# Patient Record
Sex: FEMALE | Race: WHITE | NOT HISPANIC OR LATINO
[De-identification: names, ages, dates, MRNs, and addresses within clinical notes are randomized per-mention and may not be internally consistent; named-entity substitution may affect disease eponyms.]

---

## 2017-03-16 ENCOUNTER — MYAURORA ACCOUNT LINK (OUTPATIENT)
Dept: OTHER | Age: 62
End: 2017-03-16

## 2017-04-12 ENCOUNTER — PRIOR ORIGINAL RECORDS (OUTPATIENT)
Dept: OTHER | Age: 62
End: 2017-04-12

## 2017-04-14 ENCOUNTER — PRIOR ORIGINAL RECORDS (OUTPATIENT)
Dept: OTHER | Age: 62
End: 2017-04-14

## 2017-04-15 ENCOUNTER — MYAURORA ACCOUNT LINK (OUTPATIENT)
Dept: OTHER | Age: 62
End: 2017-04-15

## 2017-04-24 ENCOUNTER — EKG ENCOUNTER (OUTPATIENT)
Dept: LAB | Facility: HOSPITAL | Age: 62
End: 2017-04-24
Attending: NURSE PRACTITIONER
Payer: COMMERCIAL

## 2017-04-24 PROCEDURE — 93005 ELECTROCARDIOGRAM TRACING: CPT

## 2017-04-24 PROCEDURE — 93010 ELECTROCARDIOGRAM REPORT: CPT | Performed by: INTERNAL MEDICINE

## 2017-09-08 ENCOUNTER — APPOINTMENT (OUTPATIENT)
Dept: LAB | Facility: HOSPITAL | Age: 62
End: 2017-09-08
Attending: NURSE PRACTITIONER
Payer: COMMERCIAL

## 2017-09-08 DIAGNOSIS — F33.1 MODERATE EPISODE OF RECURRENT MAJOR DEPRESSIVE DISORDER (HCC): ICD-10-CM

## 2017-09-08 LAB
ATRIAL RATE: 81 BPM
P AXIS: 57 DEGREES
P-R INTERVAL: 142 MS
Q-T INTERVAL: 384 MS
QRS DURATION: 92 MS
QTC CALCULATION (BEZET): 446 MS
R AXIS: -22 DEGREES
T AXIS: 105 DEGREES
VENTRICULAR RATE: 81 BPM

## 2017-09-08 PROCEDURE — 93005 ELECTROCARDIOGRAM TRACING: CPT

## 2017-09-08 PROCEDURE — 93010 ELECTROCARDIOGRAM REPORT: CPT | Performed by: INTERNAL MEDICINE

## 2017-09-13 ENCOUNTER — HOSPITAL ENCOUNTER (INPATIENT)
Facility: HOSPITAL | Age: 62
LOS: 6 days | Discharge: HOME | DRG: 247 | End: 2017-09-20
Attending: EMERGENCY MEDICINE | Admitting: INTERNAL MEDICINE
Payer: COMMERCIAL

## 2017-09-13 ENCOUNTER — APPOINTMENT (OUTPATIENT)
Dept: NUCLEAR MEDICINE | Facility: HOSPITAL | Age: 62
DRG: 247 | End: 2017-09-13
Attending: EMERGENCY MEDICINE
Payer: COMMERCIAL

## 2017-09-13 ENCOUNTER — APPOINTMENT (OUTPATIENT)
Dept: GENERAL RADIOLOGY | Facility: HOSPITAL | Age: 62
DRG: 247 | End: 2017-09-13
Attending: EMERGENCY MEDICINE
Payer: COMMERCIAL

## 2017-09-13 DIAGNOSIS — R06.00 DYSPNEA, UNSPECIFIED TYPE: Primary | ICD-10-CM

## 2017-09-13 LAB
ALBUMIN SERPL-MCNC: 2.9 G/DL (ref 3.5–4.8)
ALP LIVER SERPL-CCNC: 145 U/L (ref 50–130)
ALT SERPL-CCNC: 36 U/L (ref 14–54)
AST SERPL-CCNC: 32 U/L (ref 15–41)
BILIRUB SERPL-MCNC: 0.6 MG/DL (ref 0.1–2)
BILIRUB UR QL STRIP.AUTO: NEGATIVE
BUN BLD-MCNC: 36 MG/DL (ref 8–20)
CALCIUM BLD-MCNC: 8.7 MG/DL (ref 8.3–10.3)
CHLORIDE: 105 MMOL/L (ref 101–111)
CO2: 26 MMOL/L (ref 22–32)
COLOR UR AUTO: YELLOW
CREAT BLD-MCNC: 2.47 MG/DL (ref 0.55–1.02)
D-DIMER: 0.98 UG/ML FEU (ref 0–0.49)
GLUCOSE BLD-MCNC: 202 MG/DL (ref 70–99)
GLUCOSE UR STRIP.AUTO-MCNC: 50 MG/DL
KETONES UR STRIP.AUTO-MCNC: NEGATIVE MG/DL
M PROTEIN MFR SERPL ELPH: 7.3 G/DL (ref 6.1–8.3)
NITRITE UR QL STRIP.AUTO: NEGATIVE
PH UR STRIP.AUTO: 7 [PH] (ref 4.5–8)
POTASSIUM SERPL-SCNC: 4.7 MMOL/L (ref 3.6–5.1)
PROT UR STRIP.AUTO-MCNC: 100 MG/DL
RBC UR QL AUTO: NEGATIVE
SODIUM SERPL-SCNC: 141 MMOL/L (ref 136–144)
SP GR UR STRIP.AUTO: 1.01 (ref 1–1.03)
TROPONIN: <0.046 NG/ML (ref ?–0.05)
UROBILINOGEN UR STRIP.AUTO-MCNC: <2 MG/DL

## 2017-09-13 PROCEDURE — 71010 XR CHEST AP PORTABLE  (CPT=71010): CPT | Performed by: EMERGENCY MEDICINE

## 2017-09-13 PROCEDURE — 78582 LUNG VENTILAT&PERFUS IMAGING: CPT | Performed by: EMERGENCY MEDICINE

## 2017-09-13 RX ORDER — HYDRALAZINE HYDROCHLORIDE 20 MG/ML
20 INJECTION INTRAMUSCULAR; INTRAVENOUS ONCE
Status: COMPLETED | OUTPATIENT
Start: 2017-09-13 | End: 2017-09-13

## 2017-09-13 RX ORDER — HYDRALAZINE HYDROCHLORIDE 20 MG/ML
20 INJECTION INTRAMUSCULAR; INTRAVENOUS ONCE
Status: COMPLETED | OUTPATIENT
Start: 2017-09-13 | End: 2017-09-14

## 2017-09-13 RX ORDER — METHYLPREDNISOLONE SODIUM SUCCINATE 125 MG/2ML
125 INJECTION, POWDER, LYOPHILIZED, FOR SOLUTION INTRAMUSCULAR; INTRAVENOUS ONCE
Status: COMPLETED | OUTPATIENT
Start: 2017-09-13 | End: 2017-09-13

## 2017-09-13 RX ORDER — LORAZEPAM 2 MG/ML
1 INJECTION INTRAMUSCULAR ONCE
Status: COMPLETED | OUTPATIENT
Start: 2017-09-13 | End: 2017-09-13

## 2017-09-14 ENCOUNTER — APPOINTMENT (OUTPATIENT)
Dept: CV DIAGNOSTICS | Facility: HOSPITAL | Age: 62
DRG: 247 | End: 2017-09-14
Attending: INTERNAL MEDICINE
Payer: COMMERCIAL

## 2017-09-14 PROBLEM — R06.00 DYSPNEA, UNSPECIFIED TYPE: Status: ACTIVE | Noted: 2017-09-14

## 2017-09-14 LAB
BUN BLD-MCNC: 39 MG/DL (ref 8–20)
CALCIUM BLD-MCNC: 9.4 MG/DL (ref 8.3–10.3)
CHLORIDE: 105 MMOL/L (ref 101–111)
CO2: 23 MMOL/L (ref 22–32)
CREAT BLD-MCNC: 2.47 MG/DL (ref 0.55–1.02)
ERYTHROCYTE [DISTWIDTH] IN BLOOD BY AUTOMATED COUNT: 13.8 % (ref 11.5–16)
EST. AVERAGE GLUCOSE BLD GHB EST-MCNC: 163 MG/DL (ref 68–126)
GLUCOSE BLD-MCNC: 197 MG/DL (ref 65–99)
GLUCOSE BLD-MCNC: 246 MG/DL (ref 65–99)
GLUCOSE BLD-MCNC: 247 MG/DL (ref 65–99)
GLUCOSE BLD-MCNC: 306 MG/DL (ref 65–99)
GLUCOSE BLD-MCNC: 365 MG/DL (ref 70–99)
HBA1C MFR BLD HPLC: 7.3 % (ref ?–5.7)
HCT VFR BLD AUTO: 47.5 % (ref 34–50)
HGB BLD-MCNC: 15.6 G/DL (ref 12–16)
MCH RBC QN AUTO: 30.6 PG (ref 27–33.2)
MCHC RBC AUTO-ENTMCNC: 32.8 G/DL (ref 31–37)
MCV RBC AUTO: 93.1 FL (ref 81–100)
PLATELET # BLD AUTO: 214 10(3)UL (ref 150–450)
POTASSIUM SERPL-SCNC: 5.3 MMOL/L (ref 3.6–5.1)
RBC # BLD AUTO: 5.1 X10(6)UL (ref 3.8–5.1)
RED CELL DISTRIBUTION WIDTH-SD: 47 FL (ref 35.1–46.3)
SODIUM SERPL-SCNC: 139 MMOL/L (ref 136–144)
WBC # BLD AUTO: 6.2 X10(3) UL (ref 4–13)

## 2017-09-14 PROCEDURE — 99254 IP/OBS CNSLTJ NEW/EST MOD 60: CPT | Performed by: INTERNAL MEDICINE

## 2017-09-14 PROCEDURE — 93306 TTE W/DOPPLER COMPLETE: CPT | Performed by: INTERNAL MEDICINE

## 2017-09-14 RX ORDER — ATORVASTATIN CALCIUM 20 MG/1
20 TABLET, FILM COATED ORAL DAILY
COMMUNITY
End: 2017-09-27

## 2017-09-14 RX ORDER — ALLOPURINOL 300 MG/1
150 TABLET ORAL DAILY
Status: DISCONTINUED | OUTPATIENT
Start: 2017-09-14 | End: 2017-09-20

## 2017-09-14 RX ORDER — SODIUM PHOSPHATE, DIBASIC AND SODIUM PHOSPHATE, MONOBASIC 7; 19 G/133ML; G/133ML
1 ENEMA RECTAL ONCE AS NEEDED
Status: DISCONTINUED | OUTPATIENT
Start: 2017-09-14 | End: 2017-09-20

## 2017-09-14 RX ORDER — PANTOPRAZOLE SODIUM 40 MG/1
40 TABLET, DELAYED RELEASE ORAL
Status: DISCONTINUED | OUTPATIENT
Start: 2017-09-14 | End: 2017-09-20

## 2017-09-14 RX ORDER — ACETAMINOPHEN 325 MG/1
650 TABLET ORAL EVERY 4 HOURS PRN
Status: DISCONTINUED | OUTPATIENT
Start: 2017-09-14 | End: 2017-09-20

## 2017-09-14 RX ORDER — HYDROCODONE BITARTRATE AND ACETAMINOPHEN 5; 325 MG/1; MG/1
2 TABLET ORAL EVERY 4 HOURS PRN
Status: DISCONTINUED | OUTPATIENT
Start: 2017-09-14 | End: 2017-09-20

## 2017-09-14 RX ORDER — OMEGA-3-ACID ETHYL ESTERS 1 G/1
2 CAPSULE, LIQUID FILLED ORAL 2 TIMES DAILY
COMMUNITY
End: 2018-02-14

## 2017-09-14 RX ORDER — IPRATROPIUM BROMIDE AND ALBUTEROL SULFATE 2.5; .5 MG/3ML; MG/3ML
3 SOLUTION RESPIRATORY (INHALATION) EVERY 4 HOURS PRN
Status: DISCONTINUED | OUTPATIENT
Start: 2017-09-14 | End: 2017-09-20

## 2017-09-14 RX ORDER — HYDRALAZINE HYDROCHLORIDE 20 MG/ML
10 INJECTION INTRAMUSCULAR; INTRAVENOUS EVERY 6 HOURS PRN
Status: DISCONTINUED | OUTPATIENT
Start: 2017-09-14 | End: 2017-09-20

## 2017-09-14 RX ORDER — DEXTROSE MONOHYDRATE 25 G/50ML
50 INJECTION, SOLUTION INTRAVENOUS
Status: DISCONTINUED | OUTPATIENT
Start: 2017-09-14 | End: 2017-09-20

## 2017-09-14 RX ORDER — ASPIRIN 81 MG/1
81 TABLET ORAL NIGHTLY
Status: DISCONTINUED | OUTPATIENT
Start: 2017-09-14 | End: 2017-09-20

## 2017-09-14 RX ORDER — LABETALOL HYDROCHLORIDE 5 MG/ML
10 INJECTION, SOLUTION INTRAVENOUS EVERY 6 HOURS PRN
Status: DISCONTINUED | OUTPATIENT
Start: 2017-09-14 | End: 2017-09-20

## 2017-09-14 RX ORDER — ACETAMINOPHEN 325 MG/1
650 TABLET ORAL EVERY 6 HOURS PRN
Status: DISCONTINUED | OUTPATIENT
Start: 2017-09-14 | End: 2017-09-20

## 2017-09-14 RX ORDER — POLYETHYLENE GLYCOL 3350 17 G/17G
17 POWDER, FOR SOLUTION ORAL DAILY PRN
Status: DISCONTINUED | OUTPATIENT
Start: 2017-09-14 | End: 2017-09-20

## 2017-09-14 RX ORDER — DOCUSATE SODIUM 100 MG/1
100 CAPSULE, LIQUID FILLED ORAL 2 TIMES DAILY
Status: DISCONTINUED | OUTPATIENT
Start: 2017-09-14 | End: 2017-09-20

## 2017-09-14 RX ORDER — ONDANSETRON 2 MG/ML
4 INJECTION INTRAMUSCULAR; INTRAVENOUS EVERY 6 HOURS PRN
Status: DISCONTINUED | OUTPATIENT
Start: 2017-09-14 | End: 2017-09-20

## 2017-09-14 RX ORDER — PRASUGREL HCL 10 MG
10 TABLET ORAL DAILY
COMMUNITY
Start: 2017-08-17 | End: 2021-04-12

## 2017-09-14 RX ORDER — BISACODYL 10 MG
10 SUPPOSITORY, RECTAL RECTAL
Status: DISCONTINUED | OUTPATIENT
Start: 2017-09-14 | End: 2017-09-20

## 2017-09-14 RX ORDER — HYDRALAZINE HYDROCHLORIDE 20 MG/ML
INJECTION INTRAMUSCULAR; INTRAVENOUS
Status: COMPLETED
Start: 2017-09-14 | End: 2017-09-14

## 2017-09-14 RX ORDER — DIAZEPAM 5 MG/1
5 TABLET ORAL NIGHTLY
COMMUNITY
End: 2017-11-29

## 2017-09-14 RX ORDER — DIAZEPAM 5 MG/1
2.5 TABLET ORAL EVERY MORNING
COMMUNITY
End: 2017-11-29

## 2017-09-14 RX ORDER — HYDRALAZINE HYDROCHLORIDE 25 MG/1
25 TABLET, FILM COATED ORAL EVERY 8 HOURS SCHEDULED
Status: DISCONTINUED | OUTPATIENT
Start: 2017-09-14 | End: 2017-09-15

## 2017-09-14 RX ORDER — DIAZEPAM 5 MG/1
2.5 TABLET ORAL EVERY 12 HOURS PRN
Status: DISCONTINUED | OUTPATIENT
Start: 2017-09-14 | End: 2017-09-20

## 2017-09-14 RX ORDER — ALLOPURINOL 300 MG/1
150 TABLET ORAL DAILY
COMMUNITY
End: 2017-09-27

## 2017-09-14 RX ORDER — LEVOTHYROXINE SODIUM 0.07 MG/1
75 TABLET ORAL
Status: DISCONTINUED | OUTPATIENT
Start: 2017-09-14 | End: 2017-09-20

## 2017-09-14 RX ORDER — LABETALOL HYDROCHLORIDE 5 MG/ML
20 INJECTION, SOLUTION INTRAVENOUS ONCE
Status: COMPLETED | OUTPATIENT
Start: 2017-09-14 | End: 2017-09-14

## 2017-09-14 RX ORDER — HYDROCODONE BITARTRATE AND ACETAMINOPHEN 5; 325 MG/1; MG/1
1 TABLET ORAL EVERY 4 HOURS PRN
Status: DISCONTINUED | OUTPATIENT
Start: 2017-09-14 | End: 2017-09-20

## 2017-09-14 RX ORDER — METOPROLOL SUCCINATE 50 MG/1
50 TABLET, EXTENDED RELEASE ORAL
Status: DISCONTINUED | OUTPATIENT
Start: 2017-09-14 | End: 2017-09-17

## 2017-09-14 RX ORDER — METHYLPHENIDATE HYDROCHLORIDE 5 MG/1
15 TABLET ORAL 2 TIMES DAILY
Status: DISCONTINUED | OUTPATIENT
Start: 2017-09-14 | End: 2017-09-14

## 2017-09-14 RX ORDER — HEPARIN SODIUM 5000 [USP'U]/ML
5000 INJECTION, SOLUTION INTRAVENOUS; SUBCUTANEOUS EVERY 8 HOURS SCHEDULED
Status: DISCONTINUED | OUTPATIENT
Start: 2017-09-14 | End: 2017-09-20

## 2017-09-14 RX ORDER — IMIPRAMINE HCL 50 MG
50 TABLET ORAL NIGHTLY
Status: DISCONTINUED | OUTPATIENT
Start: 2017-09-14 | End: 2017-09-14

## 2017-09-14 RX ORDER — LEVOTHYROXINE SODIUM 0.07 MG/1
75 TABLET ORAL
COMMUNITY
End: 2017-11-08 | Stop reason: DRUGHIGH

## 2017-09-14 RX ORDER — PRASUGREL 10 MG/1
10 TABLET, FILM COATED ORAL DAILY
Status: DISCONTINUED | OUTPATIENT
Start: 2017-09-14 | End: 2017-09-20

## 2017-09-14 RX ORDER — ZOLPIDEM TARTRATE 5 MG/1
5 TABLET ORAL NIGHTLY PRN
Status: DISCONTINUED | OUTPATIENT
Start: 2017-09-14 | End: 2017-09-20

## 2017-09-14 RX ORDER — LOSARTAN POTASSIUM 50 MG/1
50 TABLET ORAL DAILY
Status: DISCONTINUED | OUTPATIENT
Start: 2017-09-14 | End: 2017-09-15

## 2017-09-14 RX ORDER — ALBUTEROL SULFATE 90 UG/1
1 AEROSOL, METERED RESPIRATORY (INHALATION) EVERY 6 HOURS PRN
Status: DISCONTINUED | OUTPATIENT
Start: 2017-09-14 | End: 2017-09-20

## 2017-09-14 RX ORDER — ATORVASTATIN CALCIUM 20 MG/1
20 TABLET, FILM COATED ORAL DAILY
Status: DISCONTINUED | OUTPATIENT
Start: 2017-09-14 | End: 2017-09-19

## 2017-09-14 NOTE — CONSULTS
[facility]  Report of Consultation    Elza Parma Patient Status:  Inpatient    1955 MRN [id number]   [place] 7NE-A Attending Biju Gomez MD   Hosp Day # 0 PCP Zaid Min MD     Reason for Consultation:  HTN    Hi ELUTING STENT EA ADDL  1973:  IMPACT TOOTH REM BONY W/COMP Bilateral      Comment: wisdom teeth  3/24/16: KNEE REPLACEMENT SURGERY      Comment: right TKA   5/31/2011: LEFT HEART CATH,PERCUTANEOUS      Comment: CAD  3/2/2015: LEFT HEART CATH,PERCUTANEOUS  N Before breakfast  •  Methylphenidate HCl (RITALIN) tab 15 mg, 15 mg, Oral, BID  •  Metoprolol Succinate ER (Toprol XL) 24 hr tab 50 mg, 50 mg, Oral, 2x Daily(Beta Blocker)  •  Pantoprazole Sodium (PROTONIX) EC tab 40 mg, 40 mg, Oral, QAM AC  •  glucose (DE except as described above in HPI. Physical Exam:  Blood pressure (!) 168/101, pulse 99, temperature 98.7 °F (37.1 °C), temperature source Oral, resp.  rate 19, height 5' 4\" (1.626 m), weight 220 lb 7.4 oz (100 kg), SpO2 98 %, not currently breastfeeding

## 2017-09-14 NOTE — ED PROVIDER NOTES
Patient Seen in: [facility] Emergency Department    History   Patient presents with:  Dyspnea NEFTALY SOB (respiratory)    Stated Complaint: NEFTALY    HPI    This is a 75-year-old female who arrives here with complaints of shortness of breath the patient h METAL STENT (BMS)  3/3/2015: CATH PERCUTANEOUS  TRANSLUMINAL CORONARY ANGIO*  No date: COLONOSCOPY  3/5/2015:  PLMT CORONARY DRUG ELUTING STENT EA ADDL  1973:  IMPACT TOOTH REM BONY W/COMP Bilateral      Comment: wisdom teeth  3/24/16: KNEE REPLACEMENT GARCIA Resp 26   Ht 162.6 cm (5' 4\")   Wt 95.3 kg   SpO2 98%   BMI 36.05 kg/m²         Physical Exam  General: . Slightly obese female in no pain put in some mild respiratory distress  .  The patient is not septic or toxic    HEENT: Atraumatic, conjunctiva are n radiograph was obtained. COMPARISON:  [facility] , XR CHEST AP PORTABLE  (CPT=71010), 7/16/2014, 14:17. INDICATIONS:  NEFTALY  PATIENT STATED HISTORY: (As transcribed by Technologist)  Patient complains of difficulty breathing.     FINDINGS:  Monitoring device pr specified.     Medications Prescribed:  Current Discharge Medication List

## 2017-09-14 NOTE — CONSULTS
[facility]  Report of Consultation    Sheron Borjas Patient Status:  Inpatient    1955 MRN [id number]   [facility] 7NE-A Attending Millicent Pino MD   Hosp Day # 0 PCP Juan Manuel Kilgore MD     Reason for Consultation:  CKD 4/HTN Comment: right TKA   2011: LEFT HEART CATH,PERCUTANEOUS      Comment: CAD  3/2/2015: LEFT HEART CATH,PERCUTANEOUS  No date:   13: PATH REPORT      Comment: cecal polyps - 1 tubular adenoma  2011: RENAL ANGIO, CARDIAC CATH  8: Brain Lama mg, 50 mg, Oral, 2x Daily(Beta Blocker)  •  Pantoprazole Sodium (PROTONIX) EC tab 40 mg, 40 mg, Oral, QAM AC  •  glucose (DEX4) oral liquid 15 g, 15 g, Oral, Q15 Min PRN **OR** Glucose-Vitamin C (DEX-4) 4-0.006 g chewable tab 4 tablet, 4 tablet, Oral, Q15 changes  Denies CP or palpitations  + SOB  Denies abd or flank pain  Denies N/V/D  Denies change in urinary habits or gross hematuria  Denies LE edema  Denies skin rashes/myalgias/arthralgias      Physical Exam:   BP (!) 177/95 (BP Location: Left arm)   Pu Value   09/14/2017 39 (H)   09/13/2017 36 (H)   10/01/2016 72 (H)   07/18/2014 51 (H)   07/17/2014 45 (H)   07/16/2014 40 (H)   ----------  Blood Urea Nitrogen (mg/dL)   Date Value   02/13/2017 49 (H)   08/27/2015 34 (H)   06/07/2014 48 (H)   01/12/2013 31 questions or concerns.        Ish Elias  9/14/2017  1:19 PM

## 2017-09-14 NOTE — PAYOR COMM NOTE
--------------  ADMISSION REVIEW     Payor: [employer]  Subscriber #:  [id number]  Authorization Number: N/A    Admit date: 9/14/17  Admit time: [facility]       Admitting Physician: Damion Fernandez MD  Attending Physician:  Theresa Chacko MD  Primary Care 9/2011   • MENOPAUSE 2001   • Osteoarthritis    • Other and unspecified hyperlipidemia    • Pneumonia, organism unspecified(486)    • Polyp of colon    • Renal artery stenosis ([facility] [address] 5/2011    right kidney   • SLEEP APNEA    • Type II or unspecified type di °C)  Temp src: Temporal  SpO2: 95 %  O2 Device: None (Room air)[SM.2]    Current:[SM.1]BP (!) 188/120   Pulse 102   Temp 98.4 °F (36.9 °C) (Temporal)   Resp 26   Ht 162.6 cm (5' 4\")   Wt 95.3 kg   SpO2 98%   BMI 36.05 kg/m²[SM.2]     Physical Exam  Iva Yazmin heart. Median sternotomy changes noted. Normal heart size and pulmonary vascularity. No pleural effusion or pneumothorax. No lobar consolidation. CONCLUSION:  No active cardiopulmonary process identified.     Dictated by: Meliza Oakley MD on 9/13/2017 CP - ?angina; likely due to her HTN; will check echo; stress when SBP ok  3. CRI - suspect that her baseline creat is higher; recommend renal eval  4.  STEFFEN  Vitaliy Uriostegui MD  9/14/2017  11:16 AM     Reason for Consultation:  Nephrology  CKD 4/HTN  Imp User    9/14/2017 0929 Given 5 Units Subcutaneous (Left Upper Arm) Ledy Reyes RN      Insulin Aspart Pen (NOVOLOG) 100 UNIT/ML flexpen 2-10 Units     Date Action Dose Route User    9/14/2017 1212 Given 6 Units Subcutaneous (Left Upper Arm) Jillian Merchant

## 2017-09-14 NOTE — ED NOTES
PCT 7CTU CHARGE WITH UPDATE ON B/P RESPONSE.   WILL OBSERVE X 30 MIN AND NOTIFY OF ANY REBOUND IN PRESSURE

## 2017-09-14 NOTE — HISTORICAL OFFICE NOTE
Corrinne Sale      *merged to 2017 note*  : 1955  ACCOUNT: [id number]  PCP: Dr. Silva Levels: 2017    CHIEF COMPLAINT: [Coronary artery disease, syncope.]    HPI: Glennis Cabot has a history of coronary artery disease, bypass surgery, status p

## 2017-09-14 NOTE — CONSULTS
Pulmonary Consult     Assessment / Plan:  1. Acute dyspnea - due to her \"stressful\" interaction  - monitor  2. Chest pain - no e/o PE  - ischemic eval per cards  3. STEFFEN  - CPAP  4.  History of tobacco use in long term remission  - qualifies for lung cance Bilateral      Comment: wisdom teeth  3/24/16: KNEE REPLACEMENT SURGERY      Comment: right TKA   2011: LEFT HEART CATH,PERCUTANEOUS      Comment: CAD  3/2/2015: LEFT HEART CATH,PERCUTANEOUS  No date:   13: PATH REPORT      Comment: cecal po Rfl: 3 9/13/2017 at 2000   aspirin 81 MG Oral Tab EC Take 81 mg by mouth nightly. Disp: 30 tablet Rfl: 11 9/13/2017 at 2000   Cholecalciferol (VITAMIN D) 2000 UNITS Oral Cap Take 2,000 Units by mouth daily.  Disp:  Rfl:  9/13/2017 at 0800   Multiple Vitam Succinate ER 50 MG Oral Tablet 24 Hr Take 1 tablet (50 mg total) by mouth 2 (two) times daily. Disp: 180 tablet Rfl: 3   aspirin 81 MG Oral Tab EC Take 81 mg by mouth nightly.    Disp: 30 tablet Rfl: 11   Cholecalciferol (VITAMIN D) 2000 UNITS Oral Cap Take Brother    • Alcohol and Other Disorders Associated Son    • Substance Abuse Son    • Obesity Sister    • Diabetes Sister          Exam:   09/14/17  0251 09/14/17  0921 09/14/17  1152 09/14/17  1630   BP:   (!) 177/95 (!) 172/91   BP Location:   Left arm L

## 2017-09-14 NOTE — PLAN OF CARE
A/OX4, SBP 170s, medicated with hydralazine, down to 166/77 at 0416. Denies pain or sob. Lungs clear. Sr/st on tele.   CARDIOVASCULAR - ADULT    • Maintains optimal cardiac output and hemodynamic stability Progressing        RESPIRATORY - ADULT    • Achieve

## 2017-09-14 NOTE — PLAN OF CARE
Assumed care at 0730. Blood pressure kept within parameters. Cards, renal, and pulm consulted. Echo completed. Needs attended to.     CARDIOVASCULAR - ADULT    • Maintains optimal cardiac output and hemodynamic stability Progressing        RESPIRATORY -

## 2017-09-14 NOTE — H&P
[facility]  History & Physical    Jie Bi Patient Status:  Inpatient    1955 MRN [id number]   [facility] 7NE-A Attending Jessika Higuera MD   Hosp Day # 0 PCP Eva Lai MD     Cc: shortness of breath    History of Pres TRANSLUMINAL CORONARY ANGIO*  No date: COLONOSCOPY  3/5/2015: HC PLMT CORONARY DRUG ELUTING STENT EA ADDL  1973:  IMPACT TOOTH REM BONY W/COMP Bilateral      Comment: wisdom teeth  3/24/16: KNEE REPLACEMENT SURGERY      Comment: right TKA   5/31/2011: LEFT Rfl: 2 9/13/2017 at 2000time   diazepam 5 MG Oral Tab Take a half tablet by mouth in the morning and one tablet by mouth at night.  Disp: 45 tablet Rfl: 2 9/13/2017 at 2000   ATORVASTATIN 20 MG Oral Tab TAKE 1 TABLET(20 MG) BY MOUTH DAILY Disp: 30 tablet Rf PEN NEEDLE ULTRAFINE 29G X 12.7MM Does not apply Misc USE DAILY AS DIRECTED Disp: 200 each Rfl: 3 Taking   NOVOLOG FLEXPEN 100 UNIT/ML Subcutaneous Solution Pen-injector Inject 3 Units into the skin 3 (three) times daily before meals.  Disp: 15 mL Rfl: 5 No Josias Smith is a 57year old female with PMH significant for HTN, T2DM, CAD s/p CABG/ PCI, CKD stage 4, GERD, HL, gout  and depression presenting to [facility] for shortness of breath.        # acute onset shortness of breath  - no hx of lung disease, sats 8280    Agree with PA note above. 54yo F with h/o HTN, DMII, CKD presenting with SOB and chest discomfort after discussion with family member. Some improvement with nebs in ED. Pt independently seen and examined. CTA, RRR, abd NT/ND +bs.  Labs reviewed

## 2017-09-15 ENCOUNTER — APPOINTMENT (OUTPATIENT)
Dept: CV DIAGNOSTICS | Facility: HOSPITAL | Age: 62
DRG: 247 | End: 2017-09-15
Attending: INTERNAL MEDICINE
Payer: COMMERCIAL

## 2017-09-15 LAB
ATRIAL RATE: 103 BPM
ATRIAL RATE: 87 BPM
BUN BLD-MCNC: 48 MG/DL (ref 8–20)
CALCIUM BLD-MCNC: 9.3 MG/DL (ref 8.3–10.3)
CHLORIDE: 108 MMOL/L (ref 101–111)
CO2: 26 MMOL/L (ref 22–32)
CREAT BLD-MCNC: 2.38 MG/DL (ref 0.55–1.02)
GLUCOSE BLD-MCNC: 105 MG/DL (ref 65–99)
GLUCOSE BLD-MCNC: 119 MG/DL (ref 70–99)
GLUCOSE BLD-MCNC: 121 MG/DL (ref 65–99)
GLUCOSE BLD-MCNC: 125 MG/DL (ref 65–99)
GLUCOSE BLD-MCNC: 175 MG/DL (ref 65–99)
P AXIS: 54 DEGREES
P AXIS: 66 DEGREES
P-R INTERVAL: 136 MS
P-R INTERVAL: 136 MS
POTASSIUM SERPL-SCNC: 4.4 MMOL/L (ref 3.6–5.1)
Q-T INTERVAL: 364 MS
Q-T INTERVAL: 390 MS
QRS DURATION: 92 MS
QRS DURATION: 94 MS
QTC CALCULATION (BEZET): 469 MS
QTC CALCULATION (BEZET): 476 MS
R AXIS: -17 DEGREES
R AXIS: -6 DEGREES
SODIUM SERPL-SCNC: 143 MMOL/L (ref 136–144)
T AXIS: 106 DEGREES
T AXIS: 125 DEGREES
VENTRICULAR RATE: 103 BPM
VENTRICULAR RATE: 87 BPM

## 2017-09-15 PROCEDURE — 99232 SBSQ HOSP IP/OBS MODERATE 35: CPT | Performed by: INTERNAL MEDICINE

## 2017-09-15 RX ORDER — HYDRALAZINE HYDROCHLORIDE 20 MG/ML
5 INJECTION INTRAMUSCULAR; INTRAVENOUS EVERY 6 HOURS PRN
Status: DISCONTINUED | OUTPATIENT
Start: 2017-09-15 | End: 2017-09-20

## 2017-09-15 RX ORDER — HYDRALAZINE HYDROCHLORIDE 25 MG/1
25 TABLET, FILM COATED ORAL EVERY 8 HOURS SCHEDULED
Status: DISCONTINUED | OUTPATIENT
Start: 2017-09-15 | End: 2017-09-15

## 2017-09-15 RX ORDER — DOBUTAMINE HYDROCHLORIDE 100 MG/100ML
INJECTION INTRAVENOUS
Status: DISPENSED
Start: 2017-09-15 | End: 2017-09-16

## 2017-09-15 RX ORDER — LOSARTAN POTASSIUM 100 MG/1
100 TABLET ORAL DAILY
Status: DISCONTINUED | OUTPATIENT
Start: 2017-09-15 | End: 2017-09-20

## 2017-09-15 RX ORDER — HYDRALAZINE HYDROCHLORIDE 50 MG/1
50 TABLET, FILM COATED ORAL EVERY 8 HOURS SCHEDULED
Status: DISCONTINUED | OUTPATIENT
Start: 2017-09-15 | End: 2017-09-15

## 2017-09-15 RX ORDER — HYDRALAZINE HYDROCHLORIDE 50 MG/1
50 TABLET, FILM COATED ORAL EVERY 8 HOURS SCHEDULED
Status: DISCONTINUED | OUTPATIENT
Start: 2017-09-15 | End: 2017-09-16

## 2017-09-15 RX ORDER — LOSARTAN POTASSIUM 100 MG/1
100 TABLET ORAL DAILY
Qty: 30 TABLET | Refills: 0 | Status: SHIPPED | OUTPATIENT
Start: 2017-09-16 | End: 2017-09-20

## 2017-09-15 RX ORDER — HYDRALAZINE HYDROCHLORIDE 50 MG/1
50 TABLET, FILM COATED ORAL EVERY 8 HOURS SCHEDULED
Qty: 90 TABLET | Refills: 0 | Status: SHIPPED | OUTPATIENT
Start: 2017-09-15 | End: 2017-09-18

## 2017-09-15 NOTE — PROGRESS NOTES
[facility]  Nephrology Progress Note    Kay Race Patient Status:  Inpatient    1955 MRN [id number]   [facility] 7NE-A Attending Roseann Reid MD   Hosp Day # 1 PCP Josefian Cordero MD       SUBJECTIVE:  No complaints this AM 246*  197*  247*  105*       Meds:     Current Facility-Administered Medications:  allopurinol (ZYLOPRIM) tab 150 mg 150 mg Oral Daily   aspirin EC tab 81 mg 81 mg Oral Nightly   atorvastatin (LIPITOR) tab 20 mg 20 mg Oral Daily   diazepam (VALIUM) tab 2.5 HCl (APRESOLINE) injection 10 mg 10 mg Intravenous Q6H PRN   Labetalol HCl (TRANDATE) injection 10 mg 10 mg Intravenous Q6H PRN   hydrALAzine HCl (APRESOLINE) tab 25 mg 25 mg Oral Q8H JERRY   Insulin Aspart Pen (NOVOLOG) 100 UNIT/ML flexpen 2-10 Units 2-10 U

## 2017-09-15 NOTE — PLAN OF CARE
Assumed care @0700. Pt AOx4. No complaints of pain. Elevated BP controlled with scheduled and PRN medications. Unable to complete stress echo due to elevated BP. Plan for stress test during this admission. Oriented to room and call light.    Will

## 2017-09-15 NOTE — DISCHARGE SUMMARY
General Medicine Discharge Summary     Patient ID:  Renaldo City  57year old  7/13/1955    Admit date: 9/13/2017    Discharge date and time: 9/15/2017    Attending Physician: Kole Magdaleno MD     [facility] following as above     # T2DM with associated CKD  - fair controlled with Ha1c of 7.3% on 9/14/17- better controlled May 2017  - hyperglycemia noted -  expected due to one-time  IV steroid administration   - resume po sitagliptin on discharge  - continue l known as:  SYNTHROID, LEVOTHROID     methylphenidate 10 MG Tabs  Take 1.5 tablets (15 mg total) by mouth 2 (two) times daily. Metoprolol Succinate ER 50 MG Tb24  Commonly known as: Toprol XL  Take 1 tablet (50 mg total) by mouth 2 (two) times daily.

## 2017-09-15 NOTE — PROGRESS NOTES
[facility]  Cardiology Progress Note    Raz Bass Patient Status:  Inpatient    1955 MRN [id number]   [facility] 7NE-A Attending Yeimy De Paz MD   [facility] Day # 1 PCP Addy Reyes MD     Subjective:  No further chest pain. Units Subcutaneous Q8H [address]   • docusate sodium  100 mg Oral BID   • Insulin Aspart Pen  2-10 Units Subcutaneous TID CC and HS   • Prasugrel HCl  10 mg Oral Daily   • Imipramine HCl  75 mg Oral Nightly          Assessment:  · SOB, resolved.  Echo with normal

## 2017-09-15 NOTE — PROGRESS NOTES
[facility] Patient Status:  Inpatient    1955 MRN [id number]   [facility] 7NE-A Attending Brandy Morgan MD   Hosp Day # 1 PCP Ioana Hernandez MD     Pulm / Critical Care Progress Note     S: pt feels fine. [P.O.:300]  Out: 2450 [Urine:2450]     Physical Exam:   General: alert, cooperative, oriented. No respiratory distress. Head: Normocephalic, without obvious abnormality, atraumatic. Lungs: ctab   Chest wall: No tenderness or deformity.    Heart: Regula

## 2017-09-15 NOTE — PLAN OF CARE
Assumed care at [address]. AOx4. Denies pain or discomfort. Pt is on room air; no SOB noted. Scheduled Hydralazine given to maintain 's-170's. Plan of care discussed with pt. Call light within reach.     CARDIOVASCULAR - ADULT    • Maintains optimal c

## 2017-09-15 NOTE — PROGRESS NOTES
Cardiodiagnostics    Pt for dobutamine stress echo, /122. Dr. Burks Clubs here and notified that test could not be performed with BP elevated. Pt returned to her room for BP management and stress test will be planned for Monday.   Mary Givens, 09/15/

## 2017-09-16 LAB
GLUCOSE BLD-MCNC: 100 MG/DL (ref 65–99)
GLUCOSE BLD-MCNC: 112 MG/DL (ref 65–99)
GLUCOSE BLD-MCNC: 122 MG/DL (ref 65–99)
GLUCOSE BLD-MCNC: 168 MG/DL (ref 65–99)

## 2017-09-16 PROCEDURE — 99232 SBSQ HOSP IP/OBS MODERATE 35: CPT | Performed by: INTERNAL MEDICINE

## 2017-09-16 RX ORDER — HYDRALAZINE HYDROCHLORIDE 50 MG/1
100 TABLET, FILM COATED ORAL EVERY 8 HOURS SCHEDULED
Status: DISCONTINUED | OUTPATIENT
Start: 2017-09-16 | End: 2017-09-20

## 2017-09-16 NOTE — PLAN OF CARE
SBP 150s-160s at 9pm & 1am, however 5am bp 183/92. Scheduled 6am bp meds given. /70. Denies pain. Slept in intervals during night.     CARDIOVASCULAR - ADULT    • Maintains optimal cardiac output and hemodynamic stability Progressing        RESPIR

## 2017-09-16 NOTE — PROGRESS NOTES
[facility] Hospitalist Progress Note     Joe Loving Patient Status:  Inpatient    1955 MRN [id number]   [facility] 7NE-A Attending Va Jules MD   Hosp Day # 2 PCP Courtney Cabello MD     CC: follow up  Delayed entry.  Pt seen and exa breakfast   • Metoprolol Succinate ER  50 mg Oral 2x Daily(Beta Blocker)   • Pantoprazole Sodium  40 mg Oral QAM AC   • Heparin Sodium (Porcine)  5,000 Units Subcutaneous Q8H [facility]   • docusate sodium  100 mg Oral BID   • Insulin Aspart Pen  2-10 Units Subcu home     # GERD  - patient with recurrent heart burn symptoms when she has tried to stop PPI  - continue po PPI, FU with PCP     # Hypothyroidism  - replace with Synthroid     # STEFFEN  - STEFFEN protocol     # depression / ADHD  - continue tofranil  - Ritalin  C

## 2017-09-16 NOTE — PROGRESS NOTES
[facility] Hospitalist Progress Note     Yuki Payne Patient Status:  Inpatient    1955 MRN [id number]   [facility] 7NE-A Attending Bette Schulte MD   Hosp Day # 2 PCP Melanie Irwin MD     CC: follow up    SUBJECTIVE:  No CP, SOB, or Daily(Beta Blocker)   • Pantoprazole Sodium  40 mg Oral QAM AC   • Heparin Sodium (Porcine)  5,000 Units Subcutaneous Q8H [address]   • docusate sodium  100 mg Oral BID   • Insulin Aspart Pen  2-10 Units Subcutaneous TID CC and HS   • Prasugrel HCl  10 mg Oral D with PCP     # Hypothyroidism  - replace with Synthroid     # STEFFEN  - STEFFEN protocol     # depression / ADHD  - continue tofranil  - Ritalin  Can be restarted as BP better controlled     # history of prolonged QTc  - per outpatient cards notes, attributed  to

## 2017-09-16 NOTE — PROGRESS NOTES
CARDIOLOGY PROGRESS NOTE - [facility]      NAME: Júnior Nobles - ROOM: 8411/1506-E - MRN: [id number] - Age: 57year old - :  1955    Date of Admission: 2017  8:14 PM  Admission Diagnosis: Dyspnea, unspecified type Glucose-Vitamin C **OR** dextrose 50% **OR** glucose **OR** Glucose-Vitamin C, acetaminophen **OR** HYDROcodone-acetaminophen **OR** HYDROcodone-acetaminophen, acetaminophen, Zolpidem Tartrate, PEG 3350, bisacodyl, FLEET ENEMA, ondansetron HCl, Albuterol S

## 2017-09-16 NOTE — PROGRESS NOTES
[facility]  Nephrology Progress Note    Angel Christiansen Patient Status:  Inpatient    1955 MRN [id number]   [facility] 7NE-A Attending Elena Ramires MD   Hosp Day # 2 PCP Sabra Wallace MD       SUBJECTIVE:  No overnight events, 175*  100*       Meds:     Current Facility-Administered Medications:  losartan (COZAAR) tab 100 mg 100 mg Oral Daily   hydrALAzine HCl (APRESOLINE) tab 50 mg 50 mg Oral Q8H [address]   hydrALAzine HCl (APRESOLINE) injection 5 mg 5 mg Intravenous Q6H PRN   allop Sulfate  (90 Base) MCG/ACT inhaler 1 puff 1 puff Inhalation Q6H PRN   ipratropium-albuterol (DUONEB) nebulizer solution 3 mL 3 mL Nebulization Q4H PRN   hydrALAzine HCl (APRESOLINE) injection 10 mg 10 mg Intravenous Q6H PRN   Labetalol HCl (TRANDATE

## 2017-09-16 NOTE — PLAN OF CARE
CARDIOVASCULAR - ADULT    • Maintains optimal cardiac output and hemodynamic stability Progressing    • Absence of cardiac arrhythmias or at baseline Progressing            Received report at 0730. Patient alert and oriented x4. No complaints of pain.  Juana

## 2017-09-17 LAB
GLUCOSE BLD-MCNC: 113 MG/DL (ref 65–99)
GLUCOSE BLD-MCNC: 115 MG/DL (ref 65–99)
GLUCOSE BLD-MCNC: 175 MG/DL (ref 65–99)
GLUCOSE BLD-MCNC: 99 MG/DL (ref 65–99)

## 2017-09-17 PROCEDURE — 99232 SBSQ HOSP IP/OBS MODERATE 35: CPT | Performed by: INTERNAL MEDICINE

## 2017-09-17 NOTE — PROGRESS NOTES
[facility] Hospitalist Progress Note     Vaishali Ricardo Patient Status:  Inpatient    1955 MRN [id number]   [facility] 7NE-A Attending Codie Pagan MD   Hosp Day # 3 PCP Jean Meléndez MD     CC: follow up    SUBJECTIVE:  No CP, SOB, or Pantoprazole Sodium  40 mg Oral QAM AC   • Heparin Sodium (Porcine)  5,000 Units Subcutaneous Q8H [address]   • docusate sodium  100 mg Oral BID   • Insulin Aspart Pen  2-10 Units Subcutaneous TID CC and HS   • Prasugrel HCl  10 mg Oral Daily   • Imipramine HCl Hypothyroidism  - replace with Synthroid     # STEFFEN  - STEFFEN protocol     # depression / ADHD  - continue tofranil  - Ritalin  Can be restarted as BP better controlled     # history of prolonged QTc  - per outpatient cards notes, attributed  to her psych meds

## 2017-09-17 NOTE — PROGRESS NOTES
[facility]  Nephrology Progress Note    Sheron Borjas Patient Status:  Inpatient    1955 MRN [id number]   [facility] 7NE-A Attending Millicent Pino MD   Hosp Day # 3 PCP Juan Manuel Kilgore MD       SUBJECTIVE:  No overnight events, Meds:     Current Facility-Administered Medications:  hydrALAzine HCl (APRESOLINE) tab 100 mg 100 mg Oral Q8H [facility]   losartan (COZAAR) tab 100 mg 100 mg Oral Daily   hydrALAzine HCl (APRESOLINE) injection 5 mg 5 mg Intravenous Q6H PRN   allopurinol (Z 108 (90 Base) MCG/ACT inhaler 1 puff 1 puff Inhalation Q6H PRN   ipratropium-albuterol (DUONEB) nebulizer solution 3 mL 3 mL Nebulization Q4H PRN   hydrALAzine HCl (APRESOLINE) injection 10 mg 10 mg Intravenous Q6H PRN   Labetalol HCl (TRANDATE) injection

## 2017-09-17 NOTE — PROGRESS NOTES
[facility]  Progress Note    Chrissy Briceño Patient Status:  Inpatient    1955 MRN [id number]   [facility] 7NE-A Attending Misti Bosch MD   Hosp Day # 3 PCP Kenneth Valdovinos MD       Assessment and Plan:  Patient Active Problem hours) at 09/17/17 1307  Last data filed at 09/17/17 1226   Gross per 24 hour   Intake              240 ml   Output             2610 ml   Net            -2370 ml       Wt Readings from Last 3 Encounters:  09/14/17 : 220 lb 7.4 oz (100 kg)  05/11/17 : 214 l (DEX-4) 4-0.006 g chewable tab 4 tablet 4 tablet Oral Q15 Min PRN   Or      dextrose 50% injection 50 mL 50 mL Intravenous Q15 Min PRN   Or      glucose (DEX4) oral liquid 30 g 30 g Oral Q15 Min PRN   Or      Glucose-Vitamin C (DEX-4) 4-0.006 g chewable ta

## 2017-09-17 NOTE — PLAN OF CARE
CARDIOVASCULAR - ADULT    • Maintains optimal cardiac output and hemodynamic stability Progressing    • Absence of cardiac arrhythmias or at baseline Progressing            Received report at 0730. Patient alert and oriented x4. No complaints of pain.  Up a

## 2017-09-18 ENCOUNTER — APPOINTMENT (OUTPATIENT)
Dept: CV DIAGNOSTICS | Facility: HOSPITAL | Age: 62
DRG: 247 | End: 2017-09-18
Attending: INTERNAL MEDICINE
Payer: COMMERCIAL

## 2017-09-18 PROBLEM — R06.00 DYSPNEA, UNSPECIFIED TYPE: Status: RESOLVED | Noted: 2017-09-14 | Resolved: 2017-09-18

## 2017-09-18 PROBLEM — R06.00 DYSPNEA: Status: RESOLVED | Noted: 2017-09-13 | Resolved: 2017-09-18

## 2017-09-18 LAB
BUN BLD-MCNC: 57 MG/DL (ref 8–20)
CALCIUM BLD-MCNC: 8.9 MG/DL (ref 8.3–10.3)
CHLORIDE: 106 MMOL/L (ref 101–111)
CO2: 24 MMOL/L (ref 22–32)
CREAT BLD-MCNC: 2.52 MG/DL (ref 0.55–1.02)
GLUCOSE BLD-MCNC: 107 MG/DL (ref 65–99)
GLUCOSE BLD-MCNC: 118 MG/DL (ref 70–99)
GLUCOSE BLD-MCNC: 153 MG/DL (ref 65–99)
GLUCOSE BLD-MCNC: 155 MG/DL (ref 65–99)
GLUCOSE BLD-MCNC: 89 MG/DL (ref 65–99)
POTASSIUM SERPL-SCNC: 4.4 MMOL/L (ref 3.6–5.1)
SODIUM SERPL-SCNC: 141 MMOL/L (ref 136–144)

## 2017-09-18 PROCEDURE — 93018 CV STRESS TEST I&R ONLY: CPT | Performed by: INTERNAL MEDICINE

## 2017-09-18 PROCEDURE — 93017 CV STRESS TEST TRACING ONLY: CPT | Performed by: INTERNAL MEDICINE

## 2017-09-18 PROCEDURE — 78452 HT MUSCLE IMAGE SPECT MULT: CPT | Performed by: INTERNAL MEDICINE

## 2017-09-18 RX ORDER — ASPIRIN 81 MG/1
324 TABLET, CHEWABLE ORAL ONCE
Status: COMPLETED | OUTPATIENT
Start: 2017-09-19 | End: 2017-09-19

## 2017-09-18 RX ORDER — SODIUM CHLORIDE 9 MG/ML
INJECTION, SOLUTION INTRAVENOUS CONTINUOUS
Status: DISCONTINUED | OUTPATIENT
Start: 2017-09-18 | End: 2017-09-20

## 2017-09-18 NOTE — PROGRESS NOTES
[facility]  Cardiology Progress Note    Subjective:  No complaints at present. Just leaving floor for stress testing.     Objective:  /89 (BP Location: Right arm)   Pulse 87   Temp 98 °F (36.7 °C) (Oral)   Resp 16   Ht 162.6 cm (5' 4\")   Wt 220 films from 2016.   Will tentatively plan on cath in AM pending review of films  Dean Beckham

## 2017-09-18 NOTE — PLAN OF CARE
AOx4.  NSR on tele. Stress test abnormal.  Cardiac cath tomorrow; NPO @ MN. Vitals stable. Will continue to monitor.     CARDIOVASCULAR - ADULT    • Maintains optimal cardiac output and hemodynamic stability Progressing    • Absence of cardiac arrhythmia

## 2017-09-18 NOTE — PROGRESS NOTES
[facility] Hospitalist Progress Note     Raz Bass Patient Status:  Inpatient    1955 MRN [id number]   [facility] 7NE-A Attending Yeimy De Paz MD   Hosp Day # 4 PCP Addy Reyes MD     CC: follow up    SUBJECTIVE:  No CP, SOB, o insulin detemir  30 Units Subcutaneous Nightly   • Levothyroxine Sodium  75 mcg Oral Before breakfast   • Pantoprazole Sodium  40 mg Oral QAM AC   • Heparin Sodium (Porcine)  5,000 Units Subcutaneous Q8H [address]   • docusate sodium  100 mg Oral BID   • Insulin insulin: 36 units nightly at home     # GERD  - patient with recurrent heart burn symptoms when she has tried to stop PPI  - continue po PPI, FU with PCP     # Hypothyroidism-stable  - replace with Synthroid     # STEFFEN-stable  - STEFFEN protocol     # justyn

## 2017-09-18 NOTE — PLAN OF CARE
Assumed car at [address], patient alert & oriented X 4, ambulating the halls independently, patient denies pain  Sat >92% on RA, NSR monitored on tele, SBP maintained within 160-180  Plan for stress test in morning, discussed treadmill breakfast with patient, s

## 2017-09-18 NOTE — PLAN OF CARE
0715:  Assumed care of patient, sitting up in chair, denies any needs. 0815:  Remains up in chair, denies any discomfort or needs at this time. SR per telemetry, peripheral pulses palpable, minimal non-pitting edema present to bilat lower ext.   Lung so

## 2017-09-19 ENCOUNTER — APPOINTMENT (OUTPATIENT)
Dept: INTERVENTIONAL RADIOLOGY/VASCULAR | Facility: HOSPITAL | Age: 62
DRG: 247 | End: 2017-09-19
Attending: INTERNAL MEDICINE
Payer: COMMERCIAL

## 2017-09-19 LAB
APTT PPP: 33.6 SECONDS (ref 25–34)
ATRIAL RATE: 80 BPM
ATRIAL RATE: 84 BPM
GLUCOSE BLD-MCNC: 130 MG/DL (ref 65–99)
GLUCOSE BLD-MCNC: 140 MG/DL (ref 65–99)
GLUCOSE BLD-MCNC: 143 MG/DL (ref 65–99)
GLUCOSE BLD-MCNC: 204 MG/DL (ref 65–99)
INR BLD: 0.99 (ref 0.89–1.11)
ISTAT ACTIVATED CLOTTING TIME: 257 SECONDS (ref 74–137)
ISTAT ACTIVATED CLOTTING TIME: 279 SECONDS (ref 74–137)
P AXIS: 65 DEGREES
P AXIS: 70 DEGREES
P-R INTERVAL: 134 MS
P-R INTERVAL: 138 MS
PSA SERPL DL<=0.01 NG/ML-MCNC: 13.1 SECONDS (ref 12–14.3)
Q-T INTERVAL: 394 MS
Q-T INTERVAL: 412 MS
QRS DURATION: 106 MS
QRS DURATION: 98 MS
QTC CALCULATION (BEZET): 454 MS
QTC CALCULATION (BEZET): 486 MS
R AXIS: 11 DEGREES
R AXIS: 26 DEGREES
T AXIS: 177 DEGREES
T AXIS: 183 DEGREES
VENTRICULAR RATE: 80 BPM
VENTRICULAR RATE: 84 BPM

## 2017-09-19 PROCEDURE — 027034Z DILATION OF CORONARY ARTERY, ONE ARTERY WITH DRUG-ELUTING INTRALUMINAL DEVICE, PERCUTANEOUS APPROACH: ICD-10-PCS | Performed by: INTERNAL MEDICINE

## 2017-09-19 PROCEDURE — 4A023N7 MEASUREMENT OF CARDIAC SAMPLING AND PRESSURE, LEFT HEART, PERCUTANEOUS APPROACH: ICD-10-PCS | Performed by: INTERNAL MEDICINE

## 2017-09-19 PROCEDURE — 99232 SBSQ HOSP IP/OBS MODERATE 35: CPT | Performed by: INTERNAL MEDICINE

## 2017-09-19 PROCEDURE — B212YZZ FLUOROSCOPY OF SINGLE CORONARY ARTERY BYPASS GRAFT USING OTHER CONTRAST: ICD-10-PCS | Performed by: INTERNAL MEDICINE

## 2017-09-19 PROCEDURE — B240ZZ3 ULTRASONOGRAPHY OF SINGLE CORONARY ARTERY, INTRAVASCULAR: ICD-10-PCS | Performed by: INTERNAL MEDICINE

## 2017-09-19 PROCEDURE — B211YZZ FLUOROSCOPY OF MULTIPLE CORONARY ARTERIES USING OTHER CONTRAST: ICD-10-PCS | Performed by: INTERNAL MEDICINE

## 2017-09-19 RX ORDER — HEPARIN SODIUM 5000 [USP'U]/ML
INJECTION, SOLUTION INTRAVENOUS; SUBCUTANEOUS
Status: COMPLETED
Start: 2017-09-19 | End: 2017-09-19

## 2017-09-19 RX ORDER — LIDOCAINE HYDROCHLORIDE 10 MG/ML
INJECTION, SOLUTION INFILTRATION; PERINEURAL
Status: DISCONTINUED
Start: 2017-09-19 | End: 2017-09-19 | Stop reason: WASHOUT

## 2017-09-19 RX ORDER — PRASUGREL 10 MG/1
TABLET, FILM COATED ORAL
Status: COMPLETED
Start: 2017-09-19 | End: 2017-09-19

## 2017-09-19 RX ORDER — SODIUM CHLORIDE 9 MG/ML
INJECTION, SOLUTION INTRAVENOUS CONTINUOUS
Status: DISCONTINUED | OUTPATIENT
Start: 2017-09-19 | End: 2017-09-19

## 2017-09-19 RX ORDER — ATORVASTATIN CALCIUM 20 MG/1
20 TABLET, FILM COATED ORAL DAILY
Status: DISCONTINUED | OUTPATIENT
Start: 2017-09-19 | End: 2017-09-20

## 2017-09-19 RX ORDER — MIDAZOLAM HYDROCHLORIDE 1 MG/ML
INJECTION INTRAMUSCULAR; INTRAVENOUS
Status: COMPLETED
Start: 2017-09-19 | End: 2017-09-19

## 2017-09-19 RX ORDER — LIDOCAINE HYDROCHLORIDE 10 MG/ML
INJECTION, SOLUTION INFILTRATION; PERINEURAL
Status: COMPLETED
Start: 2017-09-19 | End: 2017-09-19

## 2017-09-19 RX ORDER — HEPARIN SODIUM 5000 [USP'U]/ML
INJECTION, SOLUTION INTRAVENOUS; SUBCUTANEOUS
Status: DISCONTINUED
Start: 2017-09-19 | End: 2017-09-19 | Stop reason: WASHOUT

## 2017-09-19 RX ORDER — CLOPIDOGREL BISULFATE 75 MG/1
75 TABLET ORAL DAILY
Status: DISCONTINUED | OUTPATIENT
Start: 2017-09-20 | End: 2017-09-19

## 2017-09-19 RX ORDER — SODIUM CHLORIDE 9 MG/ML
INJECTION, SOLUTION INTRAVENOUS CONTINUOUS
Status: ACTIVE | OUTPATIENT
Start: 2017-09-19 | End: 2017-09-19

## 2017-09-19 NOTE — PROGRESS NOTES
[facility] Hospitalist Progress Note     Kay Race Patient Status:  Inpatient    1955 MRN [id number]   [facility] 7NE-A Attending Roseann Reid MD   Hosp Day # 5 PCP Josefina Cordero MD     CC: follow up    SUBJECTIVE:  Ms. Daria Price s 2-10 Units Subcutaneous TID CC and HS   • Prasugrel HCl  10 mg Oral Daily   • Imipramine HCl  75 mg Oral Nightly     Continuous Infusing Medication:  • sodium chloride 20 mL/hr at 09/19/17 0537     PRN Medication:hydrALAzine HCl, diazepam, glucose **OR** G protocol     # depression / ADHD-stable  - continue tofranil  - Ritalin on hold during admission due to uncontrolled BP, can be restarted on discharge     # history of prolonged QTc  - per outpatient cards notes, attributed  to her psych meds  - EKG with Q

## 2017-09-19 NOTE — PROCEDURES
[facility]  PCI Procedure Note    [place] Location: Cath Lab    CSN [id number] MRN [id number]   Admission Date 9/13/2017 Procedure Date 9/19/2017   Attending Physician David Cool, * Procedure Physician Gisella Sheldon MD     Pre-Proc hornet wire into the mid segment of the right coronary artery. An exchange was made for a run through. The vessel was dilated with a 1.5 and then 3.0 mm balloon. Intracoronary ultrasound was performed.   A 3.5 x 26 mm resolute drug-eluting stent was depl

## 2017-09-19 NOTE — PLAN OF CARE
Assumed care at 0700  Stent placed in right CA in cardiac cath. 100% occlusion  Angio-seal intact, dry, no drainage.  Old hematomas in lower abdomen from subq heparin  Pedal pulses +1  Bed rest until [address] started  Hydralazine prn needed this evenin

## 2017-09-19 NOTE — PLAN OF CARE
Assumed care at 1900. No acute issues overnight. RA, denies sob. NSR on tele. Denies pain. Up ad maverick. Cath lab in am, consent on chart, npo after MN. Vitals stable.      CARDIOVASCULAR - ADULT    • Maintains optimal cardiac output and hemodyna

## 2017-09-19 NOTE — PROGRESS NOTES
[facility]  Nephrology Progress Note    Rosalia Miller Patient Status:  Inpatient    1955 MRN [id number]   [facility] 7NE-A Attending Carol Payton MD   Hosp Day # 5 PCP Coco Vo MD       SUBJECTIVE:  No overnight events, Medications:  0.9%  NaCl infusion  Intravenous Continuous   0.9%  NaCl infusion  Intravenous Continuous   0.9%  NaCl infusion  Intravenous Continuous   Metoprolol Succinate ER (Toprol XL) 24 hr tab 75 mg 75 mg Oral 2x Daily(Beta Blocker)   hydrALAzine HCl ondansetron HCl (ZOFRAN) injection 4 mg 4 mg Intravenous Q6H PRN   Albuterol Sulfate  (90 Base) MCG/ACT inhaler 1 puff 1 puff Inhalation Q6H PRN   ipratropium-albuterol (DUONEB) nebulizer solution 3 mL 3 mL Nebulization Q4H PRN   hydrALAzine HCl (

## 2017-09-20 ENCOUNTER — PRIOR ORIGINAL RECORDS (OUTPATIENT)
Dept: OTHER | Age: 62
End: 2017-09-20

## 2017-09-20 VITALS
SYSTOLIC BLOOD PRESSURE: 165 MMHG | RESPIRATION RATE: 18 BRPM | BODY MASS INDEX: 36.55 KG/M2 | WEIGHT: 214.06 LBS | HEART RATE: 88 BPM | HEIGHT: 64 IN | DIASTOLIC BLOOD PRESSURE: 89 MMHG | OXYGEN SATURATION: 100 % | TEMPERATURE: 98 F

## 2017-09-20 LAB
BASOPHILS # BLD AUTO: 0.05 X10(3) UL (ref 0–0.1)
BASOPHILS NFR BLD AUTO: 0.7 %
BUN BLD-MCNC: 47 MG/DL (ref 8–20)
CALCIUM BLD-MCNC: 9 MG/DL (ref 8.3–10.3)
CHLORIDE: 110 MMOL/L (ref 101–111)
CO2: 23 MMOL/L (ref 22–32)
CREAT BLD-MCNC: 2.69 MG/DL (ref 0.55–1.02)
EOSINOPHIL # BLD AUTO: 0.2 X10(3) UL (ref 0–0.3)
EOSINOPHIL NFR BLD AUTO: 2.9 %
ERYTHROCYTE [DISTWIDTH] IN BLOOD BY AUTOMATED COUNT: 14.3 % (ref 11.5–16)
GLUCOSE BLD-MCNC: 121 MG/DL (ref 70–99)
GLUCOSE BLD-MCNC: 132 MG/DL (ref 65–99)
GLUCOSE BLD-MCNC: 153 MG/DL (ref 65–99)
HAV IGM SER QL: 2.4 MG/DL (ref 1.7–3)
HCT VFR BLD AUTO: 45.1 % (ref 34–50)
HGB BLD-MCNC: 14.8 G/DL (ref 12–16)
IMMATURE GRANULOCYTE COUNT: 0.05 X10(3) UL (ref 0–1)
IMMATURE GRANULOCYTE RATIO %: 0.7 %
LYMPHOCYTES # BLD AUTO: 2.16 X10(3) UL (ref 0.9–4)
LYMPHOCYTES NFR BLD AUTO: 31.1 %
MCH RBC QN AUTO: 31 PG (ref 27–33.2)
MCHC RBC AUTO-ENTMCNC: 32.8 G/DL (ref 31–37)
MCV RBC AUTO: 94.4 FL (ref 81–100)
MONOCYTES # BLD AUTO: 0.59 X10(3) UL (ref 0.1–0.6)
MONOCYTES NFR BLD AUTO: 8.5 %
NEUTROPHIL ABS PRELIM: 3.89 X10 (3) UL (ref 1.3–6.7)
NEUTROPHILS # BLD AUTO: 3.89 X10(3) UL (ref 1.3–6.7)
NEUTROPHILS NFR BLD AUTO: 56.1 %
PLATELET # BLD AUTO: 207 10(3)UL (ref 150–450)
POTASSIUM SERPL-SCNC: 4.4 MMOL/L (ref 3.6–5.1)
RBC # BLD AUTO: 4.78 X10(6)UL (ref 3.8–5.1)
RED CELL DISTRIBUTION WIDTH-SD: 49.3 FL (ref 35.1–46.3)
SODIUM SERPL-SCNC: 142 MMOL/L (ref 136–144)
WBC # BLD AUTO: 6.9 X10(3) UL (ref 4–13)

## 2017-09-20 PROCEDURE — 99232 SBSQ HOSP IP/OBS MODERATE 35: CPT | Performed by: INTERNAL MEDICINE

## 2017-09-20 RX ORDER — HYDRALAZINE HYDROCHLORIDE 100 MG/1
100 TABLET, FILM COATED ORAL EVERY 8 HOURS SCHEDULED
Qty: 90 TABLET | Refills: 3 | Status: SHIPPED | OUTPATIENT
Start: 2017-09-20 | End: 2019-07-22 | Stop reason: DRUGHIGH

## 2017-09-20 RX ORDER — METOPROLOL SUCCINATE 25 MG/1
75 TABLET, EXTENDED RELEASE ORAL
Qty: 180 TABLET | Refills: 3 | Status: SHIPPED | OUTPATIENT
Start: 2017-09-20 | End: 2019-07-24

## 2017-09-20 RX ORDER — ATORVASTATIN CALCIUM 20 MG/1
20 TABLET, FILM COATED ORAL DAILY
Refills: 0 | Status: SHIPPED | COMMUNITY
Start: 2017-09-20 | End: 2018-02-14

## 2017-09-20 RX ORDER — LOSARTAN POTASSIUM 100 MG/1
100 TABLET ORAL DAILY
Qty: 30 TABLET | Refills: 0 | Status: SHIPPED | OUTPATIENT
Start: 2017-09-20 | End: 2021-11-08

## 2017-09-20 NOTE — PROGRESS NOTES
[facility]  Nephrology Progress Note    Vaishali Ricardo Patient Status:  Inpatient    1955 MRN [id number]   [facility] Attending Codie Pagan MD   Hosp Day # 6 PCP Jean Mleéndez MD       SUBJECTIVE:  S/p PTCA/stenting yes 09/19/17   1224  09/19/17   1633  09/19/17   2123  09/20/17   0651   PGLU  140*  143*  130*  204*  132*       Meds:     Current Facility-Administered Medications:  atorvastatin (LIPITOR) tab 20 mg 20 mg Oral Daily   0.9%  NaCl infusion  Intravenous Continu (FLEET) 7-19 GM/118ML enema 133 mL 1 enema Rectal Once PRN   ondansetron HCl (ZOFRAN) injection 4 mg 4 mg Intravenous Q6H PRN   Albuterol Sulfate  (90 Base) MCG/ACT inhaler 1 puff 1 puff Inhalation Q6H PRN   ipratropium-albuterol (DUONEB) nebulizer

## 2017-09-20 NOTE — PROGRESS NOTES
[facility]  Cardiology Progress Note    Subjective:  No chest pain or shortness of breath. Feels good. Ambulating without difficulties.     Objective:  /85 (BP Location: Left arm)   Pulse 91   Temp 98 °F (36.7 °C) (Oral)   Resp 19   Ht 162.6 cm to drug eluting stent.  - Phase I Cardiac rehab  - No cardiac objection to discharge when ok w/ nephrology/hospitalist    JUAN CARLOS Strong  9/20/2017  9:33 AM  Patient seen and examined.   Okay from my standpoint for discharge as long as it is okay with n

## 2017-09-20 NOTE — DISCHARGE SUMMARY
General Medicine Discharge Summary     Patient ID:  Jie Hassan  57year old  7/13/1955    Admit date: 9/13/2017    Discharge date and time: 9/20/17    Attending Physician: Akiko Lawrence, * cardiology service following as above  - abnormal stress test 9/18/17  - s/p PTCA / stent of  RCA 9/19/17     # T2DM with associated CKD  - fair controlled with Ha1c of 7.3% on 9/14/17  - hyperglycemia due to one-time IV steroid administration - resolve prescribed for you. Read the directions carefully, and ask your doctor or other care provider to review them with you.             CONTINUE taking these medications    allopurinol 300 MG Tabs  Commonly known as:  ZYLOPRIM     aspirin 81 MG Tbec     Calcium BP: 131/85   Pulse: 91   Resp: 19   Temp:        General: no acute distress, alert, flattened affect  Heart: RRR, NSR on tele   Lungs: clear bilaterally, no active wheezing  Abdomen: nontender, nondistended, intact BS  Extremities: no pedal edema       T

## 2017-09-20 NOTE — DIETARY NOTE
Nutrition Short Note   Dietitian consult received per cardiac rehab standing order. Pt to be educated by cardiac rehab staff and encouraged to attend outpatient classes taught by RD. RD available PRN.      Kimberlee Abdi RD, LDN  Pager [phone]

## 2017-09-20 NOTE — PLAN OF CARE
To Whom it May Concern:    Mrs. Latrell Rose was hospitalized 9/13/17 through 9/20/17 and underwent a  cardiac procedure. She will need to remain off work through 9/22. She may return without restriction Monday 9/25.       Professionally,    JUAN CARLOS Dinh

## 2017-09-21 NOTE — PAYOR COMM NOTE
--------------  DISCHARGE REVIEW    Payor: ELIZABETH LUDWIG  Subscriber #:  [id number]  Authorization Number: [id number]    Admit date: 9/14/17  Admit time:  [facility]  Discharge Date: 9/20/2017  5:44 PM     Admitting Physician: Janee Mckeon MD  Attending Ph cardiologist in the clinic in 1-2 weeks    Hospital Course:      57year old female with PMH significant for HTN, T2DM, CAD s/p CABG/ PCI, CKD stage 4, GERD, HL, gout   presenting to [facility] for shortness of breath.  Please see details of hospitaliz take these medications    * atorvastatin 20 MG Tabs  Commonly known as:  LIPITOR  What changed:  Another medication with the same name was changed. Make sure you understand how and when to take each.      * atorvastatin 20 MG Tabs  Commonly known as:  LIPIT each of these medications  · HydrALAZINE HCl 100 MG Tabs  · losartan 100 MG Tabs  · Metoprolol Succinate ER 25 MG Tb24       Activity: activity as tolerated  Diet: diabetic diet  Wound Care: as directed  Code Status: Full Code    Exam on day of discharge:

## 2017-10-02 ENCOUNTER — MYAURORA ACCOUNT LINK (OUTPATIENT)
Dept: OTHER | Age: 62
End: 2017-10-02

## 2017-10-02 ENCOUNTER — PRIOR ORIGINAL RECORDS (OUTPATIENT)
Dept: OTHER | Age: 62
End: 2017-10-02

## 2017-10-09 LAB
BUN: 47 MG/DL
CALCIUM: 9 MG/DL
CHLORIDE: 110 MEQ/L
CREATININE, SERUM: 2.69 MG/DL
GLUCOSE: 121 MG/DL
HEMATOCRIT: 45.1 %
HEMOGLOBIN: 14.8 G/DL
MAGNESIUM: 2.4 MG/DL
PLATELETS: 207 K/UL
POTASSIUM, SERUM: 4.4 MEQ/L
RED BLOOD COUNT: 4.78 X 10-6/U
SODIUM: 142 MEQ/L
WHITE BLOOD COUNT: 6.9 X 10-3/U

## 2017-10-18 ENCOUNTER — PRIOR ORIGINAL RECORDS (OUTPATIENT)
Dept: OTHER | Age: 62
End: 2017-10-18

## 2017-10-19 ENCOUNTER — CARDPULM VISIT (OUTPATIENT)
Dept: CARDIAC REHAB | Facility: HOSPITAL | Age: 62
End: 2017-10-19
Attending: INTERNAL MEDICINE
Payer: COMMERCIAL

## 2017-10-19 VITALS — WEIGHT: 223.81 LBS | BODY MASS INDEX: 38.21 KG/M2 | HEIGHT: 64 IN

## 2017-10-25 ENCOUNTER — OFFICE VISIT (OUTPATIENT)
Dept: NEPHROLOGY | Facility: CLINIC | Age: 62
End: 2017-10-25

## 2017-10-25 VITALS — DIASTOLIC BLOOD PRESSURE: 88 MMHG | WEIGHT: 224 LBS | SYSTOLIC BLOOD PRESSURE: 162 MMHG | BODY MASS INDEX: 38 KG/M2

## 2017-10-25 DIAGNOSIS — N18.4 CKD (CHRONIC KIDNEY DISEASE) STAGE 4, GFR 15-29 ML/MIN (HCC): Primary | ICD-10-CM

## 2017-10-25 DIAGNOSIS — I10 ESSENTIAL HYPERTENSION: ICD-10-CM

## 2017-10-25 PROCEDURE — 99214 OFFICE O/P EST MOD 30 MIN: CPT | Performed by: INTERNAL MEDICINE

## 2017-10-25 RX ORDER — AMLODIPINE BESYLATE 10 MG/1
10 TABLET ORAL DAILY
Qty: 30 TABLET | Refills: 11 | Status: SHIPPED | OUTPATIENT
Start: 2017-10-25 | End: 2018-11-20

## 2017-10-25 NOTE — PROGRESS NOTES
Nephrology Progress Note      Keely Gallo is a 57year old female.     HPI:   Patient presents with:  Chronic Kidney Disease  Hypertension      Darielarti Calrie was seen in the nephrology clinic today in follow-up for management of chronic kidney disease s (BMS)  3/3/2015: CATH PERCUTANEOUS  TRANSLUMINAL CORONARY ANGIO*  09/19/2017: CATH PERCUTANEOUS  TRANSLUMINAL CORONARY ANGIO* Right      Comment: right Coronary artery, OSMIN  No date: COLONOSCOPY  3/5/2015: CAILIN ANDRE CORONARY DRUG ELUTING STENT REGLA CRUZ  1973: ALLOPURINOL 300 MG Oral Tab TAKE 1/2 TABLET BY MOUTH DAILY Disp: 45 tablet Rfl: 5   atorvastatin 20 MG Oral Tab Take 1 tablet (20 mg total) by mouth daily.  Disp:  Rfl: 0   hydrALAzine HCl 100 MG Oral Tab Take 1 tablet (100 mg total) by mouth every 8 (eig HA or visual changes  Denies CP or palpitations  Denies SOB/cough/hemoptysis  Denies abd or flank pain  Denies N/V/D  Denies change in urinary habits or gross hematuria  Denies LE edema  Denies skin rashes/myalgias/arthralgias      PHYSICAL EXAM:   BP (!) 0.20 09/20/2017   BAABSO 0.05 09/20/2017       Lab Results  Component Value Date   CREUR 88.60 07/02/2016       Lab Results  Component Value Date   CLARITY Hazy (A) 09/13/2017   SPECGRAVITY 1.013 09/13/2017   GLUUR 50  (A) 09/13/2017   BILUR Negative 09/13

## 2017-10-27 ENCOUNTER — CARDPULM VISIT (OUTPATIENT)
Dept: CARDIAC REHAB | Facility: HOSPITAL | Age: 62
End: 2017-10-27
Attending: INTERNAL MEDICINE
Payer: COMMERCIAL

## 2017-10-27 PROCEDURE — 93798 PHYS/QHP OP CAR RHAB W/ECG: CPT

## 2017-10-30 ENCOUNTER — CARDPULM VISIT (OUTPATIENT)
Dept: CARDIAC REHAB | Facility: HOSPITAL | Age: 62
End: 2017-10-30
Attending: INTERNAL MEDICINE
Payer: COMMERCIAL

## 2017-10-30 PROCEDURE — 93798 PHYS/QHP OP CAR RHAB W/ECG: CPT

## 2017-11-01 ENCOUNTER — APPOINTMENT (OUTPATIENT)
Dept: CARDIAC REHAB | Facility: HOSPITAL | Age: 62
End: 2017-11-01
Attending: INTERNAL MEDICINE
Payer: COMMERCIAL

## 2017-11-03 ENCOUNTER — CARDPULM VISIT (OUTPATIENT)
Dept: CARDIAC REHAB | Facility: HOSPITAL | Age: 62
End: 2017-11-03
Attending: INTERNAL MEDICINE
Payer: COMMERCIAL

## 2017-11-03 PROCEDURE — 93798 PHYS/QHP OP CAR RHAB W/ECG: CPT

## 2017-11-06 ENCOUNTER — CARDPULM VISIT (OUTPATIENT)
Dept: CARDIAC REHAB | Facility: HOSPITAL | Age: 62
End: 2017-11-06
Attending: INTERNAL MEDICINE
Payer: COMMERCIAL

## 2017-11-06 PROCEDURE — 93798 PHYS/QHP OP CAR RHAB W/ECG: CPT

## 2017-11-07 ENCOUNTER — LAB ENCOUNTER (OUTPATIENT)
Dept: LAB | Facility: HOSPITAL | Age: 62
End: 2017-11-07
Attending: INTERNAL MEDICINE
Payer: COMMERCIAL

## 2017-11-07 DIAGNOSIS — E78.00 PURE HYPERCHOLESTEROLEMIA: ICD-10-CM

## 2017-11-07 DIAGNOSIS — Z79.4 DIABETES MELLITUS, TYPE II, INSULIN DEPENDENT (HCC): ICD-10-CM

## 2017-11-07 DIAGNOSIS — E11.9 DIABETES MELLITUS, TYPE II, INSULIN DEPENDENT (HCC): ICD-10-CM

## 2017-11-07 DIAGNOSIS — E03.9 ACQUIRED HYPOTHYROIDISM: ICD-10-CM

## 2017-11-07 DIAGNOSIS — N18.4 CKD (CHRONIC KIDNEY DISEASE) STAGE 4, GFR 15-29 ML/MIN (HCC): ICD-10-CM

## 2017-11-07 DIAGNOSIS — N39.0 RECURRENT UTI (URINARY TRACT INFECTION): ICD-10-CM

## 2017-11-07 DIAGNOSIS — I10 ESSENTIAL HYPERTENSION: ICD-10-CM

## 2017-11-07 PROCEDURE — 87086 URINE CULTURE/COLONY COUNT: CPT

## 2017-11-07 PROCEDURE — 84443 ASSAY THYROID STIM HORMONE: CPT

## 2017-11-07 PROCEDURE — 80053 COMPREHEN METABOLIC PANEL: CPT

## 2017-11-07 PROCEDURE — 87077 CULTURE AEROBIC IDENTIFY: CPT

## 2017-11-07 PROCEDURE — 36415 COLL VENOUS BLD VENIPUNCTURE: CPT

## 2017-11-07 PROCEDURE — 85025 COMPLETE CBC W/AUTO DIFF WBC: CPT

## 2017-11-07 PROCEDURE — 83036 HEMOGLOBIN GLYCOSYLATED A1C: CPT

## 2017-11-07 PROCEDURE — 81001 URINALYSIS AUTO W/SCOPE: CPT

## 2017-11-07 PROCEDURE — 80061 LIPID PANEL: CPT

## 2017-11-07 PROCEDURE — 87186 SC STD MICRODIL/AGAR DIL: CPT

## 2017-11-08 ENCOUNTER — CARDPULM VISIT (OUTPATIENT)
Dept: CARDIAC REHAB | Facility: HOSPITAL | Age: 62
End: 2017-11-08
Attending: INTERNAL MEDICINE
Payer: COMMERCIAL

## 2017-11-08 PROCEDURE — 93798 PHYS/QHP OP CAR RHAB W/ECG: CPT

## 2017-11-09 ENCOUNTER — APPOINTMENT (OUTPATIENT)
Dept: LAB | Facility: HOSPITAL | Age: 62
End: 2017-11-09
Attending: FAMILY MEDICINE
Payer: COMMERCIAL

## 2017-11-09 PROCEDURE — 82570 ASSAY OF URINE CREATININE: CPT

## 2017-11-09 PROCEDURE — 82043 UR ALBUMIN QUANTITATIVE: CPT

## 2017-11-10 ENCOUNTER — CARDPULM VISIT (OUTPATIENT)
Dept: CARDIAC REHAB | Facility: HOSPITAL | Age: 62
End: 2017-11-10
Attending: INTERNAL MEDICINE
Payer: COMMERCIAL

## 2017-11-11 NOTE — PROGRESS NOTES
Bud Taveras you have any urinary tract infection from E. coli. I have ordered some cefuroxime to take 1 pill twice a day for 5 days. If have any questions, please call or e-mail the office. Have a great week.   Dr. Brian Thomas

## 2017-11-13 ENCOUNTER — CARDPULM VISIT (OUTPATIENT)
Dept: CARDIAC REHAB | Facility: HOSPITAL | Age: 62
End: 2017-11-13
Attending: INTERNAL MEDICINE
Payer: COMMERCIAL

## 2017-11-13 PROCEDURE — 93798 PHYS/QHP OP CAR RHAB W/ECG: CPT

## 2017-11-15 ENCOUNTER — OFFICE VISIT (OUTPATIENT)
Dept: NEPHROLOGY | Facility: CLINIC | Age: 62
End: 2017-11-15

## 2017-11-15 ENCOUNTER — APPOINTMENT (OUTPATIENT)
Dept: CARDIAC REHAB | Facility: HOSPITAL | Age: 62
End: 2017-11-15
Attending: INTERNAL MEDICINE
Payer: COMMERCIAL

## 2017-11-15 VITALS — SYSTOLIC BLOOD PRESSURE: 156 MMHG | DIASTOLIC BLOOD PRESSURE: 82 MMHG | BODY MASS INDEX: 39 KG/M2 | WEIGHT: 226 LBS

## 2017-11-15 DIAGNOSIS — I10 ESSENTIAL HYPERTENSION: ICD-10-CM

## 2017-11-15 DIAGNOSIS — N18.4 CKD (CHRONIC KIDNEY DISEASE) STAGE 4, GFR 15-29 ML/MIN (HCC): Primary | ICD-10-CM

## 2017-11-15 PROCEDURE — 99214 OFFICE O/P EST MOD 30 MIN: CPT | Performed by: INTERNAL MEDICINE

## 2017-11-15 PROCEDURE — 93798 PHYS/QHP OP CAR RHAB W/ECG: CPT

## 2017-11-15 RX ORDER — CLONIDINE HYDROCHLORIDE 0.2 MG/1
0.2 TABLET ORAL 2 TIMES DAILY
Qty: 60 TABLET | Refills: 11 | Status: SHIPPED | OUTPATIENT
Start: 2017-11-15 | End: 2017-12-15

## 2017-11-15 NOTE — PROGRESS NOTES
Nephrology Progress Note      Keely Gallo is a 57year old female.     HPI:   Patient presents with:  Chronic Kidney Disease  Hypertension    Merilee Seip was seen in the nephrology clinic today in follow-up for management of hypertension in the setting CORONARY ANGIO*  09/19/2017: CATH PERCUTANEOUS  TRANSLUMINAL CORONARY ANGIO* Right      Comment: right Coronary artery, OSMIN  No date: COLONOSCOPY  3/5/2015:  PLMT CORONARY DRUG ELUTING STENT EA ADDL  1973:  IMPACT TOOTH REM BONY W/COMP Bilateral      Comm ULTRAFINE) 29G X 12.7MM Does not apply Misc USE DAILY AS DIRECTED Disp: 200 each Rfl: 3   AmLODIPine Besylate (NORVASC) 10 MG Oral Tab Take 1 tablet (10 mg total) by mouth daily.  Disp: 30 tablet Rfl: 11   NOVOLOG FLEXPEN 100 UNIT/ML Subcutaneous Solution P Vitamin (TAB-A-RENAN) Oral Tab Take 1 tablet by mouth nightly.    Disp:  Rfl:        Allergies:    Ultram [Tramadol]       Fever, Anxiety    Comment:Serotonin syndrome to scheduled tramadol in             setting of use of SSRI  Plavix [Clopidogrel]    Rash 11/07/2017   MCH 30.8 11/07/2017   MCHC 33.0 11/07/2017   RDW 13.3 11/07/2017   NEPRELIM 3.82 11/07/2017   NEPERCENT 52.1 11/07/2017   LYPERCENT 35.7 11/07/2017   MOPERCENT 8.1 11/07/2017   EOPERCENT 2.7 11/07/2017   BAPERCENT 1.1 11/07/2017   NE 3.82 11/0 participate in the care of your patient. Please do not hesitate to call with any questions or concerns.     Guillermina Leiva MD  11/15/2017  3:58 PM

## 2017-11-16 ENCOUNTER — PRIOR ORIGINAL RECORDS (OUTPATIENT)
Dept: OTHER | Age: 62
End: 2017-11-16

## 2017-11-17 ENCOUNTER — PRIOR ORIGINAL RECORDS (OUTPATIENT)
Dept: OTHER | Age: 62
End: 2017-11-17

## 2017-11-20 ENCOUNTER — CARDPULM VISIT (OUTPATIENT)
Dept: CARDIAC REHAB | Facility: HOSPITAL | Age: 62
End: 2017-11-20
Attending: INTERNAL MEDICINE
Payer: COMMERCIAL

## 2017-11-20 PROCEDURE — 93798 PHYS/QHP OP CAR RHAB W/ECG: CPT

## 2017-11-24 ENCOUNTER — APPOINTMENT (OUTPATIENT)
Dept: CARDIAC REHAB | Facility: HOSPITAL | Age: 62
End: 2017-11-24
Attending: INTERNAL MEDICINE
Payer: COMMERCIAL

## 2017-11-27 ENCOUNTER — CARDPULM VISIT (OUTPATIENT)
Dept: CARDIAC REHAB | Facility: HOSPITAL | Age: 62
End: 2017-11-27
Attending: INTERNAL MEDICINE
Payer: COMMERCIAL

## 2017-11-27 PROCEDURE — 93798 PHYS/QHP OP CAR RHAB W/ECG: CPT

## 2017-11-29 ENCOUNTER — APPOINTMENT (OUTPATIENT)
Dept: CARDIAC REHAB | Facility: HOSPITAL | Age: 62
End: 2017-11-29
Attending: INTERNAL MEDICINE
Payer: COMMERCIAL

## 2017-12-04 ENCOUNTER — CARDPULM VISIT (OUTPATIENT)
Dept: CARDIAC REHAB | Facility: HOSPITAL | Age: 62
End: 2017-12-04
Attending: INTERNAL MEDICINE
Payer: COMMERCIAL

## 2017-12-04 PROCEDURE — 93798 PHYS/QHP OP CAR RHAB W/ECG: CPT

## 2017-12-08 ENCOUNTER — CARDPULM VISIT (OUTPATIENT)
Dept: CARDIAC REHAB | Facility: HOSPITAL | Age: 62
End: 2017-12-08
Attending: INTERNAL MEDICINE
Payer: COMMERCIAL

## 2017-12-08 PROCEDURE — 93798 PHYS/QHP OP CAR RHAB W/ECG: CPT

## 2017-12-11 ENCOUNTER — CARDPULM VISIT (OUTPATIENT)
Dept: CARDIAC REHAB | Facility: HOSPITAL | Age: 62
End: 2017-12-11
Attending: INTERNAL MEDICINE
Payer: COMMERCIAL

## 2017-12-11 PROCEDURE — 93798 PHYS/QHP OP CAR RHAB W/ECG: CPT

## 2017-12-13 ENCOUNTER — CARDPULM VISIT (OUTPATIENT)
Dept: CARDIAC REHAB | Facility: HOSPITAL | Age: 62
End: 2017-12-13
Attending: INTERNAL MEDICINE
Payer: COMMERCIAL

## 2017-12-13 PROCEDURE — 93798 PHYS/QHP OP CAR RHAB W/ECG: CPT

## 2017-12-15 ENCOUNTER — CARDPULM VISIT (OUTPATIENT)
Dept: CARDIAC REHAB | Facility: HOSPITAL | Age: 62
End: 2017-12-15
Attending: INTERNAL MEDICINE
Payer: COMMERCIAL

## 2017-12-15 PROCEDURE — 93798 PHYS/QHP OP CAR RHAB W/ECG: CPT

## 2017-12-18 ENCOUNTER — CARDPULM VISIT (OUTPATIENT)
Dept: CARDIAC REHAB | Facility: HOSPITAL | Age: 62
End: 2017-12-18
Attending: INTERNAL MEDICINE
Payer: COMMERCIAL

## 2017-12-18 PROCEDURE — 93798 PHYS/QHP OP CAR RHAB W/ECG: CPT

## 2017-12-20 ENCOUNTER — CARDPULM VISIT (OUTPATIENT)
Dept: CARDIAC REHAB | Facility: HOSPITAL | Age: 62
End: 2017-12-20
Attending: INTERNAL MEDICINE
Payer: COMMERCIAL

## 2017-12-20 PROCEDURE — 93798 PHYS/QHP OP CAR RHAB W/ECG: CPT

## 2017-12-22 ENCOUNTER — CARDPULM VISIT (OUTPATIENT)
Dept: CARDIAC REHAB | Facility: HOSPITAL | Age: 62
End: 2017-12-22
Attending: INTERNAL MEDICINE
Payer: COMMERCIAL

## 2017-12-22 PROCEDURE — 93798 PHYS/QHP OP CAR RHAB W/ECG: CPT

## 2017-12-25 ENCOUNTER — APPOINTMENT (OUTPATIENT)
Dept: CARDIAC REHAB | Facility: HOSPITAL | Age: 62
End: 2017-12-25
Attending: INTERNAL MEDICINE
Payer: COMMERCIAL

## 2018-01-01 ENCOUNTER — APPOINTMENT (OUTPATIENT)
Dept: CARDIAC REHAB | Facility: HOSPITAL | Age: 63
End: 2018-01-01
Attending: INTERNAL MEDICINE
Payer: COMMERCIAL

## 2018-01-02 ENCOUNTER — HOSPITAL ENCOUNTER (OUTPATIENT)
Age: 63
Discharge: HOME | End: 2018-01-02
Attending: FAMILY MEDICINE
Payer: COMMERCIAL

## 2018-01-02 VITALS
HEIGHT: 64 IN | DIASTOLIC BLOOD PRESSURE: 88 MMHG | HEART RATE: 72 BPM | RESPIRATION RATE: 18 BRPM | BODY MASS INDEX: 35.85 KG/M2 | SYSTOLIC BLOOD PRESSURE: 171 MMHG | OXYGEN SATURATION: 96 % | WEIGHT: 210 LBS | TEMPERATURE: 97 F

## 2018-01-02 DIAGNOSIS — S05.12XA PERIORBITAL CONTUSION OF LEFT EYE, INITIAL ENCOUNTER: Primary | ICD-10-CM

## 2018-01-02 PROCEDURE — 99212 OFFICE O/P EST SF 10 MIN: CPT

## 2018-01-03 ENCOUNTER — APPOINTMENT (OUTPATIENT)
Dept: CARDIAC REHAB | Facility: HOSPITAL | Age: 63
End: 2018-01-03
Attending: INTERNAL MEDICINE
Payer: COMMERCIAL

## 2018-01-03 NOTE — ED PROVIDER NOTES
Patient presents with: Eye Visual Problem (opthalmic)      HPI:     Sagrario Myers is a 57year old female who presents with for chief complaint of bruising around L eye   Noted earlier today when she woke up   Denies any injury. Denied any abuse.    Pt Bilateral    Home medications reviewed.       Family History   Problem Relation Age of Onset   • Cancer Father      prostate, liver   • Hypertension Father    • Obesity Father    • Heart Surgery Mother      Mitral valve replaced   • Heart Disorder Mother with:  Andres Suarez, [address]      As needed

## 2018-01-08 ENCOUNTER — CARDPULM VISIT (OUTPATIENT)
Dept: CARDIAC REHAB | Facility: HOSPITAL | Age: 63
End: 2018-01-08
Attending: INTERNAL MEDICINE
Payer: COMMERCIAL

## 2018-01-08 PROCEDURE — 93798 PHYS/QHP OP CAR RHAB W/ECG: CPT

## 2018-01-15 ENCOUNTER — CARDPULM VISIT (OUTPATIENT)
Dept: CARDIAC REHAB | Facility: HOSPITAL | Age: 63
End: 2018-01-15
Attending: INTERNAL MEDICINE
Payer: COMMERCIAL

## 2018-01-15 PROCEDURE — 93798 PHYS/QHP OP CAR RHAB W/ECG: CPT

## 2018-01-17 ENCOUNTER — CARDPULM VISIT (OUTPATIENT)
Dept: CARDIAC REHAB | Facility: HOSPITAL | Age: 63
End: 2018-01-17
Attending: INTERNAL MEDICINE
Payer: COMMERCIAL

## 2018-01-17 PROCEDURE — 93798 PHYS/QHP OP CAR RHAB W/ECG: CPT

## 2018-01-19 ENCOUNTER — CARDPULM VISIT (OUTPATIENT)
Dept: CARDIAC REHAB | Facility: HOSPITAL | Age: 63
End: 2018-01-19
Attending: INTERNAL MEDICINE
Payer: COMMERCIAL

## 2018-01-19 PROCEDURE — 93798 PHYS/QHP OP CAR RHAB W/ECG: CPT

## 2018-01-22 ENCOUNTER — APPOINTMENT (OUTPATIENT)
Dept: CARDIAC REHAB | Facility: HOSPITAL | Age: 63
End: 2018-01-22
Attending: INTERNAL MEDICINE
Payer: COMMERCIAL

## 2018-01-24 ENCOUNTER — APPOINTMENT (OUTPATIENT)
Dept: CARDIAC REHAB | Facility: HOSPITAL | Age: 63
End: 2018-01-24
Attending: INTERNAL MEDICINE
Payer: COMMERCIAL

## 2018-01-29 ENCOUNTER — CARDPULM VISIT (OUTPATIENT)
Dept: CARDIAC REHAB | Facility: HOSPITAL | Age: 63
End: 2018-01-29
Attending: INTERNAL MEDICINE
Payer: COMMERCIAL

## 2018-01-29 PROCEDURE — 93798 PHYS/QHP OP CAR RHAB W/ECG: CPT

## 2018-01-31 ENCOUNTER — APPOINTMENT (OUTPATIENT)
Dept: CARDIAC REHAB | Facility: HOSPITAL | Age: 63
End: 2018-01-31
Attending: INTERNAL MEDICINE
Payer: COMMERCIAL

## 2018-02-02 ENCOUNTER — CARDPULM VISIT (OUTPATIENT)
Dept: CARDIAC REHAB | Facility: HOSPITAL | Age: 63
End: 2018-02-02
Attending: INTERNAL MEDICINE
Payer: COMMERCIAL

## 2018-02-02 PROCEDURE — 93798 PHYS/QHP OP CAR RHAB W/ECG: CPT

## 2018-02-12 DIAGNOSIS — N18.30 CHRONIC KIDNEY DISEASE, STAGE III (MODERATE) (HCC): Primary | ICD-10-CM

## 2018-02-13 ENCOUNTER — LAB ENCOUNTER (OUTPATIENT)
Dept: LAB | Facility: HOSPITAL | Age: 63
End: 2018-02-13
Attending: INTERNAL MEDICINE
Payer: COMMERCIAL

## 2018-02-13 ENCOUNTER — PRIOR ORIGINAL RECORDS (OUTPATIENT)
Dept: OTHER | Age: 63
End: 2018-02-13

## 2018-02-13 DIAGNOSIS — E11.9 DIABETES MELLITUS, TYPE II, INSULIN DEPENDENT (HCC): ICD-10-CM

## 2018-02-13 DIAGNOSIS — N18.30 CHRONIC KIDNEY DISEASE, STAGE III (MODERATE) (HCC): ICD-10-CM

## 2018-02-13 DIAGNOSIS — E03.9 ACQUIRED HYPOTHYROIDISM: ICD-10-CM

## 2018-02-13 DIAGNOSIS — N18.4 CKD (CHRONIC KIDNEY DISEASE) STAGE 4, GFR 15-29 ML/MIN (HCC): ICD-10-CM

## 2018-02-13 DIAGNOSIS — N18.4 DIABETES MELLITUS DUE TO UNDERLYING CONDITION, CONTROLLED, WITH STAGE 4 CHRONIC KIDNEY DISEASE, WITH LONG-TERM CURRENT USE OF INSULIN (HCC): ICD-10-CM

## 2018-02-13 DIAGNOSIS — M10.9 GOUT, UNSPECIFIED CAUSE, UNSPECIFIED CHRONICITY, UNSPECIFIED SITE: ICD-10-CM

## 2018-02-13 DIAGNOSIS — Z79.4 DIABETES MELLITUS, TYPE II, INSULIN DEPENDENT (HCC): ICD-10-CM

## 2018-02-13 DIAGNOSIS — N39.0 RECURRENT UTI: ICD-10-CM

## 2018-02-13 DIAGNOSIS — E08.22 DIABETES MELLITUS DUE TO UNDERLYING CONDITION, CONTROLLED, WITH STAGE 4 CHRONIC KIDNEY DISEASE, WITH LONG-TERM CURRENT USE OF INSULIN (HCC): ICD-10-CM

## 2018-02-13 DIAGNOSIS — Z79.4 DIABETES MELLITUS DUE TO UNDERLYING CONDITION, CONTROLLED, WITH STAGE 4 CHRONIC KIDNEY DISEASE, WITH LONG-TERM CURRENT USE OF INSULIN (HCC): ICD-10-CM

## 2018-02-13 LAB
ALBUMIN SERPL-MCNC: 3.3 G/DL (ref 3.5–4.8)
ALP LIVER SERPL-CCNC: 106 U/L (ref 50–130)
ALT SERPL-CCNC: 39 U/L (ref 14–54)
AST SERPL-CCNC: 29 U/L (ref 15–41)
BASOPHILS # BLD AUTO: 0.08 X10(3) UL (ref 0–0.1)
BASOPHILS NFR BLD AUTO: 1.1 %
BILIRUB SERPL-MCNC: 0.6 MG/DL (ref 0.1–2)
BILIRUB UR QL STRIP.AUTO: NEGATIVE
BUN BLD-MCNC: 59 MG/DL (ref 8–20)
CALCIUM BLD-MCNC: 9.5 MG/DL (ref 8.3–10.3)
CHLORIDE: 104 MMOL/L (ref 101–111)
CO2: 27 MMOL/L (ref 22–32)
COLOR UR AUTO: YELLOW
CREAT BLD-MCNC: 2.98 MG/DL (ref 0.55–1.02)
EOSINOPHIL # BLD AUTO: 0.19 X10(3) UL (ref 0–0.3)
EOSINOPHIL NFR BLD AUTO: 2.6 %
ERYTHROCYTE [DISTWIDTH] IN BLOOD BY AUTOMATED COUNT: 13.3 % (ref 11.5–16)
EST. AVERAGE GLUCOSE BLD GHB EST-MCNC: 157 MG/DL (ref 68–126)
FREE T4: 1.1 NG/DL (ref 0.9–1.8)
GLUCOSE BLD-MCNC: 105 MG/DL (ref 70–99)
GLUCOSE UR STRIP.AUTO-MCNC: NEGATIVE MG/DL
HBA1C MFR BLD HPLC: 7.1 % (ref ?–5.7)
HCT VFR BLD AUTO: 40.4 % (ref 34–50)
HGB BLD-MCNC: 13.7 G/DL (ref 12–16)
IMMATURE GRANULOCYTE COUNT: 0.02 X10(3) UL (ref 0–1)
IMMATURE GRANULOCYTE RATIO %: 0.3 %
KETONES UR STRIP.AUTO-MCNC: NEGATIVE MG/DL
LYMPHOCYTES # BLD AUTO: 2.67 X10(3) UL (ref 0.9–4)
LYMPHOCYTES NFR BLD AUTO: 36 %
M PROTEIN MFR SERPL ELPH: 7.2 G/DL (ref 6.1–8.3)
MCH RBC QN AUTO: 32.2 PG (ref 27–33.2)
MCHC RBC AUTO-ENTMCNC: 33.9 G/DL (ref 31–37)
MCV RBC AUTO: 94.8 FL (ref 81–100)
MONOCYTES # BLD AUTO: 0.63 X10(3) UL (ref 0.1–1)
MONOCYTES NFR BLD AUTO: 8.5 %
NEUTROPHIL ABS PRELIM: 3.82 X10 (3) UL (ref 1.3–6.7)
NEUTROPHILS # BLD AUTO: 3.82 X10(3) UL (ref 1.3–6.7)
NEUTROPHILS NFR BLD AUTO: 51.5 %
NITRITE UR QL STRIP.AUTO: NEGATIVE
PH UR STRIP.AUTO: 5 [PH] (ref 4.5–8)
PLATELET # BLD AUTO: 267 10(3)UL (ref 150–450)
POTASSIUM SERPL-SCNC: 4.3 MMOL/L (ref 3.6–5.1)
PROT UR STRIP.AUTO-MCNC: 30 MG/DL
RBC # BLD AUTO: 4.26 X10(6)UL (ref 3.8–5.1)
RBC UR QL AUTO: NEGATIVE
RED CELL DISTRIBUTION WIDTH-SD: 45.8 FL (ref 35.1–46.3)
SODIUM SERPL-SCNC: 139 MMOL/L (ref 136–144)
SP GR UR STRIP.AUTO: 1.02 (ref 1–1.03)
TSI SER-ACNC: 5.39 MIU/ML (ref 0.35–5.5)
URIC ACID: 6.4 MG/DL (ref 2.4–8)
UROBILINOGEN UR STRIP.AUTO-MCNC: <2 MG/DL
WBC # BLD AUTO: 7.4 X10(3) UL (ref 4–13)

## 2018-02-13 PROCEDURE — 84443 ASSAY THYROID STIM HORMONE: CPT

## 2018-02-13 PROCEDURE — 87086 URINE CULTURE/COLONY COUNT: CPT

## 2018-02-13 PROCEDURE — 80053 COMPREHEN METABOLIC PANEL: CPT

## 2018-02-13 PROCEDURE — 85025 COMPLETE CBC W/AUTO DIFF WBC: CPT

## 2018-02-13 PROCEDURE — 84439 ASSAY OF FREE THYROXINE: CPT

## 2018-02-13 PROCEDURE — 81001 URINALYSIS AUTO W/SCOPE: CPT

## 2018-02-13 PROCEDURE — 36415 COLL VENOUS BLD VENIPUNCTURE: CPT

## 2018-02-13 PROCEDURE — 84550 ASSAY OF BLOOD/URIC ACID: CPT

## 2018-02-13 PROCEDURE — 83036 HEMOGLOBIN GLYCOSYLATED A1C: CPT

## 2018-02-13 NOTE — PROGRESS NOTES
Cassy you have white blood cells in your urine. Left me know if you have any bladder infection symptoms. If have any questions, please call or e-mail the office. Have a great week.   Dr. Rowena Morse

## 2018-02-14 ENCOUNTER — CARDPULM VISIT (OUTPATIENT)
Dept: CARDIAC REHAB | Facility: HOSPITAL | Age: 63
End: 2018-02-14
Attending: INTERNAL MEDICINE
Payer: COMMERCIAL

## 2018-02-14 ENCOUNTER — OFFICE VISIT (OUTPATIENT)
Dept: NEPHROLOGY | Facility: CLINIC | Age: 63
End: 2018-02-14

## 2018-02-14 VITALS — BODY MASS INDEX: 39 KG/M2 | SYSTOLIC BLOOD PRESSURE: 146 MMHG | DIASTOLIC BLOOD PRESSURE: 78 MMHG | WEIGHT: 225 LBS

## 2018-02-14 DIAGNOSIS — I10 ESSENTIAL HYPERTENSION: ICD-10-CM

## 2018-02-14 DIAGNOSIS — N18.4 CKD (CHRONIC KIDNEY DISEASE) STAGE 4, GFR 15-29 ML/MIN (HCC): Primary | ICD-10-CM

## 2018-02-14 PROCEDURE — 93798 PHYS/QHP OP CAR RHAB W/ECG: CPT

## 2018-02-14 PROCEDURE — 99214 OFFICE O/P EST MOD 30 MIN: CPT | Performed by: INTERNAL MEDICINE

## 2018-02-14 NOTE — PROGRESS NOTES
Nephrology Progress Note      Isabel Sargent is a 57year old female.     HPI:   Patient presents with:  Chronic Kidney Disease  Hypertension      Shannon Muller was seen in the nephrology clinic today in follow-up for management of chronic kidney disease s Comment: wisdom teeth  3/24/16: KNEE REPLACEMENT SURGERY      Comment: right TKA   2011: LEFT HEART CATH,PERCUTANEOUS      Comment: CAD  3/2/2015: LEFT HEART CATH,PERCUTANEOUS  No date:   13: PATH REPORT      Comment: cecal polyps - 1 tubul Levothyroxine Sodium 88 MCG Oral Tab Take 1 tablet (88 mcg total) by mouth before breakfast. Disp: 30 tablet Rfl: 11   Insulin Pen Needle (BD PEN NEEDLE ULTRAFINE) 29G X 12.7MM Does not apply Misc USE DAILY AS DIRECTED Disp: 200 each Rfl: 3   AmLODIPine changes  Denies CP or palpitations  Denies SOB/cough/hemoptysis  Denies abd or flank pain  Denies N/V/D  Denies change in urinary habits or gross hematuria  Denies LE edema  Denies skin rashes/myalgias/arthralgias      PHYSICAL EXAM:   /78   Wt 225 l Results  Component Value Date   CREUR 122.00 11/09/2017       Lab Results  Component Value Date   CLARITY Cloudy (A) 02/13/2018   SPECGRAVITY 1.017 02/13/2018   GLUUR Negative 02/13/2018   BILUR Negative 02/13/2018   KETUR Negative 02/13/2018   BLOODURINE again for allowing me to participate in the care of your patient. Please do not hesitate to call with any questions or concerns.     Emma Dutton MD  2/14/2018  4:07 PM

## 2018-02-16 ENCOUNTER — CARDPULM VISIT (OUTPATIENT)
Dept: CARDIAC REHAB | Facility: HOSPITAL | Age: 63
End: 2018-02-16
Attending: INTERNAL MEDICINE
Payer: COMMERCIAL

## 2018-02-16 PROCEDURE — 93798 PHYS/QHP OP CAR RHAB W/ECG: CPT

## 2018-02-19 ENCOUNTER — CARDPULM VISIT (OUTPATIENT)
Dept: CARDIAC REHAB | Facility: HOSPITAL | Age: 63
End: 2018-02-19
Attending: INTERNAL MEDICINE
Payer: COMMERCIAL

## 2018-02-19 PROCEDURE — 93798 PHYS/QHP OP CAR RHAB W/ECG: CPT

## 2018-02-21 ENCOUNTER — APPOINTMENT (OUTPATIENT)
Dept: CARDIAC REHAB | Facility: HOSPITAL | Age: 63
End: 2018-02-21
Attending: INTERNAL MEDICINE
Payer: COMMERCIAL

## 2018-02-23 ENCOUNTER — APPOINTMENT (OUTPATIENT)
Dept: CARDIAC REHAB | Facility: HOSPITAL | Age: 63
End: 2018-02-23
Attending: INTERNAL MEDICINE
Payer: COMMERCIAL

## 2018-02-23 PROCEDURE — 93798 PHYS/QHP OP CAR RHAB W/ECG: CPT

## 2018-03-02 ENCOUNTER — CARDPULM VISIT (OUTPATIENT)
Dept: CARDIAC REHAB | Facility: HOSPITAL | Age: 63
End: 2018-03-02
Attending: INTERNAL MEDICINE
Payer: COMMERCIAL

## 2018-03-02 PROCEDURE — 93798 PHYS/QHP OP CAR RHAB W/ECG: CPT

## 2018-03-05 ENCOUNTER — CARDPULM VISIT (OUTPATIENT)
Dept: CARDIAC REHAB | Facility: HOSPITAL | Age: 63
End: 2018-03-05
Attending: INTERNAL MEDICINE
Payer: COMMERCIAL

## 2018-03-05 PROCEDURE — 93798 PHYS/QHP OP CAR RHAB W/ECG: CPT

## 2018-03-07 ENCOUNTER — CARDPULM VISIT (OUTPATIENT)
Dept: CARDIAC REHAB | Facility: HOSPITAL | Age: 63
End: 2018-03-07
Attending: INTERNAL MEDICINE
Payer: COMMERCIAL

## 2018-03-07 PROCEDURE — 93798 PHYS/QHP OP CAR RHAB W/ECG: CPT

## 2018-03-09 ENCOUNTER — CARDPULM VISIT (OUTPATIENT)
Dept: CARDIAC REHAB | Facility: HOSPITAL | Age: 63
End: 2018-03-09
Attending: INTERNAL MEDICINE
Payer: COMMERCIAL

## 2018-03-09 PROCEDURE — 93798 PHYS/QHP OP CAR RHAB W/ECG: CPT

## 2018-03-12 ENCOUNTER — CARDPULM VISIT (OUTPATIENT)
Dept: CARDIAC REHAB | Facility: HOSPITAL | Age: 63
End: 2018-03-12
Attending: INTERNAL MEDICINE
Payer: COMMERCIAL

## 2018-03-12 PROCEDURE — 93798 PHYS/QHP OP CAR RHAB W/ECG: CPT

## 2018-05-19 ENCOUNTER — LAB ENCOUNTER (OUTPATIENT)
Dept: LAB | Facility: HOSPITAL | Age: 63
End: 2018-05-19
Attending: INTERNAL MEDICINE
Payer: COMMERCIAL

## 2018-05-19 ENCOUNTER — PRIOR ORIGINAL RECORDS (OUTPATIENT)
Dept: OTHER | Age: 63
End: 2018-05-19

## 2018-05-19 DIAGNOSIS — I10 ESSENTIAL HYPERTENSION: ICD-10-CM

## 2018-05-19 DIAGNOSIS — N18.4 CKD (CHRONIC KIDNEY DISEASE) STAGE 4, GFR 15-29 ML/MIN (HCC): ICD-10-CM

## 2018-05-19 PROCEDURE — 36415 COLL VENOUS BLD VENIPUNCTURE: CPT

## 2018-05-19 PROCEDURE — 80053 COMPREHEN METABOLIC PANEL: CPT

## 2018-05-19 PROCEDURE — 84100 ASSAY OF PHOSPHORUS: CPT

## 2018-05-19 PROCEDURE — 85025 COMPLETE CBC W/AUTO DIFF WBC: CPT

## 2018-05-24 ENCOUNTER — PRIOR ORIGINAL RECORDS (OUTPATIENT)
Dept: OTHER | Age: 63
End: 2018-05-24

## 2018-05-24 LAB
CHOLESTEROL, TOTAL: 181 MG/DL
HDL CHOLESTEROL: 42 MG/DL
LDL CHOLESTEROL: 59 MG/DL
TRIGLYCERIDES: 398 MG/DL

## 2018-05-25 LAB
ALBUMIN: 3 G/DL
ALKALINE PHOSPHATATE(ALK PHOS): 118 IU/L
BILIRUBIN TOTAL: 0.7 MG/DL
BUN: 39 MG/DL
CALCIUM: 9.9 MG/DL
CHLORIDE: 101 MEQ/L
CREATININE, SERUM: 2.64 MG/DL
FREE T4: 1.1 MG/DL
GLUCOSE: 318 MG/DL
HEMATOCRIT: 38.1 %
HEMOGLOBIN: 12.9 G/DL
PLATELETS: 243 K/UL
POTASSIUM, SERUM: 4.3 MEQ/L
PROTEIN, TOTAL: 7 G/DL
RED BLOOD COUNT: 4.09 X 10-6/U
SGOT (AST): 22 IU/L
SGPT (ALT): 34 IU/L
SODIUM: 135 MEQ/L
THYROID STIMULATING HORMONE: 5.39 MLU/L
WHITE BLOOD COUNT: 6.4 X 10-3/U

## 2018-05-30 ENCOUNTER — OFFICE VISIT (OUTPATIENT)
Dept: NEPHROLOGY | Facility: CLINIC | Age: 63
End: 2018-05-30

## 2018-05-30 VITALS — BODY MASS INDEX: 38 KG/M2 | DIASTOLIC BLOOD PRESSURE: 78 MMHG | SYSTOLIC BLOOD PRESSURE: 136 MMHG | WEIGHT: 223 LBS

## 2018-05-30 DIAGNOSIS — N18.4 CKD (CHRONIC KIDNEY DISEASE) STAGE 4, GFR 15-29 ML/MIN (HCC): Primary | ICD-10-CM

## 2018-05-30 DIAGNOSIS — I10 ESSENTIAL HYPERTENSION: ICD-10-CM

## 2018-05-30 PROCEDURE — 99214 OFFICE O/P EST MOD 30 MIN: CPT | Performed by: INTERNAL MEDICINE

## 2018-05-30 NOTE — PROGRESS NOTES
Nephrology Progress Note      Julianna Dalton is a 57year old female.     HPI:   Patient presents with:  Chronic Kidney Disease  Hypertension      Katy Cunningham was seen in the nephrology clinic today in follow-up for management of chronic kidney disease st IMPACT TOOTH REM BONY W/COMP Bilateral      Comment: wisdom teeth  3/24/16: KNEE REPLACEMENT SURGERY      Comment: right TKA   2011: LEFT HEART CATH,PERCUTANEOUS      Comment: CAD  3/2/2015: LEFT HEART CATH,PERCUTANEOUS  No date:   13: Manisha Aparicio TABLET(25 MG) BY MOUTH DAILY Disp: 90 tablet Rfl: 1   ATORVASTATIN 20 MG Oral Tab TAKE 1 TABLET(20 MG) BY MOUTH DAILY Disp: 90 tablet Rfl: 3   OMEGA-3-ACID ETHYL ESTERS 1 g Oral Cap TAKE 2 CAPSULES BY MOUTH TWICE DAILY Disp: 120 capsule Rfl: 3   Levothyrox loss/gain  Denies HA or visual changes  Denies CP or palpitations  Denies SOB/cough/hemoptysis  Denies abd or flank pain  Denies N/V/D  Denies change in urinary habits or gross hematuria  Denies LE edema  Denies skin rashes/myalgias/arthralgias      PHYSIC 11/09/2017       Lab Results  Component Value Date   CLARITY Cloudy (A) 02/13/2018   SPECGRAVITY 1.017 02/13/2018   GLUUR Negative 02/13/2018   BILUR Negative 02/13/2018   KETUR Negative 02/13/2018   BLOODURINE Negative 02/13/2018   PHURINE 5.0 02/13/2018

## 2018-07-09 ENCOUNTER — PRIOR ORIGINAL RECORDS (OUTPATIENT)
Dept: OTHER | Age: 63
End: 2018-07-09

## 2018-07-09 ENCOUNTER — MYAURORA ACCOUNT LINK (OUTPATIENT)
Dept: OTHER | Age: 63
End: 2018-07-09

## 2018-09-02 ENCOUNTER — HOSPITAL ENCOUNTER (EMERGENCY)
Facility: HOSPITAL | Age: 63
Discharge: HOME | End: 2018-09-02
Attending: EMERGENCY MEDICINE
Payer: COMMERCIAL

## 2018-09-02 ENCOUNTER — APPOINTMENT (OUTPATIENT)
Dept: ULTRASOUND IMAGING | Facility: HOSPITAL | Age: 63
End: 2018-09-02
Attending: EMERGENCY MEDICINE
Payer: COMMERCIAL

## 2018-09-02 ENCOUNTER — APPOINTMENT (OUTPATIENT)
Dept: GENERAL RADIOLOGY | Facility: HOSPITAL | Age: 63
End: 2018-09-02
Attending: EMERGENCY MEDICINE
Payer: COMMERCIAL

## 2018-09-02 VITALS
DIASTOLIC BLOOD PRESSURE: 61 MMHG | SYSTOLIC BLOOD PRESSURE: 128 MMHG | RESPIRATION RATE: 20 BRPM | WEIGHT: 225.06 LBS | BODY MASS INDEX: 39 KG/M2 | OXYGEN SATURATION: 97 % | HEART RATE: 88 BPM | TEMPERATURE: 98 F

## 2018-09-02 DIAGNOSIS — M25.562 BILATERAL ANTERIOR KNEE PAIN: Primary | ICD-10-CM

## 2018-09-02 DIAGNOSIS — R50.9 FEVER OF UNKNOWN ORIGIN: ICD-10-CM

## 2018-09-02 DIAGNOSIS — M25.561 BILATERAL ANTERIOR KNEE PAIN: Primary | ICD-10-CM

## 2018-09-02 LAB
ANION GAP SERPL CALC-SCNC: 10 MMOL/L (ref 0–18)
BASOPHILS # BLD: 0.1 K/UL (ref 0–0.2)
BASOPHILS NFR BLD: 1 %
BILIRUB UR QL: NEGATIVE
BUN SERPL-MCNC: 33 MG/DL (ref 8–20)
BUN/CREAT SERPL: 12.6 (ref 10–20)
CALCIUM SERPL-MCNC: 8.8 MG/DL (ref 8.5–10.5)
CHLORIDE SERPL-SCNC: 101 MMOL/L (ref 95–110)
CLARITY UR: CLEAR
CO2 SERPL-SCNC: 24 MMOL/L (ref 22–32)
COLOR UR: YELLOW
CREAT SERPL-MCNC: 2.62 MG/DL (ref 0.5–1.5)
EOSINOPHIL # BLD: 0.1 K/UL (ref 0–0.7)
EOSINOPHIL NFR BLD: 1 %
ERYTHROCYTE [DISTWIDTH] IN BLOOD BY AUTOMATED COUNT: 14.7 % (ref 11–15)
GLUCOSE SERPL-MCNC: 174 MG/DL (ref 70–99)
GLUCOSE UR-MCNC: NEGATIVE MG/DL
HCT VFR BLD AUTO: 38 % (ref 35–48)
HGB BLD-MCNC: 12.6 G/DL (ref 12–16)
HGB UR QL STRIP.AUTO: NEGATIVE
KETONES UR-MCNC: NEGATIVE MG/DL
LEUKOCYTE ESTERASE UR QL STRIP.AUTO: NEGATIVE
LYMPHOCYTES # BLD: 1.6 K/UL (ref 1–4)
LYMPHOCYTES NFR BLD: 14 %
MAGNESIUM SERPL-MCNC: 2 MG/DL (ref 1.8–2.5)
MCH RBC QN AUTO: 31.7 PG (ref 27–32)
MCHC RBC AUTO-ENTMCNC: 33.2 G/DL (ref 32–37)
MCV RBC AUTO: 95.3 FL (ref 80–100)
MONOCYTES # BLD: 0.7 K/UL (ref 0–1)
MONOCYTES NFR BLD: 6 %
NEUTROPHILS # BLD AUTO: 8.5 K/UL (ref 1.8–7.7)
NEUTROPHILS NFR BLD: 78 %
NITRITE UR QL STRIP.AUTO: NEGATIVE
OSMOLALITY UR CALC.SUM OF ELEC: 291 MOSM/KG (ref 275–295)
PH UR: 5 [PH] (ref 5–8)
PLATELET # BLD AUTO: 205 K/UL (ref 140–400)
PMV BLD AUTO: 8.9 FL (ref 7.4–10.3)
POTASSIUM SERPL-SCNC: 4 MMOL/L (ref 3.3–5.1)
PROT UR-MCNC: 30 MG/DL
RBC # BLD AUTO: 3.98 M/UL (ref 3.7–5.4)
RBC #/AREA URNS AUTO: 2 /HPF
SODIUM SERPL-SCNC: 135 MMOL/L (ref 136–144)
SP GR UR STRIP: 1.01 (ref 1–1.03)
UROBILINOGEN UR STRIP-ACNC: <2
VIT C UR-MCNC: NEGATIVE MG/DL
WBC # BLD AUTO: 10.9 K/UL (ref 4–11)
WBC #/AREA URNS AUTO: 1 /HPF

## 2018-09-02 PROCEDURE — 80048 BASIC METABOLIC PNL TOTAL CA: CPT | Performed by: EMERGENCY MEDICINE

## 2018-09-02 PROCEDURE — 73560 X-RAY EXAM OF KNEE 1 OR 2: CPT | Performed by: EMERGENCY MEDICINE

## 2018-09-02 PROCEDURE — 96361 HYDRATE IV INFUSION ADD-ON: CPT

## 2018-09-02 PROCEDURE — 99285 EMERGENCY DEPT VISIT HI MDM: CPT

## 2018-09-02 PROCEDURE — 83735 ASSAY OF MAGNESIUM: CPT | Performed by: EMERGENCY MEDICINE

## 2018-09-02 PROCEDURE — 93970 EXTREMITY STUDY: CPT | Performed by: EMERGENCY MEDICINE

## 2018-09-02 PROCEDURE — 96360 HYDRATION IV INFUSION INIT: CPT

## 2018-09-02 PROCEDURE — 81001 URINALYSIS AUTO W/SCOPE: CPT | Performed by: EMERGENCY MEDICINE

## 2018-09-02 PROCEDURE — 85025 COMPLETE CBC W/AUTO DIFF WBC: CPT | Performed by: EMERGENCY MEDICINE

## 2018-09-02 RX ORDER — HYDROCODONE BITARTRATE AND ACETAMINOPHEN 5; 325 MG/1; MG/1
1-2 TABLET ORAL EVERY 6 HOURS PRN
Qty: 20 TABLET | Refills: 0 | Status: SHIPPED | OUTPATIENT
Start: 2018-09-02 | End: 2018-11-28

## 2018-09-02 NOTE — ED NOTES
Discharge instructions given to patient. Verbalized understanding. Patient in no distress. Vss. Patient's son or mom is coming to pick her up.

## 2018-09-02 NOTE — ED INITIAL ASSESSMENT (HPI)
Patient here with c/o bilateral knee pain since yesterday and fever. Hx bilateral knee replacements. Unable to bear weight due to pain.

## 2018-09-02 NOTE — ED PROVIDER NOTES
Patient Seen in: [facility] AND [facility] Emergency Department    History   Patient presents with:  Knee Pain    Stated Complaint: Knee Pain/ Fever    HPI    70year old female with multiple medical problems including CKD, CAD s/p CABG and stents, DM, HTN, HCL, 3/5/2015:  PLMT CORONARY DRUG ELUTING STENT EA ADDL  1973:  IMPACT TOOTH REM BONY W/COMP Bilateral      Comment: wisdom teeth  3/24/16: KNEE REPLACEMENT SURGERY      Comment: right TKA   5/31/2011: LEFT HEART CATH,PERCUTANEOUS      Comment: CAD  3/2/2015: Bilateral pedal pulses, bilateral extremities appropriately warm to touch, pink with good capillary refill   Pulmonary/Chest: Effort normal and breath sounds normal. No respiratory distress. She has no wheezes. Abdominal: Soft.  She exhibits no distension Procedure                               Abnormality         Status                     ---------                               -----------         ------                     CBC W/ DIFFERENTIAL[[id number]]          Abnormal            Final result Radiology exams  Viewed and reviewed by myself and findings discussed with patient including need for follow up      Medications   sodium chloride 0.9% IV bolus 500 mL (0 mL Intravenous Stopped 9/2/18 1312)       Labs reassuring, improved with small amt fl

## 2018-09-26 ENCOUNTER — OFFICE VISIT (OUTPATIENT)
Dept: NEPHROLOGY | Facility: CLINIC | Age: 63
End: 2018-09-26
Payer: COMMERCIAL

## 2018-09-26 VITALS — WEIGHT: 227 LBS | DIASTOLIC BLOOD PRESSURE: 96 MMHG | SYSTOLIC BLOOD PRESSURE: 154 MMHG | BODY MASS INDEX: 39 KG/M2

## 2018-09-26 DIAGNOSIS — I10 ESSENTIAL HYPERTENSION: ICD-10-CM

## 2018-09-26 DIAGNOSIS — N18.4 CKD (CHRONIC KIDNEY DISEASE) STAGE 4, GFR 15-29 ML/MIN (HCC): Primary | ICD-10-CM

## 2018-09-26 PROCEDURE — 99214 OFFICE O/P EST MOD 30 MIN: CPT | Performed by: INTERNAL MEDICINE

## 2018-09-26 NOTE — PROGRESS NOTES
Nephrology Progress Note      Michelle Puga is a 70year old female.     HPI:   Patient presents with:  Chronic Kidney Disease  Hypertension      Phyllis Barrow was seen in the nephrology clinic today in follow-up for management of chronic kidney disease st COLONOSCOPY  3/5/2015: HC PLMT CORONARY DRUG ELUTING STENT EA ADDL  1973: IMPACT TOOTH REM BONY W/COMP; Bilateral      Comment:  wisdom teeth  3/24/16: KNEE REPLACEMENT SURGERY      Comment:  right TKA   5/5/2016: KNEE TOTAL REPLACEMENT;  Left      Comment: tablet Rfl: 0   HYDROcodone-acetaminophen (NORCO) 5-325 MG Oral Tab Take 1-2 tablets by mouth every 6 (six) hours as needed for Pain (severe pain - do not take while driving).  Disp: 20 tablet Rfl: 0   Imipramine HCl 50 MG Oral Tab Take 2 tablets (100 mg to by mouth daily. Disp: 30 tablet Rfl: 0   EFFIENT 10 MG Oral Tab Take 10 mg by mouth daily. Disp:  Rfl:    aspirin 81 MG Oral Tab EC Take 81 mg by mouth nightly.    Disp: 30 tablet Rfl: 11   Calcium Carbonate-Vitamin D (OSCAL-500) 500-400 MG-UNIT Oral Tab Ta 05/19/2018    TP 7.0 05/19/2018    ALB 3.0 (L) 05/19/2018    AGRATIO 2.0 06/07/2014     (L) 09/02/2018    K 4.0 09/02/2018     09/02/2018    CO2 24 09/02/2018     Lab Results   Component Value Date    RBC 3.98 09/02/2018    HGB 12.6 09/02/2018 changes in her medications at this time. We will continue to monitor the renal function closely. As mentioned in her previous correspondence if her estimated GFR is consistently less than 20 mL's per minute we will refer her for transplant evaluation.

## 2018-11-20 RX ORDER — CLONIDINE HYDROCHLORIDE 0.2 MG/1
TABLET ORAL
Qty: 60 TABLET | Refills: 0 | OUTPATIENT
Start: 2018-11-20

## 2018-11-21 RX ORDER — AMLODIPINE BESYLATE 10 MG/1
TABLET ORAL
Qty: 30 TABLET | Refills: 5 | Status: SHIPPED | OUTPATIENT
Start: 2018-11-21 | End: 2019-02-19

## 2018-12-12 ENCOUNTER — APPOINTMENT (OUTPATIENT)
Dept: LAB | Facility: HOSPITAL | Age: 63
End: 2018-12-12
Attending: NURSE PRACTITIONER
Payer: COMMERCIAL

## 2018-12-12 DIAGNOSIS — F33.1 MODERATE EPISODE OF RECURRENT MAJOR DEPRESSIVE DISORDER (HCC): ICD-10-CM

## 2018-12-12 DIAGNOSIS — F42.9 OBSESSIVE-COMPULSIVE DISORDER, UNSPECIFIED TYPE: ICD-10-CM

## 2018-12-12 PROCEDURE — 93010 ELECTROCARDIOGRAM REPORT: CPT | Performed by: INTERNAL MEDICINE

## 2018-12-12 PROCEDURE — 93005 ELECTROCARDIOGRAM TRACING: CPT

## 2018-12-14 ENCOUNTER — PRIOR ORIGINAL RECORDS (OUTPATIENT)
Dept: OTHER | Age: 63
End: 2018-12-14

## 2018-12-14 ENCOUNTER — MYAURORA ACCOUNT LINK (OUTPATIENT)
Dept: OTHER | Age: 63
End: 2018-12-14

## 2019-02-05 ENCOUNTER — APPOINTMENT (OUTPATIENT)
Dept: LAB | Age: 64
End: 2019-02-05
Attending: INTERNAL MEDICINE
Payer: COMMERCIAL

## 2019-02-05 DIAGNOSIS — I10 ESSENTIAL HYPERTENSION: ICD-10-CM

## 2019-02-05 DIAGNOSIS — F33.1 MODERATE EPISODE OF RECURRENT MAJOR DEPRESSIVE DISORDER (HCC): ICD-10-CM

## 2019-02-05 DIAGNOSIS — N18.4 CKD (CHRONIC KIDNEY DISEASE) STAGE 4, GFR 15-29 ML/MIN (HCC): ICD-10-CM

## 2019-02-05 LAB
ANION GAP SERPL CALC-SCNC: 14 MMOL/L (ref 0–18)
BUN SERPL-MCNC: 36 MG/DL (ref 8–20)
BUN/CREAT SERPL: 13.3 (ref 10–20)
CALCIUM SERPL-MCNC: 9.2 MG/DL (ref 8.5–10.5)
CHLORIDE SERPL-SCNC: 102 MMOL/L (ref 95–110)
CO2 SERPL-SCNC: 22 MMOL/L (ref 22–32)
CREAT SERPL-MCNC: 2.71 MG/DL (ref 0.5–1.5)
GLUCOSE SERPL-MCNC: 125 MG/DL (ref 70–99)
OSMOLALITY UR CALC.SUM OF ELEC: 296 MOSM/KG (ref 275–295)
POTASSIUM SERPL-SCNC: 3.9 MMOL/L (ref 3.3–5.1)
SODIUM SERPL-SCNC: 138 MMOL/L (ref 136–144)

## 2019-02-05 PROCEDURE — 36415 COLL VENOUS BLD VENIPUNCTURE: CPT

## 2019-02-05 PROCEDURE — 82306 VITAMIN D 25 HYDROXY: CPT

## 2019-02-05 PROCEDURE — 80048 BASIC METABOLIC PNL TOTAL CA: CPT

## 2019-02-06 LAB — 25(OH)D3 SERPL-MCNC: 37.7 NG/ML (ref 30–100)

## 2019-02-12 ENCOUNTER — OFFICE VISIT (OUTPATIENT)
Dept: NEPHROLOGY | Facility: CLINIC | Age: 64
End: 2019-02-12
Payer: COMMERCIAL

## 2019-02-12 VITALS — SYSTOLIC BLOOD PRESSURE: 156 MMHG | DIASTOLIC BLOOD PRESSURE: 84 MMHG | BODY MASS INDEX: 38 KG/M2 | WEIGHT: 224 LBS

## 2019-02-12 DIAGNOSIS — N18.4 CKD (CHRONIC KIDNEY DISEASE) STAGE 4, GFR 15-29 ML/MIN (HCC): Primary | ICD-10-CM

## 2019-02-12 DIAGNOSIS — I10 ESSENTIAL HYPERTENSION: ICD-10-CM

## 2019-02-12 PROCEDURE — 99214 OFFICE O/P EST MOD 30 MIN: CPT | Performed by: INTERNAL MEDICINE

## 2019-02-12 NOTE — PROGRESS NOTES
Nephrology Progress Note      Chrissy Briceño is a 70year old female.     HPI:   Patient presents with:  Chronic Kidney Disease  Hypertension      Jesse Elena was seen in the nephrology clinic today in follow-up for management of chronic kidney disease st OSMIN   • COLONOSCOPY     • HC PLMT CORONARY DRUG ELUTING STENT EA ADDL  3/5/2015   • IMPACT TOOTH REM BONY W/COMP Bilateral 1973    wisdom teeth   • KNEE REPLACEMENT SURGERY  3/24/16    right TKA    • KNEE TOTAL REPLACEMENT Left 5/5/2016    Performed by David Chaudhry (severe pain - do not take while driving).  Disp: 20 tablet Rfl: 0   LEVOTHYROXINE SODIUM 88 MCG Oral Tab TAKE 1 TABLET(88 MCG) BY MOUTH BEFORE BREAKFAST Disp: 30 tablet Rfl: 11   Insulin Pen Needle (BD PEN NEEDLE ULTRAFINE) 29G X 12.7MM Does not apply Misc Multiple Vitamin (TAB-A-RENAN) Oral Tab Take 1 tablet by mouth nightly.    Disp:  Rfl:        Allergies:    Plavix [Clopidogrel]    RASH  Ultram [Tramadol]       FEVER, ANXIETY    Comment:Serotonin syndrome to scheduled tramadol in             setting of u 09/02/2018     09/02/2018    MPV 8.9 09/02/2018    MCV 95.3 09/02/2018    MCH 31.7 09/02/2018    MCHC 33.2 09/02/2018    RDW 14.7 09/02/2018    NEPRELIM 2.80 05/19/2018    NEPERCENT 78 09/02/2018    LYPERCENT 14 09/02/2018    MOPERCENT 6 09/02/2018 that at some point she would require renal replacement therapy in the form of dialysis. We will continue to discuss these moving forward. #2.   Hypertension-as mentioned above the blood pressures are relatively stable at home and for now she will contin

## 2019-02-19 RX ORDER — AMLODIPINE BESYLATE 10 MG/1
TABLET ORAL
Qty: 90 TABLET | Refills: 1 | Status: SHIPPED | OUTPATIENT
Start: 2019-02-19 | End: 2019-04-24

## 2019-02-26 RX ORDER — CLONIDINE HYDROCHLORIDE 0.2 MG/1
0.2 TABLET ORAL 2 TIMES DAILY
Qty: 60 TABLET | Refills: 5 | Status: SHIPPED | OUTPATIENT
Start: 2019-02-26 | End: 2019-03-11

## 2019-02-28 VITALS
WEIGHT: 228 LBS | BODY MASS INDEX: 38.93 KG/M2 | HEART RATE: 74 BPM | DIASTOLIC BLOOD PRESSURE: 70 MMHG | SYSTOLIC BLOOD PRESSURE: 110 MMHG | HEIGHT: 64 IN

## 2019-02-28 VITALS
SYSTOLIC BLOOD PRESSURE: 142 MMHG | BODY MASS INDEX: 37.39 KG/M2 | WEIGHT: 219 LBS | HEART RATE: 83 BPM | HEIGHT: 64 IN | DIASTOLIC BLOOD PRESSURE: 80 MMHG

## 2019-02-28 VITALS
HEART RATE: 84 BPM | WEIGHT: 225 LBS | BODY MASS INDEX: 38.41 KG/M2 | HEIGHT: 64 IN | DIASTOLIC BLOOD PRESSURE: 80 MMHG | SYSTOLIC BLOOD PRESSURE: 142 MMHG

## 2019-02-28 VITALS
HEART RATE: 72 BPM | WEIGHT: 221 LBS | HEIGHT: 64 IN | DIASTOLIC BLOOD PRESSURE: 90 MMHG | BODY MASS INDEX: 37.73 KG/M2 | SYSTOLIC BLOOD PRESSURE: 154 MMHG

## 2019-02-28 VITALS
WEIGHT: 224 LBS | HEART RATE: 63 BPM | HEIGHT: 64 IN | SYSTOLIC BLOOD PRESSURE: 114 MMHG | DIASTOLIC BLOOD PRESSURE: 62 MMHG | BODY MASS INDEX: 38.24 KG/M2

## 2019-02-28 VITALS
HEART RATE: 74 BPM | HEIGHT: 64 IN | DIASTOLIC BLOOD PRESSURE: 70 MMHG | WEIGHT: 228 LBS | SYSTOLIC BLOOD PRESSURE: 138 MMHG | BODY MASS INDEX: 38.93 KG/M2

## 2019-03-01 VITALS
HEART RATE: 81 BPM | SYSTOLIC BLOOD PRESSURE: 178 MMHG | DIASTOLIC BLOOD PRESSURE: 96 MMHG | HEIGHT: 64 IN | WEIGHT: 210 LBS | BODY MASS INDEX: 35.85 KG/M2

## 2019-03-08 ENCOUNTER — TELEPHONE (OUTPATIENT)
Dept: CARDIOLOGY | Age: 64
End: 2019-03-08

## 2019-03-08 RX ORDER — LOSARTAN POTASSIUM 100 MG/1
100 TABLET ORAL DAILY
Qty: 90 TABLET | Refills: 0 | Status: SHIPPED | OUTPATIENT
Start: 2019-03-08 | End: 2019-05-22 | Stop reason: SDUPTHER

## 2019-03-08 RX ORDER — LOSARTAN POTASSIUM 100 MG/1
100 TABLET ORAL DAILY
COMMUNITY
Start: 2018-11-21 | End: 2019-03-08 | Stop reason: SDUPTHER

## 2019-03-08 RX ORDER — PRASUGREL 10 MG/1
TABLET, FILM COATED ORAL
Qty: 90 TABLET | Refills: 2 | Status: SHIPPED | OUTPATIENT
Start: 2019-03-08 | End: 2019-11-18 | Stop reason: SDUPTHER

## 2019-03-08 RX ORDER — HYDRALAZINE HYDROCHLORIDE 100 MG/1
100 TABLET, FILM COATED ORAL 3 TIMES DAILY
Qty: 90 TABLET | Refills: 0 | Status: SHIPPED | OUTPATIENT
Start: 2019-03-08 | End: 2019-04-25 | Stop reason: SDUPTHER

## 2019-03-08 RX ORDER — HYDRALAZINE HYDROCHLORIDE 100 MG/1
100 TABLET, FILM COATED ORAL 3 TIMES DAILY
COMMUNITY
Start: 2018-11-21 | End: 2019-03-08 | Stop reason: SDUPTHER

## 2019-03-11 RX ORDER — CLONIDINE HYDROCHLORIDE 0.2 MG/1
0.2 TABLET ORAL 2 TIMES DAILY
Qty: 180 TABLET | Refills: 1 | Status: SHIPPED | OUTPATIENT
Start: 2019-03-11 | End: 2019-08-15

## 2019-03-12 RX ORDER — ATORVASTATIN CALCIUM 20 MG/1
20 TABLET, FILM COATED ORAL DAILY
Qty: 90 TABLET | Refills: 1 | Status: SHIPPED | OUTPATIENT
Start: 2019-03-12 | End: 2019-08-16 | Stop reason: SDUPTHER

## 2019-03-12 RX ORDER — ATORVASTATIN CALCIUM 20 MG/1
20 TABLET, FILM COATED ORAL DAILY
COMMUNITY
Start: 2018-06-05 | End: 2019-03-12 | Stop reason: SDUPTHER

## 2019-04-03 RX ORDER — CHOLECALCIFEROL (VITAMIN D3) 125 MCG
CAPSULE ORAL
COMMUNITY

## 2019-04-03 RX ORDER — FENOFIBRATE 54 MG/1
TABLET ORAL
COMMUNITY
Start: 2014-09-30

## 2019-04-03 RX ORDER — IBUPROFEN 200 MG
CAPSULE ORAL
COMMUNITY
Start: 2010-12-20

## 2019-04-03 RX ORDER — METOPROLOL SUCCINATE 25 MG/1
TABLET, EXTENDED RELEASE ORAL
COMMUNITY
Start: 2018-07-02 | End: 2019-07-09 | Stop reason: SDUPTHER

## 2019-04-03 RX ORDER — INSULIN GLARGINE 100 [IU]/ML
INJECTION, SOLUTION SUBCUTANEOUS
COMMUNITY
End: 2019-12-03 | Stop reason: ALTCHOICE

## 2019-04-03 RX ORDER — METHYLPHENIDATE HYDROCHLORIDE 10 MG/1
TABLET ORAL
COMMUNITY
Start: 2017-04-12 | End: 2019-12-04 | Stop reason: DRUGHIGH

## 2019-04-03 RX ORDER — MULTIVITAMIN
1 CAPSULE ORAL DAILY
COMMUNITY
Start: 2010-12-20 | End: 2020-06-12 | Stop reason: SDUPTHER

## 2019-04-03 RX ORDER — OMEGA-3-ACID ETHYL ESTERS 1 G/1
CAPSULE, LIQUID FILLED ORAL
COMMUNITY

## 2019-04-03 RX ORDER — CLONIDINE HYDROCHLORIDE 0.2 MG/1
TABLET ORAL
COMMUNITY
End: 2020-06-12 | Stop reason: SDUPTHER

## 2019-04-03 RX ORDER — LEVOTHYROXINE SODIUM 0.07 MG/1
1 TABLET ORAL DAILY
COMMUNITY
Start: 2017-10-02 | End: 2020-06-12 | Stop reason: DRUGHIGH

## 2019-04-03 RX ORDER — PANTOPRAZOLE SODIUM 40 MG/1
1 TABLET, DELAYED RELEASE ORAL DAILY
COMMUNITY
Start: 2010-12-20 | End: 2020-06-12 | Stop reason: SDUPTHER

## 2019-04-03 RX ORDER — DIAZEPAM 5 MG/1
TABLET ORAL
COMMUNITY
Start: 2017-10-18

## 2019-04-03 RX ORDER — ALLOPURINOL 300 MG/1
300 TABLET ORAL
COMMUNITY
Start: 2017-10-02

## 2019-04-03 RX ORDER — IMIPRAMINE HCL 50 MG
2 TABLET ORAL DAILY
COMMUNITY
Start: 2018-07-09 | End: 2019-12-10 | Stop reason: ALTCHOICE

## 2019-04-08 ENCOUNTER — HOSPITAL (OUTPATIENT)
Dept: OTHER | Age: 64
End: 2019-04-08
Attending: EMERGENCY MEDICINE

## 2019-04-08 ENCOUNTER — DIAGNOSTIC TRANS (OUTPATIENT)
Dept: OTHER | Age: 64
End: 2019-04-08

## 2019-04-08 LAB
ANALYZER ANC (IANC): ABNORMAL
ANION GAP SERPL CALC-SCNC: 18 MMOL/L (ref 10–20)
BASOPHILS # BLD: 0.1 THOUSAND/MCL (ref 0–0.3)
BASOPHILS NFR BLD: 1 %
BUN SERPL-MCNC: 33 MG/DL (ref 6–20)
BUN/CREAT SERPL: 15 (ref 7–25)
CALCIUM SERPL-MCNC: 9.5 MG/DL (ref 8.4–10.2)
CHLORIDE: 104 MMOL/L (ref 98–107)
CO2 SERPL-SCNC: 26 MMOL/L (ref 21–32)
CREAT SERPL-MCNC: 2.17 MG/DL (ref 0.51–0.95)
DIFFERENTIAL METHOD BLD: ABNORMAL
EOSINOPHIL # BLD: 0.2 THOUSAND/MCL (ref 0.1–0.5)
EOSINOPHIL NFR BLD: 3 %
ERYTHROCYTE [DISTWIDTH] IN BLOOD: 13.3 % (ref 11–15)
GLUCOSE SERPL-MCNC: 224 MG/DL (ref 65–99)
HEMATOCRIT: 37.2 % (ref 36–46.5)
HGB BLD-MCNC: 12.3 GM/DL (ref 12–15.5)
IMM GRANULOCYTES # BLD AUTO: 0 THOUSAND/MCL (ref 0–0.2)
IMM GRANULOCYTES NFR BLD: 0 %
LYMPHOCYTES # BLD: 2.6 THOUSAND/MCL (ref 1–4)
LYMPHOCYTES NFR BLD: 38 %
MCH RBC QN AUTO: 31.5 PG (ref 26–34)
MCHC RBC AUTO-ENTMCNC: 33.1 GM/DL (ref 32–36.5)
MCV RBC AUTO: 95.4 FL (ref 78–100)
MONOCYTES # BLD: 0.6 THOUSAND/MCL (ref 0.3–0.9)
MONOCYTES NFR BLD: 8 %
NEUTROPHILS # BLD: 3.4 THOUSAND/MCL (ref 1.8–7.7)
NEUTROPHILS NFR BLD: 50 %
NEUTS SEG NFR BLD: ABNORMAL %
NRBC (NRBCRE): 0 /100 WBC
PLATELET # BLD: 266 THOUSAND/MCL (ref 140–450)
POTASSIUM SERPL-SCNC: 4.8 MMOL/L (ref 3.4–5.1)
RBC # BLD: 3.9 MILLION/MCL (ref 4–5.2)
SODIUM SERPL-SCNC: 143 MMOL/L (ref 135–145)
TROPONIN I SERPL HS-MCNC: <0.02 NG/ML
WBC # BLD: 6.9 THOUSAND/MCL (ref 4.2–11)

## 2019-04-23 PROCEDURE — 81001 URINALYSIS AUTO W/SCOPE: CPT | Performed by: FAMILY MEDICINE

## 2019-04-26 RX ORDER — HYDRALAZINE HYDROCHLORIDE 100 MG/1
TABLET, FILM COATED ORAL
Qty: 90 TABLET | Refills: 0 | Status: SHIPPED | OUTPATIENT
Start: 2019-04-26 | End: 2019-05-14 | Stop reason: SDUPTHER

## 2019-05-14 RX ORDER — HYDRALAZINE HYDROCHLORIDE 100 MG/1
TABLET, FILM COATED ORAL
Qty: 90 TABLET | Refills: 1 | Status: SHIPPED | OUTPATIENT
Start: 2019-05-14 | End: 2019-07-08 | Stop reason: SDUPTHER

## 2019-05-22 RX ORDER — LOSARTAN POTASSIUM 100 MG/1
TABLET ORAL
Qty: 90 TABLET | Refills: 1 | Status: SHIPPED | OUTPATIENT
Start: 2019-05-22 | End: 2019-11-18 | Stop reason: SDUPTHER

## 2019-06-13 RX ORDER — AMLODIPINE BESYLATE 10 MG/1
TABLET ORAL
COMMUNITY
Start: 2019-04-24

## 2019-06-14 ENCOUNTER — OFFICE VISIT (OUTPATIENT)
Dept: CARDIOLOGY | Age: 64
End: 2019-06-14

## 2019-06-14 VITALS
BODY MASS INDEX: 40.29 KG/M2 | DIASTOLIC BLOOD PRESSURE: 68 MMHG | WEIGHT: 236 LBS | HEIGHT: 64 IN | SYSTOLIC BLOOD PRESSURE: 132 MMHG | HEART RATE: 72 BPM

## 2019-06-14 DIAGNOSIS — Z95.5 S/P PRIMARY ANGIOPLASTY WITH CORONARY STENT: ICD-10-CM

## 2019-06-14 DIAGNOSIS — E10.8 TYPE 1 DIABETES MELLITUS WITH COMPLICATION (CMD): ICD-10-CM

## 2019-06-14 DIAGNOSIS — I70.1 RENAL ARTERY ATHEROSCLEROSIS (CMD): ICD-10-CM

## 2019-06-14 DIAGNOSIS — Z95.1 HX OF CABG: ICD-10-CM

## 2019-06-14 DIAGNOSIS — I25.10 CORONARY ARTERY DISEASE INVOLVING NATIVE HEART WITHOUT ANGINA PECTORIS, UNSPECIFIED VESSEL OR LESION TYPE: Primary | ICD-10-CM

## 2019-06-14 PROCEDURE — 3078F DIAST BP <80 MM HG: CPT | Performed by: INTERNAL MEDICINE

## 2019-06-14 PROCEDURE — 3075F SYST BP GE 130 - 139MM HG: CPT | Performed by: INTERNAL MEDICINE

## 2019-06-14 PROCEDURE — 99214 OFFICE O/P EST MOD 30 MIN: CPT | Performed by: INTERNAL MEDICINE

## 2019-06-14 SDOH — HEALTH STABILITY: PHYSICAL HEALTH: ON AVERAGE, HOW MANY DAYS PER WEEK DO YOU ENGAGE IN MODERATE TO STRENUOUS EXERCISE (LIKE A BRISK WALK)?: 0 DAYS

## 2019-06-14 SDOH — HEALTH STABILITY: PHYSICAL HEALTH: ON AVERAGE, HOW MANY MINUTES DO YOU ENGAGE IN EXERCISE AT THIS LEVEL?: 0 MIN

## 2019-06-14 ASSESSMENT — ENCOUNTER SYMPTOMS
COUGH: 0
HEMOPTYSIS: 0
BRUISES/BLEEDS EASILY: 0
HEMATOCHEZIA: 0
CHILLS: 0
FEVER: 0
ALLERGIC/IMMUNOLOGIC COMMENTS: NO NEW FOOD ALLERGIES
SUSPICIOUS LESIONS: 0
WEIGHT LOSS: 0
WEIGHT GAIN: 0

## 2019-06-21 ENCOUNTER — ANCILLARY PROCEDURE (OUTPATIENT)
Dept: CARDIOLOGY | Age: 64
End: 2019-06-21
Attending: INTERNAL MEDICINE

## 2019-06-21 DIAGNOSIS — Z95.1 HX OF CABG: ICD-10-CM

## 2019-06-21 DIAGNOSIS — E10.8 TYPE 1 DIABETES MELLITUS WITH COMPLICATION (CMD): ICD-10-CM

## 2019-06-21 DIAGNOSIS — I70.1 RENAL ARTERY ATHEROSCLEROSIS (CMD): ICD-10-CM

## 2019-06-21 DIAGNOSIS — I25.10 CORONARY ARTERY DISEASE INVOLVING NATIVE HEART WITHOUT ANGINA PECTORIS, UNSPECIFIED VESSEL OR LESION TYPE: ICD-10-CM

## 2019-06-21 PROCEDURE — 93306 TTE W/DOPPLER COMPLETE: CPT | Performed by: INTERNAL MEDICINE

## 2019-06-26 ENCOUNTER — TELEPHONE (OUTPATIENT)
Dept: CARDIOLOGY | Age: 64
End: 2019-06-26

## 2019-07-08 RX ORDER — HYDRALAZINE HYDROCHLORIDE 100 MG/1
TABLET, FILM COATED ORAL
Qty: 90 TABLET | Refills: 0 | Status: SHIPPED | OUTPATIENT
Start: 2019-07-08 | End: 2019-11-04 | Stop reason: SDUPTHER

## 2019-07-09 RX ORDER — METOPROLOL SUCCINATE 25 MG/1
TABLET, EXTENDED RELEASE ORAL
Qty: 180 TABLET | Refills: 2 | Status: SHIPPED | OUTPATIENT
Start: 2019-07-09 | End: 2019-11-23 | Stop reason: SDUPTHER

## 2019-07-19 ENCOUNTER — LAB ENCOUNTER (OUTPATIENT)
Dept: LAB | Age: 64
End: 2019-07-19
Attending: FAMILY MEDICINE
Payer: COMMERCIAL

## 2019-07-19 DIAGNOSIS — E08.22 DIABETES MELLITUS DUE TO UNDERLYING CONDITION, CONTROLLED, WITH STAGE 4 CHRONIC KIDNEY DISEASE, WITH LONG-TERM CURRENT USE OF INSULIN (HCC): ICD-10-CM

## 2019-07-19 DIAGNOSIS — N18.4 DIABETES MELLITUS DUE TO UNDERLYING CONDITION, CONTROLLED, WITH STAGE 4 CHRONIC KIDNEY DISEASE, WITH LONG-TERM CURRENT USE OF INSULIN (HCC): ICD-10-CM

## 2019-07-19 DIAGNOSIS — Z79.4 DIABETES MELLITUS DUE TO UNDERLYING CONDITION, CONTROLLED, WITH STAGE 4 CHRONIC KIDNEY DISEASE, WITH LONG-TERM CURRENT USE OF INSULIN (HCC): ICD-10-CM

## 2019-07-19 DIAGNOSIS — N18.4 CKD (CHRONIC KIDNEY DISEASE) STAGE 4, GFR 15-29 ML/MIN (HCC): ICD-10-CM

## 2019-07-19 DIAGNOSIS — E78.1 HYPERTRIGLYCERIDEMIA: ICD-10-CM

## 2019-07-19 DIAGNOSIS — E03.9 HYPOTHYROIDISM, UNSPECIFIED TYPE: ICD-10-CM

## 2019-07-19 LAB
ANION GAP SERPL CALC-SCNC: 9 MMOL/L (ref 0–18)
BUN BLD-MCNC: 40 MG/DL (ref 7–18)
BUN/CREAT SERPL: 14.4 (ref 10–20)
CALCIUM BLD-MCNC: 8.9 MG/DL (ref 8.5–10.1)
CHLORIDE SERPL-SCNC: 106 MMOL/L (ref 98–112)
CHOLEST SMN-MCNC: 220 MG/DL (ref ?–200)
CO2 SERPL-SCNC: 25 MMOL/L (ref 21–32)
CREAT BLD-MCNC: 2.77 MG/DL (ref 0.55–1.02)
EST. AVERAGE GLUCOSE BLD GHB EST-MCNC: 229 MG/DL (ref 68–126)
GLUCOSE BLD-MCNC: 282 MG/DL (ref 70–99)
HBA1C MFR BLD HPLC: 9.6 % (ref ?–5.7)
HDLC SERPL-MCNC: 36 MG/DL (ref 40–59)
LDLC SERPL DIRECT ASSAY-MCNC: 90 MG/DL (ref ?–100)
NONHDLC SERPL-MCNC: 184 MG/DL (ref ?–130)
OSMOLALITY SERPL CALC.SUM OF ELEC: 310 MOSM/KG (ref 275–295)
PATIENT FASTING: YES
PATIENT FASTING: YES
POTASSIUM SERPL-SCNC: 4.4 MMOL/L (ref 3.5–5.1)
SODIUM SERPL-SCNC: 140 MMOL/L (ref 136–145)
TRIGL SERPL-MCNC: 578 MG/DL (ref 30–149)
TSI SER-ACNC: 2.83 MIU/ML (ref 0.36–3.74)

## 2019-07-19 PROCEDURE — 83721 ASSAY OF BLOOD LIPOPROTEIN: CPT

## 2019-07-19 PROCEDURE — 80061 LIPID PANEL: CPT

## 2019-07-19 PROCEDURE — 36415 COLL VENOUS BLD VENIPUNCTURE: CPT

## 2019-07-19 PROCEDURE — 80048 BASIC METABOLIC PNL TOTAL CA: CPT

## 2019-07-19 PROCEDURE — 84443 ASSAY THYROID STIM HORMONE: CPT

## 2019-07-19 PROCEDURE — 83036 HEMOGLOBIN GLYCOSYLATED A1C: CPT

## 2019-07-21 ENCOUNTER — APPOINTMENT (OUTPATIENT)
Dept: CT IMAGING | Facility: HOSPITAL | Age: 64
End: 2019-07-21
Attending: EMERGENCY MEDICINE
Payer: COMMERCIAL

## 2019-07-21 ENCOUNTER — APPOINTMENT (OUTPATIENT)
Dept: MRI IMAGING | Facility: HOSPITAL | Age: 64
End: 2019-07-21
Attending: EMERGENCY MEDICINE
Payer: COMMERCIAL

## 2019-07-21 ENCOUNTER — HOSPITAL ENCOUNTER (EMERGENCY)
Facility: HOSPITAL | Age: 64
Discharge: HOME | End: 2019-07-21
Attending: EMERGENCY MEDICINE
Payer: COMMERCIAL

## 2019-07-21 VITALS
RESPIRATION RATE: 20 BRPM | HEART RATE: 77 BPM | TEMPERATURE: 98 F | DIASTOLIC BLOOD PRESSURE: 86 MMHG | OXYGEN SATURATION: 95 % | SYSTOLIC BLOOD PRESSURE: 156 MMHG

## 2019-07-21 DIAGNOSIS — R42 DIZZINESS: Primary | ICD-10-CM

## 2019-07-21 LAB
ANION GAP SERPL CALC-SCNC: 7 MMOL/L (ref 0–18)
BACTERIA UR QL AUTO: NEGATIVE /HPF
BASOPHILS # BLD AUTO: 0.06 X10(3) UL (ref 0–0.2)
BASOPHILS NFR BLD AUTO: 1 %
BILIRUB UR QL: NEGATIVE
BUN BLD-MCNC: 37 MG/DL (ref 7–18)
BUN/CREAT SERPL: 14.4 (ref 10–20)
CALCIUM BLD-MCNC: 9.2 MG/DL (ref 8.5–10.1)
CHLORIDE SERPL-SCNC: 107 MMOL/L (ref 98–112)
CLARITY UR: CLEAR
CO2 SERPL-SCNC: 28 MMOL/L (ref 21–32)
COLOR UR: YELLOW
CREAT BLD-MCNC: 2.57 MG/DL (ref 0.55–1.02)
DEPRECATED RDW RBC AUTO: 47.1 FL (ref 35.1–46.3)
EOSINOPHIL # BLD AUTO: 0.15 X10(3) UL (ref 0–0.7)
EOSINOPHIL NFR BLD AUTO: 2.4 %
ERYTHROCYTE [DISTWIDTH] IN BLOOD BY AUTOMATED COUNT: 13.5 % (ref 11–15)
GLUCOSE BLD-MCNC: 244 MG/DL (ref 70–99)
GLUCOSE UR-MCNC: 50 MG/DL
HCT VFR BLD AUTO: 34.3 % (ref 35–48)
HGB BLD-MCNC: 11.3 G/DL (ref 12–16)
HGB UR QL STRIP.AUTO: NEGATIVE
IMM GRANULOCYTES # BLD AUTO: 0.03 X10(3) UL (ref 0–1)
IMM GRANULOCYTES NFR BLD: 0.5 %
KETONES UR-MCNC: NEGATIVE MG/DL
LEUKOCYTE ESTERASE UR QL STRIP.AUTO: NEGATIVE
LYMPHOCYTES # BLD AUTO: 1.76 X10(3) UL (ref 1–4)
LYMPHOCYTES NFR BLD AUTO: 28.5 %
MCH RBC QN AUTO: 31.6 PG (ref 26–34)
MCHC RBC AUTO-ENTMCNC: 32.9 G/DL (ref 31–37)
MCV RBC AUTO: 95.8 FL (ref 80–100)
MONOCYTES # BLD AUTO: 0.61 X10(3) UL (ref 0.1–1)
MONOCYTES NFR BLD AUTO: 9.9 %
NEUTROPHILS # BLD AUTO: 3.57 X10 (3) UL (ref 1.5–7.7)
NEUTROPHILS # BLD AUTO: 3.57 X10(3) UL (ref 1.5–7.7)
NEUTROPHILS NFR BLD AUTO: 57.7 %
NITRITE UR QL STRIP.AUTO: NEGATIVE
OSMOLALITY SERPL CALC.SUM OF ELEC: 311 MOSM/KG (ref 275–295)
PH UR: 6 [PH] (ref 5–8)
PLATELET # BLD AUTO: 211 10(3)UL (ref 150–450)
POTASSIUM SERPL-SCNC: 4.6 MMOL/L (ref 3.5–5.1)
PROT UR-MCNC: 30 MG/DL
RBC # BLD AUTO: 3.58 X10(6)UL (ref 3.8–5.3)
RBC #/AREA URNS AUTO: 1 /HPF
SODIUM SERPL-SCNC: 142 MMOL/L (ref 136–145)
SP GR UR STRIP: 1.01 (ref 1–1.03)
TROPONIN I SERPL-MCNC: <0.045 NG/ML (ref ?–0.04)
UROBILINOGEN UR STRIP-ACNC: <2
VIT C UR-MCNC: NEGATIVE MG/DL
WBC # BLD AUTO: 6.2 X10(3) UL (ref 4–11)
WBC #/AREA URNS AUTO: 1 /HPF

## 2019-07-21 PROCEDURE — 81001 URINALYSIS AUTO W/SCOPE: CPT | Performed by: EMERGENCY MEDICINE

## 2019-07-21 PROCEDURE — 80048 BASIC METABOLIC PNL TOTAL CA: CPT | Performed by: EMERGENCY MEDICINE

## 2019-07-21 PROCEDURE — 99285 EMERGENCY DEPT VISIT HI MDM: CPT

## 2019-07-21 PROCEDURE — 93005 ELECTROCARDIOGRAM TRACING: CPT

## 2019-07-21 PROCEDURE — 70551 MRI BRAIN STEM W/O DYE: CPT | Performed by: EMERGENCY MEDICINE

## 2019-07-21 PROCEDURE — 96360 HYDRATION IV INFUSION INIT: CPT

## 2019-07-21 PROCEDURE — 96361 HYDRATE IV INFUSION ADD-ON: CPT

## 2019-07-21 PROCEDURE — 70450 CT HEAD/BRAIN W/O DYE: CPT | Performed by: EMERGENCY MEDICINE

## 2019-07-21 PROCEDURE — 85025 COMPLETE CBC W/AUTO DIFF WBC: CPT | Performed by: EMERGENCY MEDICINE

## 2019-07-21 PROCEDURE — 93010 ELECTROCARDIOGRAM REPORT: CPT | Performed by: EMERGENCY MEDICINE

## 2019-07-21 PROCEDURE — 84484 ASSAY OF TROPONIN QUANT: CPT | Performed by: EMERGENCY MEDICINE

## 2019-07-21 RX ORDER — MECLIZINE HYDROCHLORIDE CHEWABLE TABLETS 25 MG/1
1 TABLET, CHEWABLE ORAL 3 TIMES DAILY PRN
Qty: 30 TABLET | Refills: 0 | Status: SHIPPED | OUTPATIENT
Start: 2019-07-21 | End: 2019-07-31

## 2019-07-21 RX ORDER — MECLIZINE HYDROCHLORIDE 25 MG/1
25 TABLET ORAL ONCE
Status: COMPLETED | OUTPATIENT
Start: 2019-07-21 | End: 2019-07-21

## 2019-07-21 NOTE — ED INITIAL ASSESSMENT (HPI)
Patient states she has been losing her balance over the last few months. Nothing seems to bring these episodes on, and they never last more than 5 minutes.  Patient lost her balance this morning, causing her to hit her head on the side of the bathtub, and h

## 2019-07-21 NOTE — ED PROVIDER NOTES
Patient Seen in: [facility] Emergency Department    History   Patient presents with:  Unsteady    Stated Complaint: Loss of balance started \"months ago\" + fall yesterday with head injury     HPI    76 yo F with PMH CAD on ASA/brilinta, DM, HTN, HL CABGx4 vessel   • CATH BARE METAL STENT (BMS)     • CATH PERCUTANEOUS  TRANSLUMINAL CORONARY ANGIOPLASTY  3/3/2015   • CATH PERCUTANEOUS  TRANSLUMINAL CORONARY ANGIOPLASTY Right 09/19/2017    right Coronary artery, OSMIN   • COLONOSCOPY     • HC PLMT CORONAR Needle (BD PEN NEEDLE ORIGINAL U/F) 29G X 12.7MM Does not apply Misc,  USE AS DIRECTED DAILY   Insulin Lispro (HUMALOG MOISES KWIKPEN) 100 UNIT/ML Subcutaneous Solution Pen-injector,  Inject 3 Units into the skin 3 (three) times daily before meals.    allop Packs/day: 1.00        Years: 35.00        Pack years: 28        Types: Cigarettes        Quit date: 2014        Years since quittin.8      Smokeless tobacco: Never Used    Alcohol use:  Yes      Alcohol/week: 0.0 standard drinks      Comment: rare Notable for the following components:       Result Value    RBC 3.58 (*)     HGB 11.3 (*)     HCT 34.3 (*)     RDW-SD 47.1 (*)     All other components within normal limits   CBC WITH DIFFERENTIAL WITH PLATELET    Narrative:      The following orders were c hypodensities are present, consistent with chronic microvascular ischemic disease. CEREBELLUM: No edema, hemorrhage, mass, or acute infarction is seen. There is cerebellar folial expansion consistent with atrophy.   BRAINSTEM: Patchy areas of low attenuatio diffusion weighted imaging, T2-weighted FLAIR, and gradient recalled echo images performed in the axial plane. Images were performed without contrast.   FINDINGS:  COMMENT: Overall examination quality is mildly compromised by patient motion artifact.  CSF Approved by (CST): Belkys Larsen MD on 7/21/2019 at 18:45          MDM     DIFFERENTIAL DIAGNOSIS: After history and physical exam differential diagnosis includes but is not limited to intracerebral mass/hemorrhage, CVA/TIA, electrolyte derangement, a

## 2019-07-21 NOTE — ED NOTES
Pt reports she has episodes where she \"loses her balance\" and feels \"unsteady\". This has been going on for several months. Pt reports 2 falls within past 2 days. Pt denies dizziness, pain, LOC, SOB. No exacerbating factors.

## 2019-07-22 ENCOUNTER — OFFICE VISIT (OUTPATIENT)
Dept: NEPHROLOGY | Facility: CLINIC | Age: 64
End: 2019-07-22
Payer: COMMERCIAL

## 2019-07-22 VITALS — BODY MASS INDEX: 42 KG/M2 | DIASTOLIC BLOOD PRESSURE: 84 MMHG | WEIGHT: 238 LBS | SYSTOLIC BLOOD PRESSURE: 150 MMHG

## 2019-07-22 DIAGNOSIS — N18.4 CKD (CHRONIC KIDNEY DISEASE) STAGE 4, GFR 15-29 ML/MIN (HCC): Primary | ICD-10-CM

## 2019-07-22 DIAGNOSIS — I10 ESSENTIAL HYPERTENSION: ICD-10-CM

## 2019-07-22 PROCEDURE — 99214 OFFICE O/P EST MOD 30 MIN: CPT | Performed by: INTERNAL MEDICINE

## 2019-07-22 RX ORDER — HYDRALAZINE HYDROCHLORIDE 100 MG/1
100 TABLET, FILM COATED ORAL 3 TIMES DAILY
COMMUNITY

## 2019-07-22 NOTE — PROGRESS NOTES
Nephrology Progress Note      Kendall Guido is a 75year old female.     HPI:   Patient presents with:  Chronic Kidney Disease  Hypertension      Nory Clark was seen in the nephrology clinic today in follow-up for management of chronic kidney disease st 1973    wisdom teeth   • KNEE REPLACEMENT SURGERY  3/24/16    right TKA    • KNEE TOTAL REPLACEMENT Left 5/5/2016    Performed by Radha Bales MD at [address] [place] [address]   • [address] [place] [address] Right 3/24/2016    Performed by Radha Bales MD at [facility] MAIN total) by mouth 2 (two) times daily. Disp: 60 tablet Rfl: 0   diazepam 5 MG Oral Tab Take a half tablet by mouth in the morning and afternoon and one tablet by mouth at night.  Disp: 60 tablet Rfl: 2   Pantoprazole Sodium 40 MG Oral Tab EC TAKE 1 TABLET(40 Take 1 tablet (100 mg total) by mouth every 8 (eight) hours. Disp: 90 tablet Rfl: 3   Metoprolol Succinate ER 25 MG Oral Tablet 24 Hr Take 3 tablets (75 mg total) by mouth 2x Daily(Beta Blocker).  Disp: 180 tablet Rfl: 3   losartan 100 MG Oral Tab Take 1 ta (H) 07/21/2019    ANIONGAP 7 07/21/2019    GFR 21 (L) 11/07/2017    GFRNAA 19 (L) 07/21/2019    GFRAA 22 (L) 07/21/2019    CA 9.2 07/21/2019    OSMOCALC 311 (H) 07/21/2019    ALKPHO 127 04/23/2019    AST 23 04/23/2019    ALT 16 04/23/2019    BILT 0.19 04/2 We have discussed referring her for transplantation once her estimated GFR is consistently less than 20 mL's per minute although with a slight improvement in creatinine and a relatively stable clinical course for now we will hold off on this.   In the past

## 2019-08-16 RX ORDER — CLONIDINE HYDROCHLORIDE 0.2 MG/1
TABLET ORAL
Qty: 180 TABLET | Refills: 4 | Status: SHIPPED | OUTPATIENT
Start: 2019-08-16 | End: 2020-11-24

## 2019-08-19 RX ORDER — ATORVASTATIN CALCIUM 20 MG/1
20 TABLET, FILM COATED ORAL DAILY
Qty: 90 TABLET | Refills: 2 | Status: SHIPPED | OUTPATIENT
Start: 2019-11-18 | End: 2020-07-30

## 2019-09-18 ENCOUNTER — LAB ENCOUNTER (OUTPATIENT)
Dept: LAB | Age: 64
End: 2019-09-18
Attending: NURSE PRACTITIONER
Payer: COMMERCIAL

## 2019-09-18 DIAGNOSIS — Z79.899 MEDICATION MANAGEMENT: ICD-10-CM

## 2019-09-18 PROCEDURE — 36415 COLL VENOUS BLD VENIPUNCTURE: CPT

## 2019-09-18 PROCEDURE — 80335 ANTIDEPRESSANT TRICYCLIC 1/2: CPT

## 2019-09-21 LAB
DESIPRAMINE: 151 NG/ML
IMIPRAMINE: 427 NG/ML
Lab: 578 NG/ML

## 2019-09-22 NOTE — PROGRESS NOTES
I made patient aware of high imipramine level on 9/21/19 and will decrease to 100 mg at night. I will discuss alternative medications worthy patient on Monday. She denies any chest pain, nausea, dizziness, or being light headed. She reports fatigue.   She

## 2019-09-24 ENCOUNTER — APPOINTMENT (OUTPATIENT)
Dept: LAB | Age: 64
End: 2019-09-24
Attending: NURSE PRACTITIONER
Payer: COMMERCIAL

## 2019-09-24 DIAGNOSIS — F33.2 SEVERE EPISODE OF RECURRENT MAJOR DEPRESSIVE DISORDER, WITHOUT PSYCHOTIC FEATURES (HCC): ICD-10-CM

## 2019-09-24 PROCEDURE — 80335 ANTIDEPRESSANT TRICYCLIC 1/2: CPT

## 2019-09-24 PROCEDURE — 36415 COLL VENOUS BLD VENIPUNCTURE: CPT

## 2019-09-28 ENCOUNTER — LAB ENCOUNTER (OUTPATIENT)
Dept: LAB | Age: 64
End: 2019-09-28
Attending: NURSE PRACTITIONER
Payer: COMMERCIAL

## 2019-09-28 DIAGNOSIS — N18.4 CKD (CHRONIC KIDNEY DISEASE) STAGE 4, GFR 15-29 ML/MIN (HCC): ICD-10-CM

## 2019-09-28 DIAGNOSIS — F33.2 SEVERE EPISODE OF RECURRENT MAJOR DEPRESSIVE DISORDER, WITHOUT PSYCHOTIC FEATURES (HCC): ICD-10-CM

## 2019-09-28 DIAGNOSIS — I10 ESSENTIAL HYPERTENSION: ICD-10-CM

## 2019-09-28 LAB
DESIPRAMINE: 145 NG/ML
IMIPRAMINE: 319 NG/ML
Lab: 464 NG/ML

## 2019-09-28 PROCEDURE — 93010 ELECTROCARDIOGRAM REPORT: CPT | Performed by: NURSE PRACTITIONER

## 2019-09-28 PROCEDURE — 93005 ELECTROCARDIOGRAM TRACING: CPT

## 2019-10-03 ENCOUNTER — APPOINTMENT (OUTPATIENT)
Dept: LAB | Age: 64
End: 2019-10-03
Attending: NURSE PRACTITIONER
Payer: COMMERCIAL

## 2019-10-03 DIAGNOSIS — F33.2 SEVERE EPISODE OF RECURRENT MAJOR DEPRESSIVE DISORDER, WITHOUT PSYCHOTIC FEATURES (HCC): ICD-10-CM

## 2019-10-03 DIAGNOSIS — F41.1 GAD (GENERALIZED ANXIETY DISORDER): ICD-10-CM

## 2019-10-03 DIAGNOSIS — I10 ESSENTIAL HYPERTENSION: ICD-10-CM

## 2019-10-03 DIAGNOSIS — N18.4 CKD (CHRONIC KIDNEY DISEASE) STAGE 4, GFR 15-29 ML/MIN (HCC): ICD-10-CM

## 2019-10-03 PROCEDURE — 80335 ANTIDEPRESSANT TRICYCLIC 1/2: CPT

## 2019-10-03 PROCEDURE — 80048 BASIC METABOLIC PNL TOTAL CA: CPT

## 2019-10-03 PROCEDURE — 36415 COLL VENOUS BLD VENIPUNCTURE: CPT

## 2019-10-08 ENCOUNTER — TELEPHONE (OUTPATIENT)
Dept: NEPHROLOGY | Facility: CLINIC | Age: 64
End: 2019-10-08

## 2019-10-08 DIAGNOSIS — N18.4 CKD (CHRONIC KIDNEY DISEASE) STAGE 4, GFR 15-29 ML/MIN (HCC): Primary | ICD-10-CM

## 2019-10-08 NOTE — TELEPHONE ENCOUNTER
[facility] lab contacted office. Pt had gone for lab draw for orders from another doctor. Lab wenceslao the BMP that we ordered, but it was not time. Lab is crediting pt account for the lab.  We need to put an order back in for the lab and put a future date on i

## 2019-10-30 PROBLEM — Z95.5 S/P PRIMARY ANGIOPLASTY WITH CORONARY STENT: Status: ACTIVE | Noted: 2017-10-02

## 2019-11-05 RX ORDER — HYDRALAZINE HYDROCHLORIDE 100 MG/1
TABLET, FILM COATED ORAL
Qty: 90 TABLET | Refills: 0 | Status: SHIPPED | OUTPATIENT
Start: 2019-11-05 | End: 2019-11-14 | Stop reason: SDUPTHER

## 2019-11-14 ENCOUNTER — TELEPHONE (OUTPATIENT)
Dept: CARDIOLOGY | Age: 64
End: 2019-11-14

## 2019-11-14 RX ORDER — HYDRALAZINE HYDROCHLORIDE 100 MG/1
100 TABLET, FILM COATED ORAL 3 TIMES DAILY
Qty: 90 TABLET | Refills: 0 | Status: SHIPPED | OUTPATIENT
Start: 2019-11-14 | End: 2020-01-01

## 2019-11-18 RX ORDER — LOSARTAN POTASSIUM 100 MG/1
100 TABLET ORAL DAILY
Qty: 90 TABLET | Refills: 3 | Status: SHIPPED | OUTPATIENT
Start: 2019-11-19 | End: 2020-11-02 | Stop reason: SDUPTHER

## 2019-11-19 RX ORDER — PRASUGREL 10 MG/1
TABLET, FILM COATED ORAL
Qty: 90 TABLET | Refills: 0 | Status: SHIPPED | OUTPATIENT
Start: 2019-11-19

## 2019-11-23 ENCOUNTER — APPOINTMENT (OUTPATIENT)
Dept: LAB | Age: 64
End: 2019-11-23
Attending: INTERNAL MEDICINE
Payer: COMMERCIAL

## 2019-11-23 DIAGNOSIS — N18.4 CKD (CHRONIC KIDNEY DISEASE) STAGE 4, GFR 15-29 ML/MIN (HCC): ICD-10-CM

## 2019-11-23 PROCEDURE — 36415 COLL VENOUS BLD VENIPUNCTURE: CPT

## 2019-11-23 PROCEDURE — 80048 BASIC METABOLIC PNL TOTAL CA: CPT

## 2019-11-25 ENCOUNTER — OFFICE VISIT (OUTPATIENT)
Dept: NEPHROLOGY | Facility: CLINIC | Age: 64
End: 2019-11-25
Payer: COMMERCIAL

## 2019-11-25 VITALS — SYSTOLIC BLOOD PRESSURE: 124 MMHG | DIASTOLIC BLOOD PRESSURE: 78 MMHG | WEIGHT: 227 LBS | BODY MASS INDEX: 39 KG/M2

## 2019-11-25 DIAGNOSIS — N18.4 CKD (CHRONIC KIDNEY DISEASE) STAGE 4, GFR 15-29 ML/MIN (HCC): Primary | ICD-10-CM

## 2019-11-25 DIAGNOSIS — I10 ESSENTIAL HYPERTENSION: ICD-10-CM

## 2019-11-25 PROCEDURE — 99214 OFFICE O/P EST MOD 30 MIN: CPT | Performed by: INTERNAL MEDICINE

## 2019-11-25 NOTE — PROGRESS NOTES
Nephrology Progress Note      Chrissy Briceño is a 75year old female.     HPI:   Patient presents with:  Chronic Kidney Disease  Hypertension      Christian Holt was seen in the nephrology clinic today in follow-up for management of chronic kidney disease s ADDL  3/5/2015   • IMPACT TOOTH REM BONY W/COMP Bilateral 1973    wisdom teeth   • KNEE REPLACEMENT SURGERY  3/24/16    right TKA    • KNEE TOTAL REPLACEMENT Left 5/5/2016    Performed by Toño Campbell MD at [facility] MAIN OR   • KNEE TOTAL REPLACEMENT Right capsule (75 mg total) by mouth nightly.  30 capsule 2   • Pantoprazole Sodium 40 MG Oral Tab EC TAKE 1 TABLET EVERY MORNING BEFORE BREAKFAST 90 tablet 2   • Dulaglutide (TRULICITY) 0.59 CQ/9.7BH Subcutaneous Solution Pen-injector Inject 0.75 mg into the ski Esters 1 g Oral Cap Take 2 capsules (2 g total) by mouth 2 (two) times daily. 120 capsule 11   • ERICKSON CONTOUR NEXT TEST In Vitro Strip TEST BLOOD GLUCOSE THREE TIMES DAILY 300 strip 4   • EFFIENT 10 MG Oral Tab Take 10 mg by mouth daily.      • Calcium Car 11/23/2019    ALKPHO 127 04/23/2019    AST 23 04/23/2019    ALT 16 04/23/2019    BILT 0.19 04/23/2019    TP 6.4 04/23/2019    ALB 3.8 04/23/2019    AGRATIO 2.0 06/07/2014     11/23/2019    K 4.5 11/23/2019     11/23/2019    CO2 24.0 11/23/2019 that her blood sugar levels are significantly better and her blood pressure is stable at goal and I would make no changes in her medications at this time. We will see her somewhat more frequently, however, to monitor very closely.   If her creatinine remai

## 2019-11-26 RX ORDER — METOPROLOL SUCCINATE 25 MG/1
TABLET, EXTENDED RELEASE ORAL
Qty: 180 TABLET | Refills: 0 | Status: SHIPPED | OUTPATIENT
Start: 2019-11-26 | End: 2019-12-27 | Stop reason: SDUPTHER

## 2019-12-17 ENCOUNTER — OFFICE VISIT (OUTPATIENT)
Dept: CARDIOLOGY | Age: 64
End: 2019-12-17

## 2019-12-17 ENCOUNTER — APPOINTMENT (OUTPATIENT)
Dept: LAB | Facility: HOSPITAL | Age: 64
End: 2019-12-17
Attending: NURSE PRACTITIONER
Payer: COMMERCIAL

## 2019-12-17 VITALS
HEART RATE: 80 BPM | BODY MASS INDEX: 38.58 KG/M2 | DIASTOLIC BLOOD PRESSURE: 78 MMHG | SYSTOLIC BLOOD PRESSURE: 132 MMHG | HEIGHT: 64 IN | WEIGHT: 226 LBS

## 2019-12-17 DIAGNOSIS — N18.4 TYPE 2 DIABETES MELLITUS WITH STAGE 4 CHRONIC KIDNEY DISEASE, WITH LONG-TERM CURRENT USE OF INSULIN (CMD): ICD-10-CM

## 2019-12-17 DIAGNOSIS — Z95.1 HX OF CABG: ICD-10-CM

## 2019-12-17 DIAGNOSIS — Z79.899 MEDICATION COURSE CHANGED: ICD-10-CM

## 2019-12-17 DIAGNOSIS — Z79.4 TYPE 2 DIABETES MELLITUS WITH STAGE 4 CHRONIC KIDNEY DISEASE, WITH LONG-TERM CURRENT USE OF INSULIN (CMD): ICD-10-CM

## 2019-12-17 DIAGNOSIS — E11.22 TYPE 2 DIABETES MELLITUS WITH STAGE 4 CHRONIC KIDNEY DISEASE, WITH LONG-TERM CURRENT USE OF INSULIN (CMD): ICD-10-CM

## 2019-12-17 DIAGNOSIS — Z95.5 S/P PRIMARY ANGIOPLASTY WITH CORONARY STENT: ICD-10-CM

## 2019-12-17 DIAGNOSIS — I25.10 CORONARY ARTERY DISEASE INVOLVING NATIVE CORONARY ARTERY OF NATIVE HEART WITHOUT ANGINA PECTORIS: Primary | ICD-10-CM

## 2019-12-17 DIAGNOSIS — E78.01 FAMILIAL HYPERCHOLESTEROLEMIA: ICD-10-CM

## 2019-12-17 PROCEDURE — 99214 OFFICE O/P EST MOD 30 MIN: CPT | Performed by: CLINICAL NURSE SPECIALIST

## 2019-12-17 PROCEDURE — 80299 QUANTITATIVE ASSAY DRUG: CPT

## 2019-12-17 PROCEDURE — 3075F SYST BP GE 130 - 139MM HG: CPT | Performed by: CLINICAL NURSE SPECIALIST

## 2019-12-17 PROCEDURE — 3078F DIAST BP <80 MM HG: CPT | Performed by: CLINICAL NURSE SPECIALIST

## 2019-12-17 RX ORDER — CLOMIPRAMINE HYDROCHLORIDE 75 MG/1
75 CAPSULE ORAL
COMMUNITY
Start: 2019-11-20 | End: 2020-02-18

## 2019-12-17 SDOH — HEALTH STABILITY: PHYSICAL HEALTH: ON AVERAGE, HOW MANY DAYS PER WEEK DO YOU ENGAGE IN MODERATE TO STRENUOUS EXERCISE (LIKE A BRISK WALK)?: 0 DAYS

## 2019-12-17 SDOH — HEALTH STABILITY: PHYSICAL HEALTH: ON AVERAGE, HOW MANY MINUTES DO YOU ENGAGE IN EXERCISE AT THIS LEVEL?: 0 MIN

## 2019-12-17 ASSESSMENT — ENCOUNTER SYMPTOMS
ALLERGIC/IMMUNOLOGIC COMMENTS: NO NEW FOOD ALLERGIES
WEIGHT GAIN: 0
WEIGHT LOSS: 0
CHILLS: 0
HEMATOCHEZIA: 0
HEMOPTYSIS: 0
BRUISES/BLEEDS EASILY: 0
FEVER: 0
COUGH: 0
SUSPICIOUS LESIONS: 0

## 2019-12-17 ASSESSMENT — PATIENT HEALTH QUESTIONNAIRE - PHQ9
1. LITTLE INTEREST OR PLEASURE IN DOING THINGS: NOT AT ALL
SUM OF ALL RESPONSES TO PHQ9 QUESTIONS 1 AND 2: 0
SUM OF ALL RESPONSES TO PHQ9 QUESTIONS 1 AND 2: 0
2. FEELING DOWN, DEPRESSED OR HOPELESS: NOT AT ALL

## 2019-12-27 RX ORDER — METOPROLOL SUCCINATE 25 MG/1
TABLET, EXTENDED RELEASE ORAL
Qty: 270 TABLET | Refills: 3 | Status: SHIPPED | OUTPATIENT
Start: 2019-12-27 | End: 2020-01-23

## 2020-01-02 RX ORDER — HYDRALAZINE HYDROCHLORIDE 100 MG/1
100 TABLET, FILM COATED ORAL 3 TIMES DAILY
Qty: 270 TABLET | Refills: 0 | Status: SHIPPED | OUTPATIENT
Start: 2020-01-02 | End: 2020-03-23

## 2020-01-24 RX ORDER — METOPROLOL SUCCINATE 25 MG/1
75 TABLET, EXTENDED RELEASE ORAL 2 TIMES DAILY
Qty: 540 TABLET | Refills: 3 | Status: SHIPPED | OUTPATIENT
Start: 2020-01-24 | End: 2020-06-12 | Stop reason: DRUGHIGH

## 2020-02-10 PROBLEM — R80.9 PROTEINURIA: Status: ACTIVE | Noted: 2020-02-10

## 2020-02-19 ENCOUNTER — LAB ENCOUNTER (OUTPATIENT)
Dept: LAB | Age: 65
End: 2020-02-19
Attending: INTERNAL MEDICINE
Payer: COMMERCIAL

## 2020-02-19 DIAGNOSIS — E08.22 DIABETES MELLITUS DUE TO UNDERLYING CONDITION, CONTROLLED, WITH STAGE 4 CHRONIC KIDNEY DISEASE, WITH LONG-TERM CURRENT USE OF INSULIN (HCC): ICD-10-CM

## 2020-02-19 DIAGNOSIS — Z79.4 DIABETES MELLITUS DUE TO UNDERLYING CONDITION, CONTROLLED, WITH STAGE 4 CHRONIC KIDNEY DISEASE, WITH LONG-TERM CURRENT USE OF INSULIN (HCC): ICD-10-CM

## 2020-02-19 DIAGNOSIS — I70.1 RENAL ARTERY ATHEROSCLEROSIS (HCC): ICD-10-CM

## 2020-02-19 DIAGNOSIS — N18.4 CKD (CHRONIC KIDNEY DISEASE) STAGE 4, GFR 15-29 ML/MIN (HCC): ICD-10-CM

## 2020-02-19 DIAGNOSIS — I10 ESSENTIAL HYPERTENSION, BENIGN: ICD-10-CM

## 2020-02-19 DIAGNOSIS — E88.81 METABOLIC SYNDROME: ICD-10-CM

## 2020-02-19 DIAGNOSIS — I10 ESSENTIAL HYPERTENSION: ICD-10-CM

## 2020-02-19 DIAGNOSIS — E78.00 PURE HYPERCHOLESTEROLEMIA: ICD-10-CM

## 2020-02-19 DIAGNOSIS — F33.1 MAJOR DEPRESSIVE DISORDER, RECURRENT EPISODE, MODERATE (HCC): ICD-10-CM

## 2020-02-19 DIAGNOSIS — N18.4 DIABETES MELLITUS DUE TO UNDERLYING CONDITION, CONTROLLED, WITH STAGE 4 CHRONIC KIDNEY DISEASE, WITH LONG-TERM CURRENT USE OF INSULIN (HCC): ICD-10-CM

## 2020-02-19 LAB
ANION GAP SERPL CALC-SCNC: 3 MMOL/L (ref 0–18)
BASOPHILS # BLD AUTO: 0.09 X10(3) UL (ref 0–0.2)
BASOPHILS NFR BLD AUTO: 1.1 %
BUN BLD-MCNC: 56 MG/DL (ref 7–18)
BUN/CREAT SERPL: 17.7 (ref 10–20)
CALCIUM BLD-MCNC: 9.8 MG/DL (ref 8.5–10.1)
CHLORIDE SERPL-SCNC: 106 MMOL/L (ref 98–112)
CO2 SERPL-SCNC: 31 MMOL/L (ref 21–32)
CREAT BLD-MCNC: 3.16 MG/DL (ref 0.55–1.02)
DEPRECATED RDW RBC AUTO: 46.7 FL (ref 35.1–46.3)
EOSINOPHIL # BLD AUTO: 0.19 X10(3) UL (ref 0–0.7)
EOSINOPHIL NFR BLD AUTO: 2.3 %
ERYTHROCYTE [DISTWIDTH] IN BLOOD BY AUTOMATED COUNT: 13.3 % (ref 11–15)
GLUCOSE BLD-MCNC: 97 MG/DL (ref 70–99)
HCT VFR BLD AUTO: 39.5 % (ref 35–48)
HGB BLD-MCNC: 13 G/DL (ref 12–16)
IMM GRANULOCYTES # BLD AUTO: 0.02 X10(3) UL (ref 0–1)
IMM GRANULOCYTES NFR BLD: 0.2 %
LYMPHOCYTES # BLD AUTO: 3.41 X10(3) UL (ref 1–4)
LYMPHOCYTES NFR BLD AUTO: 41.1 %
MCH RBC QN AUTO: 31.9 PG (ref 26–34)
MCHC RBC AUTO-ENTMCNC: 32.9 G/DL (ref 31–37)
MCV RBC AUTO: 96.8 FL (ref 80–100)
MONOCYTES # BLD AUTO: 0.71 X10(3) UL (ref 0.1–1)
MONOCYTES NFR BLD AUTO: 8.6 %
NEUTROPHILS # BLD AUTO: 3.88 X10 (3) UL (ref 1.5–7.7)
NEUTROPHILS # BLD AUTO: 3.88 X10(3) UL (ref 1.5–7.7)
NEUTROPHILS NFR BLD AUTO: 46.7 %
OSMOLALITY SERPL CALC.SUM OF ELEC: 305 MOSM/KG (ref 275–295)
PATIENT FASTING Y/N/NP: NO
PHOSPHATE SERPL-MCNC: 3 MG/DL (ref 2.5–4.9)
PLATELET # BLD AUTO: 311 10(3)UL (ref 150–450)
POTASSIUM SERPL-SCNC: 4.6 MMOL/L (ref 3.5–5.1)
RBC # BLD AUTO: 4.08 X10(6)UL (ref 3.8–5.3)
SODIUM SERPL-SCNC: 140 MMOL/L (ref 136–145)
T4 FREE SERPL-MCNC: 1 NG/DL (ref 0.8–1.7)
TSI SER-ACNC: 3.77 MIU/ML (ref 0.36–3.74)
WBC # BLD AUTO: 8.3 X10(3) UL (ref 4–11)

## 2020-02-19 PROCEDURE — 36415 COLL VENOUS BLD VENIPUNCTURE: CPT

## 2020-02-19 PROCEDURE — 84439 ASSAY OF FREE THYROXINE: CPT

## 2020-02-19 PROCEDURE — 84443 ASSAY THYROID STIM HORMONE: CPT

## 2020-02-19 PROCEDURE — 85025 COMPLETE CBC W/AUTO DIFF WBC: CPT

## 2020-02-19 PROCEDURE — 84100 ASSAY OF PHOSPHORUS: CPT

## 2020-02-19 PROCEDURE — 80048 BASIC METABOLIC PNL TOTAL CA: CPT

## 2020-02-26 ENCOUNTER — OFFICE VISIT (OUTPATIENT)
Dept: NEPHROLOGY | Facility: CLINIC | Age: 65
End: 2020-02-26
Payer: COMMERCIAL

## 2020-02-26 VITALS — BODY MASS INDEX: 38 KG/M2 | WEIGHT: 222.19 LBS | DIASTOLIC BLOOD PRESSURE: 64 MMHG | SYSTOLIC BLOOD PRESSURE: 116 MMHG

## 2020-02-26 DIAGNOSIS — N18.4 CKD (CHRONIC KIDNEY DISEASE) STAGE 4, GFR 15-29 ML/MIN (HCC): Primary | ICD-10-CM

## 2020-02-26 DIAGNOSIS — I10 ESSENTIAL HYPERTENSION: ICD-10-CM

## 2020-02-26 PROCEDURE — 99214 OFFICE O/P EST MOD 30 MIN: CPT | Performed by: INTERNAL MEDICINE

## 2020-02-26 NOTE — PROGRESS NOTES
Nephrology Progress Note      Alix Quiroz is a 75year old female. HPI:   No chief complaint on file.       Evelyn Berry was seen in the nephrology clinic today in follow-up for management of chronic kidney disease stage IV secondary to diabetic neph COLONOSCOPY     • HC PLMT CORONARY DRUG ELUTING STENT EA ADDL  3/5/2015   • IMPACT TOOTH REM BONY W/COMP Bilateral 1973    wisdom teeth   • KNEE REPLACEMENT SURGERY  3/24/16    right TKA    • KNEE TOTAL REPLACEMENT Left 5/5/2016    Performed by Hardy Winter Refill   • Levothyroxine Sodium 112 MCG Oral Tab Take 1 tablet (112 mcg total) by mouth before breakfast. 30 tablet 11   • Sertraline HCl 100 MG Oral Tab Take 1.5 tablets (150 mg total) by mouth every morning.  45 tablet 0   • Dulaglutide (TRULICITY) 2.34 M Calcium Carbonate-Vitamin D (OSCAL-500) 500-400 MG-UNIT Oral Tab Take 1 tablet by mouth 2 (two) times daily. • Multiple Vitamin (TAB-A-RENAN) Oral Tab Take 1 tablet by mouth nightly.            Allergies:    Plavix [Clopidogrel]    RASH  Ultram Lugene Quill 2.0 06/07/2014     02/19/2020    K 4.6 02/19/2020     02/19/2020    CO2 31.0 02/19/2020     Lab Results   Component Value Date    RBC 4.08 02/19/2020    HGB 13.0 02/19/2020    HCT 39.5 02/19/2020    .0 02/19/2020    MPV 8.9 09/02/2018 regard. Again, I am optimistic that her creatinine is somewhat better. #2. Hypertension-blood pressure remained stable and she will continue amlodipine, hydralazine losartan and metoprolol.     Thank you again for allowing me to participate the care of

## 2020-03-23 RX ORDER — HYDRALAZINE HYDROCHLORIDE 100 MG/1
100 TABLET, FILM COATED ORAL 3 TIMES DAILY
Qty: 270 TABLET | Refills: 1 | Status: SHIPPED | OUTPATIENT
Start: 2020-03-23 | End: 2020-09-16 | Stop reason: SDUPTHER

## 2020-04-09 RX ORDER — AMLODIPINE BESYLATE 10 MG/1
10 TABLET ORAL DAILY
Qty: 90 TABLET | Refills: 1 | Status: SHIPPED | OUTPATIENT
Start: 2020-04-09 | End: 2021-04-12

## 2020-06-03 ENCOUNTER — NURSE ONLY (OUTPATIENT)
Dept: NEPHROLOGY | Facility: CLINIC | Age: 65
End: 2020-06-03

## 2020-06-12 ENCOUNTER — V-VISIT (OUTPATIENT)
Dept: CARDIOLOGY | Age: 65
End: 2020-06-12

## 2020-06-12 VITALS — WEIGHT: 204 LBS | HEIGHT: 64 IN | BODY MASS INDEX: 34.83 KG/M2

## 2020-06-12 DIAGNOSIS — N18.4 TYPE 2 DIABETES MELLITUS WITH STAGE 4 CHRONIC KIDNEY DISEASE, WITH LONG-TERM CURRENT USE OF INSULIN (CMD): ICD-10-CM

## 2020-06-12 DIAGNOSIS — I65.23 INTERNAL CAROTID ARTERY STENOSIS, BILATERAL: ICD-10-CM

## 2020-06-12 DIAGNOSIS — E11.22 TYPE 2 DIABETES MELLITUS WITH STAGE 4 CHRONIC KIDNEY DISEASE, WITH LONG-TERM CURRENT USE OF INSULIN (CMD): ICD-10-CM

## 2020-06-12 DIAGNOSIS — I70.1 RENAL ARTERY ATHEROSCLEROSIS (CMD): ICD-10-CM

## 2020-06-12 DIAGNOSIS — I25.10 CORONARY ARTERY DISEASE INVOLVING NATIVE CORONARY ARTERY OF NATIVE HEART WITHOUT ANGINA PECTORIS: Primary | ICD-10-CM

## 2020-06-12 DIAGNOSIS — Z95.5 S/P PRIMARY ANGIOPLASTY WITH CORONARY STENT: ICD-10-CM

## 2020-06-12 DIAGNOSIS — Z79.4 TYPE 2 DIABETES MELLITUS WITH STAGE 4 CHRONIC KIDNEY DISEASE, WITH LONG-TERM CURRENT USE OF INSULIN (CMD): ICD-10-CM

## 2020-06-12 DIAGNOSIS — Z95.1 HX OF CABG: ICD-10-CM

## 2020-06-12 PROCEDURE — 99443 TELEPHONE E&M BY PHYSICIAN EST PT NOT ORIG PREV 7 DAYS 21-30 MIN: CPT | Performed by: INTERNAL MEDICINE

## 2020-06-12 RX ORDER — SERTRALINE HYDROCHLORIDE 100 MG/1
100 TABLET, FILM COATED ORAL DAILY
COMMUNITY

## 2020-06-12 RX ORDER — METOPROLOL SUCCINATE 25 MG/1
25 TABLET, EXTENDED RELEASE ORAL 2 TIMES DAILY
COMMUNITY
End: 2020-07-16 | Stop reason: SDUPTHER

## 2020-06-12 RX ORDER — DEXTROAMPHETAMINE SACCHARATE, AMPHETAMINE ASPARTATE MONOHYDRATE, DEXTROAMPHETAMINE SULFATE AND AMPHETAMINE SULFATE 2.5; 2.5; 2.5; 2.5 MG/1; MG/1; MG/1; MG/1
10 CAPSULE, EXTENDED RELEASE ORAL 2 TIMES DAILY
COMMUNITY

## 2020-06-12 ASSESSMENT — ENCOUNTER SYMPTOMS
HEMOPTYSIS: 0
HEMATOCHEZIA: 0
CHILLS: 0
BRUISES/BLEEDS EASILY: 0
DEPRESSION: 1
WEIGHT LOSS: 0
COUGH: 0
WEIGHT GAIN: 0
SUSPICIOUS LESIONS: 0
ALLERGIC/IMMUNOLOGIC COMMENTS: NO NEW FOOD ALLERGIES
FEVER: 0

## 2020-06-16 ENCOUNTER — TELEPHONE (OUTPATIENT)
Dept: CARDIOLOGY | Age: 65
End: 2020-06-16

## 2020-06-19 ENCOUNTER — TELEPHONE (OUTPATIENT)
Dept: CARDIOLOGY | Age: 65
End: 2020-06-19

## 2020-06-19 ENCOUNTER — APPOINTMENT (OUTPATIENT)
Dept: CARDIOLOGY | Age: 65
End: 2020-06-19

## 2020-07-15 ENCOUNTER — NURSE ONLY (OUTPATIENT)
Dept: NEPHROLOGY | Facility: CLINIC | Age: 65
End: 2020-07-15

## 2020-07-15 NOTE — PROGRESS NOTES
Left voice message for patient to get blood work done prior to appointment with Paula Stratton on 7/22.

## 2020-07-16 RX ORDER — METOPROLOL SUCCINATE 25 MG/1
75 TABLET, EXTENDED RELEASE ORAL 2 TIMES DAILY
Qty: 540 TABLET | Refills: 1 | Status: SHIPPED | OUTPATIENT
Start: 2020-07-16 | End: 2021-01-04 | Stop reason: SDUPTHER

## 2020-07-22 ENCOUNTER — OFFICE VISIT (OUTPATIENT)
Dept: NEPHROLOGY | Facility: CLINIC | Age: 65
End: 2020-07-22
Payer: COMMERCIAL

## 2020-07-22 VITALS — BODY MASS INDEX: 36 KG/M2 | WEIGHT: 207.38 LBS | SYSTOLIC BLOOD PRESSURE: 124 MMHG | DIASTOLIC BLOOD PRESSURE: 64 MMHG

## 2020-07-22 DIAGNOSIS — I10 ESSENTIAL HYPERTENSION: ICD-10-CM

## 2020-07-22 DIAGNOSIS — N18.4 CKD (CHRONIC KIDNEY DISEASE) STAGE 4, GFR 15-29 ML/MIN (HCC): Primary | ICD-10-CM

## 2020-07-22 PROCEDURE — 3078F DIAST BP <80 MM HG: CPT | Performed by: INTERNAL MEDICINE

## 2020-07-22 PROCEDURE — 3074F SYST BP LT 130 MM HG: CPT | Performed by: INTERNAL MEDICINE

## 2020-07-22 PROCEDURE — 99214 OFFICE O/P EST MOD 30 MIN: CPT | Performed by: INTERNAL MEDICINE

## 2020-07-22 NOTE — PROGRESS NOTES
Nephrology Progress Note      Júnior Nobles is a 77year old female.     HPI:   Patient presents with:  Chronic Kidney Disease  Hypertension      Cyrus Daniel was seen in the nephrology clinic today in follow-up for management of chronic kidney disease s PERCUTANEOUS  TRANSLUMINAL CORONARY ANGIOPLASTY Right 09/19/2017    right Coronary artery, OSMIN   • COLONOSCOPY     • HC PLMT CORONARY DRUG ELUTING STENT EA ADDL  3/5/2015   • IMPACT TOOTH REM BONY W/COMP Bilateral 1973    wisdom teeth   • KNEE REPLACEMENT Medications (Active prior to today's visit):  Current Outpatient Medications   Medication Sig Dispense Refill   • amphetamine-dextroamphetamine (ADDERALL) 10 MG Oral Tab Take 1 tablet (10 mg total) by mouth 2 (two) times daily.  60 tablet 0   • diazepam tablet 0   • EFFIENT 10 MG Oral Tab Take 10 mg by mouth daily. • aspirin 81 MG Oral Tab EC Take 81 mg by mouth nightly. 30 tablet 11   • Calcium Carbonate-Vitamin D (OSCAL-500) 500-400 MG-UNIT Oral Tab Take 1 tablet by mouth 2 (two) times daily. ALKPHO 86 07/20/2020    AST 23 07/20/2020    ALT 24 07/20/2020    BILT 0.35 07/20/2020    TP 6.9 07/20/2020    ALB 4.3 07/20/2020    AGRATIO 2.0 06/07/2014     07/20/2020    K 3.85 07/20/2020     07/20/2020    CO2 26.2 07/20/2020     Lab Result pressure control including the use of medications which block the renin-angiotensin system. She is on losartan. #2. Hypertension-as mentioned above she does have exertional dizziness which suggest that her blood pressures are dropping somewhat.   We wi

## 2020-07-30 RX ORDER — ATORVASTATIN CALCIUM 20 MG/1
20 TABLET, FILM COATED ORAL DAILY
Qty: 90 TABLET | Refills: 0 | Status: SHIPPED | OUTPATIENT
Start: 2020-07-30 | End: 2020-11-07 | Stop reason: SDUPTHER

## 2020-09-16 ENCOUNTER — TELEPHONE (OUTPATIENT)
Dept: CARDIOLOGY | Age: 65
End: 2020-09-16

## 2020-09-18 RX ORDER — HYDRALAZINE HYDROCHLORIDE 100 MG/1
100 TABLET, FILM COATED ORAL 3 TIMES DAILY
Qty: 90 TABLET | Refills: 0 | Status: SHIPPED | OUTPATIENT
Start: 2020-09-18 | End: 2020-10-07 | Stop reason: SDUPTHER

## 2020-10-07 ENCOUNTER — TELEPHONE (OUTPATIENT)
Dept: CARDIOLOGY | Age: 65
End: 2020-10-07

## 2020-10-08 RX ORDER — HYDRALAZINE HYDROCHLORIDE 100 MG/1
100 TABLET, FILM COATED ORAL 3 TIMES DAILY
Qty: 270 TABLET | Refills: 0 | Status: SHIPPED | OUTPATIENT
Start: 2020-10-08 | End: 2021-01-09 | Stop reason: SDUPTHER

## 2020-11-03 RX ORDER — LOSARTAN POTASSIUM 100 MG/1
100 TABLET ORAL DAILY
Qty: 90 TABLET | Refills: 3 | Status: SHIPPED | OUTPATIENT
Start: 2020-11-03

## 2020-11-09 RX ORDER — ATORVASTATIN CALCIUM 20 MG/1
20 TABLET, FILM COATED ORAL DAILY
Qty: 90 TABLET | Refills: 3 | Status: SHIPPED | OUTPATIENT
Start: 2020-11-09

## 2020-11-24 RX ORDER — CLONIDINE HYDROCHLORIDE 0.2 MG/1
0.2 TABLET ORAL 2 TIMES DAILY
Qty: 180 TABLET | Refills: 1 | Status: SHIPPED | OUTPATIENT
Start: 2020-11-24 | End: 2021-11-04

## 2020-11-30 ENCOUNTER — LAB ENCOUNTER (OUTPATIENT)
Dept: LAB | Age: 65
End: 2020-11-30
Attending: INTERNAL MEDICINE
Payer: COMMERCIAL

## 2020-11-30 DIAGNOSIS — I10 ESSENTIAL HYPERTENSION: ICD-10-CM

## 2020-11-30 DIAGNOSIS — N18.4 CKD (CHRONIC KIDNEY DISEASE) STAGE 4, GFR 15-29 ML/MIN (HCC): ICD-10-CM

## 2020-11-30 PROCEDURE — 36415 COLL VENOUS BLD VENIPUNCTURE: CPT

## 2020-11-30 PROCEDURE — 80048 BASIC METABOLIC PNL TOTAL CA: CPT

## 2020-12-02 ENCOUNTER — OFFICE VISIT (OUTPATIENT)
Dept: NEPHROLOGY | Facility: CLINIC | Age: 65
End: 2020-12-02
Payer: COMMERCIAL

## 2020-12-02 VITALS — SYSTOLIC BLOOD PRESSURE: 134 MMHG | WEIGHT: 207.38 LBS | DIASTOLIC BLOOD PRESSURE: 76 MMHG | BODY MASS INDEX: 36 KG/M2

## 2020-12-02 DIAGNOSIS — N18.4 CKD (CHRONIC KIDNEY DISEASE) STAGE 4, GFR 15-29 ML/MIN (HCC): Primary | ICD-10-CM

## 2020-12-02 DIAGNOSIS — I10 ESSENTIAL HYPERTENSION: ICD-10-CM

## 2020-12-02 PROCEDURE — 99214 OFFICE O/P EST MOD 30 MIN: CPT | Performed by: INTERNAL MEDICINE

## 2020-12-02 PROCEDURE — 3075F SYST BP GE 130 - 139MM HG: CPT | Performed by: INTERNAL MEDICINE

## 2020-12-02 PROCEDURE — 3078F DIAST BP <80 MM HG: CPT | Performed by: INTERNAL MEDICINE

## 2020-12-02 NOTE — PROGRESS NOTES
Nephrology Progress Note      Kendall Guido is a 77year old female.     HPI:   Patient presents with:  Chronic Kidney Disease  Hypertension      Nory Clark was seen in the nephrology clinic today in follow-up for management of chronic kidney disease st teeth   • KNEE REPLACEMENT SURGERY  3/24/16    right TKA    • KNEE TOTAL REPLACEMENT Left 5/5/2016    Performed by John Worley MD at [facility]   • [address] [place] [address] Right 3/24/2016    Performed by John Worley MD at [facility] MAIN OR   • LEFT HEA amphetamine-dextroamphetamine (ADDERALL) 10 MG Oral Tab Take 1 tablet (10 mg total) by mouth 2 (two) times daily. 60 tablet 0   • buPROPion HCl ER, XL, 150 MG Oral Tablet 24 Hr Take 1 tablet (150 mg total) by mouth daily.  30 tablet 0   • diazepam 5 MG Oral mg by mouth nightly. 30 tablet 11   • Calcium Carbonate-Vitamin D (OSCAL-500) 500-400 MG-UNIT Oral Tab Take 1 tablet by mouth 2 (two) times daily. • Multiple Vitamin (TAB-A-RENAN) Oral Tab Take 1 tablet by mouth nightly.            Allergies:    Plav 11/30/2020    K 4.4 11/30/2020     11/30/2020    CO2 27.0 11/30/2020     Lab Results   Component Value Date    RBC 4.37 07/20/2020    HGB 13.5 07/20/2020    HCT 41.3 07/20/2020     07/20/2020    MPV 8.9 09/02/2018    MCV 94.5 07/20/2020    [facility] continue clonidine, hydralazine, losartan and metoprolol. Thank you again for allowing me to participate in the care of your patient. Please do not hesitate to call with any questions or concerns.       sIh Elias MD  12/2/2020  4:33 PM

## 2021-01-05 RX ORDER — METOPROLOL SUCCINATE 25 MG/1
75 TABLET, EXTENDED RELEASE ORAL 2 TIMES DAILY
Qty: 540 TABLET | Refills: 1 | Status: SHIPPED | OUTPATIENT
Start: 2021-01-05

## 2021-01-11 RX ORDER — HYDRALAZINE HYDROCHLORIDE 100 MG/1
100 TABLET, FILM COATED ORAL 3 TIMES DAILY
Qty: 270 TABLET | Refills: 1 | Status: SHIPPED | OUTPATIENT
Start: 2021-01-11

## 2021-01-27 DIAGNOSIS — Z23 NEED FOR VACCINATION: ICD-10-CM

## 2021-04-01 ENCOUNTER — IMMUNIZATION (OUTPATIENT)
Dept: LAB | Facility: HOSPITAL | Age: 66
End: 2021-04-01
Attending: HOSPITALIST
Payer: MEDICARE

## 2021-04-01 DIAGNOSIS — Z23 NEED FOR VACCINATION: Primary | ICD-10-CM

## 2021-04-01 PROCEDURE — 0011A SARSCOV2 VAC 100MCG/0.5ML IM: CPT

## 2021-04-10 ENCOUNTER — LAB ENCOUNTER (OUTPATIENT)
Dept: LAB | Age: 66
End: 2021-04-10
Attending: FAMILY MEDICINE
Payer: MEDICARE

## 2021-04-10 DIAGNOSIS — N18.4 CKD (CHRONIC KIDNEY DISEASE) STAGE 4, GFR 15-29 ML/MIN (HCC): ICD-10-CM

## 2021-04-10 DIAGNOSIS — I10 ESSENTIAL HYPERTENSION: ICD-10-CM

## 2021-04-10 PROCEDURE — 80048 BASIC METABOLIC PNL TOTAL CA: CPT

## 2021-04-10 PROCEDURE — 36415 COLL VENOUS BLD VENIPUNCTURE: CPT

## 2021-04-12 ENCOUNTER — OFFICE VISIT (OUTPATIENT)
Dept: NEPHROLOGY | Facility: CLINIC | Age: 66
End: 2021-04-12
Payer: MEDICARE

## 2021-04-12 VITALS — SYSTOLIC BLOOD PRESSURE: 130 MMHG | BODY MASS INDEX: 34 KG/M2 | WEIGHT: 195.38 LBS | DIASTOLIC BLOOD PRESSURE: 82 MMHG

## 2021-04-12 DIAGNOSIS — I10 ESSENTIAL HYPERTENSION: ICD-10-CM

## 2021-04-12 DIAGNOSIS — N18.4 CKD (CHRONIC KIDNEY DISEASE) STAGE 4, GFR 15-29 ML/MIN (HCC): Primary | ICD-10-CM

## 2021-04-12 PROCEDURE — 99214 OFFICE O/P EST MOD 30 MIN: CPT | Performed by: INTERNAL MEDICINE

## 2021-04-12 NOTE — PROGRESS NOTES
Nephrology Progress Note      Rosalia Miller is a 77year old female.     HPI:   Patient presents with:  Chronic Kidney Disease  Hypertension      David Castro was seen in the nephrology clinic today in follow-up for management of chronic kidney disease s PERCUTANEOUS  TRANSLUMINAL CORONARY ANGIOPLASTY  3/3/2015   • CATH PERCUTANEOUS  TRANSLUMINAL CORONARY ANGIOPLASTY Right 09/19/2017    right Coronary artery, OSMIN   • COLONOSCOPY     • HC PLMT CORONARY DRUG ELUTING STENT EA ADDL  3/5/2015   • IMPACT TOOTH R use: No       Medications (Active prior to today's visit):  Current Outpatient Medications   Medication Sig Dispense Refill   • buPROPion HCl ER, XL, 150 MG Oral Tablet 24 Hr Take 1 tablet (150 mg total) by mouth daily.  30 tablet 2   • diazepam 5 MG Oral T Tab EC Take 81 mg by mouth nightly. 30 tablet 11   • Calcium Carbonate-Vitamin D (OSCAL-500) 500-400 MG-UNIT Oral Tab Take 1 tablet by mouth 2 (two) times daily. • Multiple Vitamin (TAB-A-RENAN) Oral Tab Take 1 tablet by mouth nightly.            All 06/07/2014     04/10/2021    K 3.8 04/10/2021     04/10/2021    CO2 28.0 04/10/2021     Lab Results   Component Value Date    RBC 4.37 07/20/2020    HGB 13.5 07/20/2020    HCT 41.3 07/20/2020     07/20/2020    MPV 8.9 09/02/2018    MCV 9 function closely and I did encourage her to monitor her diet and increase her exercise frequency. We also discussed continuing her current antihypertensive regimen. #2.   Hypertension-her blood pressure is stable in the office today and she will continu

## 2021-04-29 ENCOUNTER — IMMUNIZATION (OUTPATIENT)
Dept: LAB | Facility: HOSPITAL | Age: 66
End: 2021-04-29
Attending: EMERGENCY MEDICINE
Payer: MEDICARE

## 2021-04-29 DIAGNOSIS — Z23 NEED FOR VACCINATION: Primary | ICD-10-CM

## 2021-04-29 PROCEDURE — 0012A SARSCOV2 VAC 100MCG/0.5ML IM: CPT

## 2021-06-05 ENCOUNTER — HOSPITAL ENCOUNTER (EMERGENCY)
Facility: HOSPITAL | Age: 66
Discharge: HOME | End: 2021-06-05
Attending: EMERGENCY MEDICINE
Payer: MEDICARE

## 2021-06-05 VITALS
HEART RATE: 80 BPM | RESPIRATION RATE: 18 BRPM | DIASTOLIC BLOOD PRESSURE: 96 MMHG | TEMPERATURE: 98 F | SYSTOLIC BLOOD PRESSURE: 171 MMHG | OXYGEN SATURATION: 98 %

## 2021-06-05 DIAGNOSIS — M10.071 ACUTE IDIOPATHIC GOUT INVOLVING TOE OF RIGHT FOOT: Primary | ICD-10-CM

## 2021-06-05 PROCEDURE — 99283 EMERGENCY DEPT VISIT LOW MDM: CPT

## 2021-06-05 RX ORDER — HYDROCODONE BITARTRATE AND ACETAMINOPHEN 5; 325 MG/1; MG/1
1 TABLET ORAL EVERY 8 HOURS PRN
Qty: 12 TABLET | Refills: 0 | Status: SHIPPED | OUTPATIENT
Start: 2021-06-05

## 2021-06-05 RX ORDER — METHYLPREDNISOLONE 4 MG/1
TABLET ORAL
Qty: 1 EACH | Refills: 0 | Status: SHIPPED | OUTPATIENT
Start: 2021-06-05 | End: 2021-06-23 | Stop reason: ALTCHOICE

## 2021-06-05 NOTE — ED INITIAL ASSESSMENT (HPI)
Right ankle and foot pain with some swelling and heat about the great toe x 1 week. Unable to bear weight and reports no known injury.

## 2021-06-05 NOTE — ED PROVIDER NOTES
Patient Seen in: [facility] AND [facility] Emergency Department      History   Patient presents with:   Foot Pain    Stated Complaint: Right foot problem    HPI/Subjective:   HPI    The patient is a 68-year-old female with a history of diabetes, gout, chronic kid right TKA    • LEFT HEART CATH,PERCUTANEOUS  2011    CAD   • LEFT HEART CATH,PERCUTANEOUS  3/2/2015   •      • PATH REPORT  13    cecal polyps - 1 tubular adenoma   • RENAL ANGIO, CARDIAC CATH  2011   • SLEEP STUDY, ATTENDED  12/10/2 Cervical back: Normal range of motion. Right foot: Normal capillary refill. Swelling, tenderness (First MTP right foot) and bony tenderness present. Normal pulse. Feet:    Feet:      Right foot:      Skin integrity: Erythema and warmth present. HYDROcodone-acetaminophen 5-325 MG Oral Tab  Take 1 tablet by mouth every 8 (eight) hours as needed for Pain.   Qty: 12 tablet Refills: 0

## 2021-08-12 ENCOUNTER — VIRTUAL PHONE E/M (OUTPATIENT)
Dept: NEPHROLOGY | Facility: CLINIC | Age: 66
End: 2021-08-12
Payer: MEDICARE

## 2021-08-12 DIAGNOSIS — I10 ESSENTIAL HYPERTENSION: ICD-10-CM

## 2021-08-12 DIAGNOSIS — N18.4 CKD (CHRONIC KIDNEY DISEASE) STAGE 4, GFR 15-29 ML/MIN (HCC): Primary | ICD-10-CM

## 2021-08-12 PROCEDURE — G2252 BRIEF CHKIN BY MD/QHP, 11-20: HCPCS | Performed by: INTERNAL MEDICINE

## 2021-08-12 NOTE — PROGRESS NOTES
I spoke with patient this morning and performed a telephone visit with her per her request as she has a flat tire and cannot make it to the office. She has been undergoing intensive therapy for depression but reports otherwise feeling quite well.   We did

## 2021-09-24 ENCOUNTER — LAB ENCOUNTER (OUTPATIENT)
Dept: LAB | Age: 66
End: 2021-09-24
Attending: FAMILY MEDICINE
Payer: MEDICARE

## 2021-09-24 DIAGNOSIS — E03.9 ACQUIRED HYPOTHYROIDISM: ICD-10-CM

## 2021-09-24 LAB — TSI SER-ACNC: 2.36 MIU/ML (ref 0.36–3.74)

## 2021-09-24 PROCEDURE — 84443 ASSAY THYROID STIM HORMONE: CPT

## 2021-09-24 PROCEDURE — 36415 COLL VENOUS BLD VENIPUNCTURE: CPT

## 2021-11-05 RX ORDER — CLONIDINE HYDROCHLORIDE 0.2 MG/1
0.2 TABLET ORAL 2 TIMES DAILY
Qty: 180 TABLET | Refills: 1 | Status: SHIPPED | OUTPATIENT
Start: 2021-11-05

## 2022-07-12 NOTE — PLAN OF CARE
CARDIOVASCULAR - ADULT    • Maintains optimal cardiac output and hemodynamic stability Adequate for Discharge    • Absence of cardiac arrhythmias or at baseline Adequate for Discharge        Patient/Family Goals    • Patient/Family Long Term Goal Adequate Multiple views of the left main, left coronary artery, LAD and circumflex artery obtained using hand injection.

## 2023-03-24 ENCOUNTER — APPOINTMENT (OUTPATIENT)
Dept: GENERAL RADIOLOGY | Facility: HOSPITAL | Age: 68
End: 2023-03-24
Attending: EMERGENCY MEDICINE
Payer: MEDICARE

## 2023-03-24 ENCOUNTER — APPOINTMENT (OUTPATIENT)
Dept: CT IMAGING | Facility: HOSPITAL | Age: 68
End: 2023-03-24
Attending: EMERGENCY MEDICINE
Payer: MEDICARE

## 2023-03-24 ENCOUNTER — HOSPITAL ENCOUNTER (OUTPATIENT)
Facility: HOSPITAL | Age: 68
Setting detail: OBSERVATION
Discharge: HOME HEALTH | End: 2023-03-26
Attending: EMERGENCY MEDICINE | Admitting: STUDENT IN AN ORGANIZED HEALTH CARE EDUCATION/TRAINING PROGRAM
Payer: MEDICARE

## 2023-03-24 DIAGNOSIS — S09.90XA CLOSED HEAD INJURY, INITIAL ENCOUNTER: ICD-10-CM

## 2023-03-24 DIAGNOSIS — S01.01XA LACERATION OF SCALP, INITIAL ENCOUNTER: ICD-10-CM

## 2023-03-24 DIAGNOSIS — R55 SYNCOPE, UNSPECIFIED SYNCOPE TYPE: Primary | ICD-10-CM

## 2023-03-24 LAB
ADENOVIRUS PCR:: NOT DETECTED
ANION GAP SERPL CALC-SCNC: 6 MMOL/L (ref 0–18)
ATRIAL RATE: 69 BPM
B PARAPERT DNA SPEC QL NAA+PROBE: NOT DETECTED
B PERT DNA SPEC QL NAA+PROBE: NOT DETECTED
BASOPHILS # BLD AUTO: 0.06 X10(3) UL (ref 0–0.2)
BASOPHILS NFR BLD AUTO: 0.6 %
BUN BLD-MCNC: 60 MG/DL (ref 7–18)
BUN/CREAT SERPL: 19 (ref 10–20)
C PNEUM DNA SPEC QL NAA+PROBE: NOT DETECTED
CALCIUM BLD-MCNC: 9 MG/DL (ref 8.5–10.1)
CHLORIDE SERPL-SCNC: 105 MMOL/L (ref 98–112)
CO2 SERPL-SCNC: 29 MMOL/L (ref 21–32)
CORONAVIRUS 229E PCR:: NOT DETECTED
CORONAVIRUS HKU1 PCR:: NOT DETECTED
CORONAVIRUS NL63 PCR:: NOT DETECTED
CORONAVIRUS OC43 PCR:: NOT DETECTED
CREAT BLD-MCNC: 3.16 MG/DL
DEPRECATED RDW RBC AUTO: 44.3 FL (ref 35.1–46.3)
EOSINOPHIL # BLD AUTO: 0.27 X10(3) UL (ref 0–0.7)
EOSINOPHIL NFR BLD AUTO: 2.7 %
ERYTHROCYTE [DISTWIDTH] IN BLOOD BY AUTOMATED COUNT: 12.9 % (ref 11–15)
EST. AVERAGE GLUCOSE BLD GHB EST-MCNC: 137 MG/DL (ref 68–126)
FLUAV RNA SPEC QL NAA+PROBE: NOT DETECTED
FLUBV RNA SPEC QL NAA+PROBE: NOT DETECTED
GFR SERPLBLD BASED ON 1.73 SQ M-ARVRAT: 15 ML/MIN/1.73M2 (ref 60–?)
GLUCOSE BLD-MCNC: 128 MG/DL (ref 70–99)
GLUCOSE BLDC GLUCOMTR-MCNC: 102 MG/DL (ref 70–99)
GLUCOSE BLDC GLUCOMTR-MCNC: 85 MG/DL (ref 70–99)
HBA1C MFR BLD: 6.4 % (ref ?–5.7)
HCT VFR BLD AUTO: 38.7 %
HGB BLD-MCNC: 12.8 G/DL
IMM GRANULOCYTES # BLD AUTO: 0.02 X10(3) UL (ref 0–1)
IMM GRANULOCYTES NFR BLD: 0.2 %
LYMPHOCYTES # BLD AUTO: 2.26 X10(3) UL (ref 1–4)
LYMPHOCYTES NFR BLD AUTO: 22.5 %
MCH RBC QN AUTO: 30.9 PG (ref 26–34)
MCHC RBC AUTO-ENTMCNC: 33.1 G/DL (ref 31–37)
MCV RBC AUTO: 93.5 FL
METAPNEUMOVIRUS PCR:: NOT DETECTED
MONOCYTES # BLD AUTO: 0.63 X10(3) UL (ref 0.1–1)
MONOCYTES NFR BLD AUTO: 6.3 %
MYCOPLASMA PNEUMONIA PCR:: NOT DETECTED
NEUTROPHILS # BLD AUTO: 6.81 X10 (3) UL (ref 1.5–7.7)
NEUTROPHILS # BLD AUTO: 6.81 X10(3) UL (ref 1.5–7.7)
NEUTROPHILS NFR BLD AUTO: 67.7 %
OSMOLALITY SERPL CALC.SUM OF ELEC: 309 MOSM/KG (ref 275–295)
P AXIS: 76 DEGREES
P-R INTERVAL: 126 MS
PARAINFLUENZA 1 PCR:: NOT DETECTED
PARAINFLUENZA 2 PCR:: NOT DETECTED
PARAINFLUENZA 3 PCR:: NOT DETECTED
PARAINFLUENZA 4 PCR:: NOT DETECTED
PLATELET # BLD AUTO: 243 10(3)UL (ref 150–450)
POTASSIUM SERPL-SCNC: 4.5 MMOL/L (ref 3.5–5.1)
Q-T INTERVAL: 428 MS
QRS DURATION: 90 MS
QTC CALCULATION (BEZET): 458 MS
R AXIS: 12 DEGREES
RBC # BLD AUTO: 4.14 X10(6)UL
RHINOVIRUS/ENTERO PCR:: DETECTED
RSV RNA SPEC QL NAA+PROBE: NOT DETECTED
SARS-COV-2 RNA NPH QL NAA+NON-PROBE: NOT DETECTED
SARS-COV-2 RNA RESP QL NAA+PROBE: NOT DETECTED
SODIUM SERPL-SCNC: 140 MMOL/L (ref 136–145)
T AXIS: 120 DEGREES
TROPONIN I HIGH SENSITIVITY: 25 NG/L
VENTRICULAR RATE: 69 BPM
WBC # BLD AUTO: 10.1 X10(3) UL (ref 4–11)

## 2023-03-24 PROCEDURE — 82962 GLUCOSE BLOOD TEST: CPT

## 2023-03-24 PROCEDURE — 71045 X-RAY EXAM CHEST 1 VIEW: CPT | Performed by: EMERGENCY MEDICINE

## 2023-03-24 PROCEDURE — 99285 EMERGENCY DEPT VISIT HI MDM: CPT

## 2023-03-24 PROCEDURE — 93010 ELECTROCARDIOGRAM REPORT: CPT

## 2023-03-24 PROCEDURE — 93005 ELECTROCARDIOGRAM TRACING: CPT

## 2023-03-24 PROCEDURE — 80048 BASIC METABOLIC PNL TOTAL CA: CPT | Performed by: EMERGENCY MEDICINE

## 2023-03-24 PROCEDURE — 70450 CT HEAD/BRAIN W/O DYE: CPT | Performed by: EMERGENCY MEDICINE

## 2023-03-24 PROCEDURE — 83036 HEMOGLOBIN GLYCOSYLATED A1C: CPT | Performed by: HOSPITALIST

## 2023-03-24 PROCEDURE — 85025 COMPLETE CBC W/AUTO DIFF WBC: CPT | Performed by: EMERGENCY MEDICINE

## 2023-03-24 PROCEDURE — 84484 ASSAY OF TROPONIN QUANT: CPT | Performed by: EMERGENCY MEDICINE

## 2023-03-24 PROCEDURE — 12001 RPR S/N/AX/GEN/TRNK 2.5CM/<: CPT

## 2023-03-24 PROCEDURE — 0202U NFCT DS 22 TRGT SARS-COV-2: CPT | Performed by: HOSPITALIST

## 2023-03-24 PROCEDURE — 36415 COLL VENOUS BLD VENIPUNCTURE: CPT

## 2023-03-24 RX ORDER — HYDRALAZINE HYDROCHLORIDE 100 MG/1
100 TABLET, FILM COATED ORAL 3 TIMES DAILY
Status: DISCONTINUED | OUTPATIENT
Start: 2023-03-24 | End: 2023-03-26

## 2023-03-24 RX ORDER — BUPROPION HYDROCHLORIDE 150 MG/1
300 TABLET ORAL NIGHTLY
Status: DISCONTINUED | OUTPATIENT
Start: 2023-03-24 | End: 2023-03-26

## 2023-03-24 RX ORDER — ATORVASTATIN CALCIUM 20 MG/1
20 TABLET, FILM COATED ORAL DAILY
Status: DISCONTINUED | OUTPATIENT
Start: 2023-03-25 | End: 2023-03-26

## 2023-03-24 RX ORDER — SENNOSIDES 8.6 MG
17.2 TABLET ORAL NIGHTLY PRN
Status: DISCONTINUED | OUTPATIENT
Start: 2023-03-24 | End: 2023-03-26

## 2023-03-24 RX ORDER — BISACODYL 10 MG
10 SUPPOSITORY, RECTAL RECTAL
Status: DISCONTINUED | OUTPATIENT
Start: 2023-03-24 | End: 2023-03-26

## 2023-03-24 RX ORDER — SERTRALINE HYDROCHLORIDE 100 MG/1
200 TABLET, FILM COATED ORAL NIGHTLY
Status: DISCONTINUED | OUTPATIENT
Start: 2023-03-24 | End: 2023-03-26

## 2023-03-24 RX ORDER — NICOTINE POLACRILEX 4 MG
30 LOZENGE BUCCAL
Status: DISCONTINUED | OUTPATIENT
Start: 2023-03-24 | End: 2023-03-26

## 2023-03-24 RX ORDER — ACETAMINOPHEN 500 MG
500 TABLET ORAL EVERY 4 HOURS PRN
Status: DISCONTINUED | OUTPATIENT
Start: 2023-03-24 | End: 2023-03-26

## 2023-03-24 RX ORDER — POLYETHYLENE GLYCOL 3350 17 G/17G
17 POWDER, FOR SOLUTION ORAL DAILY PRN
Status: DISCONTINUED | OUTPATIENT
Start: 2023-03-24 | End: 2023-03-26

## 2023-03-24 RX ORDER — PANTOPRAZOLE SODIUM 40 MG/1
40 TABLET, DELAYED RELEASE ORAL
Status: DISCONTINUED | OUTPATIENT
Start: 2023-03-25 | End: 2023-03-26

## 2023-03-24 RX ORDER — NICOTINE POLACRILEX 4 MG
15 LOZENGE BUCCAL
Status: DISCONTINUED | OUTPATIENT
Start: 2023-03-24 | End: 2023-03-26

## 2023-03-24 RX ORDER — MELATONIN
3 NIGHTLY PRN
Status: DISCONTINUED | OUTPATIENT
Start: 2023-03-24 | End: 2023-03-26

## 2023-03-24 RX ORDER — ASPIRIN 81 MG/1
81 TABLET ORAL NIGHTLY
Status: DISCONTINUED | OUTPATIENT
Start: 2023-03-24 | End: 2023-03-26

## 2023-03-24 RX ORDER — DIAZEPAM 5 MG/1
5 TABLET ORAL 2 TIMES DAILY
Status: DISCONTINUED | OUTPATIENT
Start: 2023-03-24 | End: 2023-03-26

## 2023-03-24 RX ORDER — ONDANSETRON 2 MG/ML
4 INJECTION INTRAMUSCULAR; INTRAVENOUS EVERY 6 HOURS PRN
Status: DISCONTINUED | OUTPATIENT
Start: 2023-03-24 | End: 2023-03-26

## 2023-03-24 RX ORDER — AMLODIPINE BESYLATE 5 MG/1
5 TABLET ORAL DAILY
COMMUNITY

## 2023-03-24 RX ORDER — CLONIDINE HYDROCHLORIDE 0.1 MG/1
0.2 TABLET ORAL 2 TIMES DAILY
Status: DISCONTINUED | OUTPATIENT
Start: 2023-03-24 | End: 2023-03-26

## 2023-03-24 RX ORDER — LEVOTHYROXINE SODIUM 0.12 MG/1
125 TABLET ORAL
Status: DISCONTINUED | OUTPATIENT
Start: 2023-03-25 | End: 2023-03-26

## 2023-03-24 RX ORDER — DEXTROSE MONOHYDRATE 25 G/50ML
50 INJECTION, SOLUTION INTRAVENOUS
Status: DISCONTINUED | OUTPATIENT
Start: 2023-03-24 | End: 2023-03-26

## 2023-03-24 RX ORDER — SODIUM CHLORIDE 9 MG/ML
INJECTION, SOLUTION INTRAVENOUS CONTINUOUS
Status: ACTIVE | OUTPATIENT
Start: 2023-03-24 | End: 2023-03-25

## 2023-03-24 RX ORDER — ALLOPURINOL 100 MG/1
300 TABLET ORAL DAILY
Status: DISCONTINUED | OUTPATIENT
Start: 2023-03-25 | End: 2023-03-26

## 2023-03-24 NOTE — ED INITIAL ASSESSMENT (HPI)
Patient presents to the ED from [facility] after syncopal episode. Pt has laceration to back of her head. Patient is on 81mg of aspirin daily. Alert and oriented x4.

## 2023-03-24 NOTE — ED QUICK NOTES
Orders for admission, patient is aware of plan and ready to go upstairs. Any questions, please call ED CRISTINA Sorenson at extension [phone].      Patient Covid vaccination status: Fully vaccinated     COVID Test Ordered in ED: Rapid SARS-CoV-2 by PCR    COVID Suspicion at Admission: N/A    Running Infusions:  None    Mental Status/LOC at time of transport: x4    Other pertinent information:   CIWA score: N/A   NIH score:  N/A

## 2023-03-25 ENCOUNTER — APPOINTMENT (OUTPATIENT)
Dept: CV DIAGNOSTICS | Facility: HOSPITAL | Age: 68
End: 2023-03-25
Attending: HOSPITALIST
Payer: MEDICARE

## 2023-03-25 LAB
ANION GAP SERPL CALC-SCNC: 7 MMOL/L (ref 0–18)
BASOPHILS # BLD AUTO: 0.05 X10(3) UL (ref 0–0.2)
BASOPHILS NFR BLD AUTO: 0.6 %
BUN BLD-MCNC: 52 MG/DL (ref 7–18)
BUN/CREAT SERPL: 18.9 (ref 10–20)
CALCIUM BLD-MCNC: 8.6 MG/DL (ref 8.5–10.1)
CHLORIDE SERPL-SCNC: 108 MMOL/L (ref 98–112)
CO2 SERPL-SCNC: 25 MMOL/L (ref 21–32)
CREAT BLD-MCNC: 2.75 MG/DL
DEPRECATED RDW RBC AUTO: 43.1 FL (ref 35.1–46.3)
EOSINOPHIL # BLD AUTO: 0.28 X10(3) UL (ref 0–0.7)
EOSINOPHIL NFR BLD AUTO: 3.3 %
ERYTHROCYTE [DISTWIDTH] IN BLOOD BY AUTOMATED COUNT: 12.9 % (ref 11–15)
GFR SERPLBLD BASED ON 1.73 SQ M-ARVRAT: 18 ML/MIN/1.73M2 (ref 60–?)
GLUCOSE BLD-MCNC: 68 MG/DL (ref 70–99)
GLUCOSE BLDC GLUCOMTR-MCNC: 106 MG/DL (ref 70–99)
GLUCOSE BLDC GLUCOMTR-MCNC: 160 MG/DL (ref 70–99)
GLUCOSE BLDC GLUCOMTR-MCNC: 198 MG/DL (ref 70–99)
GLUCOSE BLDC GLUCOMTR-MCNC: 209 MG/DL (ref 70–99)
GLUCOSE BLDC GLUCOMTR-MCNC: 283 MG/DL (ref 70–99)
GLUCOSE BLDC GLUCOMTR-MCNC: 57 MG/DL (ref 70–99)
GLUCOSE BLDC GLUCOMTR-MCNC: 70 MG/DL (ref 70–99)
HCT VFR BLD AUTO: 36 %
HGB BLD-MCNC: 11.9 G/DL
IMM GRANULOCYTES # BLD AUTO: 0.02 X10(3) UL (ref 0–1)
IMM GRANULOCYTES NFR BLD: 0.2 %
LYMPHOCYTES # BLD AUTO: 3.11 X10(3) UL (ref 1–4)
LYMPHOCYTES NFR BLD AUTO: 36.6 %
MAGNESIUM SERPL-MCNC: 2.4 MG/DL (ref 1.6–2.6)
MCH RBC QN AUTO: 30.4 PG (ref 26–34)
MCHC RBC AUTO-ENTMCNC: 33.1 G/DL (ref 31–37)
MCV RBC AUTO: 91.8 FL
MONOCYTES # BLD AUTO: 0.63 X10(3) UL (ref 0.1–1)
MONOCYTES NFR BLD AUTO: 7.4 %
NEUTROPHILS # BLD AUTO: 4.4 X10 (3) UL (ref 1.5–7.7)
NEUTROPHILS # BLD AUTO: 4.4 X10(3) UL (ref 1.5–7.7)
NEUTROPHILS NFR BLD AUTO: 51.9 %
OSMOLALITY SERPL CALC.SUM OF ELEC: 302 MOSM/KG (ref 275–295)
PLATELET # BLD AUTO: 225 10(3)UL (ref 150–450)
POTASSIUM SERPL-SCNC: 3.8 MMOL/L (ref 3.5–5.1)
RBC # BLD AUTO: 3.92 X10(6)UL
SODIUM SERPL-SCNC: 140 MMOL/L (ref 136–145)
WBC # BLD AUTO: 8.5 X10(3) UL (ref 4–11)

## 2023-03-25 PROCEDURE — 82962 GLUCOSE BLOOD TEST: CPT

## 2023-03-25 PROCEDURE — 80048 BASIC METABOLIC PNL TOTAL CA: CPT | Performed by: HOSPITALIST

## 2023-03-25 PROCEDURE — 93306 TTE W/DOPPLER COMPLETE: CPT | Performed by: HOSPITALIST

## 2023-03-25 PROCEDURE — 83735 ASSAY OF MAGNESIUM: CPT | Performed by: HOSPITALIST

## 2023-03-25 PROCEDURE — 97162 PT EVAL MOD COMPLEX 30 MIN: CPT

## 2023-03-25 PROCEDURE — 97530 THERAPEUTIC ACTIVITIES: CPT

## 2023-03-25 PROCEDURE — 85025 COMPLETE CBC W/AUTO DIFF WBC: CPT | Performed by: HOSPITALIST

## 2023-03-25 RX ORDER — SODIUM CHLORIDE 9 MG/ML
INJECTION, SOLUTION INTRAVENOUS CONTINUOUS
Status: ACTIVE | OUTPATIENT
Start: 2023-03-25 | End: 2023-03-26

## 2023-03-25 RX ORDER — HYDRALAZINE HYDROCHLORIDE 20 MG/ML
10 INJECTION INTRAMUSCULAR; INTRAVENOUS EVERY 6 HOURS PRN
Status: DISCONTINUED | OUTPATIENT
Start: 2023-03-25 | End: 2023-03-26

## 2023-03-25 RX ORDER — SODIUM CHLORIDE 9 MG/ML
INJECTION, SOLUTION INTRAVENOUS CONTINUOUS
Status: ACTIVE | OUTPATIENT
Start: 2023-03-25 | End: 2023-03-25

## 2023-03-25 NOTE — PLAN OF CARE
Alexis Sparrow is resting comfortably in bed, alert and oriented x 4, on room air, NSR on tele, purewick in place, ambulating with 1 assist x walker, no complaints of pain at this time. 0.9NS infusing at 75 mL/hr. Hypoglycemic episode resolved this morning. 2D Echo completed today. Plan to discharge tomorrow pending medical clearance with . Safety precautions in place, call light within easy reach. Problem: Diabetes/Glucose Control  Goal: Glucose maintained within prescribed range  Description: INTERVENTIONS:  - Monitor Blood Glucose as ordered  - Assess for signs and symptoms of hyperglycemia and hypoglycemia  - Administer ordered medications to maintain glucose within target range  - Assess barriers to adequate nutritional intake and initiate nutrition consult as needed  - Instruct patient on self management of diabetes  Outcome: Progressing     Problem: Patient Centered Care  Goal: Patient preferences are identified and integrated in the patient's plan of care  Description: Interventions:  - What would you like us to know as we care for you?  From [facility]  - Provide timely, complete, and accurate information to patient/family  - Incorporate patient and family knowledge, values, beliefs, and cultural backgrounds into the planning and delivery of care  - Encourage patient/family to participate in care and decision-making at the level they choose  - Honor patient and family perspectives and choices  Outcome: Progressing     Problem: CARDIOVASCULAR - ADULT  Goal: Maintains optimal cardiac output and hemodynamic stability  Description: INTERVENTIONS:  - Monitor vital signs, rhythm, and trends  - Monitor for bleeding, hypotension and signs of decreased cardiac output  - Evaluate effectiveness of vasoactive medications to optimize hemodynamic stability  - Monitor arterial and/or venous puncture sites for bleeding and/or hematoma  - Assess quality of pulses, skin color and temperature  - Assess for signs of decreased coronary artery perfusion - ex.  Angina  - Evaluate fluid balance, assess for edema, trend weights  Outcome: Progressing  Goal: Absence of cardiac arrhythmias or at baseline  Description: INTERVENTIONS:  - Continuous cardiac monitoring, monitor vital signs, obtain 12 lead EKG if indicated  - Evaluate effectiveness of antiarrhythmic and heart rate control medications as ordered  - Initiate emergency measures for life threatening arrhythmias  - Monitor electrolytes and administer replacement therapy as ordered  Outcome: Progressing     Problem: SKIN/TISSUE INTEGRITY - ADULT  Goal: Incision(s), wounds(s) or drain site(s) healing without S/S of infection  Description: INTERVENTIONS:  - Assess and document risk factors for pressure ulcer development  - Assess and document skin integrity  - Assess and document dressing/incision, wound bed, drain sites and surrounding tissue  - Implement wound care per orders  - Initiate isolation precautions as appropriate  - Initiate Pressure Ulcer prevention bundle as indicated  Outcome: Progressing

## 2023-03-25 NOTE — PLAN OF CARE
Pt. No complaints overnight. A/Ox4, on room air, (+) orthostatics. 0.9 NS @ 42ml. Unsteady at times, purewick in place. Plan for Echo in AM. Safety precaution in placed, call light within reach. Problem: Diabetes/Glucose Control  Goal: Glucose maintained within prescribed range  Description: INTERVENTIONS:  - Monitor Blood Glucose as ordered  - Assess for signs and symptoms of hyperglycemia and hypoglycemia  - Administer ordered medications to maintain glucose within target range  - Assess barriers to adequate nutritional intake and initiate nutrition consult as needed  - Instruct patient on self management of diabetes  Outcome: Progressing     Problem: Patient Centered Care  Goal: Patient preferences are identified and integrated in the patient's plan of care  Description: Interventions:  - What would you like us to know as we care for you? From [facility]  - Provide timely, complete, and accurate information to patient/family  - Incorporate patient and family knowledge, values, beliefs, and cultural backgrounds into the planning and delivery of care  - Encourage patient/family to participate in care and decision-making at the level they choose  - Honor patient and family perspectives and choices  Outcome: Progressing     Problem: CARDIOVASCULAR - ADULT  Goal: Maintains optimal cardiac output and hemodynamic stability  Description: INTERVENTIONS:  - Monitor vital signs, rhythm, and trends  - Monitor for bleeding, hypotension and signs of decreased cardiac output  - Evaluate effectiveness of vasoactive medications to optimize hemodynamic stability  - Monitor arterial and/or venous puncture sites for bleeding and/or hematoma  - Assess quality of pulses, skin color and temperature  - Assess for signs of decreased coronary artery perfusion - ex.  Angina  - Evaluate fluid balance, assess for edema, trend weights  Outcome: Progressing  Goal: Absence of cardiac arrhythmias or at baseline  Description: INTERVENTIONS:  - Continuous cardiac monitoring, monitor vital signs, obtain 12 lead EKG if indicated  - Evaluate effectiveness of antiarrhythmic and heart rate control medications as ordered  - Initiate emergency measures for life threatening arrhythmias  - Monitor electrolytes and administer replacement therapy as ordered  Outcome: Progressing     Problem: SKIN/TISSUE INTEGRITY - ADULT  Goal: Incision(s), wounds(s) or drain site(s) healing without S/S of infection  Description: INTERVENTIONS:  - Assess and document risk factors for pressure ulcer development  - Assess and document skin integrity  - Assess and document dressing/incision, wound bed, drain sites and surrounding tissue  - Implement wound care per orders  - Initiate isolation precautions as appropriate  - Initiate Pressure Ulcer prevention bundle as indicated  Outcome: Progressing

## 2023-03-25 NOTE — CM/SW NOTE
It is documented that Papi Cagle lives at [address]. Therapy recs are for [facility]. [facility] referrals started. Choice will be provided to patient prior to dc. / to remain available for support and/or discharge planning.      Amado Ramirez MBA BSN RN [address]  RN Case Manager  [phone]

## 2023-03-26 VITALS
DIASTOLIC BLOOD PRESSURE: 85 MMHG | HEIGHT: 64 IN | HEART RATE: 66 BPM | OXYGEN SATURATION: 96 % | WEIGHT: 179.81 LBS | BODY MASS INDEX: 30.7 KG/M2 | RESPIRATION RATE: 16 BRPM | TEMPERATURE: 98 F | SYSTOLIC BLOOD PRESSURE: 169 MMHG

## 2023-03-26 LAB
GLUCOSE BLDC GLUCOMTR-MCNC: 127 MG/DL (ref 70–99)
GLUCOSE BLDC GLUCOMTR-MCNC: 201 MG/DL (ref 70–99)
GLUCOSE BLDC GLUCOMTR-MCNC: 208 MG/DL (ref 70–99)
GLUCOSE BLDC GLUCOMTR-MCNC: 275 MG/DL (ref 70–99)
GLUCOSE BLDC GLUCOMTR-MCNC: 39 MG/DL (ref 70–99)

## 2023-03-26 PROCEDURE — 97530 THERAPEUTIC ACTIVITIES: CPT

## 2023-03-26 PROCEDURE — 97166 OT EVAL MOD COMPLEX 45 MIN: CPT

## 2023-03-26 PROCEDURE — 82962 GLUCOSE BLOOD TEST: CPT

## 2023-03-26 RX ORDER — INSULIN GLARGINE 300 U/ML
20 INJECTION, SOLUTION SUBCUTANEOUS DAILY
Qty: 45 ML | Refills: 0 | Status: SHIPPED | COMMUNITY
Start: 2023-03-26

## 2023-03-26 NOTE — PLAN OF CARE
Pt A&O x4. Complained of headache but declined pain medication. Denies SOB. Fluids running @ 75ml. Morning BP was extremely high - MD Ricardo Perez - PRN hydralazine given and 9 oclock clonidine and PO hydralazine given per MD orders  Problem: Diabetes/Glucose Control  Goal: Glucose maintained within prescribed range  Description: INTERVENTIONS:  - Monitor Blood Glucose as ordered  - Assess for signs and symptoms of hyperglycemia and hypoglycemia  - Administer ordered medications to maintain glucose within target range  - Assess barriers to adequate nutritional intake and initiate nutrition consult as needed  - Instruct patient on self management of diabetes  Outcome: Progressing     Problem: Patient Centered Care  Goal: Patient preferences are identified and integrated in the patient's plan of care  Description: Interventions:  - What would you like us to know as we care for you? From [facility]  - Provide timely, complete, and accurate information to patient/family  - Incorporate patient and family knowledge, values, beliefs, and cultural backgrounds into the planning and delivery of care  - Encourage patient/family to participate in care and decision-making at the level they choose  - Honor patient and family perspectives and choices  Outcome: Progressing     Problem: CARDIOVASCULAR - ADULT  Goal: Maintains optimal cardiac output and hemodynamic stability  Description: INTERVENTIONS:  - Monitor vital signs, rhythm, and trends  - Monitor for bleeding, hypotension and signs of decreased cardiac output  - Evaluate effectiveness of vasoactive medications to optimize hemodynamic stability  - Monitor arterial and/or venous puncture sites for bleeding and/or hematoma  - Assess quality of pulses, skin color and temperature  - Assess for signs of decreased coronary artery perfusion - ex.  Angina  - Evaluate fluid balance, assess for edema, trend weights  Outcome: Progressing  Goal: Absence of cardiac arrhythmias or at baseline  Description: INTERVENTIONS:  - Continuous cardiac monitoring, monitor vital signs, obtain 12 lead EKG if indicated  - Evaluate effectiveness of antiarrhythmic and heart rate control medications as ordered  - Initiate emergency measures for life threatening arrhythmias  - Monitor electrolytes and administer replacement therapy as ordered  Outcome: Progressing     Problem: SKIN/TISSUE INTEGRITY - ADULT  Goal: Incision(s), wounds(s) or drain site(s) healing without S/S of infection  Description: INTERVENTIONS:  - Assess and document risk factors for pressure ulcer development  - Assess and document skin integrity  - Assess and document dressing/incision, wound bed, drain sites and surrounding tissue  - Implement wound care per orders  - Initiate isolation precautions as appropriate  - Initiate Pressure Ulcer prevention bundle as indicated  Outcome: Progressing

## 2023-03-26 NOTE — PLAN OF CARE
Doug Irby is resting comfortably in the chair, alert and oriented x 4, on room air, hypoglycemia resolved, IVF discontinued, medically stable for discharge per MD orders, no complaints of pain, hydralazine administered x 1 for elevated BP. Son updated on POC. Plan to discharge to [facility] with [place]. Discharge instructions completed with patient, all questions answered, tele and IV removed, verified patient had her belongings prior to discharge. Problem: Diabetes/Glucose Control  Goal: Glucose maintained within prescribed range  Description: INTERVENTIONS:  - Monitor Blood Glucose as ordered  - Assess for signs and symptoms of hyperglycemia and hypoglycemia  - Administer ordered medications to maintain glucose within target range  - Assess barriers to adequate nutritional intake and initiate nutrition consult as needed  - Instruct patient on self management of diabetes  Outcome: Progressing     Problem: Patient Centered Care  Goal: Patient preferences are identified and integrated in the patient's plan of care  Description: Interventions:  - What would you like us to know as we care for you?  From [facility]  - Provide timely, complete, and accurate information to patient/family  - Incorporate patient and family knowledge, values, beliefs, and cultural backgrounds into the planning and delivery of care  - Encourage patient/family to participate in care and decision-making at the level they choose  - Honor patient and family perspectives and choices  Outcome: Progressing     Problem: CARDIOVASCULAR - ADULT  Goal: Maintains optimal cardiac output and hemodynamic stability  Description: INTERVENTIONS:  - Monitor vital signs, rhythm, and trends  - Monitor for bleeding, hypotension and signs of decreased cardiac output  - Evaluate effectiveness of vasoactive medications to optimize hemodynamic stability  - Monitor arterial and/or venous puncture sites for bleeding and/or hematoma  - Assess quality of pulses, skin color and temperature  - Assess for signs of decreased coronary artery perfusion - ex.  Angina  - Evaluate fluid balance, assess for edema, trend weights  Outcome: Progressing  Goal: Absence of cardiac arrhythmias or at baseline  Description: INTERVENTIONS:  - Continuous cardiac monitoring, monitor vital signs, obtain 12 lead EKG if indicated  - Evaluate effectiveness of antiarrhythmic and heart rate control medications as ordered  - Initiate emergency measures for life threatening arrhythmias  - Monitor electrolytes and administer replacement therapy as ordered  Outcome: Progressing     Problem: SKIN/TISSUE INTEGRITY - ADULT  Goal: Incision(s), wounds(s) or drain site(s) healing without S/S of infection  Description: INTERVENTIONS:  - Assess and document risk factors for pressure ulcer development  - Assess and document skin integrity  - Assess and document dressing/incision, wound bed, drain sites and surrounding tissue  - Implement wound care per orders  - Initiate isolation precautions as appropriate  - Initiate Pressure Ulcer prevention bundle as indicated  Outcome: Progressing

## 2023-03-26 NOTE — DISCHARGE INSTRUCTIONS
Sometimes managing your health at home requires assistance. The [facility] team has recognized your preference to use [facility], Phone: [phone]. A representative from the home health agency will contact you or your family to schedule your first visit.       -----------------------------------------------------------------    Recommend lowering Levemir dose to 20 at night. Please check your blood glucose prior to taking Levemir.

## 2023-03-26 NOTE — CM/SW NOTE
03/26/23 1300   Discharge disposition   Expected discharge disposition Home-Health   Post Acute Care Provider   ([address] [facility])   Discharge transportation Private car     Pt discussed during nursing rounds. Pt is stable for [place] today. MD olson order entered. List of accepting [facility] agencies provided to patient's son via phone, [address] [facility] is chosen provider for RN and therapy services at [facility]. Agency reserved in [address], agency notified of dc today. Pt's son will provide transport at [place]. Plan: Shantanu WHARTON w/[facility] today. / to remain available for support and/or discharge planning.      NOMAN Denson    [phone]

## 2023-09-09 ENCOUNTER — HOSPITAL ENCOUNTER (INPATIENT)
Facility: HOSPITAL | Age: 68
LOS: 3 days | Discharge: NURSING, REHAB OR LONG-TERM CARE | End: 2023-09-12
Attending: EMERGENCY MEDICINE | Admitting: HOSPITALIST
Payer: MEDICARE

## 2023-09-09 ENCOUNTER — APPOINTMENT (OUTPATIENT)
Dept: GENERAL RADIOLOGY | Facility: HOSPITAL | Age: 68
End: 2023-09-09
Attending: EMERGENCY MEDICINE
Payer: MEDICARE

## 2023-09-09 ENCOUNTER — APPOINTMENT (OUTPATIENT)
Dept: ULTRASOUND IMAGING | Facility: HOSPITAL | Age: 68
End: 2023-09-09
Attending: INTERNAL MEDICINE
Payer: MEDICARE

## 2023-09-09 DIAGNOSIS — J81.1 CHRONIC PULMONARY EDEMA: Primary | ICD-10-CM

## 2023-09-09 DIAGNOSIS — I10 PRIMARY HYPERTENSION: ICD-10-CM

## 2023-09-09 DIAGNOSIS — N28.9 RENAL INSUFFICIENCY: ICD-10-CM

## 2023-09-09 DIAGNOSIS — R09.02 HYPOXIA: ICD-10-CM

## 2023-09-09 PROBLEM — N17.9 AKI (ACUTE KIDNEY INJURY) (HCC): Status: ACTIVE | Noted: 2023-09-09

## 2023-09-09 PROBLEM — N17.9 AKI (ACUTE KIDNEY INJURY): Status: ACTIVE | Noted: 2023-09-09

## 2023-09-09 LAB
ANION GAP SERPL CALC-SCNC: 10 MMOL/L (ref 0–18)
BASOPHILS # BLD AUTO: 0.04 X10(3) UL (ref 0–0.2)
BASOPHILS NFR BLD AUTO: 0.3 %
BILIRUB UR QL: NEGATIVE
BUN BLD-MCNC: 55 MG/DL (ref 7–18)
BUN/CREAT SERPL: 13.6 (ref 10–20)
CALCIUM BLD-MCNC: 9.7 MG/DL (ref 8.5–10.1)
CHLORIDE SERPL-SCNC: 104 MMOL/L (ref 98–112)
CLARITY UR: CLEAR
CO2 SERPL-SCNC: 26 MMOL/L (ref 21–32)
CREAT BLD-MCNC: 4.05 MG/DL
CREAT UR-SCNC: 28.9 MG/DL
DEPRECATED RDW RBC AUTO: 47.3 FL (ref 35.1–46.3)
EGFRCR SERPLBLD CKD-EPI 2021: 11 ML/MIN/1.73M2 (ref 60–?)
EOSINOPHIL # BLD AUTO: 0.14 X10(3) UL (ref 0–0.7)
EOSINOPHIL NFR BLD AUTO: 1 %
ERYTHROCYTE [DISTWIDTH] IN BLOOD BY AUTOMATED COUNT: 13.5 % (ref 11–15)
EST. AVERAGE GLUCOSE BLD GHB EST-MCNC: 148 MG/DL (ref 68–126)
GLUCOSE BLD-MCNC: 183 MG/DL (ref 70–99)
GLUCOSE BLDC GLUCOMTR-MCNC: 138 MG/DL (ref 70–99)
GLUCOSE BLDC GLUCOMTR-MCNC: 142 MG/DL (ref 70–99)
GLUCOSE BLDC GLUCOMTR-MCNC: 161 MG/DL (ref 70–99)
GLUCOSE BLDC GLUCOMTR-MCNC: 192 MG/DL (ref 70–99)
GLUCOSE BLDC GLUCOMTR-MCNC: 218 MG/DL (ref 70–99)
GLUCOSE BLDC GLUCOMTR-MCNC: 271 MG/DL (ref 70–99)
GLUCOSE BLDC GLUCOMTR-MCNC: 274 MG/DL (ref 70–99)
GLUCOSE UR-MCNC: NORMAL MG/DL
HBA1C MFR BLD: 6.8 % (ref ?–5.7)
HCT VFR BLD AUTO: 41.8 %
HGB BLD-MCNC: 14 G/DL
IMM GRANULOCYTES # BLD AUTO: 0.03 X10(3) UL (ref 0–1)
IMM GRANULOCYTES NFR BLD: 0.2 %
KETONES UR-MCNC: NEGATIVE MG/DL
LEUKOCYTE ESTERASE UR QL STRIP.AUTO: 250
LYMPHOCYTES # BLD AUTO: 1.23 X10(3) UL (ref 1–4)
LYMPHOCYTES NFR BLD AUTO: 8.5 %
MCH RBC QN AUTO: 31.6 PG (ref 26–34)
MCHC RBC AUTO-ENTMCNC: 33.5 G/DL (ref 31–37)
MCV RBC AUTO: 94.4 FL
MONOCYTES # BLD AUTO: 0.17 X10(3) UL (ref 0.1–1)
MONOCYTES NFR BLD AUTO: 1.2 %
NEUTROPHILS # BLD AUTO: 12.93 X10 (3) UL (ref 1.5–7.7)
NEUTROPHILS # BLD AUTO: 12.93 X10(3) UL (ref 1.5–7.7)
NEUTROPHILS NFR BLD AUTO: 88.8 %
NT-PROBNP SERPL-MCNC: 5206 PG/ML (ref ?–125)
OSMOLALITY SERPL CALC.SUM OF ELEC: 310 MOSM/KG (ref 275–295)
PH UR: 5.5 [PH] (ref 5–8)
PLATELET # BLD AUTO: 287 10(3)UL (ref 150–450)
POTASSIUM SERPL-SCNC: 3.9 MMOL/L (ref 3.5–5.1)
PROT UR-MCNC: 100 MG/DL
PROT UR-MCNC: 144.4 MG/DL
PROT/CREAT UR-RTO: 5
RBC # BLD AUTO: 4.43 X10(6)UL
SARS-COV-2 RNA RESP QL NAA+PROBE: NOT DETECTED
SODIUM SERPL-SCNC: 140 MMOL/L (ref 136–145)
SODIUM SERPL-SCNC: 97 MMOL/L
SP GR UR STRIP: 1.01 (ref 1–1.03)
TROPONIN I HIGH SENSITIVITY: 36 NG/L
UROBILINOGEN UR STRIP-ACNC: NORMAL
WBC # BLD AUTO: 14.5 X10(3) UL (ref 4–11)

## 2023-09-09 PROCEDURE — 71045 X-RAY EXAM CHEST 1 VIEW: CPT | Performed by: EMERGENCY MEDICINE

## 2023-09-09 PROCEDURE — 76770 US EXAM ABDO BACK WALL COMP: CPT | Performed by: INTERNAL MEDICINE

## 2023-09-09 PROCEDURE — 99223 1ST HOSP IP/OBS HIGH 75: CPT | Performed by: INTERNAL MEDICINE

## 2023-09-09 RX ORDER — LEVOTHYROXINE SODIUM 0.12 MG/1
125 TABLET ORAL
Status: DISCONTINUED | OUTPATIENT
Start: 2023-09-09 | End: 2023-09-12

## 2023-09-09 RX ORDER — CARVEDILOL 6.25 MG/1
6.25 TABLET ORAL 2 TIMES DAILY WITH MEALS
Status: DISCONTINUED | OUTPATIENT
Start: 2023-09-09 | End: 2023-09-09 | Stop reason: ALTCHOICE

## 2023-09-09 RX ORDER — ATORVASTATIN CALCIUM 20 MG/1
20 TABLET, FILM COATED ORAL DAILY
Status: DISCONTINUED | OUTPATIENT
Start: 2023-09-09 | End: 2023-09-09

## 2023-09-09 RX ORDER — BUPROPION HYDROCHLORIDE 150 MG/1
300 TABLET ORAL DAILY
Status: DISCONTINUED | OUTPATIENT
Start: 2023-09-09 | End: 2023-09-12

## 2023-09-09 RX ORDER — PANTOPRAZOLE SODIUM 40 MG/1
40 TABLET, DELAYED RELEASE ORAL
Status: DISCONTINUED | OUTPATIENT
Start: 2023-09-09 | End: 2023-09-12

## 2023-09-09 RX ORDER — DEXTROSE MONOHYDRATE 25 G/50ML
50 INJECTION, SOLUTION INTRAVENOUS
Status: DISCONTINUED | OUTPATIENT
Start: 2023-09-09 | End: 2023-09-12

## 2023-09-09 RX ORDER — ALLOPURINOL 300 MG/1
150 TABLET ORAL DAILY
Status: DISCONTINUED | OUTPATIENT
Start: 2023-09-09 | End: 2023-09-12

## 2023-09-09 RX ORDER — HEPARIN SODIUM 5000 [USP'U]/ML
5000 INJECTION, SOLUTION INTRAVENOUS; SUBCUTANEOUS EVERY 8 HOURS SCHEDULED
Status: DISCONTINUED | OUTPATIENT
Start: 2023-09-09 | End: 2023-09-12

## 2023-09-09 RX ORDER — CARVEDILOL 12.5 MG/1
12.5 TABLET ORAL 2 TIMES DAILY WITH MEALS
Status: DISCONTINUED | OUTPATIENT
Start: 2023-09-09 | End: 2023-09-12

## 2023-09-09 RX ORDER — ASPIRIN 81 MG/1
81 TABLET, CHEWABLE ORAL DAILY
Status: DISCONTINUED | OUTPATIENT
Start: 2023-09-09 | End: 2023-09-09

## 2023-09-09 RX ORDER — FUROSEMIDE 10 MG/ML
40 INJECTION INTRAMUSCULAR; INTRAVENOUS
Status: DISCONTINUED | OUTPATIENT
Start: 2023-09-09 | End: 2023-09-12

## 2023-09-09 RX ORDER — ASPIRIN 81 MG/1
81 TABLET ORAL NIGHTLY
Status: DISCONTINUED | OUTPATIENT
Start: 2023-09-09 | End: 2023-09-12

## 2023-09-09 RX ORDER — NICOTINE POLACRILEX 4 MG
15 LOZENGE BUCCAL
Status: DISCONTINUED | OUTPATIENT
Start: 2023-09-09 | End: 2023-09-12

## 2023-09-09 RX ORDER — ONDANSETRON 2 MG/ML
4 INJECTION INTRAMUSCULAR; INTRAVENOUS EVERY 6 HOURS PRN
Status: DISCONTINUED | OUTPATIENT
Start: 2023-09-09 | End: 2023-09-12

## 2023-09-09 RX ORDER — ACETAMINOPHEN 500 MG
500 TABLET ORAL EVERY 4 HOURS PRN
Status: DISCONTINUED | OUTPATIENT
Start: 2023-09-09 | End: 2023-09-12

## 2023-09-09 RX ORDER — LABETALOL HYDROCHLORIDE 5 MG/ML
20 INJECTION, SOLUTION INTRAVENOUS ONCE
Status: COMPLETED | OUTPATIENT
Start: 2023-09-09 | End: 2023-09-09

## 2023-09-09 RX ORDER — HYDRALAZINE HYDROCHLORIDE 50 MG/1
100 TABLET, FILM COATED ORAL EVERY 8 HOURS SCHEDULED
Status: DISCONTINUED | OUTPATIENT
Start: 2023-09-09 | End: 2023-09-09

## 2023-09-09 RX ORDER — AMLODIPINE BESYLATE 5 MG/1
5 TABLET ORAL DAILY
Status: DISCONTINUED | OUTPATIENT
Start: 2023-09-09 | End: 2023-09-12

## 2023-09-09 RX ORDER — ATORVASTATIN CALCIUM 40 MG/1
40 TABLET, FILM COATED ORAL NIGHTLY
Status: DISCONTINUED | OUTPATIENT
Start: 2023-09-09 | End: 2023-09-12

## 2023-09-09 RX ORDER — HYDRALAZINE HYDROCHLORIDE 100 MG/1
100 TABLET, FILM COATED ORAL EVERY 8 HOURS SCHEDULED
Status: DISCONTINUED | OUTPATIENT
Start: 2023-09-09 | End: 2023-09-12

## 2023-09-09 RX ORDER — CLONIDINE HYDROCHLORIDE 0.2 MG/1
0.2 TABLET ORAL 2 TIMES DAILY
Status: DISCONTINUED | OUTPATIENT
Start: 2023-09-09 | End: 2023-09-12

## 2023-09-09 RX ORDER — NICOTINE POLACRILEX 4 MG
30 LOZENGE BUCCAL
Status: DISCONTINUED | OUTPATIENT
Start: 2023-09-09 | End: 2023-09-12

## 2023-09-09 RX ORDER — DIAZEPAM 5 MG/1
5 TABLET ORAL 2 TIMES DAILY
Status: DISCONTINUED | OUTPATIENT
Start: 2023-09-09 | End: 2023-09-12

## 2023-09-09 RX ORDER — FUROSEMIDE 10 MG/ML
40 INJECTION INTRAMUSCULAR; INTRAVENOUS ONCE
Status: COMPLETED | OUTPATIENT
Start: 2023-09-09 | End: 2023-09-09

## 2023-09-09 RX ORDER — FUROSEMIDE 10 MG/ML
40 INJECTION INTRAMUSCULAR; INTRAVENOUS DAILY
Status: DISCONTINUED | OUTPATIENT
Start: 2023-09-10 | End: 2023-09-09

## 2023-09-09 RX ORDER — SERTRALINE HYDROCHLORIDE 100 MG/1
200 TABLET, FILM COATED ORAL DAILY
Status: DISCONTINUED | OUTPATIENT
Start: 2023-09-09 | End: 2023-09-12

## 2023-09-09 RX ORDER — METOCLOPRAMIDE HYDROCHLORIDE 5 MG/ML
5 INJECTION INTRAMUSCULAR; INTRAVENOUS EVERY 8 HOURS PRN
Status: DISCONTINUED | OUTPATIENT
Start: 2023-09-09 | End: 2023-09-12

## 2023-09-10 ENCOUNTER — APPOINTMENT (OUTPATIENT)
Dept: GENERAL RADIOLOGY | Facility: HOSPITAL | Age: 68
End: 2023-09-10
Attending: INTERNAL MEDICINE
Payer: MEDICARE

## 2023-09-10 LAB
ANION GAP SERPL CALC-SCNC: 7 MMOL/L (ref 0–18)
ATRIAL RATE: 121 BPM
BASOPHILS # BLD AUTO: 0.06 X10(3) UL (ref 0–0.2)
BASOPHILS NFR BLD AUTO: 0.4 %
BUN BLD-MCNC: 64 MG/DL (ref 7–18)
BUN/CREAT SERPL: 13.9 (ref 10–20)
CALCIUM BLD-MCNC: 8.3 MG/DL (ref 8.5–10.1)
CHLORIDE SERPL-SCNC: 107 MMOL/L (ref 98–112)
CO2 SERPL-SCNC: 27 MMOL/L (ref 21–32)
CREAT BLD-MCNC: 4.6 MG/DL
DEPRECATED RDW RBC AUTO: 47.6 FL (ref 35.1–46.3)
EGFRCR SERPLBLD CKD-EPI 2021: 10 ML/MIN/1.73M2 (ref 60–?)
EOSINOPHIL # BLD AUTO: 0.17 X10(3) UL (ref 0–0.7)
EOSINOPHIL NFR BLD AUTO: 1.3 %
ERYTHROCYTE [DISTWIDTH] IN BLOOD BY AUTOMATED COUNT: 13.8 % (ref 11–15)
GLUCOSE BLD-MCNC: 75 MG/DL (ref 70–99)
GLUCOSE BLDC GLUCOMTR-MCNC: 159 MG/DL (ref 70–99)
GLUCOSE BLDC GLUCOMTR-MCNC: 172 MG/DL (ref 70–99)
GLUCOSE BLDC GLUCOMTR-MCNC: 252 MG/DL (ref 70–99)
GLUCOSE BLDC GLUCOMTR-MCNC: 79 MG/DL (ref 70–99)
HCT VFR BLD AUTO: 33.6 %
HGB BLD-MCNC: 11.2 G/DL
IMM GRANULOCYTES # BLD AUTO: 0.06 X10(3) UL (ref 0–1)
IMM GRANULOCYTES NFR BLD: 0.4 %
LYMPHOCYTES # BLD AUTO: 3.01 X10(3) UL (ref 1–4)
LYMPHOCYTES NFR BLD AUTO: 22.2 %
MAGNESIUM SERPL-MCNC: 2.4 MG/DL (ref 1.6–2.6)
MCH RBC QN AUTO: 31.5 PG (ref 26–34)
MCHC RBC AUTO-ENTMCNC: 33.3 G/DL (ref 31–37)
MCV RBC AUTO: 94.4 FL
MONOCYTES # BLD AUTO: 0.66 X10(3) UL (ref 0.1–1)
MONOCYTES NFR BLD AUTO: 4.9 %
NEUTROPHILS # BLD AUTO: 9.58 X10 (3) UL (ref 1.5–7.7)
NEUTROPHILS # BLD AUTO: 9.58 X10(3) UL (ref 1.5–7.7)
NEUTROPHILS NFR BLD AUTO: 70.8 %
OSMOLALITY SERPL CALC.SUM OF ELEC: 309 MOSM/KG (ref 275–295)
P AXIS: 66 DEGREES
P-R INTERVAL: 134 MS
PHOSPHATE SERPL-MCNC: 4.4 MG/DL (ref 2.5–4.9)
PLATELET # BLD AUTO: 215 10(3)UL (ref 150–450)
POTASSIUM SERPL-SCNC: 3.6 MMOL/L (ref 3.5–5.1)
PTH-INTACT SERPL-MCNC: 80.6 PG/ML (ref 18.5–88)
Q-T INTERVAL: 324 MS
QRS DURATION: 94 MS
QTC CALCULATION (BEZET): 460 MS
R AXIS: 5 DEGREES
RBC # BLD AUTO: 3.56 X10(6)UL
SODIUM SERPL-SCNC: 141 MMOL/L (ref 136–145)
T AXIS: 120 DEGREES
VENTRICULAR RATE: 121 BPM
WBC # BLD AUTO: 13.5 X10(3) UL (ref 4–11)

## 2023-09-10 PROCEDURE — 71045 X-RAY EXAM CHEST 1 VIEW: CPT | Performed by: INTERNAL MEDICINE

## 2023-09-10 PROCEDURE — 99233 SBSQ HOSP IP/OBS HIGH 50: CPT | Performed by: INTERNAL MEDICINE

## 2023-09-11 ENCOUNTER — APPOINTMENT (OUTPATIENT)
Dept: NUCLEAR MEDICINE | Facility: HOSPITAL | Age: 68
End: 2023-09-11
Attending: INTERNAL MEDICINE
Payer: MEDICARE

## 2023-09-11 ENCOUNTER — APPOINTMENT (OUTPATIENT)
Dept: CV DIAGNOSTICS | Facility: HOSPITAL | Age: 68
End: 2023-09-11
Attending: INTERNAL MEDICINE
Payer: MEDICARE

## 2023-09-11 LAB
% OF MAX PREDICTED HR: 100 %
ALBUMIN SERPL-MCNC: 2.5 G/DL (ref 3.4–5)
ALBUMIN/GLOB SERPL: 0.7 {RATIO} (ref 1–2)
ALP LIVER SERPL-CCNC: 73 U/L
ALT SERPL-CCNC: 15 U/L
ANION GAP SERPL CALC-SCNC: 7 MMOL/L (ref 0–18)
AST SERPL-CCNC: 13 U/L (ref 15–37)
BASOPHILS # BLD AUTO: 0.07 X10(3) UL (ref 0–0.2)
BASOPHILS NFR BLD AUTO: 0.6 %
BILIRUB SERPL-MCNC: 0.4 MG/DL (ref 0.1–2)
BUN BLD-MCNC: 64 MG/DL (ref 7–18)
BUN/CREAT SERPL: 15 (ref 10–20)
CALCIUM BLD-MCNC: 8.7 MG/DL (ref 8.5–10.1)
CHLORIDE SERPL-SCNC: 108 MMOL/L (ref 98–112)
CO2 SERPL-SCNC: 27 MMOL/L (ref 21–32)
CREAT BLD-MCNC: 4.28 MG/DL
DEPRECATED RDW RBC AUTO: 46.4 FL (ref 35.1–46.3)
EGFRCR SERPLBLD CKD-EPI 2021: 11 ML/MIN/1.73M2 (ref 60–?)
EOSINOPHIL # BLD AUTO: 0.29 X10(3) UL (ref 0–0.7)
EOSINOPHIL NFR BLD AUTO: 2.4 %
ERYTHROCYTE [DISTWIDTH] IN BLOOD BY AUTOMATED COUNT: 13.5 % (ref 11–15)
GLOBULIN PLAS-MCNC: 3.6 G/DL (ref 2.8–4.4)
GLUCOSE BLD-MCNC: 99 MG/DL (ref 70–99)
GLUCOSE BLDC GLUCOMTR-MCNC: 102 MG/DL (ref 70–99)
GLUCOSE BLDC GLUCOMTR-MCNC: 113 MG/DL (ref 70–99)
GLUCOSE BLDC GLUCOMTR-MCNC: 170 MG/DL (ref 70–99)
GLUCOSE BLDC GLUCOMTR-MCNC: 224 MG/DL (ref 70–99)
HCT VFR BLD AUTO: 32.7 %
HGB BLD-MCNC: 10.9 G/DL
IMM GRANULOCYTES # BLD AUTO: 0.05 X10(3) UL (ref 0–1)
IMM GRANULOCYTES NFR BLD: 0.4 %
LYMPHOCYTES # BLD AUTO: 3.2 X10(3) UL (ref 1–4)
LYMPHOCYTES NFR BLD AUTO: 26.5 %
MAGNESIUM SERPL-MCNC: 2.2 MG/DL (ref 1.6–2.6)
MAX DIASTOLIC BP: 80 MMHG
MAX HEART RATE: 83 BPM
MAX PREDICTED HEART RATE: 150 BPM
MAX SYSTOLIC BP: 176 MMHG
MAX WORK LOAD: 10
MCH RBC QN AUTO: 31.1 PG (ref 26–34)
MCHC RBC AUTO-ENTMCNC: 33.3 G/DL (ref 31–37)
MCV RBC AUTO: 93.4 FL
MONOCYTES # BLD AUTO: 0.63 X10(3) UL (ref 0.1–1)
MONOCYTES NFR BLD AUTO: 5.2 %
NEUTROPHILS # BLD AUTO: 7.85 X10 (3) UL (ref 1.5–7.7)
NEUTROPHILS # BLD AUTO: 7.85 X10(3) UL (ref 1.5–7.7)
NEUTROPHILS NFR BLD AUTO: 64.9 %
OSMOLALITY SERPL CALC.SUM OF ELEC: 312 MOSM/KG (ref 275–295)
PHOSPHATE SERPL-MCNC: 4.5 MG/DL (ref 2.5–4.9)
PLATELET # BLD AUTO: 226 10(3)UL (ref 150–450)
POTASSIUM SERPL-SCNC: 3.4 MMOL/L (ref 3.5–5.1)
PROT SERPL-MCNC: 6.1 G/DL (ref 6.4–8.2)
RBC # BLD AUTO: 3.5 X10(6)UL
SODIUM SERPL-SCNC: 142 MMOL/L (ref 136–145)
TSI SER-ACNC: 2.67 MIU/ML (ref 0.36–3.74)
WBC # BLD AUTO: 12.1 X10(3) UL (ref 4–11)

## 2023-09-11 PROCEDURE — 78452 HT MUSCLE IMAGE SPECT MULT: CPT | Performed by: INTERNAL MEDICINE

## 2023-09-11 PROCEDURE — 99233 SBSQ HOSP IP/OBS HIGH 50: CPT | Performed by: INTERNAL MEDICINE

## 2023-09-11 PROCEDURE — 93017 CV STRESS TEST TRACING ONLY: CPT | Performed by: INTERNAL MEDICINE

## 2023-09-11 RX ORDER — POTASSIUM CHLORIDE 20 MEQ/1
20 TABLET, EXTENDED RELEASE ORAL ONCE
Status: COMPLETED | OUTPATIENT
Start: 2023-09-11 | End: 2023-09-11

## 2023-09-11 RX ORDER — REGADENOSON 0.08 MG/ML
INJECTION, SOLUTION INTRAVENOUS
Status: COMPLETED
Start: 2023-09-11 | End: 2023-09-11

## 2023-09-11 RX ORDER — CARVEDILOL 12.5 MG/1
12.5 TABLET ORAL 2 TIMES DAILY WITH MEALS
COMMUNITY

## 2023-09-12 ENCOUNTER — TELEPHONE (OUTPATIENT)
Dept: NEPHROLOGY | Facility: CLINIC | Age: 68
End: 2023-09-12

## 2023-09-12 VITALS
TEMPERATURE: 98 F | DIASTOLIC BLOOD PRESSURE: 77 MMHG | OXYGEN SATURATION: 95 % | WEIGHT: 178.5 LBS | BODY MASS INDEX: 30.48 KG/M2 | HEIGHT: 64 IN | HEART RATE: 63 BPM | SYSTOLIC BLOOD PRESSURE: 95 MMHG | RESPIRATION RATE: 18 BRPM

## 2023-09-12 DIAGNOSIS — N17.9 AKI (ACUTE KIDNEY INJURY) (HCC): Primary | ICD-10-CM

## 2023-09-12 LAB
ALBUMIN SERPL-MCNC: 2.4 G/DL (ref 3.4–5)
ANION GAP SERPL CALC-SCNC: 7 MMOL/L (ref 0–18)
BASOPHILS # BLD AUTO: 0.05 X10(3) UL (ref 0–0.2)
BASOPHILS NFR BLD AUTO: 0.5 %
BUN BLD-MCNC: 62 MG/DL (ref 7–18)
BUN/CREAT SERPL: 16.2 (ref 10–20)
CALCIUM BLD-MCNC: 8.6 MG/DL (ref 8.5–10.1)
CHLORIDE SERPL-SCNC: 109 MMOL/L (ref 98–112)
CO2 SERPL-SCNC: 26 MMOL/L (ref 21–32)
CREAT BLD-MCNC: 3.82 MG/DL
DEPRECATED RDW RBC AUTO: 45.8 FL (ref 35.1–46.3)
EGFRCR SERPLBLD CKD-EPI 2021: 12 ML/MIN/1.73M2 (ref 60–?)
EOSINOPHIL # BLD AUTO: 0.33 X10(3) UL (ref 0–0.7)
EOSINOPHIL NFR BLD AUTO: 3.6 %
ERYTHROCYTE [DISTWIDTH] IN BLOOD BY AUTOMATED COUNT: 13.5 % (ref 11–15)
GLUCOSE BLD-MCNC: 67 MG/DL (ref 70–99)
GLUCOSE BLDC GLUCOMTR-MCNC: 101 MG/DL (ref 70–99)
GLUCOSE BLDC GLUCOMTR-MCNC: 111 MG/DL (ref 70–99)
GLUCOSE BLDC GLUCOMTR-MCNC: 142 MG/DL (ref 70–99)
HCT VFR BLD AUTO: 32.4 %
HGB BLD-MCNC: 11.1 G/DL
IMM GRANULOCYTES # BLD AUTO: 0.03 X10(3) UL (ref 0–1)
IMM GRANULOCYTES NFR BLD: 0.3 %
LYMPHOCYTES # BLD AUTO: 3.05 X10(3) UL (ref 1–4)
LYMPHOCYTES NFR BLD AUTO: 32.9 %
MAGNESIUM SERPL-MCNC: 2.4 MG/DL (ref 1.6–2.6)
MCH RBC QN AUTO: 31.7 PG (ref 26–34)
MCHC RBC AUTO-ENTMCNC: 34.3 G/DL (ref 31–37)
MCV RBC AUTO: 92.6 FL
MONOCYTES # BLD AUTO: 0.6 X10(3) UL (ref 0.1–1)
MONOCYTES NFR BLD AUTO: 6.5 %
NEUTROPHILS # BLD AUTO: 5.2 X10 (3) UL (ref 1.5–7.7)
NEUTROPHILS # BLD AUTO: 5.2 X10(3) UL (ref 1.5–7.7)
NEUTROPHILS NFR BLD AUTO: 56.2 %
OSMOLALITY SERPL CALC.SUM OF ELEC: 310 MOSM/KG (ref 275–295)
PHOSPHATE SERPL-MCNC: 3.8 MG/DL (ref 2.5–4.9)
PLATELET # BLD AUTO: 227 10(3)UL (ref 150–450)
POTASSIUM SERPL-SCNC: 4 MMOL/L (ref 3.5–5.1)
RBC # BLD AUTO: 3.5 X10(6)UL
SODIUM SERPL-SCNC: 142 MMOL/L (ref 136–145)
WBC # BLD AUTO: 9.3 X10(3) UL (ref 4–11)

## 2023-09-12 PROCEDURE — 99233 SBSQ HOSP IP/OBS HIGH 50: CPT | Performed by: INTERNAL MEDICINE

## 2023-09-18 ENCOUNTER — APPOINTMENT (OUTPATIENT)
Dept: GENERAL RADIOLOGY | Facility: HOSPITAL | Age: 68
End: 2023-09-18
Attending: EMERGENCY MEDICINE
Payer: MEDICARE

## 2023-09-18 ENCOUNTER — APPOINTMENT (OUTPATIENT)
Dept: CT IMAGING | Facility: HOSPITAL | Age: 68
End: 2023-09-18
Attending: EMERGENCY MEDICINE
Payer: MEDICARE

## 2023-09-18 ENCOUNTER — HOSPITAL ENCOUNTER (INPATIENT)
Facility: HOSPITAL | Age: 68
LOS: 10 days | Discharge: NURSING, REHAB OR LONG-TERM CARE | End: 2023-09-29
Attending: EMERGENCY MEDICINE | Admitting: HOSPITALIST
Payer: MEDICARE

## 2023-09-18 DIAGNOSIS — I21.4 NSTEMI (NON-ST ELEVATED MYOCARDIAL INFARCTION) (HCC): ICD-10-CM

## 2023-09-18 DIAGNOSIS — J96.01 ACUTE RESPIRATORY FAILURE WITH HYPOXIA (HCC): Primary | ICD-10-CM

## 2023-09-18 LAB
ANION GAP SERPL CALC-SCNC: 9 MMOL/L (ref 0–18)
APTT PPP: 29.4 SECONDS (ref 23.3–35.6)
BASE EXCESS BLD CALC-SCNC: -2 MMOL/L (ref ?–2)
BASOPHILS # BLD AUTO: 0.08 X10(3) UL (ref 0–0.2)
BASOPHILS NFR BLD AUTO: 0.5 %
BILIRUB UR QL: NEGATIVE
BUN BLD-MCNC: 50 MG/DL (ref 7–18)
BUN/CREAT SERPL: 12.6 (ref 10–20)
CALCIUM BLD-MCNC: 8.6 MG/DL (ref 8.5–10.1)
CHLORIDE SERPL-SCNC: 106 MMOL/L (ref 98–112)
CHOLEST SERPL-MCNC: 184 MG/DL (ref ?–200)
CO2 SERPL-SCNC: 23 MMOL/L (ref 21–32)
COLOR UR: YELLOW
CREAT BLD-MCNC: 3.98 MG/DL
DEPRECATED RDW RBC AUTO: 47.7 FL (ref 35.1–46.3)
EGFRCR SERPLBLD CKD-EPI 2021: 12 ML/MIN/1.73M2 (ref 60–?)
EOSINOPHIL # BLD AUTO: 0.12 X10(3) UL (ref 0–0.7)
EOSINOPHIL NFR BLD AUTO: 0.7 %
ERYTHROCYTE [DISTWIDTH] IN BLOOD BY AUTOMATED COUNT: 13.6 % (ref 11–15)
GLUCOSE BLD-MCNC: 182 MG/DL (ref 70–99)
GLUCOSE BLDC GLUCOMTR-MCNC: 192 MG/DL (ref 70–99)
GLUCOSE UR-MCNC: NORMAL MG/DL
HCO3 BLDA-SCNC: 23.3 MEQ/L (ref 21–27)
HCT VFR BLD AUTO: 40.1 %
HDLC SERPL-MCNC: 57 MG/DL (ref 40–59)
HGB BLD-MCNC: 13.2 G/DL
HYALINE CASTS #/AREA URNS AUTO: PRESENT /LPF
IMM GRANULOCYTES # BLD AUTO: 0.08 X10(3) UL (ref 0–1)
IMM GRANULOCYTES NFR BLD: 0.5 %
KETONES UR-MCNC: NEGATIVE MG/DL
LACTATE SERPL-SCNC: 1.2 MMOL/L (ref 0.4–2)
LDLC SERPL CALC-MCNC: 104 MG/DL (ref ?–100)
LEUKOCYTE ESTERASE UR QL STRIP.AUTO: 250
LYMPHOCYTES # BLD AUTO: 0.63 X10(3) UL (ref 1–4)
LYMPHOCYTES NFR BLD AUTO: 3.7 %
MCH RBC QN AUTO: 31.4 PG (ref 26–34)
MCHC RBC AUTO-ENTMCNC: 32.9 G/DL (ref 31–37)
MCV RBC AUTO: 95.5 FL
MONOCYTES # BLD AUTO: 0.41 X10(3) UL (ref 0.1–1)
MONOCYTES NFR BLD AUTO: 2.4 %
NEUTROPHILS # BLD AUTO: 15.66 X10 (3) UL (ref 1.5–7.7)
NEUTROPHILS # BLD AUTO: 15.66 X10(3) UL (ref 1.5–7.7)
NEUTROPHILS NFR BLD AUTO: 92.2 %
NONHDLC SERPL-MCNC: 127 MG/DL (ref ?–130)
NT-PROBNP SERPL-MCNC: ABNORMAL PG/ML (ref ?–125)
O2 CT BLD-SCNC: 17.3 VOL% (ref 15–23)
O2/TOTAL GAS SETTING VFR VENT: 100 %
OSMOLALITY SERPL CALC.SUM OF ELEC: 304 MOSM/KG (ref 275–295)
PCO2 BLDA: 49 MM HG (ref 35–45)
PEEP SETTING VENT: 5 CM H2O
PH BLDA: 7.31 [PH] (ref 7.35–7.45)
PH UR: 6 [PH] (ref 5–8)
PLATELET # BLD AUTO: 294 10(3)UL (ref 150–450)
PO2 BLDA: 234 MM HG (ref 80–100)
POTASSIUM SERPL-SCNC: 5.3 MMOL/L (ref 3.5–5.1)
PROT UR-MCNC: 300 MG/DL
PUNCTURE CHARGE: YES
RBC # BLD AUTO: 4.2 X10(6)UL
RESP RATE: 14 BPM
SAO2 % BLDA: 98.9 % (ref 94–100)
SARS-COV-2 RNA RESP QL NAA+PROBE: NOT DETECTED
SODIUM SERPL-SCNC: 138 MMOL/L (ref 136–145)
SP GR UR STRIP: 1.02 (ref 1–1.03)
SPECIMEN VOL 24H UR: 400 ML
TRIGL SERPL-MCNC: 131 MG/DL (ref 30–149)
TROPONIN I HIGH SENSITIVITY: 852 NG/L
UROBILINOGEN UR STRIP-ACNC: NORMAL
VLDLC SERPL CALC-MCNC: 22 MG/DL (ref 0–30)
WBC # BLD AUTO: 17 X10(3) UL (ref 4–11)
WBC CLUMPS UR QL AUTO: PRESENT /HPF

## 2023-09-18 PROCEDURE — 0BH17EZ INSERTION OF ENDOTRACHEAL AIRWAY INTO TRACHEA, VIA NATURAL OR ARTIFICIAL OPENING: ICD-10-PCS | Performed by: INTERNAL MEDICINE

## 2023-09-18 PROCEDURE — 5A1945Z RESPIRATORY VENTILATION, 24-96 CONSECUTIVE HOURS: ICD-10-PCS | Performed by: INTERNAL MEDICINE

## 2023-09-18 PROCEDURE — 70450 CT HEAD/BRAIN W/O DYE: CPT | Performed by: EMERGENCY MEDICINE

## 2023-09-18 PROCEDURE — 71045 X-RAY EXAM CHEST 1 VIEW: CPT | Performed by: EMERGENCY MEDICINE

## 2023-09-18 RX ORDER — IPRATROPIUM BROMIDE AND ALBUTEROL SULFATE 2.5; .5 MG/3ML; MG/3ML
3 SOLUTION RESPIRATORY (INHALATION) ONCE
Status: COMPLETED | OUTPATIENT
Start: 2023-09-18 | End: 2023-09-18

## 2023-09-18 RX ORDER — FUROSEMIDE 10 MG/ML
40 INJECTION INTRAMUSCULAR; INTRAVENOUS ONCE
Status: COMPLETED | OUTPATIENT
Start: 2023-09-18 | End: 2023-09-18

## 2023-09-18 RX ORDER — HEPARIN SODIUM 1000 [USP'U]/ML
60 INJECTION, SOLUTION INTRAVENOUS; SUBCUTANEOUS ONCE
Status: COMPLETED | OUTPATIENT
Start: 2023-09-18 | End: 2023-09-19

## 2023-09-18 RX ORDER — HEPARIN SODIUM AND DEXTROSE 10000; 5 [USP'U]/100ML; G/100ML
12 INJECTION INTRAVENOUS ONCE
Status: COMPLETED | OUTPATIENT
Start: 2023-09-18 | End: 2023-09-19

## 2023-09-18 RX ORDER — ROCURONIUM BROMIDE 10 MG/ML
70 INJECTION, SOLUTION INTRAVENOUS ONCE
Status: COMPLETED | OUTPATIENT
Start: 2023-09-18 | End: 2023-09-18

## 2023-09-18 RX ORDER — NITROGLYCERIN 20 MG/100ML
INJECTION INTRAVENOUS CONTINUOUS
Status: DISCONTINUED | OUTPATIENT
Start: 2023-09-18 | End: 2023-09-25

## 2023-09-19 ENCOUNTER — APPOINTMENT (OUTPATIENT)
Dept: CV DIAGNOSTICS | Facility: HOSPITAL | Age: 68
End: 2023-09-19
Attending: NURSE PRACTITIONER
Payer: MEDICARE

## 2023-09-19 PROBLEM — I21.4 NSTEMI (NON-ST ELEVATED MYOCARDIAL INFARCTION) (HCC): Status: ACTIVE | Noted: 2023-09-19

## 2023-09-19 LAB
ANION GAP SERPL CALC-SCNC: 10 MMOL/L (ref 0–18)
APTT PPP: 136.2 SECONDS (ref 23.3–35.6)
APTT PPP: 67.6 SECONDS (ref 23.3–35.6)
ATRIAL RATE: 124 BPM
BASE EXCESS BLD CALC-SCNC: -0.7 MMOL/L (ref ?–2)
BUN BLD-MCNC: 57 MG/DL (ref 7–18)
BUN/CREAT SERPL: 13.2 (ref 10–20)
CALCIUM BLD-MCNC: 8.5 MG/DL (ref 8.5–10.1)
CHLORIDE SERPL-SCNC: 106 MMOL/L (ref 98–112)
CO2 SERPL-SCNC: 24 MMOL/L (ref 21–32)
CREAT BLD-MCNC: 4.33 MG/DL
D DIMER PPP FEU-MCNC: 2.53 UG/ML FEU (ref ?–0.7)
DEPRECATED RDW RBC AUTO: 48.3 FL (ref 35.1–46.3)
EGFRCR SERPLBLD CKD-EPI 2021: 10 ML/MIN/1.73M2 (ref 60–?)
ERYTHROCYTE [DISTWIDTH] IN BLOOD BY AUTOMATED COUNT: 13.6 % (ref 11–15)
GLUCOSE BLD-MCNC: 132 MG/DL (ref 70–99)
GLUCOSE BLDC GLUCOMTR-MCNC: 114 MG/DL (ref 70–99)
GLUCOSE BLDC GLUCOMTR-MCNC: 139 MG/DL (ref 70–99)
GLUCOSE BLDC GLUCOMTR-MCNC: 148 MG/DL (ref 70–99)
GLUCOSE BLDC GLUCOMTR-MCNC: 76 MG/DL (ref 70–99)
GLUCOSE BLDC GLUCOMTR-MCNC: 86 MG/DL (ref 70–99)
HCO3 BLDA-SCNC: 24.4 MEQ/L (ref 21–27)
HCT VFR BLD AUTO: 36.8 %
HGB BLD-MCNC: 12.2 G/DL
MCH RBC QN AUTO: 32 PG (ref 26–34)
MCHC RBC AUTO-ENTMCNC: 33.2 G/DL (ref 31–37)
MCV RBC AUTO: 96.6 FL
MODIFIED ALLEN TEST: POSITIVE
MRSA DNA SPEC QL NAA+PROBE: NEGATIVE
O2 CT BLD-SCNC: 14.8 VOL% (ref 15–23)
O2/TOTAL GAS SETTING VFR VENT: 50 %
OSMOLALITY SERPL CALC.SUM OF ELEC: 308 MOSM/KG (ref 275–295)
P AXIS: 74 DEGREES
P-R INTERVAL: 132 MS
PCO2 BLDA: 34 MM HG (ref 35–45)
PEEP SETTING VENT: 5 CM H2O
PH BLDA: 7.44 [PH] (ref 7.35–7.45)
PLATELET # BLD AUTO: 242 10(3)UL (ref 150–450)
PO2 BLDA: 105 MM HG (ref 80–100)
POTASSIUM SERPL-SCNC: 4 MMOL/L (ref 3.5–5.1)
PUNCTURE CHARGE: YES
Q-T INTERVAL: 340 MS
QRS DURATION: 118 MS
QTC CALCULATION (BEZET): 488 MS
R AXIS: 4 DEGREES
RBC # BLD AUTO: 3.81 X10(6)UL
RESP RATE: 14 BPM
SAO2 % BLDA: 98.4 % (ref 94–100)
SODIUM SERPL-SCNC: 140 MMOL/L (ref 136–145)
SPECIMEN VOL 24H UR: 400 ML
T AXIS: 121 DEGREES
TROPONIN I HIGH SENSITIVITY: ABNORMAL NG/L
VENTRICULAR RATE: 124 BPM
WBC # BLD AUTO: 14.8 X10(3) UL (ref 4–11)

## 2023-09-19 PROCEDURE — 93306 TTE W/DOPPLER COMPLETE: CPT | Performed by: NURSE PRACTITIONER

## 2023-09-19 PROCEDURE — 99223 1ST HOSP IP/OBS HIGH 75: CPT | Performed by: INTERNAL MEDICINE

## 2023-09-19 RX ORDER — AMLODIPINE BESYLATE 5 MG/1
5 TABLET ORAL DAILY
Status: DISCONTINUED | OUTPATIENT
Start: 2023-09-19 | End: 2023-09-20

## 2023-09-19 RX ORDER — ACETAMINOPHEN 650 MG/1
650 SUPPOSITORY RECTAL EVERY 4 HOURS PRN
Status: DISCONTINUED | OUTPATIENT
Start: 2023-09-19 | End: 2023-09-22

## 2023-09-19 RX ORDER — BISACODYL 10 MG
10 SUPPOSITORY, RECTAL RECTAL
Status: DISCONTINUED | OUTPATIENT
Start: 2023-09-19 | End: 2023-09-29

## 2023-09-19 RX ORDER — POLYETHYLENE GLYCOL 3350 17 G/17G
17 POWDER, FOR SOLUTION ORAL DAILY PRN
Status: DISCONTINUED | OUTPATIENT
Start: 2023-09-19 | End: 2023-09-29

## 2023-09-19 RX ORDER — DEXTROSE MONOHYDRATE 25 G/50ML
50 INJECTION, SOLUTION INTRAVENOUS
Status: DISCONTINUED | OUTPATIENT
Start: 2023-09-19 | End: 2023-09-29

## 2023-09-19 RX ORDER — ACETAMINOPHEN 10 MG/ML
1000 INJECTION, SOLUTION INTRAVENOUS EVERY 6 HOURS PRN
Status: DISCONTINUED | OUTPATIENT
Start: 2023-09-19 | End: 2023-09-22

## 2023-09-19 RX ORDER — ACETAMINOPHEN 160 MG/5ML
650 SOLUTION ORAL EVERY 4 HOURS PRN
Status: DISCONTINUED | OUTPATIENT
Start: 2023-09-19 | End: 2023-09-22

## 2023-09-19 RX ORDER — SENNOSIDES 8.8 MG/5ML
10 LIQUID ORAL NIGHTLY PRN
Status: DISCONTINUED | OUTPATIENT
Start: 2023-09-19 | End: 2023-09-20

## 2023-09-19 RX ORDER — SERTRALINE HYDROCHLORIDE 100 MG/1
200 TABLET, FILM COATED ORAL DAILY
Status: DISCONTINUED | OUTPATIENT
Start: 2023-09-19 | End: 2023-09-29

## 2023-09-19 RX ORDER — NICOTINE POLACRILEX 4 MG
15 LOZENGE BUCCAL
Status: DISCONTINUED | OUTPATIENT
Start: 2023-09-19 | End: 2023-09-29

## 2023-09-19 RX ORDER — DEXTROAMPHETAMINE SACCHARATE, AMPHETAMINE ASPARTATE, DEXTROAMPHETAMINE SULFATE AND AMPHETAMINE SULFATE 2.5; 2.5; 2.5; 2.5 MG/1; MG/1; MG/1; MG/1
20 TABLET ORAL 2 TIMES DAILY
Status: DISCONTINUED | OUTPATIENT
Start: 2023-09-19 | End: 2023-09-19

## 2023-09-19 RX ORDER — CHLORHEXIDINE GLUCONATE ORAL RINSE 1.2 MG/ML
15 SOLUTION DENTAL
Status: DISCONTINUED | OUTPATIENT
Start: 2023-09-19 | End: 2023-09-21

## 2023-09-19 RX ORDER — HYDRALAZINE HYDROCHLORIDE 20 MG/ML
10 INJECTION INTRAMUSCULAR; INTRAVENOUS EVERY 6 HOURS PRN
Status: ACTIVE | OUTPATIENT
Start: 2023-09-19 | End: 2023-09-20

## 2023-09-19 RX ORDER — LEVOTHYROXINE SODIUM 0.12 MG/1
125 TABLET ORAL
Status: DISCONTINUED | OUTPATIENT
Start: 2023-09-19 | End: 2023-09-29

## 2023-09-19 RX ORDER — HYDRALAZINE HYDROCHLORIDE 100 MG/1
100 TABLET, FILM COATED ORAL 3 TIMES DAILY
Status: DISCONTINUED | OUTPATIENT
Start: 2023-09-19 | End: 2023-09-24

## 2023-09-19 RX ORDER — CLONIDINE HYDROCHLORIDE 0.2 MG/1
0.2 TABLET ORAL 2 TIMES DAILY
Status: DISCONTINUED | OUTPATIENT
Start: 2023-09-19 | End: 2023-09-22

## 2023-09-19 RX ORDER — ASPIRIN 81 MG/1
81 TABLET ORAL NIGHTLY
Status: DISCONTINUED | OUTPATIENT
Start: 2023-09-19 | End: 2023-09-21

## 2023-09-19 RX ORDER — ALLOPURINOL 300 MG/1
150 TABLET ORAL DAILY
Status: DISCONTINUED | OUTPATIENT
Start: 2023-09-19 | End: 2023-09-29

## 2023-09-19 RX ORDER — CARVEDILOL 12.5 MG/1
12.5 TABLET ORAL 2 TIMES DAILY WITH MEALS
Status: DISCONTINUED | OUTPATIENT
Start: 2023-09-19 | End: 2023-09-22

## 2023-09-19 RX ORDER — FUROSEMIDE 10 MG/ML
40 INJECTION INTRAMUSCULAR; INTRAVENOUS
Status: DISCONTINUED | OUTPATIENT
Start: 2023-09-19 | End: 2023-09-20

## 2023-09-19 RX ORDER — HEPARIN SODIUM AND DEXTROSE 10000; 5 [USP'U]/100ML; G/100ML
INJECTION INTRAVENOUS CONTINUOUS
Status: DISCONTINUED | OUTPATIENT
Start: 2023-09-19 | End: 2023-09-22

## 2023-09-19 RX ORDER — ACETAMINOPHEN 325 MG/1
650 TABLET ORAL EVERY 4 HOURS PRN
Status: DISCONTINUED | OUTPATIENT
Start: 2023-09-19 | End: 2023-09-29

## 2023-09-19 RX ORDER — BUPROPION HYDROCHLORIDE 300 MG/1
300 TABLET ORAL DAILY
Status: DISCONTINUED | OUTPATIENT
Start: 2023-09-19 | End: 2023-09-29

## 2023-09-19 RX ORDER — PANTOPRAZOLE SODIUM 40 MG/1
40 TABLET, DELAYED RELEASE ORAL
Status: DISCONTINUED | OUTPATIENT
Start: 2023-09-19 | End: 2023-09-19

## 2023-09-19 RX ORDER — NICOTINE POLACRILEX 4 MG
30 LOZENGE BUCCAL
Status: DISCONTINUED | OUTPATIENT
Start: 2023-09-19 | End: 2023-09-29

## 2023-09-19 RX ORDER — ATORVASTATIN CALCIUM 20 MG/1
20 TABLET, FILM COATED ORAL DAILY
Status: DISCONTINUED | OUTPATIENT
Start: 2023-09-19 | End: 2023-09-20

## 2023-09-19 NOTE — PROGRESS NOTES
Therapeutic interchange from Insulin Glargine (1 Unit Dial)  to insulin detemir (Levemir) 100 UNIT/ML  per P&T approved protocol.     Jonathon Gutierrez, PharmD

## 2023-09-19 NOTE — RESPIRATORY THERAPY NOTE
Patient received intubated and sedated. SAT performed in am. Patient met criteria for SBT. Awake and following all commands. Placed on pressure support 5, peep 5, 30% FiO2 at 1020. Dr. Danny Christina notified via perfect serve. Post 35 min on trial patient SpO2 decreased to 88%. Returned to full support at [phone]. FiO2 increased to 40% in afternoon. RT will continue to monitor on full support overnight. Small to moderate thick pink tinged secretions suctioned from ETT.      Vent settings:  AC/VC 14/400/+5/40%    Problem: RESPIRATORY - ADULT  Goal: Achieves optimal ventilation and oxygenation  Description: INTERVENTIONS:  - Assess for changes in respiratory status  - Assess for changes in mentation and behavior  - Position to facilitate oxygenation and minimize respiratory effort  - Oxygen supplementation based on oxygen saturation or ABGs  - Provide Smoking Cessation handout, if applicable  - Encourage broncho-pulmonary hygiene including cough, deep breathe, Incentive Spirometry  - Assess the need for suctioning and perform as needed  - Assess and instruct to report SOB or any respiratory difficulty  - Respiratory Therapy support as indicated  - Manage/alleviate anxiety  - Monitor for signs/symptoms of CO2 retention  Outcome: Progressing

## 2023-09-19 NOTE — ED QUICK NOTES
nitroglycerin stopped at this time per MD Cooley notified of current BP. Patient to CT with RT, ERT, RN and monitor at this time.

## 2023-09-19 NOTE — PLAN OF CARE
Pt has b/l wrist restraints to prevent pulling on medical equipment. Frequent nursing rounds and skin checks performed.     Problem: Safety Risk - Non-Violent Restraints  Goal: Patient will remain free from self-harm  Description: INTERVENTIONS:  - Apply the least restrictive restraint to prevent harm  - Notify patient and family of reasons restraints applied  - Assess for any contributing factors to confusion (electrolyte disturbances, delirium, medications)  - Discontinue any unnecessary medical devices as soon as possible  - Assess the patient's physical comfort, circulation, skin condition, hydration, nutrition and elimination needs   - Reorient and redirection as needed  - Assess for the need to continue restraints  Outcome: Not Progressing

## 2023-09-19 NOTE — RESPIRATORY THERAPY NOTE
Pt intubated in ED with ETT size 7.5 at 23 cm at the lip, placed on full support. Pt was suctioned, ABG drawn, FiO2 wean down to 90%. Pt went to CT scan, tolerated well, no acute events during transport or test.       09/18/23 2010   Vent Information   $ RT Standby Charge (per 15 min) 1  (Intubation)   $ Vent Care / Non-Invasive Initial Day Yes   $ Ventilator supplies provided Yes   Is this patient on Chronic Ventilation? No   Vent Initiation Date 09/18/23   Vent Initiation Time 2010   Interface Invasive   Vent Type    Vent plugged into main power?  Yes   Vent ID [id number]   Vent Mode VC/AC   Settings   FiO2 (%) 100 %   Resp Rate (Set) 14   Vt (Set, mL) 400 mL   Waveform Decelerating ramp   PEEP/CPAP (cm H2O) 5 cm H20   Peak Flow LPM 60   Trigger Sensitivity Flow (L/min) 3 L/min   Humidification Heater   H2O Bag Level Filled   Readings   Total RR 14   Minute Ventilation (L/min) 5.1 L/min   Expiratory Tidal Volume 363 mL   PIP Observed (cm H2O) 40 cm H2O   MAP (cm H2O) 10   I/E Ratio 1:5.0   Plateau Pressure (cm H2O) 23 cm H2O   Static Compliance (L/cm H2O) 39   Dynamic Compliance (L/cm H2O) 27 L/cm H2O   Alarms   High RR 40   Insp Pressure High (cm H2O) 60 cm H2O   MV High (L/min) 20 L/min   MV Low (L/min) 2 L/min   Apnea Interval (sec) 20 seconds   Apnea Rate 14   Apnea Volume (mL) 400 mL       ABG results as follow:     Latest Reference Range & Units Most Recent   ABG PH 7.35 - 7.45  7.31 (L)  9/18/23 21:32   ABG PCO2 35 - 45 mm Hg 49 (H)  9/18/23 21:32   ABG PO2 80 - 100 mm Hg 234 (H)  9/18/23 21:32   ABG HCO3 21.0 - 27.0 mEq/L 23.3  9/18/23 21:32   ABG O2 SATURATION 94.0 - 100.0 % 98.9  9/18/23 21:32

## 2023-09-19 NOTE — PROGRESS NOTES
09/19/23 1114   [REMOVED] Wound 09/19/23 Back Lower;Medial   Final Assessment Date/Final Assessment Time: 09/19/23 1159  Date First Assessed/Time First Assessed: 09/19/23 0200   Present on Original Admission: Yes  Location: Back  Wound Location Orientation: Lower;Medial  Wound Outcome: Healed   Site Assessment Clean;Dry; Intact   Closure Approximated   Drainage Amount None   Dressing Open to air   Dressing Changed Changed   Non-staged Wound Description Not applicable   Nay-wound Assessment Clean;Dry; Intact   Wound Bed Epithelium (%) 100 %   Wound Odor None   State of Healing Epithelialized   Wound 09/19/23 Pressure Injury Buttocks Left   Date First Assessed/Time First Assessed: 09/19/23 0200   Present on Original Admission: Yes  Primary Wound Type: Pressure Injury  Location: Buttocks  Wound Location Orientation: Left  Wound Description (Comments): slough   Site Assessment Clean;Dry;Fragile; Red  (BLANCHABLE)   Closure Approximated   Drainage Amount None   Treatments Topical (Barrier/Moisturizer/Ointment)   Dressing Aquacel Foam   Dressing Status Dressing Changed;Removed   Non-staged Wound Description Not applicable   Nay-wound Assessment Clean;Dry; Intact   Wound Odor None   State of Healing Epithelialized   [REMOVED] Wound 09/19/23 Pressure Injury Buttocks Right   Final Assessment Date/Final Assessment Time: 09/19/23 1159  Date First Assessed/Time First Assessed: 09/19/23 0200   Present on Original Admission: Yes  Primary Wound Type: Pressure Injury  Location: Buttocks  Wound Location Orientation: Right  Wound . .. Wound Image    Site Assessment Moist;Fragile;Tan;Painful   Closure Not approximated   Drainage Amount None   Treatments Cleansed; Saline   Dressing Aquacel Foam   Dressing Changed Changed   Dressing Status Dressing Changed;Removed   Wound Length (cm) 2.1 cm   Wound Width (cm) 0.8 cm   Wound Surface Area (cm^2) 1.68 cm^2   Nay-wound Assessment Clean;Dry;Fragile;Pink;Painful   Wound Bed Slough (%) 100 % Wound Odor None   Pressure Injury Stage U   State of Healing Non-healing   Wound Follow Up   Follow up needed Yes       WOUND CARE NOTE    History:  Past Medical History:   Diagnosis Date    Adrenal adenoma 2011    left adrenoma    Anxiety     Chronic kidney disease (CKD), stage III (moderate) ([facility] [address]     CORONARY ARTERY DISEASE     Coronary atherosclerosis     Depression     DIABETES     Diverticulosis of colon     Esophageal reflux     Fainting episodes     GERD     GOUT     High blood pressure     High cholesterol     HYPERTENSION     Hypothyroid 2011    MENOPAUSE     Osteoarthritis     Other and unspecified hyperlipidemia     Pneumonia, organism unspecified(486)     Polyp of colon     Renal artery stenosis ([facility] [address] 2011    right kidney    Type II or unspecified type diabetes mellitus without mention of complication, not stated as uncontrolled     Unspecified essential hypertension     Unspecified sleep apnea     PSG Merit  AHI 70, auto-pap      Past Surgical History:   Procedure Laterality Date    ANGIOPLASTY (CORONARY)  3/2015    stents placed in heart    CABG  2011    CABGx4 vessel    CATH BARE METAL STENT (BMS)      CATH PERCUTANEOUS  TRANSLUMINAL CORONARY ANGIOPLASTY  3/3/2015    CATH PERCUTANEOUS  TRANSLUMINAL CORONARY ANGIOPLASTY Right 2017    right Coronary artery, OSMIN    COLONOSCOPY      [facility] PLMT CORONARY DRUG ELUTING STENT EA ADDL  3/5/2015    HISTOPATHOLOGY REPORT  13    cecal polyps - 1 tubular adenoma    IMPACT TOOTH REM BONY W/COMP Bilateral 1973    wisdom teeth    KNEE REPLACEMENT SURGERY  3/24/16    right TKA     LEFT HEART CATH,PERCUTANEOUS  2011    CAD    LEFT HEART CATH,PERCUTANEOUS  3/2/2015          RENAL ANGIO, CARDIAC CATH  2011    SLEEP STUDY, ATTENDED  12/10/2012    TONSILLECTOMY Bilateral       Social History     Socioeconomic History    Marital status:     Number of children: 2    Years of education: 15   Occupational History    Occupation: [employer]     Comment: [employer]   Tobacco Use    Smoking status: Former     Packs/day: 1.00     Years: 35.00     Additional pack years: 0.00     Total pack years: 35.00     Types: Cigarettes     Quit date: 2014     Years since quittin.9    Smokeless tobacco: Never   Vaping Use    Vaping Use: Never used   Substance and Sexual Activity    Alcohol use: Yes     Alcohol/week: 1.0 standard drink of alcohol     Types: 1 Glasses of wine per week     Comment: occasionally    Drug use: No    Sexual activity: Not Currently     Partners: Male   Other Topics Concern     Service No    Blood Transfusions No    Caffeine Concern Yes     Comment: coffee    Occupational Exposure No    Hobby Hazards No    Sleep Concern Yes     Comment: STEFFEN    Stress Concern No    Weight Concern No    Special Diet No    Back Care No    Exercise Yes    Bike Helmet Yes    Seat Belt Yes    Self-Exams Yes   Social History Narrative    Lives alone. Enjoys reading, pets, knitting, cooking     PLAN   Recommendations:  Dr. Johanny Hu currently on consult  Dietary consult for recommendations for nutrition to optimize wound healing  Turn schedules  Heels elevated using pillows, heel wedge or heel boots to offload heels  Prompt incontinence care    Wound(s)  Location: RIGHT BUTTOCK  Cleansing  Saline   Topical SANTYL  Dressings SALINE MOIST 2X2  Secure with BORDERED FOAM  Frequency BID      OBJECTIVE   RN Consult new  Reason for Consult      ASSESSMENT   Phong Score:  Phong Scale Score: 9    Chart Reviewed: yes    Wound(s):  Pt seen with bedside RN for assessment of buttocks. There is an intact area of blanchable redness as well as an unstageable ulcer with slough in the wound base to the right buttock, see above. Recommend application of santyl for enzymatic debridement of non viable tissues.         Allergies: Plavix [Clopidogrel], Ultram [Tramadol], and Sulfa Antibiotics    Labs:   Lab Results   Component Value Date WBC 14.8 (H) 09/19/2023    HGB 12.2 09/19/2023    HCT 36.8 09/19/2023    .0 09/19/2023    CREATSERUM 4.33 (H) 09/19/2023    BUN 57 (H) 09/19/2023     09/19/2023    K 4.0 09/19/2023     09/19/2023    CO2 24.0 09/19/2023     (H) 09/19/2023    CA 8.5 09/19/2023    ALB 2.4 (L) 09/12/2023    ALKPHO 73 09/11/2023    BILT 0.4 09/11/2023    TP 6.1 (L) 09/11/2023    AST 13 (L) 09/11/2023    ALT 15 09/11/2023    MG 2.4 09/12/2023    PHOS 3.8 09/12/2023     No results found for: \"PREALBUMIN\"      Time Spent 30 Minutes.     Wendy Woo, RN, BSN, [address] [facility]  [phone]

## 2023-09-19 NOTE — PLAN OF CARE
Pt intubated and sedated, on propofol, on heparin gtt, nitroglycerin gtt on (See MAR), new aPTT results, wounds assessed, pure wick in place, no BM. Bed locked in lowest position and call light within reach.      Problem: Patient Centered Care  Goal: Patient preferences are identified and integrated in the patient's plan of care  Description: Interventions:  - What would you like us to know as we care for you?   - Provide timely, complete, and accurate information to patient/family  - Incorporate patient and family knowledge, values, beliefs, and cultural backgrounds into the planning and delivery of care  - Encourage patient/family to participate in care and decision-making at the level they choose  - Honor patient and family perspectives and choices  Outcome: Progressing     Problem: Safety Risk - Non-Violent Restraints  Goal: Patient will remain free from self-harm  Description: INTERVENTIONS:  - Apply the least restrictive restraint to prevent harm  - Notify patient and family of reasons restraints applied  - Assess for any contributing factors to confusion (electrolyte disturbances, delirium, medications)  - Discontinue any unnecessary medical devices as soon as possible  - Assess the patient's physical comfort, circulation, skin condition, hydration, nutrition and elimination needs   - Reorient and redirection as needed  - Assess for the need to continue restraints  9/19/2023 0659 by Alexys Marc RN  Outcome: Progressing  9/19/2023 0341 by Alexys Marc RN  Outcome: Not Progressing     Problem: RESPIRATORY - ADULT  Goal: Achieves optimal ventilation and oxygenation  Description: INTERVENTIONS:  - Assess for changes in respiratory status  - Assess for changes in mentation and behavior  - Position to facilitate oxygenation and minimize respiratory effort  - Oxygen supplementation based on oxygen saturation or ABGs  - Provide Smoking Cessation handout, if applicable  - Encourage broncho-pulmonary hygiene including cough, deep breathe, Incentive Spirometry  - Assess the need for suctioning and perform as needed  - Assess and instruct to report SOB or any respiratory difficulty  - Respiratory Therapy support as indicated  - Manage/alleviate anxiety  - Monitor for signs/symptoms of CO2 retention  Outcome: Progressing     Problem: Patient/Family Goals  Goal: Patient/Family Long Term Goal  Description: Patient's Long Term Goal:     Interventions:    - See additional Care Plan goals for specific interventions  Outcome: Progressing  Goal: Patient/Family Short Term Goal  Description: Patient's Short Term Goal:     Interventions:     - See additional Care Plan goals for specific interventions  Outcome: Progressing

## 2023-09-19 NOTE — PROGRESS NOTES
[facility] Pharmacy Note:  Renal Adjustment for piperacillin/tazobactam (Yoselin Gold)    Trey Reyes is a 77year old patient who has been prescribed piperacillin/tazobactam (ZOSYN) 3.375 g every 8 hrs. The estimated creatinine clearance is 11.4 mL/min (A) (based on SCr of 3.98 mg/dL (H)). The dose has been adjusted to piperacillin/tazobactam (ZOSYN) 3.375 g every 12 hrs per hospital renal dose adjustment protocol for treatment of sepsis. Pharmacy will follow and adjust dose as warranted for additional renal function changes.     Thank you,    Ion Palmer, PharmD  9/19/2023  3:35 AM

## 2023-09-19 NOTE — ED INITIAL ASSESSMENT (HPI)
Arrives via ems for respiratory distress, tachypnic and hypoxic on ems arrival, sats of 60%. Decompensated en route, ems attempted to intubate with 300 ketamine, unsuccessful. Bagged on arrival, continued.

## 2023-09-19 NOTE — PLAN OF CARE
Pt remains intubated and sedated. Off of sedation, pt opens eyes spontaneously, able to follow commands. SBT done in AM per RT. MD aware of repeat troponin levels; heparin gtt continued. Urine culture sent. IV lasix given x2. Weaned off of nitroglycerin gtt. Son, Monie Treadwell at bedside, updated on plan of care. Bed is locked, in lowest position; call light remains within reach.  Soft bilateral wrist restraints remain in place    Problem: Safety Risk - Non-Violent Restraints  Goal: Patient will remain free from self-harm  Description: INTERVENTIONS:  - Apply the least restrictive restraint to prevent harm  - Notify patient and family of reasons restraints applied  - Assess for any contributing factors to confusion (electrolyte disturbances, delirium, medications)  - Discontinue any unnecessary medical devices as soon as possible  - Assess the patient's physical comfort, circulation, skin condition, hydration, nutrition and elimination needs   - Reorient and redirection as needed  - Assess for the need to continue restraints  Outcome: Not Progressing     Problem: Patient Centered Care  Goal: Patient preferences are identified and integrated in the patient's plan of care  Description: Interventions:  - What would you like us to know as we care for you?   - Provide timely, complete, and accurate information to patient/family  - Incorporate patient and family knowledge, values, beliefs, and cultural backgrounds into the planning and delivery of care  - Encourage patient/family to participate in care and decision-making at the level they choose  - Honor patient and family perspectives and choices  Outcome: Progressing     Problem: RESPIRATORY - ADULT  Goal: Achieves optimal ventilation and oxygenation  Description: INTERVENTIONS:  - Assess for changes in respiratory status  - Assess for changes in mentation and behavior  - Position to facilitate oxygenation and minimize respiratory effort  - Oxygen supplementation based on oxygen saturation or ABGs  - Provide Smoking Cessation handout, if applicable  - Encourage broncho-pulmonary hygiene including cough, deep breathe, Incentive Spirometry  - Assess the need for suctioning and perform as needed  - Assess and instruct to report SOB or any respiratory difficulty  - Respiratory Therapy support as indicated  - Manage/alleviate anxiety  - Monitor for signs/symptoms of CO2 retention  Outcome: Progressing     Problem: Patient/Family Goals  Goal: Patient/Family Long Term Goal  Description: Patient's Long Term Goal:     Interventions:  -   - See additional Care Plan goals for specific interventions  Outcome: Progressing  Goal: Patient/Family Short Term Goal  Description: Patient's Short Term Goal:     Interventions:   -   - See additional Care Plan goals for specific interventions  Outcome: Progressing

## 2023-09-20 ENCOUNTER — APPOINTMENT (OUTPATIENT)
Dept: GENERAL RADIOLOGY | Facility: HOSPITAL | Age: 68
End: 2023-09-20
Attending: STUDENT IN AN ORGANIZED HEALTH CARE EDUCATION/TRAINING PROGRAM
Payer: MEDICARE

## 2023-09-20 LAB
ANION GAP SERPL CALC-SCNC: 10 MMOL/L (ref 0–18)
APTT PPP: 65.7 SECONDS (ref 23.3–35.6)
BUN BLD-MCNC: 59 MG/DL (ref 7–18)
BUN/CREAT SERPL: 13.9 (ref 10–20)
CALCIUM BLD-MCNC: 8.1 MG/DL (ref 8.5–10.1)
CHLORIDE SERPL-SCNC: 107 MMOL/L (ref 98–112)
CO2 SERPL-SCNC: 25 MMOL/L (ref 21–32)
CREAT BLD-MCNC: 4.23 MG/DL
DEPRECATED RDW RBC AUTO: 46 FL (ref 35.1–46.3)
EGFRCR SERPLBLD CKD-EPI 2021: 11 ML/MIN/1.73M2 (ref 60–?)
ERYTHROCYTE [DISTWIDTH] IN BLOOD BY AUTOMATED COUNT: 13.7 % (ref 11–15)
GLUCOSE BLD-MCNC: 90 MG/DL (ref 70–99)
GLUCOSE BLDC GLUCOMTR-MCNC: 114 MG/DL (ref 70–99)
GLUCOSE BLDC GLUCOMTR-MCNC: 132 MG/DL (ref 70–99)
GLUCOSE BLDC GLUCOMTR-MCNC: 176 MG/DL (ref 70–99)
GLUCOSE BLDC GLUCOMTR-MCNC: 177 MG/DL (ref 70–99)
GLUCOSE BLDC GLUCOMTR-MCNC: 263 MG/DL (ref 70–99)
GLUCOSE BLDC GLUCOMTR-MCNC: 67 MG/DL (ref 70–99)
GLUCOSE BLDC GLUCOMTR-MCNC: 78 MG/DL (ref 70–99)
HCT VFR BLD AUTO: 31.6 %
HGB BLD-MCNC: 10.7 G/DL
MAGNESIUM SERPL-MCNC: 2.2 MG/DL (ref 1.6–2.6)
MCH RBC QN AUTO: 31.8 PG (ref 26–34)
MCHC RBC AUTO-ENTMCNC: 33.9 G/DL (ref 31–37)
MCV RBC AUTO: 93.8 FL
OSMOLALITY SERPL CALC.SUM OF ELEC: 310 MOSM/KG (ref 275–295)
PLATELET # BLD AUTO: 231 10(3)UL (ref 150–450)
POTASSIUM SERPL-SCNC: 3.3 MMOL/L (ref 3.5–5.1)
RBC # BLD AUTO: 3.37 X10(6)UL
SODIUM SERPL-SCNC: 142 MMOL/L (ref 136–145)
TROPONIN I HIGH SENSITIVITY: ABNORMAL NG/L
WBC # BLD AUTO: 7.4 X10(3) UL (ref 4–11)

## 2023-09-20 PROCEDURE — 71045 X-RAY EXAM CHEST 1 VIEW: CPT | Performed by: STUDENT IN AN ORGANIZED HEALTH CARE EDUCATION/TRAINING PROGRAM

## 2023-09-20 PROCEDURE — 99233 SBSQ HOSP IP/OBS HIGH 50: CPT | Performed by: INTERNAL MEDICINE

## 2023-09-20 RX ORDER — AMLODIPINE BESYLATE 10 MG/1
10 TABLET ORAL DAILY
Status: DISCONTINUED | OUTPATIENT
Start: 2023-09-20 | End: 2023-09-29

## 2023-09-20 RX ORDER — AMLODIPINE BESYLATE 5 MG/1
5 TABLET ORAL ONCE
Status: DISCONTINUED | OUTPATIENT
Start: 2023-09-20 | End: 2023-09-20

## 2023-09-20 RX ORDER — SODIUM BICARBONATE 650 MG/1
325 TABLET ORAL AS NEEDED
Status: DISCONTINUED | OUTPATIENT
Start: 2023-09-20 | End: 2023-09-21

## 2023-09-20 RX ORDER — SENNOSIDES 8.8 MG/5ML
10 LIQUID ORAL NIGHTLY
Status: DISCONTINUED | OUTPATIENT
Start: 2023-09-20 | End: 2023-09-29

## 2023-09-20 RX ORDER — ATORVASTATIN CALCIUM 80 MG/1
80 TABLET, FILM COATED ORAL DAILY
Status: DISCONTINUED | OUTPATIENT
Start: 2023-09-21 | End: 2023-09-29

## 2023-09-20 NOTE — PLAN OF CARE
Problem: Safety Risk - Non-Violent Restraints  Goal: Patient will remain free from self-harm  Description: INTERVENTIONS:  - Apply the least restrictive restraint to prevent harm  - Notify patient and family of reasons restraints applied  - Assess for any contributing factors to confusion (electrolyte disturbances, delirium, medications)  - Discontinue any unnecessary medical devices as soon as possible  - Assess the patient's physical comfort, circulation, skin condition, hydration, nutrition and elimination needs   - Reorient and redirection as needed  - Assess for the need to continue restraints  Outcome: Not Progressing 12-Feb-2019 15:06

## 2023-09-20 NOTE — RESPIRATORY THERAPY NOTE
Patient received on full vent support with the following settings AC 14/400/40%/+5. Patient is awake and alert. Patient was placed on SBT 5/5 40% at 1020. Patient tolerated SBT well for about 45mins. Per Dr. Milan Brasher, keep patient intubated as she has a cath procedure scheduled  for tomorrow. Patient was placed back on full vent support. Family made aware of plan. Will continue to monitor.

## 2023-09-21 ENCOUNTER — APPOINTMENT (OUTPATIENT)
Dept: INTERVENTIONAL RADIOLOGY/VASCULAR | Facility: HOSPITAL | Age: 68
End: 2023-09-21
Attending: INTERNAL MEDICINE
Payer: MEDICARE

## 2023-09-21 LAB
ANION GAP SERPL CALC-SCNC: 8 MMOL/L (ref 0–18)
APTT PPP: 63.6 SECONDS (ref 23.3–35.6)
BUN BLD-MCNC: 56 MG/DL (ref 7–18)
BUN/CREAT SERPL: 13.1 (ref 10–20)
CALCIUM BLD-MCNC: 7.9 MG/DL (ref 8.5–10.1)
CHLORIDE SERPL-SCNC: 107 MMOL/L (ref 98–112)
CO2 SERPL-SCNC: 26 MMOL/L (ref 21–32)
CREAT BLD-MCNC: 4.28 MG/DL
DEPRECATED RDW RBC AUTO: 48.4 FL (ref 35.1–46.3)
EGFRCR SERPLBLD CKD-EPI 2021: 11 ML/MIN/1.73M2 (ref 60–?)
ERYTHROCYTE [DISTWIDTH] IN BLOOD BY AUTOMATED COUNT: 13.6 % (ref 11–15)
GLUCOSE BLD-MCNC: 217 MG/DL (ref 70–99)
GLUCOSE BLDC GLUCOMTR-MCNC: 127 MG/DL (ref 70–99)
GLUCOSE BLDC GLUCOMTR-MCNC: 135 MG/DL (ref 70–99)
GLUCOSE BLDC GLUCOMTR-MCNC: 169 MG/DL (ref 70–99)
GLUCOSE BLDC GLUCOMTR-MCNC: 228 MG/DL (ref 70–99)
HCT VFR BLD AUTO: 34.2 %
HGB BLD-MCNC: 11.1 G/DL
ISTAT ACTIVATED CLOTTING TIME: 269 SECONDS (ref 125–137)
MAGNESIUM SERPL-MCNC: 2.5 MG/DL (ref 1.6–2.6)
MCH RBC QN AUTO: 31.2 PG (ref 26–34)
MCHC RBC AUTO-ENTMCNC: 32.5 G/DL (ref 31–37)
MCV RBC AUTO: 96.1 FL
OSMOLALITY SERPL CALC.SUM OF ELEC: 314 MOSM/KG (ref 275–295)
PHOSPHATE SERPL-MCNC: 4.4 MG/DL (ref 2.5–4.9)
PLATELET # BLD AUTO: 232 10(3)UL (ref 150–450)
POTASSIUM SERPL-SCNC: 3.5 MMOL/L (ref 3.5–5.1)
RBC # BLD AUTO: 3.56 X10(6)UL
SODIUM SERPL-SCNC: 141 MMOL/L (ref 136–145)
WBC # BLD AUTO: 6.6 X10(3) UL (ref 4–11)

## 2023-09-21 PROCEDURE — 4A023N7 MEASUREMENT OF CARDIAC SAMPLING AND PRESSURE, LEFT HEART, PERCUTANEOUS APPROACH: ICD-10-PCS | Performed by: INTERNAL MEDICINE

## 2023-09-21 PROCEDURE — 5A0935A ASSISTANCE WITH RESPIRATORY VENTILATION, LESS THAN 24 CONSECUTIVE HOURS, HIGH NASAL FLOW/VELOCITY: ICD-10-PCS | Performed by: INTERNAL MEDICINE

## 2023-09-21 PROCEDURE — B2111ZZ FLUOROSCOPY OF MULTIPLE CORONARY ARTERIES USING LOW OSMOLAR CONTRAST: ICD-10-PCS | Performed by: INTERNAL MEDICINE

## 2023-09-21 PROCEDURE — 99232 SBSQ HOSP IP/OBS MODERATE 35: CPT | Performed by: INTERNAL MEDICINE

## 2023-09-21 PROCEDURE — 027035Z DILATION OF CORONARY ARTERY, ONE ARTERY WITH TWO DRUG-ELUTING INTRALUMINAL DEVICES, PERCUTANEOUS APPROACH: ICD-10-PCS | Performed by: INTERNAL MEDICINE

## 2023-09-21 RX ORDER — ASPIRIN 81 MG/1
81 TABLET ORAL DAILY
Status: DISCONTINUED | OUTPATIENT
Start: 2023-09-22 | End: 2023-09-29

## 2023-09-21 RX ORDER — NITROGLYCERIN 20 MG/100ML
INJECTION INTRAVENOUS
Status: COMPLETED
Start: 2023-09-21 | End: 2023-09-21

## 2023-09-21 RX ORDER — HYDRALAZINE HYDROCHLORIDE 20 MG/ML
INJECTION INTRAMUSCULAR; INTRAVENOUS
Status: COMPLETED
Start: 2023-09-21 | End: 2023-09-21

## 2023-09-21 RX ORDER — CEPHALEXIN 500 MG/1
500 CAPSULE ORAL EVERY 12 HOURS SCHEDULED
Status: DISCONTINUED | OUTPATIENT
Start: 2023-09-22 | End: 2023-09-21

## 2023-09-21 RX ORDER — PANTOPRAZOLE SODIUM 40 MG/1
40 TABLET, DELAYED RELEASE ORAL
Status: DISCONTINUED | OUTPATIENT
Start: 2023-09-22 | End: 2023-09-29

## 2023-09-21 RX ORDER — LIDOCAINE HYDROCHLORIDE 20 MG/ML
INJECTION, SOLUTION EPIDURAL; INFILTRATION; INTRACAUDAL; PERINEURAL
Status: COMPLETED
Start: 2023-09-21 | End: 2023-09-21

## 2023-09-21 RX ORDER — ASPIRIN 81 MG/1
TABLET, CHEWABLE ORAL
Status: COMPLETED
Start: 2023-09-21 | End: 2023-09-21

## 2023-09-21 RX ORDER — SODIUM CHLORIDE 9 MG/ML
INJECTION, SOLUTION INTRAVENOUS CONTINUOUS
Status: ACTIVE | OUTPATIENT
Start: 2023-09-21 | End: 2023-09-21

## 2023-09-21 RX ORDER — HEPARIN SODIUM 1000 [USP'U]/ML
INJECTION, SOLUTION INTRAVENOUS; SUBCUTANEOUS
Status: COMPLETED
Start: 2023-09-21 | End: 2023-09-21

## 2023-09-21 RX ORDER — MIDAZOLAM HYDROCHLORIDE 1 MG/ML
INJECTION INTRAMUSCULAR; INTRAVENOUS
Status: COMPLETED
Start: 2023-09-21 | End: 2023-09-21

## 2023-09-21 RX ORDER — ASPIRIN 81 MG/1
81 TABLET ORAL DAILY
Status: DISCONTINUED | OUTPATIENT
Start: 2023-09-22 | End: 2023-09-21

## 2023-09-21 RX ORDER — SODIUM CHLORIDE 9 MG/ML
INJECTION, SOLUTION INTRAVENOUS CONTINUOUS
Status: DISCONTINUED | OUTPATIENT
Start: 2023-09-21 | End: 2023-09-21

## 2023-09-21 NOTE — CM/SW NOTE
Met with patient at bedside for assessment. Patient lives at [facility]. She is independent with ADLs & IADLs. She states she goes to [facility] 2x/day and they assist w/ light cleaning 2x/month. Patient denies h/o [facility] or SNF. She owns a cane & walker. Discussed discharge plan. Patient anticipates returning home once stable, she is open to [facility] if needed. Patient currently on 5L O2, SW/CM to monitor for home needs. MD/RN to order PT/OT evaluations if indicated.     Patricia Cantu, [address]

## 2023-09-21 NOTE — PLAN OF CARE
Pt received extubated, alert and oriented x4 on 5L HFNC. Heart cath completed with 2 stents placed. NG removed, bed side swallow eval completed. Electrolyte protocol canceled per nephrology. Pt with good appetite and good urine output. Family at bedside. Problem: Patient Centered Care  Goal: Patient preferences are identified and integrated in the patient's plan of care  Description: Interventions:  - What would you like us to know as we care for you?  Pt is very grateful to be alive  - Provide timely, complete, and accurate information to patient/family  - Incorporate patient and family knowledge, values, beliefs, and cultural backgrounds into the planning and delivery of care  - Encourage patient/family to participate in care and decision-making at the level they choose  - Honor patient and family perspectives and choices  Outcome: Progressing     Problem: RESPIRATORY - ADULT  Goal: Achieves optimal ventilation and oxygenation  Description: INTERVENTIONS:  - Assess for changes in respiratory status  - Assess for changes in mentation and behavior  - Position to facilitate oxygenation and minimize respiratory effort  - Oxygen supplementation based on oxygen saturation or ABGs  - Provide Smoking Cessation handout, if applicable  - Encourage broncho-pulmonary hygiene including cough, deep breathe, Incentive Spirometry  - Assess the need for suctioning and perform as needed  - Assess and instruct to report SOB or any respiratory difficulty  - Respiratory Therapy support as indicated  - Manage/alleviate anxiety  - Monitor for signs/symptoms of CO2 retention  Outcome: Progressing     Problem: Patient/Family Goals  Goal: Patient/Family Long Term Goal  Description: Patient's Long Term Goal: return to baseline ADL     Interventions:  - healthy diet, maintain regular medications   - See additional Care Plan goals for specific interventions  Outcome: Progressing  Goal: Patient/Family Short Term Goal  Description: Patient's Short Term Goal: discharge home     Interventions:   - cardiac meds / PT & OT   - See additional Care Plan goals for specific interventions  Outcome: Progressing

## 2023-09-21 NOTE — PLAN OF CARE
Pt remains intubated and sedated; off of sedation, pt is able to follow commands, uses written communication, moves all extremities. SBT done in AM per RT; tolerated well. Plan for LHC in AM. BMx1, caro remains in place. Remains on heparin gtt. Tube feeds started. Soft bilateral wrist restraints remain in place. Bed is locked, in lowest position; call light remains within reach.     Problem: Safety Risk - Non-Violent Restraints  Goal: Patient will remain free from self-harm  Description: INTERVENTIONS:  - Apply the least restrictive restraint to prevent harm  - Notify patient and family of reasons restraints applied  - Assess for any contributing factors to confusion (electrolyte disturbances, delirium, medications)  - Discontinue any unnecessary medical devices as soon as possible  - Assess the patient's physical comfort, circulation, skin condition, hydration, nutrition and elimination needs   - Reorient and redirection as needed  - Assess for the need to continue restraints  9/20/2023 2007 by Loly Grayson RN  Outcome: Not Progressing  9/20/2023 1538 by Loly Grayson RN  Outcome: Not Progressing     Problem: Patient Centered Care  Goal: Patient preferences are identified and integrated in the patient's plan of care  Description: Interventions:  - What would you like us to know as we care for you?   - Provide timely, complete, and accurate information to patient/family  - Incorporate patient and family knowledge, values, beliefs, and cultural backgrounds into the planning and delivery of care  - Encourage patient/family to participate in care and decision-making at the level they choose  - Honor patient and family perspectives and choices  Outcome: Progressing     Problem: RESPIRATORY - ADULT  Goal: Achieves optimal ventilation and oxygenation  Description: INTERVENTIONS:  - Assess for changes in respiratory status  - Assess for changes in mentation and behavior  - Position to facilitate oxygenation and minimize respiratory effort  - Oxygen supplementation based on oxygen saturation or ABGs  - Provide Smoking Cessation handout, if applicable  - Encourage broncho-pulmonary hygiene including cough, deep breathe, Incentive Spirometry  - Assess the need for suctioning and perform as needed  - Assess and instruct to report SOB or any respiratory difficulty  - Respiratory Therapy support as indicated  - Manage/alleviate anxiety  - Monitor for signs/symptoms of CO2 retention  Outcome: Progressing     Problem: Patient/Family Goals  Goal: Patient/Family Long Term Goal  Description: Patient's Long Term Goal:     Interventions:  -   - See additional Care Plan goals for specific interventions  Outcome: Progressing  Goal: Patient/Family Short Term Goal  Description: Patient's Short Term Goal:     Interventions:   -   - See additional Care Plan goals for specific interventions  Outcome: Progressing

## 2023-09-21 NOTE — PROCEDURES
[place] [address] Location: Cath Lab    [facility] [id number] MRN [id number]   Admission Date 9/18/2023 Procedure Date 9/21/2023   Attending Physician Petty Leone MD Procedure Physician Oracio Lerner MD         CARDIAC CATHETERIZATION/PERCUTANEOUS CORONARY INTERVENTION REPORT     PREOPERATIVE DIAGNOSIS:  NSTEMI, acute diastolic heart failure, hx of 4v CABG (2012)- known occlusion of all grafts except LIMA to LAD  POSTOPERATIVE DIAGNOSIS:  obstructive ramus stenosis. PROCEDURE PERFORMED:  left heart catheterization, selective coronary angiography, bypass graft angiography, PCI to the ramus intermedius artery      PROCEDURE:  The patient was brought to the cardiac catheterization lab in the fasting state. Informed consent was obtained. Moderate sedation was employed using a total of IV Versed 1mg and IV fentanyl 25mcg. I directly observed the patient from [id number] to [id number], for a total of 65 minutes, and an independent trained observer was present and assisted in the monitoring of the patient's level of consciousness and physiological status, watching the heart rate, blood pressure, oximetry, and rhythm, in addition to total moderation time. ACCESS/CATHETER PLACEMENT:  The groin was prepped and draped in a sterile manner, and the right groin area was anesthetized with 2% lidocaine area. The right femoral artery was accessed with a micropuncture needle, and a 6-Guamanian 11 cm sheath was placed. Left and right selective coronary angiography was performed using 6-[place] JL4 and JR4 catheters respectively. The left ventricular pressure was measured using a pigtail catheter and 30 degree MACHADO ventriculogram was performed. The catheter was pulled back across the aortic valve was performed with the same catheter. At the conclusion of the study, right femoral artery angiography was performed to determine suitability for a closure; a Perclose suture was deployed, with excellent hemostasis. FINDINGS:      1. Left heart catheterization:    Left ventricle: 146/9 mmHg  Aorta: 146/49/87 mmHg  Left ventriculogram was not performed in setting of renal insufficiency. 2.  Selective coronary angiography:      Left main artery: The left main artery is a medium size trifurcating vessel; no obstructive angiographic disease. LAD:  The left anterior descending artery is a medium caliber vessel; occluded proximally. LCx: The left circumflex artery is a medium size vessel that gives rise to two obtuse marginals. The proximal stents are patent. The first OM branch demonstrates an ostial 80% stenosis, it is a small caliber vessel. RCA:  The right coronary artery is a medium caliber dominant vessel that gives rise to a medium rPDA. The RCA is diffusely stents, which are patent. The distal RCA into the rPDA demonstrates mild-moderate non obstructive angiographic disease. 3. Bypass graft angiography    LIMA to LAD: widely patent without ostial/anastomotic stenosis; distal LAD runoff just beyond the anastomosis demonstrates mild-moderate non-obstructive plaque. SVG to ramus: occluded    SVG to OM: occluded    SVG to RCA: occluded    INTERVENTIONAL PROCEDURE: Heparin was used for systemic anticoagulation, confirmed therapeutic by ACT measurement. The LM artery was engaged with a 6F EBU3.5 guide catheter and a Runthrough wire was advanced to the distal ramus artery. After pre-dilating the ostium with a 2.0x12mm balloon, a 2.5x12mm [place] OSMIN was deployed and post-dilated with a 3.0x12mm NC balloon. Next, decision was made to intervene on an eccentric lesion further distal in the vessel; a 2.5x12mm OSMIN was deployed and post-dilated with the delivery balloon to high pressure. Completion angiography demonstrated a good result with 0% residual stenosis, TIMI3 distal flow.   There was a faint contrast blush/stain adjacent to the proximal vessel that seemed be fed by a tiny branch, which may represent a small contained dissection; considering hemodynamic and clinical stability, no further intervention was performed. MEDICATIONS:  See nursing record. COMPLICATIONS:  No major complications were observed during this visit to the catheterization lab. IMPRESSION:    1. Left heart catheterization: LVEDP 9 mmHg, no aortic stenosis. LV gram was not performed to minimize contrast exposure. 2.  Coronary angiography:  right dominant system  - LM: no significant angiographic disease  - LAD: proximal occlusion  - LCx: patent stents; OM1- 80% ostial stenosis (small caliber vessel, unchanged angiographic appearance from 2017 study). - Ramus intermedius: 90% ostial stenosis  - RCA: patent stents  3. Bypass graft angiography:  - LIMA to LAD- widely patent  - SVG to ramus- occluded  - SVG to OM- occluded  - SVG to RCA- occluded  4. Percutaneous coronary intervention:  Successful PCI to the ostial ramus intermedius with a 2.5x12mm [place] OSMIN and the mid vessel with another 2.5x12mm OSMIN. RECOMMENDATIONS: DAPT (asa/ticagrelor) minimum 12 months in setting of ACS presentation.

## 2023-09-21 NOTE — PROGRESS NOTES
[facility] Pharmacy Note: Route Optimization for Pantoprazole (Protonix)    Patient is currently on Pantoprazole (Protonix) 40 mg IV every 24 hours. The patient meets the criteria to convert to the oral equivalent as established by the IV to Oral conversion protocol approved by the P&T committee. Medication was changed from IV formulation to pantoprazole (Protonix)  40 mg PO every 24 hours per protocol.       Alexandra Pichardo PharmD  9/21/2023,  1:14 PM

## 2023-09-22 LAB
ALBUMIN SERPL-MCNC: 2.5 G/DL (ref 3.4–5)
ANION GAP SERPL CALC-SCNC: 7 MMOL/L (ref 0–18)
BASOPHILS # BLD AUTO: 0.04 X10(3) UL (ref 0–0.2)
BASOPHILS NFR BLD AUTO: 0.5 %
BUN BLD-MCNC: 52 MG/DL (ref 7–18)
BUN/CREAT SERPL: 13.2 (ref 10–20)
CALCIUM BLD-MCNC: 8.4 MG/DL (ref 8.5–10.1)
CHLORIDE SERPL-SCNC: 112 MMOL/L (ref 98–112)
CO2 SERPL-SCNC: 25 MMOL/L (ref 21–32)
CREAT BLD-MCNC: 3.95 MG/DL
DEPRECATED RDW RBC AUTO: 49.1 FL (ref 35.1–46.3)
EGFRCR SERPLBLD CKD-EPI 2021: 12 ML/MIN/1.73M2 (ref 60–?)
EOSINOPHIL # BLD AUTO: 0.3 X10(3) UL (ref 0–0.7)
EOSINOPHIL NFR BLD AUTO: 3.6 %
ERYTHROCYTE [DISTWIDTH] IN BLOOD BY AUTOMATED COUNT: 13.7 % (ref 11–15)
GLUCOSE BLD-MCNC: 95 MG/DL (ref 70–99)
GLUCOSE BLDC GLUCOMTR-MCNC: 114 MG/DL (ref 70–99)
GLUCOSE BLDC GLUCOMTR-MCNC: 120 MG/DL (ref 70–99)
GLUCOSE BLDC GLUCOMTR-MCNC: 132 MG/DL (ref 70–99)
GLUCOSE BLDC GLUCOMTR-MCNC: 135 MG/DL (ref 70–99)
GLUCOSE BLDC GLUCOMTR-MCNC: 160 MG/DL (ref 70–99)
HCT VFR BLD AUTO: 34.3 %
HGB BLD-MCNC: 11.1 G/DL
IMM GRANULOCYTES # BLD AUTO: 0.01 X10(3) UL (ref 0–1)
IMM GRANULOCYTES NFR BLD: 0.1 %
LYMPHOCYTES # BLD AUTO: 1.56 X10(3) UL (ref 1–4)
LYMPHOCYTES NFR BLD AUTO: 18.8 %
MAGNESIUM SERPL-MCNC: 2.4 MG/DL (ref 1.6–2.6)
MCH RBC QN AUTO: 31.3 PG (ref 26–34)
MCHC RBC AUTO-ENTMCNC: 32.4 G/DL (ref 31–37)
MCV RBC AUTO: 96.6 FL
MONOCYTES # BLD AUTO: 0.42 X10(3) UL (ref 0.1–1)
MONOCYTES NFR BLD AUTO: 5.1 %
NEUTROPHILS # BLD AUTO: 5.98 X10 (3) UL (ref 1.5–7.7)
NEUTROPHILS # BLD AUTO: 5.98 X10(3) UL (ref 1.5–7.7)
NEUTROPHILS NFR BLD AUTO: 71.9 %
OSMOLALITY SERPL CALC.SUM OF ELEC: 312 MOSM/KG (ref 275–295)
PHOSPHATE SERPL-MCNC: 5 MG/DL (ref 2.5–4.9)
PLATELET # BLD AUTO: 227 10(3)UL (ref 150–450)
POTASSIUM SERPL-SCNC: 3.8 MMOL/L (ref 3.5–5.1)
RBC # BLD AUTO: 3.55 X10(6)UL
SODIUM SERPL-SCNC: 144 MMOL/L (ref 136–145)
WBC # BLD AUTO: 8.3 X10(3) UL (ref 4–11)

## 2023-09-22 PROCEDURE — 99232 SBSQ HOSP IP/OBS MODERATE 35: CPT | Performed by: INTERNAL MEDICINE

## 2023-09-22 RX ORDER — CARVEDILOL 25 MG/1
25 TABLET ORAL 2 TIMES DAILY WITH MEALS
Status: DISCONTINUED | OUTPATIENT
Start: 2023-09-22 | End: 2023-09-23

## 2023-09-22 RX ORDER — HEPARIN SODIUM 5000 [USP'U]/ML
5000 INJECTION, SOLUTION INTRAVENOUS; SUBCUTANEOUS EVERY 8 HOURS
Status: DISCONTINUED | OUTPATIENT
Start: 2023-09-22 | End: 2023-09-25

## 2023-09-22 RX ORDER — HYDRALAZINE HYDROCHLORIDE 20 MG/ML
10 INJECTION INTRAMUSCULAR; INTRAVENOUS EVERY 4 HOURS PRN
Status: DISCONTINUED | OUTPATIENT
Start: 2023-09-22 | End: 2023-09-29

## 2023-09-22 RX ORDER — METOPROLOL TARTRATE 5 MG/5ML
5 INJECTION INTRAVENOUS EVERY 6 HOURS PRN
Status: DISCONTINUED | OUTPATIENT
Start: 2023-09-22 | End: 2023-09-29

## 2023-09-22 RX ORDER — CLONIDINE HYDROCHLORIDE 0.2 MG/1
0.2 TABLET ORAL 3 TIMES DAILY
Status: DISCONTINUED | OUTPATIENT
Start: 2023-09-22 | End: 2023-09-23

## 2023-09-22 NOTE — CM/SW NOTE
Therapy recommending KAREN. CM/SW will follow and see how Mckinley Martínez progresses. Currently resides in [place] at [facility]. [facility] and [facility] completed. If Mckinley Martínez is agreeable to KAREN, will provide list of facilities. / to remain available for support and/or discharge planning.       Roman Lennox MBA BSN RN CRRN   RN Case Manager  [phone]

## 2023-09-22 NOTE — PHYSICAL THERAPY NOTE
PHYSICAL THERAPY EVALUATION - INPATIENT     Room Number: 210/210-A  Evaluation Date: 9/22/2023  Type of Evaluation: Initial   Physician Order: PT Eval and Treat    Presenting Problem: Acute respiratory failure hypoxia, weakness  Co-Morbidities : angioplasty, CKD stage 4, hypoxia, obesisty  Reason for Therapy: Mobility Dysfunction and Discharge Planning    PHYSICAL THERAPY ASSESSMENT     Patient is a 77year old female admitted 9/18/2023 for Acute respiratory failure, hypoxia. Patient's current functional deficits include decreased bed mobility, transfers, gait, strength, balance endurance, which are below the patient's pre-admission status. Pt ed with pursed lip breathing and energy conservation strategies. Pt ed with bed mobility with min A to EOB and mod A with RW to SPT to a chair with  shuffling unsteady gait on high flow 02 5 lts 02 sats 93% at rest and 90% with transfers. Pt ed with low intensity therex in chair for ankle pumps and LAQ's x 10 reps. Pt will benefit from KAREN PT, OT to regain her maximal PLOF as medical progress allows. The patient's Approx Degree of Impairment: 68.66% has been calculated based on documentation in the [facility] '6 clicks' Inpatient Basic Mobility Short Form. Research supports that patients with this level of impairment may benefit from KAREN with PT, OT to regain her maximal PLOF and safety for fall prevention and a return to mod indep mobility with a RW as medical progress allows. Patient will benefit from continued IP PT services to address these deficits in preparation for discharge. DISCHARGE RECOMMENDATIONS  PT Discharge Recommendations: Sub-acute rehabilitation (PT, OT tor egain maximal PLOF and safety)    PLAN  PT Treatment Plan: Bed mobility; Body mechanics; Endurance; Energy conservation;Patient education;Gait training;Strengthening;Transfer training;Balance training;Family education  Rehab Potential : Good  Frequency (Obs): 5x/week       PHYSICAL THERAPY MEDICAL/SOCIAL HISTORY     History related to current admission: This is a 68-year-old female with a history of CKD stage IV, coronary artery disease, hypertension, type 2 diabetes mellitus, depression, anxiety, gout, who presents to the hospital for evaluation of dyspnea. Paramedics state that she was talking to them when they arrived but then she slumped over became less responsive. They gave her 300 mg of ketamine IV in order to try and intubate her but they could not intubate her so they transported her here. They did not get much history but they brought her medications with her. She has recent discharge summary that I was able to review. Patient was quickly oxygenated and then intubated upon arrival because she was already sedated with ketamine. We gave rocuronium for the intubation. Per chart review per ER reporting. Problem List  Principal Problem:    Acute respiratory failure with hypoxia (HCC)  Active Problems:    CKD (chronic kidney disease) stage 4, GFR 15-29 ml/min ([facility])    NSTEMI (non-ST elevated myocardial infarction) ([facility] [address]      HOME SITUATION  Home Situation  Type of Home: Independent living facility  Lives With: Staff 24 hours  Drives: No  Patient Owned Equipment: Rolling walker     Prior Level of [place]: Lives in [facility] living facitlity mod [facility] with a RW for all mobility and ADL's. Amb regularly to dinning room. Retired no longer drives. SUBJECTIVE  I feel like I can get up to the chair but walking not so much I have bad knee and feel too weak and I get short of breath. PHYSICAL THERAPY EXAMINATION     OBJECTIVE  Precautions: Bed/chair alarm  Fall Risk: High fall risk    WEIGHT BEARING RESTRICTION                PAIN ASSESSMENT  Ratin  Location: B knee soreness with movement  Management Techniques: Activity promotion; Body mechanics;Breathing techniques;Relaxation;Repositioning    COGNITION  Overall Cognitive Status:  WFL - within functional limits    RANGE OF MOTION AND STRENGTH ASSESSMENT  Upper extremity ROM and strength are within functional limits   Lower extremity ROM is within functional limits   Lower extremity strength is within functional limits 3-/5 BLE's    BALANCE  Static Sitting: Fair +  Dynamic Sitting: Fair -  Static Standing: Poor  Dynamic Standing: Poor -    ACTIVITY TOLERANCE  Pulse: 75  Heart Rate Source: Monitor  Resp: 15  BP: (!) 166/66  BP Location: Right arm  BP Method: Automatic  Patient Position: Sitting    O2 WALK  Oxygen Therapy  SPO2% on Oxygen at Rest: 93  At rest oxygen flow (liters per minute): 5  SPO2% Ambulation on Oxygen: 90  Ambulation oxygen flow (liters per minute): 5    AM-PAC '6-Clicks' INPATIENT SHORT FORM - BASIC MOBILITY  How much difficulty does the patient currently have. .. Patient Difficulty: Turning over in bed (including adjusting bedclothes, sheets and blankets)?: A Little   Patient Difficulty: Sitting down on and standing up from a chair with arms (e.g., wheelchair, bedside commode, etc.): A Lot   Patient Difficulty: Moving from lying on back to sitting on the side of the bed?: A Lot   How much help from another person does the patient currently need. .. Help from Another: Moving to and from a bed to a chair (including a wheelchair)?: A Lot   Help from Another: Need to walk in hospital room?: A Lot   Help from Another: Climbing 3-5 steps with a railing?: Total     AM-PAC Score:  Raw Score: 12   Approx Degree of Impairment: 68.66%   Standardized Score (AM-PAC Scale): 35.33   CMS Modifier (G-Code): CL    FUNCTIONAL ABILITY STATUS  Functional Mobility/Gait Assessment  Gait Assistance: Moderate assistance  Distance (ft): 4  Assistive Device: Rolling walker  Pattern: Shuffle (up to chair decreased step length unsteady gait)    Bed Mobility: Min A     Transfers:  Mod a with RW    Exercise/Education Provided:  Bed mobility  Body mechanics  Energy conservation  Functional activity tolerated  Gait training  Posture  ROM  Strengthening  Lower therapeutic exercise: Ankle pumps  Heel slides  LAQ  Upper therapeutic exercise:  Shoulder flex/ext and Wrist flex/ext    Patient End of Session: Up in chair; With [address] staff;Needs met;RN aware of session/findings;Call light within reach; All patient questions and concerns addressed; Family present    CURRENT GOALS    Goals to be met by: 10/15/2023  Patient Goal Patient's self-stated goal is: Return home    Goal #1 Patient is able to demonstrate supine - sit EOB @ level: independent     Goal #1   Current Status    Goal #2 Patient is able to demonstrate transfers Sit to/from Stand at assistance level: modified independent with walker - rolling     Goal #2  Current Status    Goal #3 Patient is able to ambulate 300 feet with assist device: walker - rolling at assistance level: modified independent   Goal #3   Current Status    Goal #4    Goal #4   Current Status    Goal #5 Patient to demonstrate independence with home activity/exercise instructions provided to patient in preparation for discharge.    Goal #5   Current Status    Goal #6    Goal #6  Current Status    Patient Evaluation Complexity Level:  History Low - no personal factors and/or co-morbidities   Examination of body systems Low - addressing 1-2 elements   Clinical Presentation Low - Stable   Clinical Decision Making Low Complexity       Gait Training: 15 minutes

## 2023-09-22 NOTE — PLAN OF CARE
Received patient awake in bed. Alert/oriented x4. NSR on monitor. SpO2>93% on 5L HFNC. Denies pain/discomfort. Right groin dressing c/d/intact s/p cath lab. No bleeding/hematoma at access site. Good pulses, skin warm/dry. IVF infusing through PIV. Voiding via purewick catheter. Episode of incontinent loose stool. Nay-care provided. Purewick changed. Hypertensive over night >200 SBP. Cardiology contacted/aware. PRN hydralazine ordered/given. Discussed with Nocturnist (face-to-face) continued hypertension >180 SBP despite hydralazine given. New order for PRN Lopressor. Patient denies any pain/discomfort w/ hypertension. Problem: RESPIRATORY - ADULT  Goal: Achieves optimal ventilation and oxygenation  Description: INTERVENTIONS:  - Assess for changes in respiratory status  - Assess for changes in mentation and behavior  - Position to facilitate oxygenation and minimize respiratory effort  - Oxygen supplementation based on oxygen saturation or ABGs  - Provide Smoking Cessation handout, if applicable  - Encourage broncho-pulmonary hygiene including cough, deep breathe, Incentive Spirometry  - Assess the need for suctioning and perform as needed  - Assess and instruct to report SOB or any respiratory difficulty  - Respiratory Therapy support as indicated  - Manage/alleviate anxiety  - Monitor for signs/symptoms of CO2 retention  Outcome: Progressing     Problem: SAFETY ADULT - FALL  Goal: Free from fall injury  Description: INTERVENTIONS:  - Assess pt frequently for physical needs  - Identify cognitive and physical deficits and behaviors that affect risk of falls.   - [place] fall precautions as indicated by assessment.  - Educate pt/family on patient safety including physical limitations  - Instruct pt to call for assistance with activity based on assessment  - Modify environment to reduce risk of injury  - Provide assistive devices as appropriate  - Consider OT/PT consult to assist with strengthening/mobility  - Encourage toileting schedule  Outcome: Progressing     Problem: CARDIOVASCULAR - ADULT  Goal: Maintains optimal cardiac output and hemodynamic stability  Description: INTERVENTIONS:  - Monitor vital signs, rhythm, and trends  - Monitor for bleeding, hypotension and signs of decreased cardiac output  - Evaluate effectiveness of vasoactive medications to optimize hemodynamic stability  - Monitor arterial and/or venous puncture sites for bleeding and/or hematoma  - Assess quality of pulses, skin color and temperature  - Assess for signs of decreased coronary artery perfusion - ex.  Angina  - Evaluate fluid balance, assess for edema, trend weights  Outcome: Progressing  Goal: Absence of cardiac arrhythmias or at baseline  Description: INTERVENTIONS:  - Continuous cardiac monitoring, monitor vital signs, obtain 12 lead EKG if indicated  - Evaluate effectiveness of antiarrhythmic and heart rate control medications as ordered  - Initiate emergency measures for life threatening arrhythmias  - Monitor electrolytes and administer replacement therapy as ordered  Outcome: Progressing     Problem: GASTROINTESTINAL - ADULT  Goal: Minimal or absence of nausea and vomiting  Description: INTERVENTIONS:  - Maintain adequate hydration with IV or PO as ordered and tolerated  - Nasogastric tube to low intermittent suction as ordered  - Evaluate effectiveness of ordered antiemetic medications  - Provide nonpharmacologic comfort measures as appropriate  - Advance diet as tolerated, if ordered  - Obtain nutritional consult as needed  - Evaluate fluid balance  Outcome: Progressing  Goal: Maintains or returns to baseline bowel function  Description: INTERVENTIONS:  - Assess bowel function  - Maintain adequate hydration with IV or PO as ordered and tolerated  - Evaluate effectiveness of GI medications  - Encourage mobilization and activity  - Obtain nutritional consult as needed  - Establish a toileting routine/schedule  - Consider collaborating with pharmacy to review patient's medication profile  Outcome: Not Progressing  Goal: Maintains adequate nutritional intake (undernourished)  Description: INTERVENTIONS:  - Monitor percentage of each meal consumed  - Identify factors contributing to decreased intake, treat as appropriate  - Assist with meals as needed  - Monitor I&O, WT and lab values  - Obtain nutritional consult as needed  - Optimize oral hygiene and moisture  - Encourage food from home; allow for food preferences  - Enhance eating environment  Outcome: Progressing     Problem: METABOLIC/FLUID AND ELECTROLYTES - ADULT  Goal: Glucose maintained within prescribed range  Description: INTERVENTIONS:  - Monitor Blood Glucose as ordered  - Assess for signs and symptoms of hyperglycemia and hypoglycemia  - Administer ordered medications to maintain glucose within target range  - Assess barriers to adequate nutritional intake and initiate nutrition consult as needed  - Instruct patient on self management of diabetes  Outcome: Progressing  Goal: Electrolytes maintained within normal limits  Description: INTERVENTIONS:  - Monitor labs and rhythm and assess patient for signs and symptoms of electrolyte imbalances  - Administer electrolyte replacement as ordered  - Monitor response to electrolyte replacements, including rhythm and repeat lab results as appropriate  - Fluid restriction as ordered  - Instruct patient on fluid and nutrition restrictions as appropriate  Outcome: Not Progressing  Goal: Hemodynamic stability and optimal renal function maintained  Description: INTERVENTIONS:  - Monitor labs and assess for signs and symptoms of volume excess or deficit  - Monitor intake, output and patient weight  - Monitor urine specific gravity, serum osmolarity and serum sodium as indicated or ordered  - Monitor response to interventions for patient's volume status, including labs, urine output, blood pressure (other measures as available)  - Encourage oral intake as appropriate  - Instruct patient on fluid and nutrition restrictions as appropriate  Outcome: Not Progressing     Problem: SKIN/TISSUE INTEGRITY - ADULT  Goal: Skin integrity remains intact  Description: INTERVENTIONS  - Assess and document risk factors for pressure ulcer development  - Assess and document skin integrity  - Monitor for areas of redness and/or skin breakdown  - Initiate interventions, skin care algorithm/standards of care as needed  Outcome: Not Progressing  Goal: Incision(s), wounds(s) or drain site(s) healing without S/S of infection  Description: INTERVENTIONS:  - Assess and document risk factors for pressure ulcer development  - Assess and document skin integrity  - Assess and document dressing/incision, wound bed, drain sites and surrounding tissue  - Implement wound care per orders  - Initiate isolation precautions as appropriate  - Initiate Pressure Ulcer prevention bundle as indicated  Outcome: Progressing

## 2023-09-22 NOTE — PLAN OF CARE
Problem: Patient Centered Care  Goal: Patient preferences are identified and integrated in the patient's plan of care  Description: Interventions:  - What would you like us to know as we care for you? Pt is very grateful to be alive  - Provide timely, complete, and accurate information to patient/family  - Incorporate patient and family knowledge, values, beliefs, and cultural backgrounds into the planning and delivery of care  - Encourage patient/family to participate in care and decision-making at the level they choose  - Honor patient and family perspectives and choices  Outcome: Progressing     Problem: RESPIRATORY - ADULT  Goal: Achieves optimal ventilation and oxygenation  Description: INTERVENTIONS:  - Assess for changes in respiratory status  - Assess for changes in mentation and behavior  - Position to facilitate oxygenation and minimize respiratory effort  - Oxygen supplementation based on oxygen saturation or ABGs  - Provide Smoking Cessation handout, if applicable  - Encourage broncho-pulmonary hygiene including cough, deep breathe, Incentive Spirometry  - Assess the need for suctioning and perform as needed  - Assess and instruct to report SOB or any respiratory difficulty  - Respiratory Therapy support as indicated  - Manage/alleviate anxiety  - Monitor for signs/symptoms of CO2 retention  Outcome: Progressing     Problem: SAFETY ADULT - FALL  Goal: Free from fall injury  Description: INTERVENTIONS:  - Assess pt frequently for physical needs  - Identify cognitive and physical deficits and behaviors that affect risk of falls.   - [place] fall precautions as indicated by assessment.  - Educate pt/family on patient safety including physical limitations  - Instruct pt to call for assistance with activity based on assessment  - Modify environment to reduce risk of injury  - Provide assistive devices as appropriate  - Consider OT/PT consult to assist with strengthening/mobility  - Encourage toileting schedule  Outcome: Progressing     Problem: CARDIOVASCULAR - ADULT  Goal: Maintains optimal cardiac output and hemodynamic stability  Description: INTERVENTIONS:  - Monitor vital signs, rhythm, and trends  - Monitor for bleeding, hypotension and signs of decreased cardiac output  - Evaluate effectiveness of vasoactive medications to optimize hemodynamic stability  - Monitor arterial and/or venous puncture sites for bleeding and/or hematoma  - Assess quality of pulses, skin color and temperature  - Assess for signs of decreased coronary artery perfusion - ex.  Angina  - Evaluate fluid balance, assess for edema, trend weights  Outcome: Progressing  Goal: Absence of cardiac arrhythmias or at baseline  Description: INTERVENTIONS:  - Continuous cardiac monitoring, monitor vital signs, obtain 12 lead EKG if indicated  - Evaluate effectiveness of antiarrhythmic and heart rate control medications as ordered  - Initiate emergency measures for life threatening arrhythmias  - Monitor electrolytes and administer replacement therapy as ordered  Outcome: Progressing     Problem: GASTROINTESTINAL - ADULT  Goal: Minimal or absence of nausea and vomiting  Description: INTERVENTIONS:  - Maintain adequate hydration with IV or PO as ordered and tolerated  - Nasogastric tube to low intermittent suction as ordered  - Evaluate effectiveness of ordered antiemetic medications  - Provide nonpharmacologic comfort measures as appropriate  - Advance diet as tolerated, if ordered  - Obtain nutritional consult as needed  - Evaluate fluid balance  Outcome: Progressing  Goal: Maintains or returns to baseline bowel function  Description: INTERVENTIONS:  - Assess bowel function  - Maintain adequate hydration with IV or PO as ordered and tolerated  - Evaluate effectiveness of GI medications  - Encourage mobilization and activity  - Obtain nutritional consult as needed  - Establish a toileting routine/schedule  - Consider collaborating with pharmacy to review patient's medication profile  Outcome: Progressing  Goal: Maintains adequate nutritional intake (undernourished)  Description: INTERVENTIONS:  - Monitor percentage of each meal consumed  - Identify factors contributing to decreased intake, treat as appropriate  - Assist with meals as needed  - Monitor I&O, WT and lab values  - Obtain nutritional consult as needed  - Optimize oral hygiene and moisture  - Encourage food from home; allow for food preferences  - Enhance eating environment  Outcome: Progressing     Problem: METABOLIC/FLUID AND ELECTROLYTES - ADULT  Goal: Glucose maintained within prescribed range  Description: INTERVENTIONS:  - Monitor Blood Glucose as ordered  - Assess for signs and symptoms of hyperglycemia and hypoglycemia  - Administer ordered medications to maintain glucose within target range  - Assess barriers to adequate nutritional intake and initiate nutrition consult as needed  - Instruct patient on self management of diabetes  Outcome: Progressing  Goal: Electrolytes maintained within normal limits  Description: INTERVENTIONS:  - Monitor labs and rhythm and assess patient for signs and symptoms of electrolyte imbalances  - Administer electrolyte replacement as ordered  - Monitor response to electrolyte replacements, including rhythm and repeat lab results as appropriate  - Fluid restriction as ordered  - Instruct patient on fluid and nutrition restrictions as appropriate  Outcome: Progressing  Goal: Hemodynamic stability and optimal renal function maintained  Description: INTERVENTIONS:  - Monitor labs and assess for signs and symptoms of volume excess or deficit  - Monitor intake, output and patient weight  - Monitor urine specific gravity, serum osmolarity and serum sodium as indicated or ordered  - Monitor response to interventions for patient's volume status, including labs, urine output, blood pressure (other measures as available)  - Encourage oral intake as appropriate  - Instruct patient on fluid and nutrition restrictions as appropriate  Outcome: Progressing     Problem: SKIN/TISSUE INTEGRITY - ADULT  Goal: Skin integrity remains intact  Description: INTERVENTIONS  - Assess and document risk factors for pressure ulcer development  - Assess and document skin integrity  - Monitor for areas of redness and/or skin breakdown  - Initiate interventions, skin care algorithm/standards of care as needed  Outcome: Progressing  Goal: Incision(s), wounds(s) or drain site(s) healing without S/S of infection  Description: INTERVENTIONS:  - Assess and document risk factors for pressure ulcer development  - Assess and document skin integrity  - Assess and document dressing/incision, wound bed, drain sites and surrounding tissue  - Implement wound care per orders  - Initiate isolation precautions as appropriate  - Initiate Pressure Ulcer prevention bundle as indicated  Outcome: Progressing

## 2023-09-23 LAB
ALBUMIN SERPL-MCNC: 2.4 G/DL (ref 3.4–5)
ANION GAP SERPL CALC-SCNC: 8 MMOL/L (ref 0–18)
BASOPHILS # BLD AUTO: 0.04 X10(3) UL (ref 0–0.2)
BASOPHILS NFR BLD AUTO: 0.5 %
BUN BLD-MCNC: 57 MG/DL (ref 7–18)
BUN/CREAT SERPL: 13 (ref 10–20)
CALCIUM BLD-MCNC: 8.4 MG/DL (ref 8.5–10.1)
CHLORIDE SERPL-SCNC: 110 MMOL/L (ref 98–112)
CO2 SERPL-SCNC: 23 MMOL/L (ref 21–32)
CREAT BLD-MCNC: 4.4 MG/DL
DEPRECATED RDW RBC AUTO: 46.9 FL (ref 35.1–46.3)
EGFRCR SERPLBLD CKD-EPI 2021: 10 ML/MIN/1.73M2 (ref 60–?)
EOSINOPHIL # BLD AUTO: 0.28 X10(3) UL (ref 0–0.7)
EOSINOPHIL NFR BLD AUTO: 3.3 %
ERYTHROCYTE [DISTWIDTH] IN BLOOD BY AUTOMATED COUNT: 13.6 % (ref 11–15)
GLUCOSE BLD-MCNC: 110 MG/DL (ref 70–99)
GLUCOSE BLDC GLUCOMTR-MCNC: 116 MG/DL (ref 70–99)
GLUCOSE BLDC GLUCOMTR-MCNC: 126 MG/DL (ref 70–99)
GLUCOSE BLDC GLUCOMTR-MCNC: 143 MG/DL (ref 70–99)
GLUCOSE BLDC GLUCOMTR-MCNC: 166 MG/DL (ref 70–99)
GLUCOSE BLDC GLUCOMTR-MCNC: 275 MG/DL (ref 70–99)
HCT VFR BLD AUTO: 29.8 %
HGB BLD-MCNC: 9.9 G/DL
IMM GRANULOCYTES # BLD AUTO: 0.02 X10(3) UL (ref 0–1)
IMM GRANULOCYTES NFR BLD: 0.2 %
LYMPHOCYTES # BLD AUTO: 1.69 X10(3) UL (ref 1–4)
LYMPHOCYTES NFR BLD AUTO: 20.2 %
MCH RBC QN AUTO: 31.6 PG (ref 26–34)
MCHC RBC AUTO-ENTMCNC: 33.2 G/DL (ref 31–37)
MCV RBC AUTO: 95.2 FL
MONOCYTES # BLD AUTO: 0.4 X10(3) UL (ref 0.1–1)
MONOCYTES NFR BLD AUTO: 4.8 %
NEUTROPHILS # BLD AUTO: 5.94 X10 (3) UL (ref 1.5–7.7)
NEUTROPHILS # BLD AUTO: 5.94 X10(3) UL (ref 1.5–7.7)
NEUTROPHILS NFR BLD AUTO: 71 %
OSMOLALITY SERPL CALC.SUM OF ELEC: 308 MOSM/KG (ref 275–295)
PHOSPHATE SERPL-MCNC: 4.9 MG/DL (ref 2.5–4.9)
PLATELET # BLD AUTO: 206 10(3)UL (ref 150–450)
POTASSIUM SERPL-SCNC: 3.8 MMOL/L (ref 3.5–5.1)
RBC # BLD AUTO: 3.13 X10(6)UL
SODIUM SERPL-SCNC: 141 MMOL/L (ref 136–145)
WBC # BLD AUTO: 8.4 X10(3) UL (ref 4–11)

## 2023-09-23 PROCEDURE — 99232 SBSQ HOSP IP/OBS MODERATE 35: CPT | Performed by: INTERNAL MEDICINE

## 2023-09-23 RX ORDER — CLONIDINE HYDROCHLORIDE 0.1 MG/1
0.3 TABLET ORAL 3 TIMES DAILY
Status: DISCONTINUED | OUTPATIENT
Start: 2023-09-23 | End: 2023-09-28

## 2023-09-23 RX ORDER — FUROSEMIDE 10 MG/ML
40 INJECTION INTRAMUSCULAR; INTRAVENOUS ONCE
Status: COMPLETED | OUTPATIENT
Start: 2023-09-23 | End: 2023-09-23

## 2023-09-23 NOTE — PLAN OF CARE
Problem: Patient Centered Care  Goal: Patient preferences are identified and integrated in the patient's plan of care  Description: Interventions:  - What would you like us to know as we care for you?  Pt is very grateful to be alive  - Provide timely, complete, and accurate information to patient/family  - Incorporate patient and family knowledge, values, beliefs, and cultural backgrounds into the planning and delivery of care  - Encourage patient/family to participate in care and decision-making at the level they choose  - Honor patient and family perspectives and choices  Outcome: Progressing     Problem: RESPIRATORY - ADULT  Goal: Achieves optimal ventilation and oxygenation  Description: INTERVENTIONS:  - Assess for changes in respiratory status  - Assess for changes in mentation and behavior  - Position to facilitate oxygenation and minimize respiratory effort  - Oxygen supplementation based on oxygen saturation or ABGs  - Provide Smoking Cessation handout, if applicable  - Encourage broncho-pulmonary hygiene including cough, deep breathe, Incentive Spirometry  - Assess the need for suctioning and perform as needed  - Assess and instruct to report SOB or any respiratory difficulty  - Respiratory Therapy support as indicated  - Manage/alleviate anxiety  - Monitor for signs/symptoms of CO2 retention  Outcome: Progressing     Problem: Patient/Family Goals  Goal: Patient/Family Long Term Goal  Description: Patient's Long Term Goal: return to baseline ADL     Interventions:  - healthy diet, maintain regular medications   - See additional Care Plan goals for specific interventions  Outcome: Progressing  Goal: Patient/Family Short Term Goal  Description: Patient's Short Term Goal: discharge home     Interventions:   - cardiac meds / PT & OT   - See additional Care Plan goals for specific interventions  Outcome: Progressing     Problem: SAFETY ADULT - FALL  Goal: Free from fall injury  Description: INTERVENTIONS:  - Assess pt frequently for physical needs  - Identify cognitive and physical deficits and behaviors that affect risk of falls. - [place] fall precautions as indicated by assessment.  - Educate pt/family on patient safety including physical limitations  - Instruct pt to call for assistance with activity based on assessment  - Modify environment to reduce risk of injury  - Provide assistive devices as appropriate  - Consider OT/PT consult to assist with strengthening/mobility  - Encourage toileting schedule  Outcome: Progressing     Problem: CARDIOVASCULAR - ADULT  Goal: Maintains optimal cardiac output and hemodynamic stability  Description: INTERVENTIONS:  - Monitor vital signs, rhythm, and trends  - Monitor for bleeding, hypotension and signs of decreased cardiac output  - Evaluate effectiveness of vasoactive medications to optimize hemodynamic stability  - Monitor arterial and/or venous puncture sites for bleeding and/or hematoma  - Assess quality of pulses, skin color and temperature  - Assess for signs of decreased coronary artery perfusion - ex.  Angina  - Evaluate fluid balance, assess for edema, trend weights  Outcome: Progressing  Goal: Absence of cardiac arrhythmias or at baseline  Description: INTERVENTIONS:  - Continuous cardiac monitoring, monitor vital signs, obtain 12 lead EKG if indicated  - Evaluate effectiveness of antiarrhythmic and heart rate control medications as ordered  - Initiate emergency measures for life threatening arrhythmias  - Monitor electrolytes and administer replacement therapy as ordered  Outcome: Progressing     Problem: GASTROINTESTINAL - ADULT  Goal: Minimal or absence of nausea and vomiting  Description: INTERVENTIONS:  - Maintain adequate hydration with IV or PO as ordered and tolerated  - Nasogastric tube to low intermittent suction as ordered  - Evaluate effectiveness of ordered antiemetic medications  - Provide nonpharmacologic comfort measures as appropriate  - Advance diet as tolerated, if ordered  - Obtain nutritional consult as needed  - Evaluate fluid balance  Outcome: Progressing  Goal: Maintains or returns to baseline bowel function  Description: INTERVENTIONS:  - Assess bowel function  - Maintain adequate hydration with IV or PO as ordered and tolerated  - Evaluate effectiveness of GI medications  - Encourage mobilization and activity  - Obtain nutritional consult as needed  - Establish a toileting routine/schedule  - Consider collaborating with pharmacy to review patient's medication profile  Outcome: Progressing     Problem: METABOLIC/FLUID AND ELECTROLYTES - ADULT  Goal: Glucose maintained within prescribed range  Description: INTERVENTIONS:  - Monitor Blood Glucose as ordered  - Assess for signs and symptoms of hyperglycemia and hypoglycemia  - Administer ordered medications to maintain glucose within target range  - Assess barriers to adequate nutritional intake and initiate nutrition consult as needed  - Instruct patient on self management of diabetes  Outcome: Progressing  Goal: Electrolytes maintained within normal limits  Description: INTERVENTIONS:  - Monitor labs and rhythm and assess patient for signs and symptoms of electrolyte imbalances  - Administer electrolyte replacement as ordered  - Monitor response to electrolyte replacements, including rhythm and repeat lab results as appropriate  - Fluid restriction as ordered  - Instruct patient on fluid and nutrition restrictions as appropriate  Outcome: Progressing  Goal: Hemodynamic stability and optimal renal function maintained  Description: INTERVENTIONS:  - Monitor labs and assess for signs and symptoms of volume excess or deficit  - Monitor intake, output and patient weight  - Monitor urine specific gravity, serum osmolarity and serum sodium as indicated or ordered  - Monitor response to interventions for patient's volume status, including labs, urine output, blood pressure (other measures as available)  - Encourage oral intake as appropriate  - Instruct patient on fluid and nutrition restrictions as appropriate  Outcome: Progressing     Problem: SKIN/TISSUE INTEGRITY - ADULT  Goal: Skin integrity remains intact  Description: INTERVENTIONS  - Assess and document risk factors for pressure ulcer development  - Assess and document skin integrity  - Monitor for areas of redness and/or skin breakdown  - Initiate interventions, skin care algorithm/standards of care as needed  Outcome: Progressing  Goal: Incision(s), wounds(s) or drain site(s) healing without S/S of infection  Description: INTERVENTIONS:  - Assess and document risk factors for pressure ulcer development  - Assess and document skin integrity  - Assess and document dressing/incision, wound bed, drain sites and surrounding tissue  - Implement wound care per orders  - Initiate isolation precautions as appropriate  - Initiate Pressure Ulcer prevention bundle as indicated  Outcome: Progressing

## 2023-09-23 NOTE — PLAN OF CARE
Pt is drowsy and Ox3. Pt on 6L HFNC. Pt given PRN hydralazine and lopressor for hypertension, see eMAR. Pt safety maintained, bed in lowest position and call light within reach. Pt updated on plan of care and all questions answered. Problem: Patient Centered Care  Goal: Patient preferences are identified and integrated in the patient's plan of care  Description: Interventions:  - What would you like us to know as we care for you?  Pt is very grateful to be alive  - Provide timely, complete, and accurate information to patient/family  - Incorporate patient and family knowledge, values, beliefs, and cultural backgrounds into the planning and delivery of care  - Encourage patient/family to participate in care and decision-making at the level they choose  - Honor patient and family perspectives and choices  Outcome: Progressing     Problem: RESPIRATORY - ADULT  Goal: Achieves optimal ventilation and oxygenation  Description: INTERVENTIONS:  - Assess for changes in respiratory status  - Assess for changes in mentation and behavior  - Position to facilitate oxygenation and minimize respiratory effort  - Oxygen supplementation based on oxygen saturation or ABGs  - Provide Smoking Cessation handout, if applicable  - Encourage broncho-pulmonary hygiene including cough, deep breathe, Incentive Spirometry  - Assess the need for suctioning and perform as needed  - Assess and instruct to report SOB or any respiratory difficulty  - Respiratory Therapy support as indicated  - Manage/alleviate anxiety  - Monitor for signs/symptoms of CO2 retention  Outcome: Progressing     Problem: Patient/Family Goals  Goal: Patient/Family Long Term Goal  Description: Patient's Long Term Goal: return to baseline ADL     Interventions:  - healthy diet, maintain regular medications   - See additional Care Plan goals for specific interventions  Outcome: Progressing  Goal: Patient/Family Short Term Goal  Description: Patient's Short Term Goal: discharge home     Interventions:   - cardiac meds / PT & OT   - See additional Care Plan goals for specific interventions  Outcome: Progressing     Problem: SAFETY ADULT - FALL  Goal: Free from fall injury  Description: INTERVENTIONS:  - Assess pt frequently for physical needs  - Identify cognitive and physical deficits and behaviors that affect risk of falls. - [place] fall precautions as indicated by assessment.  - Educate pt/family on patient safety including physical limitations  - Instruct pt to call for assistance with activity based on assessment  - Modify environment to reduce risk of injury  - Provide assistive devices as appropriate  - Consider OT/PT consult to assist with strengthening/mobility  - Encourage toileting schedule  Outcome: Progressing     Problem: CARDIOVASCULAR - ADULT  Goal: Maintains optimal cardiac output and hemodynamic stability  Description: INTERVENTIONS:  - Monitor vital signs, rhythm, and trends  - Monitor for bleeding, hypotension and signs of decreased cardiac output  - Evaluate effectiveness of vasoactive medications to optimize hemodynamic stability  - Monitor arterial and/or venous puncture sites for bleeding and/or hematoma  - Assess quality of pulses, skin color and temperature  - Assess for signs of decreased coronary artery perfusion - ex.  Angina  - Evaluate fluid balance, assess for edema, trend weights  Outcome: Progressing  Goal: Absence of cardiac arrhythmias or at baseline  Description: INTERVENTIONS:  - Continuous cardiac monitoring, monitor vital signs, obtain 12 lead EKG if indicated  - Evaluate effectiveness of antiarrhythmic and heart rate control medications as ordered  - Initiate emergency measures for life threatening arrhythmias  - Monitor electrolytes and administer replacement therapy as ordered  Outcome: Progressing     Problem: GASTROINTESTINAL - ADULT  Goal: Minimal or absence of nausea and vomiting  Description: INTERVENTIONS:  - Maintain adequate hydration with IV or PO as ordered and tolerated  - Nasogastric tube to low intermittent suction as ordered  - Evaluate effectiveness of ordered antiemetic medications  - Provide nonpharmacologic comfort measures as appropriate  - Advance diet as tolerated, if ordered  - Obtain nutritional consult as needed  - Evaluate fluid balance  Outcome: Progressing  Goal: Maintains or returns to baseline bowel function  Description: INTERVENTIONS:  - Assess bowel function  - Maintain adequate hydration with IV or PO as ordered and tolerated  - Evaluate effectiveness of GI medications  - Encourage mobilization and activity  - Obtain nutritional consult as needed  - Establish a toileting routine/schedule  - Consider collaborating with pharmacy to review patient's medication profile  Outcome: Progressing  Goal: Maintains adequate nutritional intake (undernourished)  Description: INTERVENTIONS:  - Monitor percentage of each meal consumed  - Identify factors contributing to decreased intake, treat as appropriate  - Assist with meals as needed  - Monitor I&O, WT and lab values  - Obtain nutritional consult as needed  - Optimize oral hygiene and moisture  - Encourage food from home; allow for food preferences  - Enhance eating environment  Outcome: Progressing     Problem: METABOLIC/FLUID AND ELECTROLYTES - ADULT  Goal: Glucose maintained within prescribed range  Description: INTERVENTIONS:  - Monitor Blood Glucose as ordered  - Assess for signs and symptoms of hyperglycemia and hypoglycemia  - Administer ordered medications to maintain glucose within target range  - Assess barriers to adequate nutritional intake and initiate nutrition consult as needed  - Instruct patient on self management of diabetes  Outcome: Progressing  Goal: Electrolytes maintained within normal limits  Description: INTERVENTIONS:  - Monitor labs and rhythm and assess patient for signs and symptoms of electrolyte imbalances  - Administer electrolyte replacement as ordered  - Monitor response to electrolyte replacements, including rhythm and repeat lab results as appropriate  - Fluid restriction as ordered  - Instruct patient on fluid and nutrition restrictions as appropriate  Outcome: Progressing  Goal: Hemodynamic stability and optimal renal function maintained  Description: INTERVENTIONS:  - Monitor labs and assess for signs and symptoms of volume excess or deficit  - Monitor intake, output and patient weight  - Monitor urine specific gravity, serum osmolarity and serum sodium as indicated or ordered  - Monitor response to interventions for patient's volume status, including labs, urine output, blood pressure (other measures as available)  - Encourage oral intake as appropriate  - Instruct patient on fluid and nutrition restrictions as appropriate  Outcome: Progressing     Problem: SKIN/TISSUE INTEGRITY - ADULT  Goal: Skin integrity remains intact  Description: INTERVENTIONS  - Assess and document risk factors for pressure ulcer development  - Assess and document skin integrity  - Monitor for areas of redness and/or skin breakdown  - Initiate interventions, skin care algorithm/standards of care as needed  Outcome: Progressing  Goal: Incision(s), wounds(s) or drain site(s) healing without S/S of infection  Description: INTERVENTIONS:  - Assess and document risk factors for pressure ulcer development  - Assess and document skin integrity  - Assess and document dressing/incision, wound bed, drain sites and surrounding tissue  - Implement wound care per orders  - Initiate isolation precautions as appropriate  - Initiate Pressure Ulcer prevention bundle as indicated  Outcome: Progressing

## 2023-09-23 NOTE — PLAN OF CARE
Pt is a&ox4, lethargic. Pt on HFNC at 6L. Pt given hydralazine prn for hypertension. Jorge L Durie in place. Pt reports no pain. Pt safety maintained, bed in lowest position, wheels locked, call light within reach. Pt updated on POC and all questions answered. Problem: Patient Centered Care  Goal: Patient preferences are identified and integrated in the patient's plan of care  Description: Interventions:  - What would you like us to know as we care for you?  Pt is very grateful to be alive  - Provide timely, complete, and accurate information to patient/family  - Incorporate patient and family knowledge, values, beliefs, and cultural backgrounds into the planning and delivery of care  - Encourage patient/family to participate in care and decision-making at the level they choose  - Honor patient and family perspectives and choices  9/23/2023 1407 by Scott Razo RN  Outcome: Progressing  9/23/2023 1407 by Scott Razo RN  Outcome: Progressing     Problem: RESPIRATORY - ADULT  Goal: Achieves optimal ventilation and oxygenation  Description: INTERVENTIONS:  - Assess for changes in respiratory status  - Assess for changes in mentation and behavior  - Position to facilitate oxygenation and minimize respiratory effort  - Oxygen supplementation based on oxygen saturation or ABGs  - Provide Smoking Cessation handout, if applicable  - Encourage broncho-pulmonary hygiene including cough, deep breathe, Incentive Spirometry  - Assess the need for suctioning and perform as needed  - Assess and instruct to report SOB or any respiratory difficulty  - Respiratory Therapy support as indicated  - Manage/alleviate anxiety  - Monitor for signs/symptoms of CO2 retention  9/23/2023 1407 by Scott Razo RN  Outcome: Progressing  9/23/2023 1407 by Scott Razo RN  Outcome: Progressing     Problem: Patient/Family Goals  Goal: Patient/Family Long Term Goal  Description: Patient's Long Term Goal: return to baseline ADL     Interventions:  - healthy diet, maintain regular medications   - See additional Care Plan goals for specific interventions  9/23/2023 1407 by Harrison Medel RN  Outcome: Progressing  9/23/2023 1407 by Harrison Medel RN  Outcome: Progressing  Goal: Patient/Family Short Term Goal  Description: Patient's Short Term Goal: discharge home     Interventions:   - cardiac meds / PT & OT   - See additional Care Plan goals for specific interventions  9/23/2023 1407 by Harrison Medel RN  Outcome: Progressing  9/23/2023 1407 by Harrison Medel RN  Outcome: Progressing     Problem: SAFETY ADULT - FALL  Goal: Free from fall injury  Description: INTERVENTIONS:  - Assess pt frequently for physical needs  - Identify cognitive and physical deficits and behaviors that affect risk of falls. - [place] fall precautions as indicated by assessment.  - Educate pt/family on patient safety including physical limitations  - Instruct pt to call for assistance with activity based on assessment  - Modify environment to reduce risk of injury  - Provide assistive devices as appropriate  - Consider OT/PT consult to assist with strengthening/mobility  - Encourage toileting schedule  9/23/2023 1407 by Harrison Medel RN  Outcome: Progressing  9/23/2023 1407 by Harrison Medel RN  Outcome: Progressing     Problem: CARDIOVASCULAR - ADULT  Goal: Maintains optimal cardiac output and hemodynamic stability  Description: INTERVENTIONS:  - Monitor vital signs, rhythm, and trends  - Monitor for bleeding, hypotension and signs of decreased cardiac output  - Evaluate effectiveness of vasoactive medications to optimize hemodynamic stability  - Monitor arterial and/or venous puncture sites for bleeding and/or hematoma  - Assess quality of pulses, skin color and temperature  - Assess for signs of decreased coronary artery perfusion - ex.  Angina  - Evaluate fluid balance, assess for edema, trend weights  9/23/2023 1407 by Harrison Medel RN  Outcome: Progressing  9/23/2023 1407 by Scott Razo RN  Outcome: Progressing  Goal: Absence of cardiac arrhythmias or at baseline  Description: INTERVENTIONS:  - Continuous cardiac monitoring, monitor vital signs, obtain 12 lead EKG if indicated  - Evaluate effectiveness of antiarrhythmic and heart rate control medications as ordered  - Initiate emergency measures for life threatening arrhythmias  - Monitor electrolytes and administer replacement therapy as ordered  9/23/2023 1407 by Scott Razo RN  Outcome: Progressing  9/23/2023 1407 by Scott Razo RN  Outcome: Progressing     Problem: GASTROINTESTINAL - ADULT  Goal: Minimal or absence of nausea and vomiting  Description: INTERVENTIONS:  - Maintain adequate hydration with IV or PO as ordered and tolerated  - Nasogastric tube to low intermittent suction as ordered  - Evaluate effectiveness of ordered antiemetic medications  - Provide nonpharmacologic comfort measures as appropriate  - Advance diet as tolerated, if ordered  - Obtain nutritional consult as needed  - Evaluate fluid balance  9/23/2023 1407 by Scott Razo RN  Outcome: Progressing  9/23/2023 1407 by Scott Razo RN  Outcome: Progressing  Goal: Maintains or returns to baseline bowel function  Description: INTERVENTIONS:  - Assess bowel function  - Maintain adequate hydration with IV or PO as ordered and tolerated  - Evaluate effectiveness of GI medications  - Encourage mobilization and activity  - Obtain nutritional consult as needed  - Establish a toileting routine/schedule  - Consider collaborating with pharmacy to review patient's medication profile  9/23/2023 1407 by Scott Razo RN  Outcome: Progressing  9/23/2023 1407 by Scott Razo RN  Outcome: Progressing  Goal: Maintains adequate nutritional intake (undernourished)  Description: INTERVENTIONS:  - Monitor percentage of each meal consumed  - Identify factors contributing to decreased intake, treat as appropriate  - Assist with meals as needed  - Monitor I&O, WT and lab values  - Obtain nutritional consult as needed  - Optimize oral hygiene and moisture  - Encourage food from home; allow for food preferences  - Enhance eating environment  9/23/2023 1407 by Harrison Medel RN  Outcome: Progressing  9/23/2023 1407 by Harrsion Medel RN  Outcome: Progressing     Problem: METABOLIC/FLUID AND ELECTROLYTES - ADULT  Goal: Glucose maintained within prescribed range  Description: INTERVENTIONS:  - Monitor Blood Glucose as ordered  - Assess for signs and symptoms of hyperglycemia and hypoglycemia  - Administer ordered medications to maintain glucose within target range  - Assess barriers to adequate nutritional intake and initiate nutrition consult as needed  - Instruct patient on self management of diabetes  9/23/2023 1407 by Harrison Medel RN  Outcome: Progressing  9/23/2023 1407 by Harrison Medel RN  Outcome: Progressing  Goal: Electrolytes maintained within normal limits  Description: INTERVENTIONS:  - Monitor labs and rhythm and assess patient for signs and symptoms of electrolyte imbalances  - Administer electrolyte replacement as ordered  - Monitor response to electrolyte replacements, including rhythm and repeat lab results as appropriate  - Fluid restriction as ordered  - Instruct patient on fluid and nutrition restrictions as appropriate  9/23/2023 1407 by Harrison Medel RN  Outcome: Progressing  9/23/2023 1407 by Harrison Medel RN  Outcome: Progressing  Goal: Hemodynamic stability and optimal renal function maintained  Description: INTERVENTIONS:  - Monitor labs and assess for signs and symptoms of volume excess or deficit  - Monitor intake, output and patient weight  - Monitor urine specific gravity, serum osmolarity and serum sodium as indicated or ordered  - Monitor response to interventions for patient's volume status, including labs, urine output, blood pressure (other measures as available)  - Encourage oral intake as appropriate  - Instruct patient on fluid and nutrition restrictions as appropriate  9/23/2023 1407 by Grazyna Jenkins RN  Outcome: Progressing  9/23/2023 1407 by Grazyna Jenkins RN  Outcome: Progressing     Problem: SKIN/TISSUE INTEGRITY - ADULT  Goal: Skin integrity remains intact  Description: INTERVENTIONS  - Assess and document risk factors for pressure ulcer development  - Assess and document skin integrity  - Monitor for areas of redness and/or skin breakdown  - Initiate interventions, skin care algorithm/standards of care as needed  9/23/2023 1407 by Grazyna Jenkins RN  Outcome: Progressing  9/23/2023 1407 by Grazyna Jenkins RN  Outcome: Progressing  Goal: Incision(s), wounds(s) or drain site(s) healing without S/S of infection  Description: INTERVENTIONS:  - Assess and document risk factors for pressure ulcer development  - Assess and document skin integrity  - Assess and document dressing/incision, wound bed, drain sites and surrounding tissue  - Implement wound care per orders  - Initiate isolation precautions as appropriate  - Initiate Pressure Ulcer prevention bundle as indicated  9/23/2023 1407 by Grazyna Jenkins RN  Outcome: Progressing  9/23/2023 1407 by Grazyna Jenkins RN  Outcome: Progressing

## 2023-09-24 LAB
ALBUMIN SERPL-MCNC: 2.5 G/DL (ref 3.4–5)
ANION GAP SERPL CALC-SCNC: 10 MMOL/L (ref 0–18)
BASOPHILS # BLD AUTO: 0.06 X10(3) UL (ref 0–0.2)
BASOPHILS NFR BLD AUTO: 0.5 %
BUN BLD-MCNC: 60 MG/DL (ref 7–18)
BUN/CREAT SERPL: 12.8 (ref 10–20)
CALCIUM BLD-MCNC: 8.7 MG/DL (ref 8.5–10.1)
CHLORIDE SERPL-SCNC: 109 MMOL/L (ref 98–112)
CO2 SERPL-SCNC: 20 MMOL/L (ref 21–32)
CREAT BLD-MCNC: 4.69 MG/DL
DEPRECATED RDW RBC AUTO: 45.1 FL (ref 35.1–46.3)
EGFRCR SERPLBLD CKD-EPI 2021: 9 ML/MIN/1.73M2 (ref 60–?)
EOSINOPHIL # BLD AUTO: 0.23 X10(3) UL (ref 0–0.7)
EOSINOPHIL NFR BLD AUTO: 2.1 %
ERYTHROCYTE [DISTWIDTH] IN BLOOD BY AUTOMATED COUNT: 13.3 % (ref 11–15)
GLUCOSE BLD-MCNC: 139 MG/DL (ref 70–99)
GLUCOSE BLDC GLUCOMTR-MCNC: 137 MG/DL (ref 70–99)
GLUCOSE BLDC GLUCOMTR-MCNC: 150 MG/DL (ref 70–99)
GLUCOSE BLDC GLUCOMTR-MCNC: 201 MG/DL (ref 70–99)
HBV CORE AB SERPL QL IA: REACTIVE
HBV CORE IGM SER QL: REACTIVE
HBV SURFACE AB SER QL: REACTIVE
HBV SURFACE AB SERPL IA-ACNC: 983.43 MIU/ML
HBV SURFACE AG SER-ACNC: <0.1 [IU]/L
HBV SURFACE AG SERPL QL IA: NONREACTIVE
HCT VFR BLD AUTO: 32.2 %
HGB BLD-MCNC: 10.7 G/DL
IMM GRANULOCYTES # BLD AUTO: 0.05 X10(3) UL (ref 0–1)
IMM GRANULOCYTES NFR BLD: 0.4 %
LYMPHOCYTES # BLD AUTO: 1.31 X10(3) UL (ref 1–4)
LYMPHOCYTES NFR BLD AUTO: 11.7 %
MCH RBC QN AUTO: 31.1 PG (ref 26–34)
MCHC RBC AUTO-ENTMCNC: 33.2 G/DL (ref 31–37)
MCV RBC AUTO: 93.6 FL
MONOCYTES # BLD AUTO: 0.53 X10(3) UL (ref 0.1–1)
MONOCYTES NFR BLD AUTO: 4.7 %
NEUTROPHILS # BLD AUTO: 9 X10 (3) UL (ref 1.5–7.7)
NEUTROPHILS # BLD AUTO: 9 X10(3) UL (ref 1.5–7.7)
NEUTROPHILS NFR BLD AUTO: 80.6 %
OSMOLALITY SERPL CALC.SUM OF ELEC: 307 MOSM/KG (ref 275–295)
PHOSPHATE SERPL-MCNC: 5.5 MG/DL (ref 2.5–4.9)
PLATELET # BLD AUTO: 245 10(3)UL (ref 150–450)
POTASSIUM SERPL-SCNC: 4 MMOL/L (ref 3.5–5.1)
RBC # BLD AUTO: 3.44 X10(6)UL
SODIUM SERPL-SCNC: 139 MMOL/L (ref 136–145)
WBC # BLD AUTO: 11.2 X10(3) UL (ref 4–11)

## 2023-09-24 PROCEDURE — 99232 SBSQ HOSP IP/OBS MODERATE 35: CPT | Performed by: INTERNAL MEDICINE

## 2023-09-24 RX ORDER — HEPARIN SODIUM 1000 [USP'U]/ML
1.5 INJECTION, SOLUTION INTRAVENOUS; SUBCUTANEOUS
Status: DISCONTINUED | OUTPATIENT
Start: 2023-09-25 | End: 2023-09-26

## 2023-09-24 RX ORDER — FUROSEMIDE 10 MG/ML
40 INJECTION INTRAMUSCULAR; INTRAVENOUS ONCE
Status: COMPLETED | OUTPATIENT
Start: 2023-09-24 | End: 2023-09-24

## 2023-09-24 NOTE — PLAN OF CARE
Received pt AOx4 on 6L HFNC. Oxygen titrated down to 5L HFNC. Blood pressure poorly controlled overnight despite scheduled + PRN blood pressure medications. PRN hydralazine given x2. Metoprolol given x1. Dr. Ricardo Perez notified. Advised to give morning PO BP medications early. Documented in [facility] Bed locked in lowest position, safety maintained. Problem: RESPIRATORY - ADULT  Goal: Achieves optimal ventilation and oxygenation  Description: INTERVENTIONS:  - Assess for changes in respiratory status  - Assess for changes in mentation and behavior  - Position to facilitate oxygenation and minimize respiratory effort  - Oxygen supplementation based on oxygen saturation or ABGs  - Provide Smoking Cessation handout, if applicable  - Encourage broncho-pulmonary hygiene including cough, deep breathe, Incentive Spirometry  - Assess the need for suctioning and perform as needed  - Assess and instruct to report SOB or any respiratory difficulty  - Respiratory Therapy support as indicated  - Manage/alleviate anxiety  - Monitor for signs/symptoms of CO2 retention  Outcome: Progressing     Problem: CARDIOVASCULAR - ADULT  Goal: Maintains optimal cardiac output and hemodynamic stability  Description: INTERVENTIONS:  - Monitor vital signs, rhythm, and trends  - Monitor for bleeding, hypotension and signs of decreased cardiac output  - Evaluate effectiveness of vasoactive medications to optimize hemodynamic stability  - Monitor arterial and/or venous puncture sites for bleeding and/or hematoma  - Assess quality of pulses, skin color and temperature  - Assess for signs of decreased coronary artery perfusion - ex.  Angina  - Evaluate fluid balance, assess for edema, trend weights  Outcome: Progressing  Goal: Absence of cardiac arrhythmias or at baseline  Description: INTERVENTIONS:  - Continuous cardiac monitoring, monitor vital signs, obtain 12 lead EKG if indicated  - Evaluate effectiveness of antiarrhythmic and heart rate control medications as ordered  - Initiate emergency measures for life threatening arrhythmias  - Monitor electrolytes and administer replacement therapy as ordered  Outcome: Progressing

## 2023-09-24 NOTE — PROGRESS NOTES
Patient transferred to room 332, all belongings including IPAD and phone, two chargers, transported with patient. Son arrived at bedside as patient being transferred. Skin check done with second RN Saima Hollingsworth, patient has open area on right inner buttock, dressing changed this a.m. as ordered, generalized bruising. Reviewed plan of care with receiving RN Jimmy Hardin at midnight for HD catheter placement tomorrow, endorsed for nursing to call [facility]en tomorrow 9/25 after catheter has been placed.

## 2023-09-24 NOTE — PLAN OF CARE
Pt alert and oriented. Vitals stable. Tolerating 2.5L o2. Up to chair this afternoon. No complaints of pain. Problem: Patient Centered Care  Goal: Patient preferences are identified and integrated in the patient's plan of care  Description: Interventions:  - What would you like us to know as we care for you?  Pt is very grateful to be alive  - Provide timely, complete, and accurate information to patient/family  - Incorporate patient and family knowledge, values, beliefs, and cultural backgrounds into the planning and delivery of care  - Encourage patient/family to participate in care and decision-making at the level they choose  - Honor patient and family perspectives and choices  Outcome: Progressing     Problem: RESPIRATORY - ADULT  Goal: Achieves optimal ventilation and oxygenation  Description: INTERVENTIONS:  - Assess for changes in respiratory status  - Assess for changes in mentation and behavior  - Position to facilitate oxygenation and minimize respiratory effort  - Oxygen supplementation based on oxygen saturation or ABGs  - Provide Smoking Cessation handout, if applicable  - Encourage broncho-pulmonary hygiene including cough, deep breathe, Incentive Spirometry  - Assess the need for suctioning and perform as needed  - Assess and instruct to report SOB or any respiratory difficulty  - Respiratory Therapy support as indicated  - Manage/alleviate anxiety  - Monitor for signs/symptoms of CO2 retention  Outcome: Progressing     Problem: Patient/Family Goals  Goal: Patient/Family Long Term Goal  Description: Patient's Long Term Goal: return to baseline ADL     Interventions:  - healthy diet, maintain regular medications   - See additional Care Plan goals for specific interventions  Outcome: Progressing  Goal: Patient/Family Short Term Goal  Description: Patient's Short Term Goal: discharge home     Interventions:   - cardiac meds / PT & OT   - See additional Care Plan goals for specific interventions  Outcome: Progressing     Problem: SAFETY ADULT - FALL  Goal: Free from fall injury  Description: INTERVENTIONS:  - Assess pt frequently for physical needs  - Identify cognitive and physical deficits and behaviors that affect risk of falls. - [place] fall precautions as indicated by assessment.  - Educate pt/family on patient safety including physical limitations  - Instruct pt to call for assistance with activity based on assessment  - Modify environment to reduce risk of injury  - Provide assistive devices as appropriate  - Consider OT/PT consult to assist with strengthening/mobility  - Encourage toileting schedule  Outcome: Progressing     Problem: CARDIOVASCULAR - ADULT  Goal: Maintains optimal cardiac output and hemodynamic stability  Description: INTERVENTIONS:  - Monitor vital signs, rhythm, and trends  - Monitor for bleeding, hypotension and signs of decreased cardiac output  - Evaluate effectiveness of vasoactive medications to optimize hemodynamic stability  - Monitor arterial and/or venous puncture sites for bleeding and/or hematoma  - Assess quality of pulses, skin color and temperature  - Assess for signs of decreased coronary artery perfusion - ex.  Angina  - Evaluate fluid balance, assess for edema, trend weights  Outcome: Progressing  Goal: Absence of cardiac arrhythmias or at baseline  Description: INTERVENTIONS:  - Continuous cardiac monitoring, monitor vital signs, obtain 12 lead EKG if indicated  - Evaluate effectiveness of antiarrhythmic and heart rate control medications as ordered  - Initiate emergency measures for life threatening arrhythmias  - Monitor electrolytes and administer replacement therapy as ordered  Outcome: Progressing     Problem: GASTROINTESTINAL - ADULT  Goal: Minimal or absence of nausea and vomiting  Description: INTERVENTIONS:  - Maintain adequate hydration with IV or PO as ordered and tolerated  - Nasogastric tube to low intermittent suction as ordered  - Evaluate effectiveness of ordered antiemetic medications  - Provide nonpharmacologic comfort measures as appropriate  - Advance diet as tolerated, if ordered  - Obtain nutritional consult as needed  - Evaluate fluid balance  Outcome: Progressing  Goal: Maintains or returns to baseline bowel function  Description: INTERVENTIONS:  - Assess bowel function  - Maintain adequate hydration with IV or PO as ordered and tolerated  - Evaluate effectiveness of GI medications  - Encourage mobilization and activity  - Obtain nutritional consult as needed  - Establish a toileting routine/schedule  - Consider collaborating with pharmacy to review patient's medication profile  Outcome: Progressing  Goal: Maintains adequate nutritional intake (undernourished)  Description: INTERVENTIONS:  - Monitor percentage of each meal consumed  - Identify factors contributing to decreased intake, treat as appropriate  - Assist with meals as needed  - Monitor I&O, WT and lab values  - Obtain nutritional consult as needed  - Optimize oral hygiene and moisture  - Encourage food from home; allow for food preferences  - Enhance eating environment  Outcome: Progressing     Problem: METABOLIC/FLUID AND ELECTROLYTES - ADULT  Goal: Glucose maintained within prescribed range  Description: INTERVENTIONS:  - Monitor Blood Glucose as ordered  - Assess for signs and symptoms of hyperglycemia and hypoglycemia  - Administer ordered medications to maintain glucose within target range  - Assess barriers to adequate nutritional intake and initiate nutrition consult as needed  - Instruct patient on self management of diabetes  Outcome: Progressing  Goal: Electrolytes maintained within normal limits  Description: INTERVENTIONS:  - Monitor labs and rhythm and assess patient for signs and symptoms of electrolyte imbalances  - Administer electrolyte replacement as ordered  - Monitor response to electrolyte replacements, including rhythm and repeat lab results as appropriate  - Fluid restriction as ordered  - Instruct patient on fluid and nutrition restrictions as appropriate  Outcome: Progressing  Goal: Hemodynamic stability and optimal renal function maintained  Description: INTERVENTIONS:  - Monitor labs and assess for signs and symptoms of volume excess or deficit  - Monitor intake, output and patient weight  - Monitor urine specific gravity, serum osmolarity and serum sodium as indicated or ordered  - Monitor response to interventions for patient's volume status, including labs, urine output, blood pressure (other measures as available)  - Encourage oral intake as appropriate  - Instruct patient on fluid and nutrition restrictions as appropriate  Outcome: Progressing     Problem: SKIN/TISSUE INTEGRITY - ADULT  Goal: Skin integrity remains intact  Description: INTERVENTIONS  - Assess and document risk factors for pressure ulcer development  - Assess and document skin integrity  - Monitor for areas of redness and/or skin breakdown  - Initiate interventions, skin care algorithm/standards of care as needed  Outcome: Progressing  Goal: Incision(s), wounds(s) or drain site(s) healing without S/S of infection  Description: INTERVENTIONS:  - Assess and document risk factors for pressure ulcer development  - Assess and document skin integrity  - Assess and document dressing/incision, wound bed, drain sites and surrounding tissue  - Implement wound care per orders  - Initiate isolation precautions as appropriate  - Initiate Pressure Ulcer prevention bundle as indicated  Outcome: Progressing

## 2023-09-24 NOTE — PROGRESS NOTES
[facility] called, spoke with Sulma Ng, Hemodialysis scheduled for tomorrow 9/25, informed to be done later, patient will have catheter placed by IR tomorrow.

## 2023-09-25 ENCOUNTER — APPOINTMENT (OUTPATIENT)
Dept: INTERVENTIONAL RADIOLOGY/VASCULAR | Facility: HOSPITAL | Age: 68
End: 2023-09-25
Attending: INTERNAL MEDICINE
Payer: MEDICARE

## 2023-09-25 LAB
ALBUMIN SERPL-MCNC: 2.3 G/DL (ref 3.4–5)
ANION GAP SERPL CALC-SCNC: 11 MMOL/L (ref 0–18)
BASOPHILS # BLD AUTO: 0.05 X10(3) UL (ref 0–0.2)
BASOPHILS NFR BLD AUTO: 0.6 %
BUN BLD-MCNC: 62 MG/DL (ref 7–18)
BUN/CREAT SERPL: 12.5 (ref 10–20)
CALCIUM BLD-MCNC: 8.6 MG/DL (ref 8.5–10.1)
CHLORIDE SERPL-SCNC: 106 MMOL/L (ref 98–112)
CO2 SERPL-SCNC: 21 MMOL/L (ref 21–32)
CREAT BLD-MCNC: 4.95 MG/DL
DEPRECATED RDW RBC AUTO: 44.5 FL (ref 35.1–46.3)
EGFRCR SERPLBLD CKD-EPI 2021: 9 ML/MIN/1.73M2 (ref 60–?)
EOSINOPHIL # BLD AUTO: 0.17 X10(3) UL (ref 0–0.7)
EOSINOPHIL NFR BLD AUTO: 1.9 %
ERYTHROCYTE [DISTWIDTH] IN BLOOD BY AUTOMATED COUNT: 13.2 % (ref 11–15)
GLUCOSE BLD-MCNC: 120 MG/DL (ref 70–99)
GLUCOSE BLDC GLUCOMTR-MCNC: 116 MG/DL (ref 70–99)
GLUCOSE BLDC GLUCOMTR-MCNC: 143 MG/DL (ref 70–99)
GLUCOSE BLDC GLUCOMTR-MCNC: 153 MG/DL (ref 70–99)
GLUCOSE BLDC GLUCOMTR-MCNC: 169 MG/DL (ref 70–99)
HCT VFR BLD AUTO: 28 %
HGB BLD-MCNC: 9.4 G/DL
IMM GRANULOCYTES # BLD AUTO: 0.04 X10(3) UL (ref 0–1)
IMM GRANULOCYTES NFR BLD: 0.5 %
LYMPHOCYTES # BLD AUTO: 1.31 X10(3) UL (ref 1–4)
LYMPHOCYTES NFR BLD AUTO: 15 %
MCH RBC QN AUTO: 31.1 PG (ref 26–34)
MCHC RBC AUTO-ENTMCNC: 33.6 G/DL (ref 31–37)
MCV RBC AUTO: 92.7 FL
MONOCYTES # BLD AUTO: 0.64 X10(3) UL (ref 0.1–1)
MONOCYTES NFR BLD AUTO: 7.3 %
NEUTROPHILS # BLD AUTO: 6.55 X10 (3) UL (ref 1.5–7.7)
NEUTROPHILS # BLD AUTO: 6.55 X10(3) UL (ref 1.5–7.7)
NEUTROPHILS NFR BLD AUTO: 74.7 %
OSMOLALITY SERPL CALC.SUM OF ELEC: 305 MOSM/KG (ref 275–295)
PHOSPHATE SERPL-MCNC: 5 MG/DL (ref 2.5–4.9)
PLATELET # BLD AUTO: 234 10(3)UL (ref 150–450)
POTASSIUM SERPL-SCNC: 3.7 MMOL/L (ref 3.5–5.1)
RBC # BLD AUTO: 3.02 X10(6)UL
SODIUM SERPL-SCNC: 138 MMOL/L (ref 136–145)
WBC # BLD AUTO: 8.8 X10(3) UL (ref 4–11)

## 2023-09-25 PROCEDURE — 99233 SBSQ HOSP IP/OBS HIGH 50: CPT | Performed by: INTERNAL MEDICINE

## 2023-09-25 PROCEDURE — B5191ZA FLUOROSCOPY OF INFERIOR VENA CAVA USING LOW OSMOLAR CONTRAST, GUIDANCE: ICD-10-PCS | Performed by: RADIOLOGY

## 2023-09-25 PROCEDURE — 5A1D70Z PERFORMANCE OF URINARY FILTRATION, INTERMITTENT, LESS THAN 6 HOURS PER DAY: ICD-10-PCS | Performed by: INTERNAL MEDICINE

## 2023-09-25 PROCEDURE — 06H033Z INSERTION OF INFUSION DEVICE INTO INFERIOR VENA CAVA, PERCUTANEOUS APPROACH: ICD-10-PCS | Performed by: RADIOLOGY

## 2023-09-25 PROCEDURE — 0JH63XZ INSERTION OF TUNNELED VASCULAR ACCESS DEVICE INTO CHEST SUBCUTANEOUS TISSUE AND FASCIA, PERCUTANEOUS APPROACH: ICD-10-PCS | Performed by: RADIOLOGY

## 2023-09-25 RX ORDER — HEPARIN SODIUM (PORCINE) LOCK FLUSH IV SOLN 100 UNIT/ML 100 UNIT/ML
SOLUTION INTRAVENOUS
Status: DISCONTINUED
Start: 2023-09-25 | End: 2023-09-25 | Stop reason: WASHOUT

## 2023-09-25 RX ORDER — HEPARIN SODIUM 5000 [USP'U]/ML
5000 INJECTION, SOLUTION INTRAVENOUS; SUBCUTANEOUS EVERY 8 HOURS
Status: DISCONTINUED | OUTPATIENT
Start: 2023-09-26 | End: 2023-09-29

## 2023-09-25 RX ORDER — LIDOCAINE HYDROCHLORIDE 20 MG/ML
INJECTION, SOLUTION INFILTRATION; PERINEURAL
Status: COMPLETED
Start: 2023-09-25 | End: 2023-09-25

## 2023-09-25 RX ORDER — CEFAZOLIN SODIUM/WATER 2 G/20 ML
SYRINGE (ML) INTRAVENOUS
Status: COMPLETED
Start: 2023-09-25 | End: 2023-09-25

## 2023-09-25 RX ORDER — HEPARIN SODIUM 1000 [USP'U]/ML
INJECTION, SOLUTION INTRAVENOUS; SUBCUTANEOUS
Status: COMPLETED
Start: 2023-09-25 | End: 2023-09-25

## 2023-09-25 RX ORDER — MIDAZOLAM HYDROCHLORIDE 1 MG/ML
INJECTION INTRAMUSCULAR; INTRAVENOUS
Status: COMPLETED
Start: 2023-09-25 | End: 2023-09-25

## 2023-09-25 NOTE — PLAN OF CARE
Autumn Hoover is resting comfortably in the bed, alert and oriented x 4, on 2L NC, no home oxygen at baseline, NSR on tele, on Brillinta for anticoagulation, ambulating with 1 assist x walker, incontinent x 2, purewick in place, Accucheck ACHS, permacath placed, oozing at the site, reinforced with pressure dressing per HD RN, HD completed- 3L removed. Safety precautions in place, call light within easy reach. Plan to discharge to [facility] pending medical clearance. Problem: Patient Centered Care  Goal: Patient preferences are identified and integrated in the patient's plan of care  Description: Interventions:  - What would you like us to know as we care for you?  Pt is very grateful to be alive  - Provide timely, complete, and accurate information to patient/family  - Incorporate patient and family knowledge, values, beliefs, and cultural backgrounds into the planning and delivery of care  - Encourage patient/family to participate in care and decision-making at the level they choose  - Honor patient and family perspectives and choices  Outcome: Progressing     Problem: RESPIRATORY - ADULT  Goal: Achieves optimal ventilation and oxygenation  Description: INTERVENTIONS:  - Assess for changes in respiratory status  - Assess for changes in mentation and behavior  - Position to facilitate oxygenation and minimize respiratory effort  - Oxygen supplementation based on oxygen saturation or ABGs  - Provide Smoking Cessation handout, if applicable  - Encourage broncho-pulmonary hygiene including cough, deep breathe, Incentive Spirometry  - Assess the need for suctioning and perform as needed  - Assess and instruct to report SOB or any respiratory difficulty  - Respiratory Therapy support as indicated  - Manage/alleviate anxiety  - Monitor for signs/symptoms of CO2 retention  Outcome: Progressing     Problem: Patient/Family Goals  Goal: Patient/Family Long Term Goal  Description: Patient's Long Term Goal: return to baseline ADL Interventions:  - healthy diet, maintain regular medications   - See additional Care Plan goals for specific interventions  Outcome: Progressing  Goal: Patient/Family Short Term Goal  Description: Patient's Short Term Goal: discharge home     Interventions:   - cardiac meds / PT & OT   - See additional Care Plan goals for specific interventions  Outcome: Progressing     Problem: SAFETY ADULT - FALL  Goal: Free from fall injury  Description: INTERVENTIONS:  - Assess pt frequently for physical needs  - Identify cognitive and physical deficits and behaviors that affect risk of falls. - [place] fall precautions as indicated by assessment.  - Educate pt/family on patient safety including physical limitations  - Instruct pt to call for assistance with activity based on assessment  - Modify environment to reduce risk of injury  - Provide assistive devices as appropriate  - Consider OT/PT consult to assist with strengthening/mobility  - Encourage toileting schedule  Outcome: Progressing     Problem: CARDIOVASCULAR - ADULT  Goal: Maintains optimal cardiac output and hemodynamic stability  Description: INTERVENTIONS:  - Monitor vital signs, rhythm, and trends  - Monitor for bleeding, hypotension and signs of decreased cardiac output  - Evaluate effectiveness of vasoactive medications to optimize hemodynamic stability  - Monitor arterial and/or venous puncture sites for bleeding and/or hematoma  - Assess quality of pulses, skin color and temperature  - Assess for signs of decreased coronary artery perfusion - ex.  Angina  - Evaluate fluid balance, assess for edema, trend weights  Outcome: Progressing  Goal: Absence of cardiac arrhythmias or at baseline  Description: INTERVENTIONS:  - Continuous cardiac monitoring, monitor vital signs, obtain 12 lead EKG if indicated  - Evaluate effectiveness of antiarrhythmic and heart rate control medications as ordered  - Initiate emergency measures for life threatening arrhythmias  - Monitor electrolytes and administer replacement therapy as ordered  Outcome: Progressing     Problem: GASTROINTESTINAL - ADULT  Goal: Minimal or absence of nausea and vomiting  Description: INTERVENTIONS:  - Maintain adequate hydration with IV or PO as ordered and tolerated  - Nasogastric tube to low intermittent suction as ordered  - Evaluate effectiveness of ordered antiemetic medications  - Provide nonpharmacologic comfort measures as appropriate  - Advance diet as tolerated, if ordered  - Obtain nutritional consult as needed  - Evaluate fluid balance  Outcome: Progressing  Goal: Maintains or returns to baseline bowel function  Description: INTERVENTIONS:  - Assess bowel function  - Maintain adequate hydration with IV or PO as ordered and tolerated  - Evaluate effectiveness of GI medications  - Encourage mobilization and activity  - Obtain nutritional consult as needed  - Establish a toileting routine/schedule  - Consider collaborating with pharmacy to review patient's medication profile  Outcome: Progressing  Goal: Maintains adequate nutritional intake (undernourished)  Description: INTERVENTIONS:  - Monitor percentage of each meal consumed  - Identify factors contributing to decreased intake, treat as appropriate  - Assist with meals as needed  - Monitor I&O, WT and lab values  - Obtain nutritional consult as needed  - Optimize oral hygiene and moisture  - Encourage food from home; allow for food preferences  - Enhance eating environment  Outcome: Progressing     Problem: METABOLIC/FLUID AND ELECTROLYTES - ADULT  Goal: Glucose maintained within prescribed range  Description: INTERVENTIONS:  - Monitor Blood Glucose as ordered  - Assess for signs and symptoms of hyperglycemia and hypoglycemia  - Administer ordered medications to maintain glucose within target range  - Assess barriers to adequate nutritional intake and initiate nutrition consult as needed  - Instruct patient on self management of diabetes  Outcome: Progressing  Goal: Electrolytes maintained within normal limits  Description: INTERVENTIONS:  - Monitor labs and rhythm and assess patient for signs and symptoms of electrolyte imbalances  - Administer electrolyte replacement as ordered  - Monitor response to electrolyte replacements, including rhythm and repeat lab results as appropriate  - Fluid restriction as ordered  - Instruct patient on fluid and nutrition restrictions as appropriate  Outcome: Progressing  Goal: Hemodynamic stability and optimal renal function maintained  Description: INTERVENTIONS:  - Monitor labs and assess for signs and symptoms of volume excess or deficit  - Monitor intake, output and patient weight  - Monitor urine specific gravity, serum osmolarity and serum sodium as indicated or ordered  - Monitor response to interventions for patient's volume status, including labs, urine output, blood pressure (other measures as available)  - Encourage oral intake as appropriate  - Instruct patient on fluid and nutrition restrictions as appropriate  Outcome: Progressing     Problem: SKIN/TISSUE INTEGRITY - ADULT  Goal: Skin integrity remains intact  Description: INTERVENTIONS  - Assess and document risk factors for pressure ulcer development  - Assess and document skin integrity  - Monitor for areas of redness and/or skin breakdown  - Initiate interventions, skin care algorithm/standards of care as needed  Outcome: Progressing  Goal: Incision(s), wounds(s) or drain site(s) healing without S/S of infection  Description: INTERVENTIONS:  - Assess and document risk factors for pressure ulcer development  - Assess and document skin integrity  - Assess and document dressing/incision, wound bed, drain sites and surrounding tissue  - Implement wound care per orders  - Initiate isolation precautions as appropriate  - Initiate Pressure Ulcer prevention bundle as indicated  Outcome: Progressing

## 2023-09-25 NOTE — PROCEDURES
[facility]  Procedure Note    Ria Singh Patient Status:  Inpatient    1955 MRN [id number]   Location [facility] 3W/SW Attending Ky Kerr, [address] [facility] Day # 6 PCP Karol Malcolm MD     Procedure: Tunneled hemodialysis catheter insertion    Pre-Procedure Diagnosis: NSTEMI (non-ST elevated myocardial infarction) ([address] [I21.4]  Acute respiratory failure with hypoxia (HCC) [J96.01]      Post-Procedure Diagnosis: NSTEMI (non-ST elevated myocardial infarction) ([address] [I21.4]  Acute respiratory failure with hypoxia ([address] [J96.01]    Anesthesia:  Local and Sedation    Findings:  Right internal jugular vein accessed under ultrasound. 23cm tunneled hemodialysis catheter placed. Specimens: None    Blood Loss:  5mL      Complications:  None    Plan:  OK to use dialysis catheter.     Bhavesh Winter MD  2023

## 2023-09-25 NOTE — PLAN OF CARE
Problem: RESPIRATORY - ADULT  Goal: Achieves optimal ventilation and oxygenation  Description: INTERVENTIONS:  - Assess for changes in respiratory status  - Assess for changes in mentation and behavior  - Position to facilitate oxygenation and minimize respiratory effort  - Oxygen supplementation based on oxygen saturation or ABGs  - Provide Smoking Cessation handout, if applicable  - Encourage broncho-pulmonary hygiene including cough, deep breathe, Incentive Spirometry  - Assess the need for suctioning and perform as needed  - Assess and instruct to report SOB or any respiratory difficulty  - Respiratory Therapy support as indicated  - Manage/alleviate anxiety  - Monitor for signs/symptoms of CO2 retention  Outcome: Progressing     Problem: SAFETY ADULT - FALL  Goal: Free from fall injury  Description: INTERVENTIONS:  - Assess pt frequently for physical needs  - Identify cognitive and physical deficits and behaviors that affect risk of falls. - [place] fall precautions as indicated by assessment.  - Educate pt/family on patient safety including physical limitations  - Instruct pt to call for assistance with activity based on assessment  - Modify environment to reduce risk of injury  - Provide assistive devices as appropriate  - Consider OT/PT consult to assist with strengthening/mobility  - Encourage toileting schedule  Outcome: Progressing     Problem: CARDIOVASCULAR - ADULT  Goal: Maintains optimal cardiac output and hemodynamic stability  Description: INTERVENTIONS:  - Monitor vital signs, rhythm, and trends  - Monitor for bleeding, hypotension and signs of decreased cardiac output  - Evaluate effectiveness of vasoactive medications to optimize hemodynamic stability  - Monitor arterial and/or venous puncture sites for bleeding and/or hematoma  - Assess quality of pulses, skin color and temperature  - Assess for signs of decreased coronary artery perfusion - ex.  Angina  - Evaluate fluid balance, assess for edema, trend weights  Outcome: Progressing  Goal: Absence of cardiac arrhythmias or at baseline  Description: INTERVENTIONS:  - Continuous cardiac monitoring, monitor vital signs, obtain 12 lead EKG if indicated  - Evaluate effectiveness of antiarrhythmic and heart rate control medications as ordered  - Initiate emergency measures for life threatening arrhythmias  - Monitor electrolytes and administer replacement therapy as ordered  Outcome: Progressing     Problem: GASTROINTESTINAL - ADULT  Goal: Minimal or absence of nausea and vomiting  Description: INTERVENTIONS:  - Maintain adequate hydration with IV or PO as ordered and tolerated  - Nasogastric tube to low intermittent suction as ordered  - Evaluate effectiveness of ordered antiemetic medications  - Provide nonpharmacologic comfort measures as appropriate  - Advance diet as tolerated, if ordered  - Obtain nutritional consult as needed  - Evaluate fluid balance  Outcome: Progressing  Goal: Maintains or returns to baseline bowel function  Description: INTERVENTIONS:  - Assess bowel function  - Maintain adequate hydration with IV or PO as ordered and tolerated  - Evaluate effectiveness of GI medications  - Encourage mobilization and activity  - Obtain nutritional consult as needed  - Establish a toileting routine/schedule  - Consider collaborating with pharmacy to review patient's medication profile  Outcome: Progressing     Problem: METABOLIC/FLUID AND ELECTROLYTES - ADULT  Goal: Glucose maintained within prescribed range  Description: INTERVENTIONS:  - Monitor Blood Glucose as ordered  - Assess for signs and symptoms of hyperglycemia and hypoglycemia  - Administer ordered medications to maintain glucose within target range  - Assess barriers to adequate nutritional intake and initiate nutrition consult as needed  - Instruct patient on self management of diabetes  Outcome: Progressing  Goal: Electrolytes maintained within normal limits  Description: INTERVENTIONS:  - Monitor labs and rhythm and assess patient for signs and symptoms of electrolyte imbalances  - Administer electrolyte replacement as ordered  - Monitor response to electrolyte replacements, including rhythm and repeat lab results as appropriate  - Fluid restriction as ordered  - Instruct patient on fluid and nutrition restrictions as appropriate  Outcome: Progressing     Problem: SKIN/TISSUE INTEGRITY - ADULT  Goal: Skin integrity remains intact  Description: INTERVENTIONS  - Assess and document risk factors for pressure ulcer development  - Assess and document skin integrity  - Monitor for areas of redness and/or skin breakdown  - Initiate interventions, skin care algorithm/standards of care as needed  Outcome: Progressing    Received awake,oriented. Slept fairly during the night. No complaints of pain. Febrile this morning,BP also elevated. Hydralazine IVP givne. Kept on NPO post midnight for dialysis catheter insertion today. Day RN to call [facility] once HD catheter in place. Will continue to monitor patient.

## 2023-09-25 NOTE — PRE-SEDATION ASSESSMENT
[facility]  IR Pre-Procedure Sedation Assessment    History of snoring or sleep or apnea? No    History of previous problems with anesthesia or sedation  No    Physical Findings:  Neck: nl ROM  CV: regular rate  PULM: normal respiratory rate/effort    Mallampati Score:  II (hard and soft palate, upper portion of tonsils anduvula visible)    ASA Classification:   3.  Patient with severe systemic disease    Plan:   -IV moderate sedation    Jules Olivera MD

## 2023-09-25 NOTE — PHYSICAL THERAPY NOTE
Attempted treatment pt is at a procedure at this time and will be having dialysis this afternoon. Will follow up tomorrow as appropriate.

## 2023-09-25 NOTE — CARDIAC REHAB
Order received and Chart review completed. Attempted cardiac rehab education twice, pt is at procedure at this time and will be having dialysis this afternoon. Will follow up tomorrow as appropriate.

## 2023-09-25 NOTE — CM/SW NOTE
Social work met with the patient at bedside to discuss discharge planning. Social work made the patient aware that PT is recommending [facility] after discharge. Social work provided the patient with KAREN choices and emailed the list to her son Jammie Shown at [email or web address]. [employer]    Social work will follow up on K[facility]EN choice. LORA/CM to remain available for support and/or discharge planning.      Monique Lua MSW, [facility]  Discharge Planner [id number]

## 2023-09-26 PROBLEM — Z99.2 ESRD ON HEMODIALYSIS (HCC): Status: ACTIVE | Noted: 2023-09-26

## 2023-09-26 PROBLEM — N18.6 ESRD ON HEMODIALYSIS (HCC): Status: ACTIVE | Noted: 2023-09-26

## 2023-09-26 LAB
ALBUMIN SERPL-MCNC: 2.4 G/DL (ref 3.4–5)
ALBUMIN SERPL-MCNC: 2.4 G/DL (ref 3.4–5)
ALP LIVER SERPL-CCNC: 71 U/L
ALT SERPL-CCNC: 31 U/L
ANION GAP SERPL CALC-SCNC: 10 MMOL/L (ref 0–18)
AST SERPL-CCNC: 27 U/L (ref 15–37)
BASOPHILS # BLD AUTO: 0.04 X10(3) UL (ref 0–0.2)
BASOPHILS NFR BLD AUTO: 0.6 %
BILIRUB DIRECT SERPL-MCNC: 0.1 MG/DL (ref 0–0.2)
BILIRUB SERPL-MCNC: 0.5 MG/DL (ref 0.1–2)
BUN BLD-MCNC: 40 MG/DL (ref 7–18)
BUN/CREAT SERPL: 11.8 (ref 10–20)
CALCIUM BLD-MCNC: 8.9 MG/DL (ref 8.5–10.1)
CHLORIDE SERPL-SCNC: 104 MMOL/L (ref 98–112)
CO2 SERPL-SCNC: 25 MMOL/L (ref 21–32)
CREAT BLD-MCNC: 3.39 MG/DL
DEPRECATED RDW RBC AUTO: 45 FL (ref 35.1–46.3)
EGFRCR SERPLBLD CKD-EPI 2021: 14 ML/MIN/1.73M2 (ref 60–?)
EOSINOPHIL # BLD AUTO: 0.25 X10(3) UL (ref 0–0.7)
EOSINOPHIL NFR BLD AUTO: 3.7 %
ERYTHROCYTE [DISTWIDTH] IN BLOOD BY AUTOMATED COUNT: 13.2 % (ref 11–15)
GLUCOSE BLD-MCNC: 106 MG/DL (ref 70–99)
GLUCOSE BLDC GLUCOMTR-MCNC: 117 MG/DL (ref 70–99)
GLUCOSE BLDC GLUCOMTR-MCNC: 159 MG/DL (ref 70–99)
GLUCOSE BLDC GLUCOMTR-MCNC: 213 MG/DL (ref 70–99)
GLUCOSE BLDC GLUCOMTR-MCNC: 219 MG/DL (ref 70–99)
HCT VFR BLD AUTO: 29.9 %
HGB BLD-MCNC: 10.1 G/DL
IMM GRANULOCYTES # BLD AUTO: 0.03 X10(3) UL (ref 0–1)
IMM GRANULOCYTES NFR BLD: 0.4 %
LYMPHOCYTES # BLD AUTO: 1.4 X10(3) UL (ref 1–4)
LYMPHOCYTES NFR BLD AUTO: 20.7 %
MAGNESIUM SERPL-MCNC: 2.4 MG/DL (ref 1.6–2.6)
MCH RBC QN AUTO: 31.6 PG (ref 26–34)
MCHC RBC AUTO-ENTMCNC: 33.8 G/DL (ref 31–37)
MCV RBC AUTO: 93.4 FL
MONOCYTES # BLD AUTO: 0.64 X10(3) UL (ref 0.1–1)
MONOCYTES NFR BLD AUTO: 9.5 %
NEUTROPHILS # BLD AUTO: 4.4 X10 (3) UL (ref 1.5–7.7)
NEUTROPHILS # BLD AUTO: 4.4 X10(3) UL (ref 1.5–7.7)
NEUTROPHILS NFR BLD AUTO: 65.1 %
OSMOLALITY SERPL CALC.SUM OF ELEC: 298 MOSM/KG (ref 275–295)
PHOSPHATE SERPL-MCNC: 3.4 MG/DL (ref 2.5–4.9)
PLATELET # BLD AUTO: 273 10(3)UL (ref 150–450)
POTASSIUM SERPL-SCNC: 3.5 MMOL/L (ref 3.5–5.1)
PROT SERPL-MCNC: 7 G/DL (ref 6.4–8.2)
RBC # BLD AUTO: 3.2 X10(6)UL
SODIUM SERPL-SCNC: 139 MMOL/L (ref 136–145)
WBC # BLD AUTO: 6.8 X10(3) UL (ref 4–11)

## 2023-09-26 PROCEDURE — 99233 SBSQ HOSP IP/OBS HIGH 50: CPT | Performed by: INTERNAL MEDICINE

## 2023-09-26 RX ORDER — POTASSIUM CHLORIDE 20 MEQ/1
20 TABLET, EXTENDED RELEASE ORAL ONCE
Status: COMPLETED | OUTPATIENT
Start: 2023-09-26 | End: 2023-09-26

## 2023-09-26 RX ORDER — ALBUMIN (HUMAN) 12.5 G/50ML
100 SOLUTION INTRAVENOUS
Status: DISCONTINUED | OUTPATIENT
Start: 2023-09-26 | End: 2023-09-29

## 2023-09-26 RX ORDER — HEPARIN SODIUM 1000 [USP'U]/ML
1.5 INJECTION, SOLUTION INTRAVENOUS; SUBCUTANEOUS
Status: DISCONTINUED | OUTPATIENT
Start: 2023-09-26 | End: 2023-09-29

## 2023-09-26 NOTE — PLAN OF CARE
Pt is alert and oriented. On room air. No complaints of pain or shortness of breath. Pt had HD, 3L removed, plan for reevaluation in the AM per nephrology. Plan for [facility] pending choice and medical clearance. Problem: Patient Centered Care  Goal: Patient preferences are identified and integrated in the patient's plan of care  Description: Interventions:  - What would you like us to know as we care for you?  Pt is very grateful to be alive  - Provide timely, complete, and accurate information to patient/family  - Incorporate patient and family knowledge, values, beliefs, and cultural backgrounds into the planning and delivery of care  - Encourage patient/family to participate in care and decision-making at the level they choose  - Honor patient and family perspectives and choices  Outcome: Progressing     Problem: RESPIRATORY - ADULT  Goal: Achieves optimal ventilation and oxygenation  Description: INTERVENTIONS:  - Assess for changes in respiratory status  - Assess for changes in mentation and behavior  - Position to facilitate oxygenation and minimize respiratory effort  - Oxygen supplementation based on oxygen saturation or ABGs  - Provide Smoking Cessation handout, if applicable  - Encourage broncho-pulmonary hygiene including cough, deep breathe, Incentive Spirometry  - Assess the need for suctioning and perform as needed  - Assess and instruct to report SOB or any respiratory difficulty  - Respiratory Therapy support as indicated  - Manage/alleviate anxiety  - Monitor for signs/symptoms of CO2 retention  Outcome: Progressing     Problem: Patient/Family Goals  Goal: Patient/Family Long Term Goal  Description: Patient's Long Term Goal: return to baseline ADL     Interventions:  - healthy diet, maintain regular medications   - See additional Care Plan goals for specific interventions  Outcome: Progressing  Goal: Patient/Family Short Term Goal  Description: Patient's Short Term Goal: discharge home     Interventions: - cardiac meds / PT & OT   - See additional Care Plan goals for specific interventions  Outcome: Progressing     Problem: SAFETY ADULT - FALL  Goal: Free from fall injury  Description: INTERVENTIONS:  - Assess pt frequently for physical needs  - Identify cognitive and physical deficits and behaviors that affect risk of falls. - [place] fall precautions as indicated by assessment.  - Educate pt/family on patient safety including physical limitations  - Instruct pt to call for assistance with activity based on assessment  - Modify environment to reduce risk of injury  - Provide assistive devices as appropriate  - Consider OT/PT consult to assist with strengthening/mobility  - Encourage toileting schedule  Outcome: Progressing     Problem: CARDIOVASCULAR - ADULT  Goal: Maintains optimal cardiac output and hemodynamic stability  Description: INTERVENTIONS:  - Monitor vital signs, rhythm, and trends  - Monitor for bleeding, hypotension and signs of decreased cardiac output  - Evaluate effectiveness of vasoactive medications to optimize hemodynamic stability  - Monitor arterial and/or venous puncture sites for bleeding and/or hematoma  - Assess quality of pulses, skin color and temperature  - Assess for signs of decreased coronary artery perfusion - ex.  Angina  - Evaluate fluid balance, assess for edema, trend weights  Outcome: Progressing  Goal: Absence of cardiac arrhythmias or at baseline  Description: INTERVENTIONS:  - Continuous cardiac monitoring, monitor vital signs, obtain 12 lead EKG if indicated  - Evaluate effectiveness of antiarrhythmic and heart rate control medications as ordered  - Initiate emergency measures for life threatening arrhythmias  - Monitor electrolytes and administer replacement therapy as ordered  Outcome: Progressing     Problem: GASTROINTESTINAL - ADULT  Goal: Minimal or absence of nausea and vomiting  Description: INTERVENTIONS:  - Maintain adequate hydration with IV or PO as ordered and tolerated  - Nasogastric tube to low intermittent suction as ordered  - Evaluate effectiveness of ordered antiemetic medications  - Provide nonpharmacologic comfort measures as appropriate  - Advance diet as tolerated, if ordered  - Obtain nutritional consult as needed  - Evaluate fluid balance  Outcome: Progressing  Goal: Maintains or returns to baseline bowel function  Description: INTERVENTIONS:  - Assess bowel function  - Maintain adequate hydration with IV or PO as ordered and tolerated  - Evaluate effectiveness of GI medications  - Encourage mobilization and activity  - Obtain nutritional consult as needed  - Establish a toileting routine/schedule  - Consider collaborating with pharmacy to review patient's medication profile  Outcome: Progressing  Goal: Maintains adequate nutritional intake (undernourished)  Description: INTERVENTIONS:  - Monitor percentage of each meal consumed  - Identify factors contributing to decreased intake, treat as appropriate  - Assist with meals as needed  - Monitor I&O, WT and lab values  - Obtain nutritional consult as needed  - Optimize oral hygiene and moisture  - Encourage food from home; allow for food preferences  - Enhance eating environment  Outcome: Progressing     Problem: METABOLIC/FLUID AND ELECTROLYTES - ADULT  Goal: Glucose maintained within prescribed range  Description: INTERVENTIONS:  - Monitor Blood Glucose as ordered  - Assess for signs and symptoms of hyperglycemia and hypoglycemia  - Administer ordered medications to maintain glucose within target range  - Assess barriers to adequate nutritional intake and initiate nutrition consult as needed  - Instruct patient on self management of diabetes  Outcome: Progressing  Goal: Electrolytes maintained within normal limits  Description: INTERVENTIONS:  - Monitor labs and rhythm and assess patient for signs and symptoms of electrolyte imbalances  - Administer electrolyte replacement as ordered  - Monitor response to electrolyte replacements, including rhythm and repeat lab results as appropriate  - Fluid restriction as ordered  - Instruct patient on fluid and nutrition restrictions as appropriate  Outcome: Progressing  Goal: Hemodynamic stability and optimal renal function maintained  Description: INTERVENTIONS:  - Monitor labs and assess for signs and symptoms of volume excess or deficit  - Monitor intake, output and patient weight  - Monitor urine specific gravity, serum osmolarity and serum sodium as indicated or ordered  - Monitor response to interventions for patient's volume status, including labs, urine output, blood pressure (other measures as available)  - Encourage oral intake as appropriate  - Instruct patient on fluid and nutrition restrictions as appropriate  Outcome: Progressing     Problem: SKIN/TISSUE INTEGRITY - ADULT  Goal: Skin integrity remains intact  Description: INTERVENTIONS  - Assess and document risk factors for pressure ulcer development  - Assess and document skin integrity  - Monitor for areas of redness and/or skin breakdown  - Initiate interventions, skin care algorithm/standards of care as needed  Outcome: Progressing  Goal: Incision(s), wounds(s) or drain site(s) healing without S/S of infection  Description: INTERVENTIONS:  - Assess and document risk factors for pressure ulcer development  - Assess and document skin integrity  - Assess and document dressing/incision, wound bed, drain sites and surrounding tissue  - Implement wound care per orders  - Initiate isolation precautions as appropriate  - Initiate Pressure Ulcer prevention bundle as indicated  Outcome: Progressing

## 2023-09-26 NOTE — PLAN OF CARE
Pt Aox4. RA. 1 assist with walker. Pt denies chest pain or sob. Wound care today per wound care RN. HD M-W-F. HD scheduled for tomorrow. IR paged to evaluate HD catheter. Dressing wet/soiled in blood; dressing changed by IR RN. All needs met. Bed low and locked. Call light within reach. Problem: Patient Centered Care  Goal: Patient preferences are identified and integrated in the patient's plan of care  Description: Interventions:  - What would you like us to know as we care for you? Pt is very grateful to be alive  - Provide timely, complete, and accurate information to patient/family  - Incorporate patient and family knowledge, values, beliefs, and cultural backgrounds into the planning and delivery of care  - Encourage patient/family to participate in care and decision-making at the level they choose  - Honor patient and family perspectives and choices  Outcome: Progressing     Problem: RESPIRATORY - ADULT  Goal: Achieves optimal ventilation and oxygenation  Description: INTERVENTIONS:  - Assess for changes in respiratory status  - Assess for changes in mentation and behavior  - Position to facilitate oxygenation and minimize respiratory effort  - Oxygen supplementation based on oxygen saturation or ABGs  - Provide Smoking Cessation handout, if applicable  - Encourage broncho-pulmonary hygiene including cough, deep breathe, Incentive Spirometry  - Assess the need for suctioning and perform as needed  - Assess and instruct to report SOB or any respiratory difficulty  - Respiratory Therapy support as indicated  - Manage/alleviate anxiety  - Monitor for signs/symptoms of CO2 retention  Outcome: Progressing     Problem: SAFETY ADULT - FALL  Goal: Free from fall injury  Description: INTERVENTIONS:  - Assess pt frequently for physical needs  - Identify cognitive and physical deficits and behaviors that affect risk of falls.   - [place] fall precautions as indicated by assessment.  - Educate pt/family on patient safety including physical limitations  - Instruct pt to call for assistance with activity based on assessment  - Modify environment to reduce risk of injury  - Provide assistive devices as appropriate  - Consider OT/PT consult to assist with strengthening/mobility  - Encourage toileting schedule  Outcome: Progressing     Problem: CARDIOVASCULAR - ADULT  Goal: Maintains optimal cardiac output and hemodynamic stability  Description: INTERVENTIONS:  - Monitor vital signs, rhythm, and trends  - Monitor for bleeding, hypotension and signs of decreased cardiac output  - Evaluate effectiveness of vasoactive medications to optimize hemodynamic stability  - Monitor arterial and/or venous puncture sites for bleeding and/or hematoma  - Assess quality of pulses, skin color and temperature  - Assess for signs of decreased coronary artery perfusion - ex.  Angina  - Evaluate fluid balance, assess for edema, trend weights  Outcome: Progressing  Goal: Absence of cardiac arrhythmias or at baseline  Description: INTERVENTIONS:  - Continuous cardiac monitoring, monitor vital signs, obtain 12 lead EKG if indicated  - Evaluate effectiveness of antiarrhythmic and heart rate control medications as ordered  - Initiate emergency measures for life threatening arrhythmias  - Monitor electrolytes and administer replacement therapy as ordered  Outcome: Progressing     Problem: GASTROINTESTINAL - ADULT  Goal: Minimal or absence of nausea and vomiting  Description: INTERVENTIONS:  - Maintain adequate hydration with IV or PO as ordered and tolerated  - Nasogastric tube to low intermittent suction as ordered  - Evaluate effectiveness of ordered antiemetic medications  - Provide nonpharmacologic comfort measures as appropriate  - Advance diet as tolerated, if ordered  - Obtain nutritional consult as needed  - Evaluate fluid balance  Outcome: Progressing  Goal: Maintains or returns to baseline bowel function  Description: INTERVENTIONS:  - Assess bowel function  - Maintain adequate hydration with IV or PO as ordered and tolerated  - Evaluate effectiveness of GI medications  - Encourage mobilization and activity  - Obtain nutritional consult as needed  - Establish a toileting routine/schedule  - Consider collaborating with pharmacy to review patient's medication profile  Outcome: Progressing  Goal: Maintains adequate nutritional intake (undernourished)  Description: INTERVENTIONS:  - Monitor percentage of each meal consumed  - Identify factors contributing to decreased intake, treat as appropriate  - Assist with meals as needed  - Monitor I&O, WT and lab values  - Obtain nutritional consult as needed  - Optimize oral hygiene and moisture  - Encourage food from home; allow for food preferences  - Enhance eating environment  Outcome: Progressing     Problem: METABOLIC/FLUID AND ELECTROLYTES - ADULT  Goal: Glucose maintained within prescribed range  Description: INTERVENTIONS:  - Monitor Blood Glucose as ordered  - Assess for signs and symptoms of hyperglycemia and hypoglycemia  - Administer ordered medications to maintain glucose within target range  - Assess barriers to adequate nutritional intake and initiate nutrition consult as needed  - Instruct patient on self management of diabetes  Outcome: Progressing  Goal: Electrolytes maintained within normal limits  Description: INTERVENTIONS:  - Monitor labs and rhythm and assess patient for signs and symptoms of electrolyte imbalances  - Administer electrolyte replacement as ordered  - Monitor response to electrolyte replacements, including rhythm and repeat lab results as appropriate  - Fluid restriction as ordered  - Instruct patient on fluid and nutrition restrictions as appropriate  Outcome: Progressing  Goal: Hemodynamic stability and optimal renal function maintained  Description: INTERVENTIONS:  - Monitor labs and assess for signs and symptoms of volume excess or deficit  - Monitor intake, output and patient weight  - Monitor urine specific gravity, serum osmolarity and serum sodium as indicated or ordered  - Monitor response to interventions for patient's volume status, including labs, urine output, blood pressure (other measures as available)  - Encourage oral intake as appropriate  - Instruct patient on fluid and nutrition restrictions as appropriate  Outcome: Progressing     Problem: SKIN/TISSUE INTEGRITY - ADULT  Goal: Skin integrity remains intact  Description: INTERVENTIONS  - Assess and document risk factors for pressure ulcer development  - Assess and document skin integrity  - Monitor for areas of redness and/or skin breakdown  - Initiate interventions, skin care algorithm/standards of care as needed  Outcome: Progressing  Goal: Incision(s), wounds(s) or drain site(s) healing without S/S of infection  Description: INTERVENTIONS:  - Assess and document risk factors for pressure ulcer development  - Assess and document skin integrity  - Assess and document dressing/incision, wound bed, drain sites and surrounding tissue  - Implement wound care per orders  - Initiate isolation precautions as appropriate  - Initiate Pressure Ulcer prevention bundle as indicated  Outcome: Progressing

## 2023-09-26 NOTE — WOUND PROGRESS NOTE
Wound Care Services  Attempting to follow up to assess the buttocks and lower back. The pt. does not want to be disturbed at this time, she has her I pad and notes she is doing therapy. Spoke with the nurse.

## 2023-09-26 NOTE — CM/SW NOTE
Social work called the patient's son to follow up on KAREN choice. The patient's son did not answer. Social work left a voicemail. Social work will attempt to call again to obtain KAREN choice. LORA/FARIHA to remain available for support and/or discharge planning.      Stacie DICK, [facility]  Discharge Planner [id number]

## 2023-09-26 NOTE — PROGRESS NOTES
09/26/23 1347   Wound 09/19/23 Pressure Injury Buttocks Right   Date First Assessed/Time First Assessed: 09/19/23 0200   Present on Original Admission: Yes  Primary Wound Type: Pressure Injury  Location: Buttocks  Wound Location Orientation: Right  Wound Description (Comments): slough   Wound Image    Site Assessment Clean;Dry; Intact;Fragile;Pink;Epithelialization   Drainage Amount None   Treatments Cleansed   Dressing Aquacel Foam   Dressing Changed Changed   Dressing Status Dressing Changed;Removed   Non-staged Wound Description Not applicable   Nay-wound Assessment Clean;Dry; Intact   Wound Odor None   State of Healing Epithelialized   Wound Follow Up   Follow up needed No     Pt seen for wound care follow up. The right buttock ulcer has healed, protective foam applied to newly epithelialized skin. Pt can turn self. Spoke to bedside Rn with update. Wound rn signing off.

## 2023-09-26 NOTE — SPIRITUAL CARE NOTE
Spiritual Care Visit Note    Visited With: Patient    Writer report consulting with RN. No one at bedside. Writer offered empathic listening and emotional support. Patient spoke about her health status and family situation. Patient open to 's visit. No other need at this time. Follow up as requested. Spiritual Care Taxonomy:    Intended Effects: Demonstrate caring and concern    Methods: Collaborate with care team member;Offer emotional support; Offer support    Interventions: Active listening; Ask guided questions; Acknowledge response to difficult experience;Provide compassionate touch; Silent prayer     [address] [facility]   [id number]     On call  services [phone]

## 2023-09-26 NOTE — PROGRESS NOTES
Oozing from HD line this morning  Site assessed, no active oozing noted at present and dressing is clean. Quick clot dressing applied with Sorbaview and covered with pressure dressing. Site has already been packed with Gelfoam pledgets and a pursestring suture was also placed at the catheter entry site yesterday during placement.

## 2023-09-27 ENCOUNTER — APPOINTMENT (OUTPATIENT)
Dept: GENERAL RADIOLOGY | Facility: HOSPITAL | Age: 68
End: 2023-09-27
Attending: INTERNAL MEDICINE
Payer: MEDICARE

## 2023-09-27 LAB
ALBUMIN SERPL-MCNC: 2.2 G/DL (ref 3.4–5)
ANION GAP SERPL CALC-SCNC: 8 MMOL/L (ref 0–18)
BASOPHILS # BLD AUTO: 0.07 X10(3) UL (ref 0–0.2)
BASOPHILS NFR BLD AUTO: 1.1 %
BUN BLD-MCNC: 59 MG/DL (ref 7–18)
BUN/CREAT SERPL: 15.5 (ref 10–20)
CALCIUM BLD-MCNC: 8.7 MG/DL (ref 8.5–10.1)
CHLORIDE SERPL-SCNC: 106 MMOL/L (ref 98–112)
CO2 SERPL-SCNC: 24 MMOL/L (ref 21–32)
CREAT BLD-MCNC: 3.8 MG/DL
DEPRECATED RDW RBC AUTO: 44.4 FL (ref 35.1–46.3)
EGFRCR SERPLBLD CKD-EPI 2021: 12 ML/MIN/1.73M2 (ref 60–?)
EOSINOPHIL # BLD AUTO: 0.27 X10(3) UL (ref 0–0.7)
EOSINOPHIL NFR BLD AUTO: 4.4 %
ERYTHROCYTE [DISTWIDTH] IN BLOOD BY AUTOMATED COUNT: 13.2 % (ref 11–15)
GLUCOSE BLD-MCNC: 142 MG/DL (ref 70–99)
GLUCOSE BLDC GLUCOMTR-MCNC: 120 MG/DL (ref 70–99)
GLUCOSE BLDC GLUCOMTR-MCNC: 152 MG/DL (ref 70–99)
GLUCOSE BLDC GLUCOMTR-MCNC: 211 MG/DL (ref 70–99)
GLUCOSE BLDC GLUCOMTR-MCNC: 230 MG/DL (ref 70–99)
HCT VFR BLD AUTO: 29 %
HGB BLD-MCNC: 9.7 G/DL
IMM GRANULOCYTES # BLD AUTO: 0.02 X10(3) UL (ref 0–1)
IMM GRANULOCYTES NFR BLD: 0.3 %
LYMPHOCYTES # BLD AUTO: 1.85 X10(3) UL (ref 1–4)
LYMPHOCYTES NFR BLD AUTO: 30 %
MAGNESIUM SERPL-MCNC: 2.5 MG/DL (ref 1.6–2.6)
MCH RBC QN AUTO: 31.1 PG (ref 26–34)
MCHC RBC AUTO-ENTMCNC: 33.4 G/DL (ref 31–37)
MCV RBC AUTO: 92.9 FL
MONOCYTES # BLD AUTO: 0.68 X10(3) UL (ref 0.1–1)
MONOCYTES NFR BLD AUTO: 11 %
NEUTROPHILS # BLD AUTO: 3.27 X10 (3) UL (ref 1.5–7.7)
NEUTROPHILS # BLD AUTO: 3.27 X10(3) UL (ref 1.5–7.7)
NEUTROPHILS NFR BLD AUTO: 53.2 %
OSMOLALITY SERPL CALC.SUM OF ELEC: 305 MOSM/KG (ref 275–295)
PHOSPHATE SERPL-MCNC: 3.5 MG/DL (ref 2.5–4.9)
PLATELET # BLD AUTO: 292 10(3)UL (ref 150–450)
POTASSIUM SERPL-SCNC: 3.9 MMOL/L (ref 3.5–5.1)
RBC # BLD AUTO: 3.12 X10(6)UL
SODIUM SERPL-SCNC: 138 MMOL/L (ref 136–145)
WBC # BLD AUTO: 6.2 X10(3) UL (ref 4–11)

## 2023-09-27 PROCEDURE — 90935 HEMODIALYSIS ONE EVALUATION: CPT | Performed by: INTERNAL MEDICINE

## 2023-09-27 PROCEDURE — 71045 X-RAY EXAM CHEST 1 VIEW: CPT | Performed by: INTERNAL MEDICINE

## 2023-09-27 NOTE — CARDIAC REHAB
Cardiac Rehab Phase I    Activity:  Distance   Assistance needed   Patient tolerated activity . Education:  Handouts provided and reviewed: [address] Diet: Healthy Cardiac diet reviewed. Disease Process: Disease process reviewed.     Reviewed the following:      RISK FACTORS: Reviewed      SMOKING CESSATION: Reviewed      HOME EXERCISE ACTIVITY: Reviewed      OUTPATIENT CARDIAC REHAB: Referred to Cardiac Rehabilitation

## 2023-09-27 NOTE — PLAN OF CARE
Pt is alert and oriented. On room air. No complaints of pain or shortness of breath. Plan for HD in the AM as well as Xray. Problem: Patient Centered Care  Goal: Patient preferences are identified and integrated in the patient's plan of care  Description: Interventions:  - What would you like us to know as we care for you?  Pt is very grateful to be alive  - Provide timely, complete, and accurate information to patient/family  - Incorporate patient and family knowledge, values, beliefs, and cultural backgrounds into the planning and delivery of care  - Encourage patient/family to participate in care and decision-making at the level they choose  - Honor patient and family perspectives and choices  Outcome: Progressing     Problem: RESPIRATORY - ADULT  Goal: Achieves optimal ventilation and oxygenation  Description: INTERVENTIONS:  - Assess for changes in respiratory status  - Assess for changes in mentation and behavior  - Position to facilitate oxygenation and minimize respiratory effort  - Oxygen supplementation based on oxygen saturation or ABGs  - Provide Smoking Cessation handout, if applicable  - Encourage broncho-pulmonary hygiene including cough, deep breathe, Incentive Spirometry  - Assess the need for suctioning and perform as needed  - Assess and instruct to report SOB or any respiratory difficulty  - Respiratory Therapy support as indicated  - Manage/alleviate anxiety  - Monitor for signs/symptoms of CO2 retention  Outcome: Progressing     Problem: Patient/Family Goals  Goal: Patient/Family Long Term Goal  Description: Patient's Long Term Goal: return to baseline ADL     Interventions:  - healthy diet, maintain regular medications   - See additional Care Plan goals for specific interventions  Outcome: Progressing  Goal: Patient/Family Short Term Goal  Description: Patient's Short Term Goal: discharge home     Interventions:   - cardiac meds / PT & OT   - See additional Care Plan goals for specific interventions  Outcome: Progressing     Problem: SAFETY ADULT - FALL  Goal: Free from fall injury  Description: INTERVENTIONS:  - Assess pt frequently for physical needs  - Identify cognitive and physical deficits and behaviors that affect risk of falls. - [place] fall precautions as indicated by assessment.  - Educate pt/family on patient safety including physical limitations  - Instruct pt to call for assistance with activity based on assessment  - Modify environment to reduce risk of injury  - Provide assistive devices as appropriate  - Consider OT/PT consult to assist with strengthening/mobility  - Encourage toileting schedule  Outcome: Progressing     Problem: CARDIOVASCULAR - ADULT  Goal: Maintains optimal cardiac output and hemodynamic stability  Description: INTERVENTIONS:  - Monitor vital signs, rhythm, and trends  - Monitor for bleeding, hypotension and signs of decreased cardiac output  - Evaluate effectiveness of vasoactive medications to optimize hemodynamic stability  - Monitor arterial and/or venous puncture sites for bleeding and/or hematoma  - Assess quality of pulses, skin color and temperature  - Assess for signs of decreased coronary artery perfusion - ex.  Angina  - Evaluate fluid balance, assess for edema, trend weights  Outcome: Progressing  Goal: Absence of cardiac arrhythmias or at baseline  Description: INTERVENTIONS:  - Continuous cardiac monitoring, monitor vital signs, obtain 12 lead EKG if indicated  - Evaluate effectiveness of antiarrhythmic and heart rate control medications as ordered  - Initiate emergency measures for life threatening arrhythmias  - Monitor electrolytes and administer replacement therapy as ordered  Outcome: Progressing     Problem: GASTROINTESTINAL - ADULT  Goal: Minimal or absence of nausea and vomiting  Description: INTERVENTIONS:  - Maintain adequate hydration with IV or PO as ordered and tolerated  - Nasogastric tube to low intermittent suction as ordered  - Evaluate effectiveness of ordered antiemetic medications  - Provide nonpharmacologic comfort measures as appropriate  - Advance diet as tolerated, if ordered  - Obtain nutritional consult as needed  - Evaluate fluid balance  Outcome: Progressing  Goal: Maintains or returns to baseline bowel function  Description: INTERVENTIONS:  - Assess bowel function  - Maintain adequate hydration with IV or PO as ordered and tolerated  - Evaluate effectiveness of GI medications  - Encourage mobilization and activity  - Obtain nutritional consult as needed  - Establish a toileting routine/schedule  - Consider collaborating with pharmacy to review patient's medication profile  Outcome: Progressing  Goal: Maintains adequate nutritional intake (undernourished)  Description: INTERVENTIONS:  - Monitor percentage of each meal consumed  - Identify factors contributing to decreased intake, treat as appropriate  - Assist with meals as needed  - Monitor I&O, WT and lab values  - Obtain nutritional consult as needed  - Optimize oral hygiene and moisture  - Encourage food from home; allow for food preferences  - Enhance eating environment  Outcome: Progressing     Problem: METABOLIC/FLUID AND ELECTROLYTES - ADULT  Goal: Glucose maintained within prescribed range  Description: INTERVENTIONS:  - Monitor Blood Glucose as ordered  - Assess for signs and symptoms of hyperglycemia and hypoglycemia  - Administer ordered medications to maintain glucose within target range  - Assess barriers to adequate nutritional intake and initiate nutrition consult as needed  - Instruct patient on self management of diabetes  Outcome: Progressing  Goal: Electrolytes maintained within normal limits  Description: INTERVENTIONS:  - Monitor labs and rhythm and assess patient for signs and symptoms of electrolyte imbalances  - Administer electrolyte replacement as ordered  - Monitor response to electrolyte replacements, including rhythm and repeat lab results as appropriate  - Fluid restriction as ordered  - Instruct patient on fluid and nutrition restrictions as appropriate  Outcome: Progressing  Goal: Hemodynamic stability and optimal renal function maintained  Description: INTERVENTIONS:  - Monitor labs and assess for signs and symptoms of volume excess or deficit  - Monitor intake, output and patient weight  - Monitor urine specific gravity, serum osmolarity and serum sodium as indicated or ordered  - Monitor response to interventions for patient's volume status, including labs, urine output, blood pressure (other measures as available)  - Encourage oral intake as appropriate  - Instruct patient on fluid and nutrition restrictions as appropriate  Outcome: Progressing     Problem: SKIN/TISSUE INTEGRITY - ADULT  Goal: Skin integrity remains intact  Description: INTERVENTIONS  - Assess and document risk factors for pressure ulcer development  - Assess and document skin integrity  - Monitor for areas of redness and/or skin breakdown  - Initiate interventions, skin care algorithm/standards of care as needed  Outcome: Progressing  Goal: Incision(s), wounds(s) or drain site(s) healing without S/S of infection  Description: INTERVENTIONS:  - Assess and document risk factors for pressure ulcer development  - Assess and document skin integrity  - Assess and document dressing/incision, wound bed, drain sites and surrounding tissue  - Implement wound care per orders  - Initiate isolation precautions as appropriate  - Initiate Pressure Ulcer prevention bundle as indicated  Outcome: Progressing

## 2023-09-27 NOTE — PLAN OF CARE
Pt Aox3. RA. 1 assist with walker. HD today with 2L removed; pt tolerated well. Physical therapy session today; see note. BP meds held this evening d/t low BP. Monitoring closely. Plan is to discharge to [facility] once medically stable, pending insurance auth. All needs met. Bed low and locked. Call light within reach. Problem: Patient Centered Care  Goal: Patient preferences are identified and integrated in the patient's plan of care  Description: Interventions:  - What would you like us to know as we care for you? Pt is very grateful to be alive  - Provide timely, complete, and accurate information to patient/family  - Incorporate patient and family knowledge, values, beliefs, and cultural backgrounds into the planning and delivery of care  - Encourage patient/family to participate in care and decision-making at the level they choose  - Honor patient and family perspectives and choices  Outcome: Progressing     Problem: RESPIRATORY - ADULT  Goal: Achieves optimal ventilation and oxygenation  Description: INTERVENTIONS:  - Assess for changes in respiratory status  - Assess for changes in mentation and behavior  - Position to facilitate oxygenation and minimize respiratory effort  - Oxygen supplementation based on oxygen saturation or ABGs  - Provide Smoking Cessation handout, if applicable  - Encourage broncho-pulmonary hygiene including cough, deep breathe, Incentive Spirometry  - Assess the need for suctioning and perform as needed  - Assess and instruct to report SOB or any respiratory difficulty  - Respiratory Therapy support as indicated  - Manage/alleviate anxiety  - Monitor for signs/symptoms of CO2 retention  Outcome: Progressing     Problem: SAFETY ADULT - FALL  Goal: Free from fall injury  Description: INTERVENTIONS:  - Assess pt frequently for physical needs  - Identify cognitive and physical deficits and behaviors that affect risk of falls.   - [place] fall precautions as indicated by assessment.  - Educate pt/family on patient safety including physical limitations  - Instruct pt to call for assistance with activity based on assessment  - Modify environment to reduce risk of injury  - Provide assistive devices as appropriate  - Consider OT/PT consult to assist with strengthening/mobility  - Encourage toileting schedule  Outcome: Progressing     Problem: CARDIOVASCULAR - ADULT  Goal: Maintains optimal cardiac output and hemodynamic stability  Description: INTERVENTIONS:  - Monitor vital signs, rhythm, and trends  - Monitor for bleeding, hypotension and signs of decreased cardiac output  - Evaluate effectiveness of vasoactive medications to optimize hemodynamic stability  - Monitor arterial and/or venous puncture sites for bleeding and/or hematoma  - Assess quality of pulses, skin color and temperature  - Assess for signs of decreased coronary artery perfusion - ex.  Angina  - Evaluate fluid balance, assess for edema, trend weights  Outcome: Progressing  Goal: Absence of cardiac arrhythmias or at baseline  Description: INTERVENTIONS:  - Continuous cardiac monitoring, monitor vital signs, obtain 12 lead EKG if indicated  - Evaluate effectiveness of antiarrhythmic and heart rate control medications as ordered  - Initiate emergency measures for life threatening arrhythmias  - Monitor electrolytes and administer replacement therapy as ordered  Outcome: Progressing     Problem: GASTROINTESTINAL - ADULT  Goal: Minimal or absence of nausea and vomiting  Description: INTERVENTIONS:  - Maintain adequate hydration with IV or PO as ordered and tolerated  - Nasogastric tube to low intermittent suction as ordered  - Evaluate effectiveness of ordered antiemetic medications  - Provide nonpharmacologic comfort measures as appropriate  - Advance diet as tolerated, if ordered  - Obtain nutritional consult as needed  - Evaluate fluid balance  Outcome: Progressing  Goal: Maintains or returns to baseline bowel function  Description: INTERVENTIONS:  - Assess bowel function  - Maintain adequate hydration with IV or PO as ordered and tolerated  - Evaluate effectiveness of GI medications  - Encourage mobilization and activity  - Obtain nutritional consult as needed  - Establish a toileting routine/schedule  - Consider collaborating with pharmacy to review patient's medication profile  Outcome: Progressing  Goal: Maintains adequate nutritional intake (undernourished)  Description: INTERVENTIONS:  - Monitor percentage of each meal consumed  - Identify factors contributing to decreased intake, treat as appropriate  - Assist with meals as needed  - Monitor I&O, WT and lab values  - Obtain nutritional consult as needed  - Optimize oral hygiene and moisture  - Encourage food from home; allow for food preferences  - Enhance eating environment  Outcome: Progressing     Problem: METABOLIC/FLUID AND ELECTROLYTES - ADULT  Goal: Glucose maintained within prescribed range  Description: INTERVENTIONS:  - Monitor Blood Glucose as ordered  - Assess for signs and symptoms of hyperglycemia and hypoglycemia  - Administer ordered medications to maintain glucose within target range  - Assess barriers to adequate nutritional intake and initiate nutrition consult as needed  - Instruct patient on self management of diabetes  Outcome: Progressing  Goal: Electrolytes maintained within normal limits  Description: INTERVENTIONS:  - Monitor labs and rhythm and assess patient for signs and symptoms of electrolyte imbalances  - Administer electrolyte replacement as ordered  - Monitor response to electrolyte replacements, including rhythm and repeat lab results as appropriate  - Fluid restriction as ordered  - Instruct patient on fluid and nutrition restrictions as appropriate  Outcome: Progressing  Goal: Hemodynamic stability and optimal renal function maintained  Description: INTERVENTIONS:  - Monitor labs and assess for signs and symptoms of volume excess or deficit  - Monitor intake, output and patient weight  - Monitor urine specific gravity, serum osmolarity and serum sodium as indicated or ordered  - Monitor response to interventions for patient's volume status, including labs, urine output, blood pressure (other measures as available)  - Encourage oral intake as appropriate  - Instruct patient on fluid and nutrition restrictions as appropriate  Outcome: Progressing     Problem: SKIN/TISSUE INTEGRITY - ADULT  Goal: Skin integrity remains intact  Description: INTERVENTIONS  - Assess and document risk factors for pressure ulcer development  - Assess and document skin integrity  - Monitor for areas of redness and/or skin breakdown  - Initiate interventions, skin care algorithm/standards of care as needed  Outcome: Progressing  Goal: Incision(s), wounds(s) or drain site(s) healing without S/S of infection  Description: INTERVENTIONS:  - Assess and document risk factors for pressure ulcer development  - Assess and document skin integrity  - Assess and document dressing/incision, wound bed, drain sites and surrounding tissue  - Implement wound care per orders  - Initiate isolation precautions as appropriate  - Initiate Pressure Ulcer prevention bundle as indicated  Outcome: Progressing

## 2023-09-27 NOTE — PHYSICAL THERAPY NOTE
PHYSICAL THERAPY TREATMENT NOTE - INPATIENT     Room Number: 332/332-A       Presenting Problem: Acute respiratory failure hypoxia, weakness  Co-Morbidities : angioplasty, CKD stage 4, hypoxia, obesisty    Problem List  Principal Problem:    Acute respiratory failure with hypoxia ([facility])  Active Problems:    CKD (chronic kidney disease) stage 4, GFR 15-29 ml/min ([facility])    NSTEMI (non-ST elevated myocardial infarction) ([facility] [address]    ESRD on hemodialysis ([email or web address]      PHYSICAL THERAPY ASSESSMENT     RN approves participation in therapy session. Patient had dialysis this morning and presents sitting up in bed and agreeable to therapy. She is educated and performs BLE exs in bed with deep breathing and encouraged to perform these 1-2 times daily to improve strength and overall mobility. Also discussed and educated that she should call RN staff and get up to chair for all meals as well with good understanding verbalized. She needs min A for moving supine to sitting EOB with initial post lean and then progressing to supervision. She feels a bit dizzy and initial /63 and gradually pt feels stable. She is educated in deep breathing, weight shifting all planes and then sit to/from stand x 3 reps from bed with min A, mod A walking 5' to recliner with RW and cues for posture and breathing. She sits in chair and legs elevated due to pain/soreness in bed from sitting unsupported so long at EOB. She sits for 5 min and feels ready to stand. Once legs are lowered she feels symptomatic and dizzy and BP noted to be 79/55 so legs are elevated and pt reclined slightly and she feels better again, awake and alert. Education to sit in chair and when she feels ready to lower legs or call RN staff to have legs down for a bit and to be sure to be up DAILY with RN staff. She is using purewick right now, encouraged her to try to be up to bathroom as able with RN staff as her BP stabilizes.  She verbalizes understanding of information and is able to perform seated exs x 10 as well. She feels very weak and is on track to dc to [facility] with her goal being home after [facility] . The patient's Approx Degree of Impairment: 64.91% has been calculated based on documentation in the [facility] '6 clicks' Inpatient Basic Mobility Short Form. Research supports that patients with this level of impairment may benefit from inpatient rehab and [facility] is the current recommendation. PCT and RN aware of session and mobility, pt in chair with alarm set, son walked in as PT was leaving. DISCHARGE RECOMMENDATIONS  PT Discharge Recommendations: Sub-acute rehabilitation     PLAN  PT Treatment Plan: Bed mobility; Body mechanics; Endurance; Energy conservation;Patient education;Gait training;Strengthening;Transfer training;Balance training;Family education  Frequency (Obs): 5x/week    SUBJECTIVE  \"I don't think I've been up for a few days\"    OBJECTIVE  Precautions: Bed/chair alarm    PAIN ASSESSMENT   Rating: 3  Location: overall soreness and back pain  Management Techniques: Activity promotion; Body mechanics;Breathing techniques;Relaxation;Repositioning    BALANCE  Static Sitting: Fair +  Dynamic Sitting: Fair -  Static Standing: Poor  Dynamic Standing: Poor -    ACTIVITY TOLERANCE  Pulse: 68  Heart Rate Source: Monitor     BP: (!) 79/55 (pt is very symptomatic now with legs down, improves and stabilizes with legs elevated in chair. RN aware, will monitor)  BP Location: Left arm  BP Method: Automatic  Patient Position: Sitting     O2 WALK  Oxygen Therapy  SPO2% on Room Air at Rest: 96  SPO2% Ambulation on Room Air: 99    AM-PAC '6-Clicks' [address]  How much difficulty does the patient currently have. ..   Patient Difficulty: Turning over in bed (including adjusting bedclothes, sheets and blankets)?: A Little   Patient Difficulty: Sitting down on and standing up from a chair with arms (e.g., wheelchair, bedside commode, etc.): A Lot   Patient Difficulty: Moving from lying on back to sitting on the side of the bed?: A Lot   How much help from another person does the patient currently need. .. Help from Another: Moving to and from a bed to a chair (including a wheelchair)?: A Lot   Help from Another: Need to walk in hospital room?: A Lot   Help from Another: Climbing 3-5 steps with a railing?: A Lot     AM-PAC Score:  Raw Score: 13   Approx Degree of Impairment: 64.91%   Standardized Score (AM-PAC Scale): 36.74   CMS Modifier (G-Code): CL    FUNCTIONAL ABILITY STATUS  Functional Mobility/Gait Assessment  Gait Assistance: Moderate assistance  Distance (ft): 5'  Assistive Device: Rolling walker  Pattern: Shuffle (forward flexed, cues for upright posture)    Additional information: Patient benefits from education about how to self mobilize in chair and bed to improve activity tolerance    THERAPEUTIC EXERCISES  Lower Extremity Alternating marching  Ankle pumps  Glut sets  Hip AB/AD  Heel raises  Heel slides  Hip adduction squeezes  Knee extension  LAQ  Leg slides  Quad sets  SAQ  SLR  Toe raises  Deep breathing, weight shfiting all planes     Position Sitting, Standing, and Supine       Patient End of Session: Up in chair;Needs met;Call light within reach;RN aware of session/findings; All patient questions and concerns addressed; Alarm set; Family present; Discussed recommendations with /    CURRENT GOALS     Goals to be met by: 10/15/2023, goals revised 9/27/22  Patient Goal Patient's self-stated goal is: Return home    Goal #1 Patient is able to demonstrate supine - sit EOB @ level: minimal assistance      Goal #1   Current Status  mod-min A and increased time, slight posterior lean   Goal #2 Patient is able to demonstrate transfers Sit to/from Stand at assistance level: minimal assistance with walker - rolling      Goal #2  Current Status  mod A with second person SBA to RW, cues for hand placement   Goal #3 Patient is able to ambulate 75 feet with assist device: walker - rolling at assistance level: minimal assistance   Goal #3   Current Status  progressing 5' with RW and small shuffled steps and flexed posture   Goal #4  Patient to perform bed to chair transfers with RW with CGA   Goal #4   Current Status  mod A bed to chair with assist to manage RW on turns   Goal #5 Patient to demonstrate independence with home activity/exercise instructions provided to patient in preparation for discharge.    Goal #5   Current Status  In progress   Goal #6     Goal #6  Current Status       Therapeutic Activity: 18 minutes  Therapeutic Exercise: 26 minutes

## 2023-09-27 NOTE — CM/SW NOTE
Social work met with the patient and her son Monie Treadwell at bedside to retrieve [facility] choice. The patient and family have decided to go with [facility] [address]. Social work reserved [facility] [address] [facility] and uploaded HD flowsheets. SW/CM to remain available for support and/or discharge planning.      Irlanda Officer MSW, Doreen Hwang  Discharge Planner [id number]

## 2023-09-28 LAB
ALBUMIN SERPL-MCNC: 2.4 G/DL (ref 3.4–5)
ANION GAP SERPL CALC-SCNC: 11 MMOL/L (ref 0–18)
BASOPHILS # BLD AUTO: 0.08 X10(3) UL (ref 0–0.2)
BASOPHILS NFR BLD AUTO: 1.1 %
BUN BLD-MCNC: 45 MG/DL (ref 7–18)
BUN/CREAT SERPL: 13.1 (ref 10–20)
CALCIUM BLD-MCNC: 8.6 MG/DL (ref 8.5–10.1)
CHLORIDE SERPL-SCNC: 100 MMOL/L (ref 98–112)
CO2 SERPL-SCNC: 24 MMOL/L (ref 21–32)
CREAT BLD-MCNC: 3.43 MG/DL
DEPRECATED RDW RBC AUTO: 45.7 FL (ref 35.1–46.3)
EGFRCR SERPLBLD CKD-EPI 2021: 14 ML/MIN/1.73M2 (ref 60–?)
EOSINOPHIL # BLD AUTO: 0.28 X10(3) UL (ref 0–0.7)
EOSINOPHIL NFR BLD AUTO: 3.8 %
ERYTHROCYTE [DISTWIDTH] IN BLOOD BY AUTOMATED COUNT: 13.3 % (ref 11–15)
GLUCOSE BLD-MCNC: 121 MG/DL (ref 70–99)
GLUCOSE BLDC GLUCOMTR-MCNC: 123 MG/DL (ref 70–99)
GLUCOSE BLDC GLUCOMTR-MCNC: 153 MG/DL (ref 70–99)
GLUCOSE BLDC GLUCOMTR-MCNC: 156 MG/DL (ref 70–99)
GLUCOSE BLDC GLUCOMTR-MCNC: 212 MG/DL (ref 70–99)
HCT VFR BLD AUTO: 29.7 %
HGB BLD-MCNC: 9.8 G/DL
IMM GRANULOCYTES # BLD AUTO: 0.04 X10(3) UL (ref 0–1)
IMM GRANULOCYTES NFR BLD: 0.5 %
LYMPHOCYTES # BLD AUTO: 2.59 X10(3) UL (ref 1–4)
LYMPHOCYTES NFR BLD AUTO: 35.3 %
MAGNESIUM SERPL-MCNC: 2.3 MG/DL (ref 1.6–2.6)
MCH RBC QN AUTO: 31.1 PG (ref 26–34)
MCHC RBC AUTO-ENTMCNC: 33 G/DL (ref 31–37)
MCV RBC AUTO: 94.3 FL
MONOCYTES # BLD AUTO: 0.72 X10(3) UL (ref 0.1–1)
MONOCYTES NFR BLD AUTO: 9.8 %
NEUTROPHILS # BLD AUTO: 3.62 X10 (3) UL (ref 1.5–7.7)
NEUTROPHILS # BLD AUTO: 3.62 X10(3) UL (ref 1.5–7.7)
NEUTROPHILS NFR BLD AUTO: 49.5 %
OSMOLALITY SERPL CALC.SUM OF ELEC: 293 MOSM/KG (ref 275–295)
PHOSPHATE SERPL-MCNC: 3.5 MG/DL (ref 2.5–4.9)
PLATELET # BLD AUTO: 358 10(3)UL (ref 150–450)
POTASSIUM SERPL-SCNC: 4.4 MMOL/L (ref 3.5–5.1)
RBC # BLD AUTO: 3.15 X10(6)UL
SODIUM SERPL-SCNC: 135 MMOL/L (ref 136–145)
WBC # BLD AUTO: 7.3 X10(3) UL (ref 4–11)

## 2023-09-28 PROCEDURE — 99232 SBSQ HOSP IP/OBS MODERATE 35: CPT | Performed by: INTERNAL MEDICINE

## 2023-09-28 RX ORDER — CLONIDINE HYDROCHLORIDE 0.1 MG/1
0.2 TABLET ORAL 2 TIMES DAILY
Status: DISCONTINUED | OUTPATIENT
Start: 2023-09-28 | End: 2023-09-29

## 2023-09-28 RX ORDER — HYDRALAZINE HYDROCHLORIDE 100 MG/1
100 TABLET, FILM COATED ORAL 3 TIMES DAILY
Status: DISCONTINUED | OUTPATIENT
Start: 2023-09-28 | End: 2023-09-29

## 2023-09-28 RX ORDER — CLONIDINE HYDROCHLORIDE 0.1 MG/1
0.2 TABLET ORAL 3 TIMES DAILY
Status: DISCONTINUED | OUTPATIENT
Start: 2023-09-28 | End: 2023-09-28

## 2023-09-28 NOTE — PHYSICAL THERAPY NOTE
PHYSICAL THERAPY TREATMENT NOTE - INPATIENT     Room Number: 332/332-A       Presenting Problem: Acute respiratory failure hypoxia, weakness    Problem List  Principal Problem:    Acute respiratory failure with hypoxia ([facility])  Active Problems:    CKD (chronic kidney disease) stage 4, GFR 15-29 ml/min ([facility])    [facility]    ESRD on hemodialysis ([facility]      PHYSICAL THERAPY ASSESSMENT   Chart reviewed. RN Kirk Ricks approved participation in physical therapy. PPE worn by therapist: gloves. Patient was not wearing a mask during session. Patient presented in bed with did not rate pain. Patient with fair  progress towards goals during this session. Education provided on Physical therapy plan of care and physiological benefits of out of bed mobility. Patient with good carryover. Pt is received in the bed and was cleared for therapy session. Co treat session with OT Sherrie. Pt with improved mobility this session but sit recommend [facility]. Pt still with some weakness and decreased activity tolerance and fatigues quickly. Pt's BP was 112/59. Pt is min A with bed mobility and to transfer to the EOB. Pt was able to scoot herself to the EOB with cueing. Pt sat EOB for a few minutes and denied any dizziness and light headedness. Pt then was min A from the height of the bed with the RW for sit<>stand transfers. Pt was able to take a shuffling few steps to the chair with the RW min A. Pt then took a few minute rest break then attempted to AMB further. Pt was only able to take a few more steps forward with the RW min A but then reported increased dizziness and sat back in the chair. Pt's BP was 104/72. Pt was repositioned and left in the chair with all needs within reach and alarm system activated. Reported to the RN on the status of the pt.          Bed mobility: Min assist  Transfers: Min assist  Gait Assistance: Minimum assistance  Distance (ft): few steps and SPT  Assistive Device: Rolling walker  Pattern: Shuffle          . Patient was left in bedside chair and alarm activated at end of session with all needs in reach. The patient's Approx Degree of Impairment: 54.16% has been calculated based on documentation in the [facility] '6 clicks' Inpatient Basic Mobility Short Form. Research supports that patients with this level of impairment may benefit from Subacute Rehab. RN aware of patient status post session. DISCHARGE RECOMMENDATIONS  PT Discharge Recommendations: Sub-acute rehabilitation     PLAN  PT Treatment Plan: Bed mobility; Body mechanics; Coordination; Endurance; Patient education;Gait training;Strengthening;Transfer training;Balance training    SUBJECTIVE  Pt was agreeable to therapy session. OBJECTIVE  Precautions: Bed/chair alarm    WEIGHT BEARING RESTRICTION  Weight Bearing Restriction: None                PAIN ASSESSMENT   Rating:  (did not rate)  Location: overall soreness and back pain  Management Techniques: Activity promotion; Body mechanics; Relaxation;Repositioning    BALANCE                                                                                                                       Static Sitting: Fair +  Dynamic Sitting: Fair           Static Standing: Poor +  Dynamic Standing: Poor +    ACTIVITY TOLERANCE                         O2 WALK  Oxygen Therapy  SPO2% on Room Air at Rest: 96    AM-PAC '6-Clicks' INPATIENT SHORT FORM - BASIC MOBILITY  How much difficulty does the patient currently have. .. Patient Difficulty: Turning over in bed (including adjusting bedclothes, sheets and blankets)?: A Little   Patient Difficulty: Sitting down on and standing up from a chair with arms (e.g., wheelchair, bedside commode, etc.): A Little   Patient Difficulty: Moving from lying on back to sitting on the side of the bed?: A Lot   How much help from another person does the patient currently need. .. Help from Another: Moving to and from a bed to a chair (including a wheelchair)? : A Little   Help from Another: Need to walk in hospital room?: A Little   Help from Another: Climbing 3-5 steps with a railing?: A Lot     AM-PAC Score:  Raw Score: 16   Approx Degree of Impairment: 54.16%   Standardized Score (AM-PAC Scale): 40.78   CMS Modifier (G-Code): CK        Patient End of Session: Up in chair;Needs met;Call light within reach;RN aware of session/findings; All patient questions and concerns addressed; Alarm set    CURRENT GOALS     Goals to be met by: 10/15/2023, goals revised 9/27/22  Patient Goal Patient's self-stated goal is: Return home    Goal #1 Patient is able to demonstrate supine - sit EOB @ level: minimal assistance      Goal #1   Current Status  min A   Goal #2 Patient is able to demonstrate transfers Sit to/from Stand at assistance level: minimal assistance with walker - rolling      Goal #2  Current Status  min A with the RW   Goal #3 Patient is able to ambulate 75 feet with assist device: walker - rolling at assistance level: minimal assistance   Goal #3   Current Status  SPT and few steps with the RW min A   Goal #4  Patient to perform bed to chair transfers with RW with CGA   Goal #4   Current Status Min A with the RW   Goal #5 Patient to demonstrate independence with home activity/exercise instructions provided to patient in preparation for discharge.    Goal #5   Current Status  In progress   Goal #6     Goal #6  Current Status             Therapeutic Activity: 30 minutes

## 2023-09-28 NOTE — PLAN OF CARE
A&Ox4 on RA 1A with walker. Complains of no pain. BP up to 110s at night and then BP on AM rounds was 130s. Plan to go to [facility] pending medical clearance. Will continue to monitor. Problem: Patient Centered Care  Goal: Patient preferences are identified and integrated in the patient's plan of care  Description: Interventions:  - What would you like us to know as we care for you?  Pt is very grateful to be alive  - Provide timely, complete, and accurate information to patient/family  - Incorporate patient and family knowledge, values, beliefs, and cultural backgrounds into the planning and delivery of care  - Encourage patient/family to participate in care and decision-making at the level they choose  - Honor patient and family perspectives and choices  Outcome: Progressing     Problem: RESPIRATORY - ADULT  Goal: Achieves optimal ventilation and oxygenation  Description: INTERVENTIONS:  - Assess for changes in respiratory status  - Assess for changes in mentation and behavior  - Position to facilitate oxygenation and minimize respiratory effort  - Oxygen supplementation based on oxygen saturation or ABGs  - Provide Smoking Cessation handout, if applicable  - Encourage broncho-pulmonary hygiene including cough, deep breathe, Incentive Spirometry  - Assess the need for suctioning and perform as needed  - Assess and instruct to report SOB or any respiratory difficulty  - Respiratory Therapy support as indicated  - Manage/alleviate anxiety  - Monitor for signs/symptoms of CO2 retention  Outcome: Progressing     Problem: Patient/Family Goals  Goal: Patient/Family Long Term Goal  Description: Patient's Long Term Goal: return to baseline ADL     Interventions:  - healthy diet, maintain regular medications   - See additional Care Plan goals for specific interventions  Outcome: Progressing  Goal: Patient/Family Short Term Goal  Description: Patient's Short Term Goal: discharge home     Interventions:   - cardiac meds / PT & OT   - See additional Care Plan goals for specific interventions  Outcome: Progressing     Problem: SAFETY ADULT - FALL  Goal: Free from fall injury  Description: INTERVENTIONS:  - Assess pt frequently for physical needs  - Identify cognitive and physical deficits and behaviors that affect risk of falls. - [place] fall precautions as indicated by assessment.  - Educate pt/family on patient safety including physical limitations  - Instruct pt to call for assistance with activity based on assessment  - Modify environment to reduce risk of injury  - Provide assistive devices as appropriate  - Consider OT/PT consult to assist with strengthening/mobility  - Encourage toileting schedule  Outcome: Progressing     Problem: CARDIOVASCULAR - ADULT  Goal: Maintains optimal cardiac output and hemodynamic stability  Description: INTERVENTIONS:  - Monitor vital signs, rhythm, and trends  - Monitor for bleeding, hypotension and signs of decreased cardiac output  - Evaluate effectiveness of vasoactive medications to optimize hemodynamic stability  - Monitor arterial and/or venous puncture sites for bleeding and/or hematoma  - Assess quality of pulses, skin color and temperature  - Assess for signs of decreased coronary artery perfusion - ex.  Angina  - Evaluate fluid balance, assess for edema, trend weights  Outcome: Progressing  Goal: Absence of cardiac arrhythmias or at baseline  Description: INTERVENTIONS:  - Continuous cardiac monitoring, monitor vital signs, obtain 12 lead EKG if indicated  - Evaluate effectiveness of antiarrhythmic and heart rate control medications as ordered  - Initiate emergency measures for life threatening arrhythmias  - Monitor electrolytes and administer replacement therapy as ordered  Outcome: Progressing     Problem: GASTROINTESTINAL - ADULT  Goal: Minimal or absence of nausea and vomiting  Description: INTERVENTIONS:  - Maintain adequate hydration with IV or PO as ordered and tolerated  - Nasogastric tube to low intermittent suction as ordered  - Evaluate effectiveness of ordered antiemetic medications  - Provide nonpharmacologic comfort measures as appropriate  - Advance diet as tolerated, if ordered  - Obtain nutritional consult as needed  - Evaluate fluid balance  Outcome: Progressing  Goal: Maintains or returns to baseline bowel function  Description: INTERVENTIONS:  - Assess bowel function  - Maintain adequate hydration with IV or PO as ordered and tolerated  - Evaluate effectiveness of GI medications  - Encourage mobilization and activity  - Obtain nutritional consult as needed  - Establish a toileting routine/schedule  - Consider collaborating with pharmacy to review patient's medication profile  Outcome: Progressing  Goal: Maintains adequate nutritional intake (undernourished)  Description: INTERVENTIONS:  - Monitor percentage of each meal consumed  - Identify factors contributing to decreased intake, treat as appropriate  - Assist with meals as needed  - Monitor I&O, WT and lab values  - Obtain nutritional consult as needed  - Optimize oral hygiene and moisture  - Encourage food from home; allow for food preferences  - Enhance eating environment  Outcome: Progressing     Problem: METABOLIC/FLUID AND ELECTROLYTES - ADULT  Goal: Glucose maintained within prescribed range  Description: INTERVENTIONS:  - Monitor Blood Glucose as ordered  - Assess for signs and symptoms of hyperglycemia and hypoglycemia  - Administer ordered medications to maintain glucose within target range  - Assess barriers to adequate nutritional intake and initiate nutrition consult as needed  - Instruct patient on self management of diabetes  Outcome: Progressing  Goal: Electrolytes maintained within normal limits  Description: INTERVENTIONS:  - Monitor labs and rhythm and assess patient for signs and symptoms of electrolyte imbalances  - Administer electrolyte replacement as ordered  - Monitor response to electrolyte replacements, including rhythm and repeat lab results as appropriate  - Fluid restriction as ordered  - Instruct patient on fluid and nutrition restrictions as appropriate  Outcome: Progressing  Goal: Hemodynamic stability and optimal renal function maintained  Description: INTERVENTIONS:  - Monitor labs and assess for signs and symptoms of volume excess or deficit  - Monitor intake, output and patient weight  - Monitor urine specific gravity, serum osmolarity and serum sodium as indicated or ordered  - Monitor response to interventions for patient's volume status, including labs, urine output, blood pressure (other measures as available)  - Encourage oral intake as appropriate  - Instruct patient on fluid and nutrition restrictions as appropriate  Outcome: Progressing     Problem: SKIN/TISSUE INTEGRITY - ADULT  Goal: Skin integrity remains intact  Description: INTERVENTIONS  - Assess and document risk factors for pressure ulcer development  - Assess and document skin integrity  - Monitor for areas of redness and/or skin breakdown  - Initiate interventions, skin care algorithm/standards of care as needed  Outcome: Progressing  Goal: Incision(s), wounds(s) or drain site(s) healing without S/S of infection  Description: INTERVENTIONS:  - Assess and document risk factors for pressure ulcer development  - Assess and document skin integrity  - Assess and document dressing/incision, wound bed, drain sites and surrounding tissue  - Implement wound care per orders  - Initiate isolation precautions as appropriate  - Initiate Pressure Ulcer prevention bundle as indicated  Outcome: Progressing

## 2023-09-28 NOTE — PLAN OF CARE
Patient is alert and oriented, RA, no complaints of pain. BP meds adjusted. Dialysis yesterday, 2L removed. Pt sitting up in the chair for breakfast. DC plan to [facility] waiting on insurance auth. Problem: Patient Centered Care  Goal: Patient preferences are identified and integrated in the patient's plan of care  Description: Interventions:  - What would you like us to know as we care for you?  Pt is very grateful to be alive  - Provide timely, complete, and accurate information to patient/family  - Incorporate patient and family knowledge, values, beliefs, and cultural backgrounds into the planning and delivery of care  - Encourage patient/family to participate in care and decision-making at the level they choose  - Honor patient and family perspectives and choices  Outcome: Progressing     Problem: RESPIRATORY - ADULT  Goal: Achieves optimal ventilation and oxygenation  Description: INTERVENTIONS:  - Assess for changes in respiratory status  - Assess for changes in mentation and behavior  - Position to facilitate oxygenation and minimize respiratory effort  - Oxygen supplementation based on oxygen saturation or ABGs  - Provide Smoking Cessation handout, if applicable  - Encourage broncho-pulmonary hygiene including cough, deep breathe, Incentive Spirometry  - Assess the need for suctioning and perform as needed  - Assess and instruct to report SOB or any respiratory difficulty  - Respiratory Therapy support as indicated  - Manage/alleviate anxiety  - Monitor for signs/symptoms of CO2 retention  Outcome: Progressing     Problem: Patient/Family Goals  Goal: Patient/Family Long Term Goal  Description: Patient's Long Term Goal: return to baseline ADL     Interventions:  - healthy diet, maintain regular medications   - See additional Care Plan goals for specific interventions  Outcome: Progressing  Goal: Patient/Family Short Term Goal  Description: Patient's Short Term Goal: discharge home Interventions:   - cardiac meds / PT & OT   - See additional Care Plan goals for specific interventions  Outcome: Progressing     Problem: SAFETY ADULT - FALL  Goal: Free from fall injury  Description: INTERVENTIONS:  - Assess pt frequently for physical needs  - Identify cognitive and physical deficits and behaviors that affect risk of falls. - [place] fall precautions as indicated by assessment.  - Educate pt/family on patient safety including physical limitations  - Instruct pt to call for assistance with activity based on assessment  - Modify environment to reduce risk of injury  - Provide assistive devices as appropriate  - Consider OT/PT consult to assist with strengthening/mobility  - Encourage toileting schedule  Outcome: Progressing     Problem: CARDIOVASCULAR - ADULT  Goal: Maintains optimal cardiac output and hemodynamic stability  Description: INTERVENTIONS:  - Monitor vital signs, rhythm, and trends  - Monitor for bleeding, hypotension and signs of decreased cardiac output  - Evaluate effectiveness of vasoactive medications to optimize hemodynamic stability  - Monitor arterial and/or venous puncture sites for bleeding and/or hematoma  - Assess quality of pulses, skin color and temperature  - Assess for signs of decreased coronary artery perfusion - ex.  Angina  - Evaluate fluid balance, assess for edema, trend weights  Outcome: Progressing  Goal: Absence of cardiac arrhythmias or at baseline  Description: INTERVENTIONS:  - Continuous cardiac monitoring, monitor vital signs, obtain 12 lead EKG if indicated  - Evaluate effectiveness of antiarrhythmic and heart rate control medications as ordered  - Initiate emergency measures for life threatening arrhythmias  - Monitor electrolytes and administer replacement therapy as ordered  Outcome: Progressing     Problem: GASTROINTESTINAL - ADULT  Goal: Minimal or absence of nausea and vomiting  Description: INTERVENTIONS:  - Maintain adequate hydration with IV or PO as ordered and tolerated  - Nasogastric tube to low intermittent suction as ordered  - Evaluate effectiveness of ordered antiemetic medications  - Provide nonpharmacologic comfort measures as appropriate  - Advance diet as tolerated, if ordered  - Obtain nutritional consult as needed  - Evaluate fluid balance  Outcome: Progressing  Goal: Maintains or returns to baseline bowel function  Description: INTERVENTIONS:  - Assess bowel function  - Maintain adequate hydration with IV or PO as ordered and tolerated  - Evaluate effectiveness of GI medications  - Encourage mobilization and activity  - Obtain nutritional consult as needed  - Establish a toileting routine/schedule  - Consider collaborating with pharmacy to review patient's medication profile  Outcome: Progressing  Goal: Maintains adequate nutritional intake (undernourished)  Description: INTERVENTIONS:  - Monitor percentage of each meal consumed  - Identify factors contributing to decreased intake, treat as appropriate  - Assist with meals as needed  - Monitor I&O, WT and lab values  - Obtain nutritional consult as needed  - Optimize oral hygiene and moisture  - Encourage food from home; allow for food preferences  - Enhance eating environment  Outcome: Progressing     Problem: METABOLIC/FLUID AND ELECTROLYTES - ADULT  Goal: Glucose maintained within prescribed range  Description: INTERVENTIONS:  - Monitor Blood Glucose as ordered  - Assess for signs and symptoms of hyperglycemia and hypoglycemia  - Administer ordered medications to maintain glucose within target range  - Assess barriers to adequate nutritional intake and initiate nutrition consult as needed  - Instruct patient on self management of diabetes  Outcome: Progressing  Goal: Electrolytes maintained within normal limits  Description: INTERVENTIONS:  - Monitor labs and rhythm and assess patient for signs and symptoms of electrolyte imbalances  - Administer electrolyte replacement as ordered  - Monitor response to electrolyte replacements, including rhythm and repeat lab results as appropriate  - Fluid restriction as ordered  - Instruct patient on fluid and nutrition restrictions as appropriate  Outcome: Progressing  Goal: Hemodynamic stability and optimal renal function maintained  Description: INTERVENTIONS:  - Monitor labs and assess for signs and symptoms of volume excess or deficit  - Monitor intake, output and patient weight  - Monitor urine specific gravity, serum osmolarity and serum sodium as indicated or ordered  - Monitor response to interventions for patient's volume status, including labs, urine output, blood pressure (other measures as available)  - Encourage oral intake as appropriate  - Instruct patient on fluid and nutrition restrictions as appropriate  Outcome: Progressing     Problem: SKIN/TISSUE INTEGRITY - ADULT  Goal: Skin integrity remains intact  Description: INTERVENTIONS  - Assess and document risk factors for pressure ulcer development  - Assess and document skin integrity  - Monitor for areas of redness and/or skin breakdown  - Initiate interventions, skin care algorithm/standards of care as needed  Outcome: Progressing  Goal: Incision(s), wounds(s) or drain site(s) healing without S/S of infection  Description: INTERVENTIONS:  - Assess and document risk factors for pressure ulcer development  - Assess and document skin integrity  - Assess and document dressing/incision, wound bed, drain sites and surrounding tissue  - Implement wound care per orders  - Initiate isolation precautions as appropriate  - Initiate Pressure Ulcer prevention bundle as indicated  Outcome: Progressing

## 2023-09-29 VITALS
DIASTOLIC BLOOD PRESSURE: 65 MMHG | RESPIRATION RATE: 20 BRPM | BODY MASS INDEX: 29.72 KG/M2 | TEMPERATURE: 98 F | SYSTOLIC BLOOD PRESSURE: 132 MMHG | WEIGHT: 174.06 LBS | HEIGHT: 64.02 IN | OXYGEN SATURATION: 96 % | HEART RATE: 70 BPM

## 2023-09-29 LAB
ALBUMIN SERPL-MCNC: 2.7 G/DL (ref 3.4–5)
ANION GAP SERPL CALC-SCNC: 8 MMOL/L (ref 0–18)
BASOPHILS # BLD AUTO: 0.08 X10(3) UL (ref 0–0.2)
BASOPHILS NFR BLD AUTO: 1 %
BUN BLD-MCNC: 22 MG/DL (ref 7–18)
BUN/CREAT SERPL: 10 (ref 10–20)
CALCIUM BLD-MCNC: 9 MG/DL (ref 8.5–10.1)
CHLORIDE SERPL-SCNC: 100 MMOL/L (ref 98–112)
CO2 SERPL-SCNC: 25 MMOL/L (ref 21–32)
CREAT BLD-MCNC: 2.2 MG/DL
DEPRECATED RDW RBC AUTO: 43.8 FL (ref 35.1–46.3)
EGFRCR SERPLBLD CKD-EPI 2021: 24 ML/MIN/1.73M2 (ref 60–?)
EOSINOPHIL # BLD AUTO: 0.22 X10(3) UL (ref 0–0.7)
EOSINOPHIL NFR BLD AUTO: 2.7 %
ERYTHROCYTE [DISTWIDTH] IN BLOOD BY AUTOMATED COUNT: 13.1 % (ref 11–15)
GLUCOSE BLD-MCNC: 171 MG/DL (ref 70–99)
GLUCOSE BLDC GLUCOMTR-MCNC: 138 MG/DL (ref 70–99)
GLUCOSE BLDC GLUCOMTR-MCNC: 180 MG/DL (ref 70–99)
HCT VFR BLD AUTO: 32 %
HGB BLD-MCNC: 10.9 G/DL
IMM GRANULOCYTES # BLD AUTO: 0.05 X10(3) UL (ref 0–1)
IMM GRANULOCYTES NFR BLD: 0.6 %
LYMPHOCYTES # BLD AUTO: 1.2 X10(3) UL (ref 1–4)
LYMPHOCYTES NFR BLD AUTO: 14.8 %
MAGNESIUM SERPL-MCNC: 2.2 MG/DL (ref 1.6–2.6)
MCH RBC QN AUTO: 31.5 PG (ref 26–34)
MCHC RBC AUTO-ENTMCNC: 34.1 G/DL (ref 31–37)
MCV RBC AUTO: 92.5 FL
MONOCYTES # BLD AUTO: 0.72 X10(3) UL (ref 0.1–1)
MONOCYTES NFR BLD AUTO: 8.9 %
NEUTROPHILS # BLD AUTO: 5.85 X10 (3) UL (ref 1.5–7.7)
NEUTROPHILS # BLD AUTO: 5.85 X10(3) UL (ref 1.5–7.7)
NEUTROPHILS NFR BLD AUTO: 72 %
OSMOLALITY SERPL CALC.SUM OF ELEC: 283 MOSM/KG (ref 275–295)
PHOSPHATE SERPL-MCNC: 2.7 MG/DL (ref 2.5–4.9)
PLATELET # BLD AUTO: 359 10(3)UL (ref 150–450)
POTASSIUM SERPL-SCNC: 4.2 MMOL/L (ref 3.5–5.1)
RBC # BLD AUTO: 3.46 X10(6)UL
SODIUM SERPL-SCNC: 133 MMOL/L (ref 136–145)
WBC # BLD AUTO: 8.1 X10(3) UL (ref 4–11)

## 2023-09-29 PROCEDURE — 90935 HEMODIALYSIS ONE EVALUATION: CPT | Performed by: INTERNAL MEDICINE

## 2023-09-29 RX ORDER — CARVEDILOL 12.5 MG/1
37.5 TABLET ORAL 2 TIMES DAILY WITH MEALS
Status: SHIPPED | COMMUNITY
Start: 2023-09-29

## 2023-09-29 RX ORDER — INSULIN GLARGINE 300 U/ML
20 INJECTION, SOLUTION SUBCUTANEOUS EVERY EVENING
Status: SHIPPED | COMMUNITY
Start: 2023-09-29

## 2023-09-29 RX ORDER — AMLODIPINE BESYLATE 5 MG/1
10 TABLET ORAL DAILY
Status: SHIPPED | COMMUNITY
Start: 2023-09-29 | End: 2023-09-29

## 2023-09-29 RX ORDER — ATORVASTATIN CALCIUM 80 MG/1
80 TABLET, FILM COATED ORAL DAILY
Status: SHIPPED | COMMUNITY
Start: 2023-09-29

## 2023-09-29 RX ORDER — AMLODIPINE BESYLATE 5 MG/1
5 TABLET ORAL DAILY
Status: SHIPPED | COMMUNITY
Start: 2023-09-29

## 2023-09-29 NOTE — PLAN OF CARE
Pt alert, RA complains of no pain. Intermittent dry cough at night. No acute changes. Plan for dialysis tomorrow and dc pending insurance approval. No acute changes on night shift. Problem: Patient Centered Care  Goal: Patient preferences are identified and integrated in the patient's plan of care  Description: Interventions:  - What would you like us to know as we care for you?  Pt is very grateful to be alive  - Provide timely, complete, and accurate information to patient/family  - Incorporate patient and family knowledge, values, beliefs, and cultural backgrounds into the planning and delivery of care  - Encourage patient/family to participate in care and decision-making at the level they choose  - Honor patient and family perspectives and choices  Outcome: Progressing     Problem: RESPIRATORY - ADULT  Goal: Achieves optimal ventilation and oxygenation  Description: INTERVENTIONS:  - Assess for changes in respiratory status  - Assess for changes in mentation and behavior  - Position to facilitate oxygenation and minimize respiratory effort  - Oxygen supplementation based on oxygen saturation or ABGs  - Provide Smoking Cessation handout, if applicable  - Encourage broncho-pulmonary hygiene including cough, deep breathe, Incentive Spirometry  - Assess the need for suctioning and perform as needed  - Assess and instruct to report SOB or any respiratory difficulty  - Respiratory Therapy support as indicated  - Manage/alleviate anxiety  - Monitor for signs/symptoms of CO2 retention  Outcome: Progressing     Problem: Patient/Family Goals  Goal: Patient/Family Long Term Goal  Description: Patient's Long Term Goal: return to baseline ADL     Interventions:  - healthy diet, maintain regular medications   - See additional Care Plan goals for specific interventions  Outcome: Progressing  Goal: Patient/Family Short Term Goal  Description: Patient's Short Term Goal: discharge home     Interventions:   - cardiac meds / PT & OT   - See additional Care Plan goals for specific interventions  Outcome: Progressing     Problem: SAFETY ADULT - FALL  Goal: Free from fall injury  Description: INTERVENTIONS:  - Assess pt frequently for physical needs  - Identify cognitive and physical deficits and behaviors that affect risk of falls. - [place] fall precautions as indicated by assessment.  - Educate pt/family on patient safety including physical limitations  - Instruct pt to call for assistance with activity based on assessment  - Modify environment to reduce risk of injury  - Provide assistive devices as appropriate  - Consider OT/PT consult to assist with strengthening/mobility  - Encourage toileting schedule  Outcome: Progressing     Problem: CARDIOVASCULAR - ADULT  Goal: Maintains optimal cardiac output and hemodynamic stability  Description: INTERVENTIONS:  - Monitor vital signs, rhythm, and trends  - Monitor for bleeding, hypotension and signs of decreased cardiac output  - Evaluate effectiveness of vasoactive medications to optimize hemodynamic stability  - Monitor arterial and/or venous puncture sites for bleeding and/or hematoma  - Assess quality of pulses, skin color and temperature  - Assess for signs of decreased coronary artery perfusion - ex.  Angina  - Evaluate fluid balance, assess for edema, trend weights  Outcome: Progressing  Goal: Absence of cardiac arrhythmias or at baseline  Description: INTERVENTIONS:  - Continuous cardiac monitoring, monitor vital signs, obtain 12 lead EKG if indicated  - Evaluate effectiveness of antiarrhythmic and heart rate control medications as ordered  - Initiate emergency measures for life threatening arrhythmias  - Monitor electrolytes and administer replacement therapy as ordered  Outcome: Progressing     Problem: METABOLIC/FLUID AND ELECTROLYTES - ADULT  Goal: Glucose maintained within prescribed range  Description: INTERVENTIONS:  - Monitor Blood Glucose as ordered  - Assess for signs and symptoms of hyperglycemia and hypoglycemia  - Administer ordered medications to maintain glucose within target range  - Assess barriers to adequate nutritional intake and initiate nutrition consult as needed  - Instruct patient on self management of diabetes  Outcome: Progressing  Goal: Electrolytes maintained within normal limits  Description: INTERVENTIONS:  - Monitor labs and rhythm and assess patient for signs and symptoms of electrolyte imbalances  - Administer electrolyte replacement as ordered  - Monitor response to electrolyte replacements, including rhythm and repeat lab results as appropriate  - Fluid restriction as ordered  - Instruct patient on fluid and nutrition restrictions as appropriate  Outcome: Progressing  Goal: Hemodynamic stability and optimal renal function maintained  Description: INTERVENTIONS:  - Monitor labs and assess for signs and symptoms of volume excess or deficit  - Monitor intake, output and patient weight  - Monitor urine specific gravity, serum osmolarity and serum sodium as indicated or ordered  - Monitor response to interventions for patient's volume status, including labs, urine output, blood pressure (other measures as available)  - Encourage oral intake as appropriate  - Instruct patient on fluid and nutrition restrictions as appropriate  Outcome: Progressing     Problem: GASTROINTESTINAL - ADULT  Goal: Minimal or absence of nausea and vomiting  Description: INTERVENTIONS:  - Maintain adequate hydration with IV or PO as ordered and tolerated  - Nasogastric tube to low intermittent suction as ordered  - Evaluate effectiveness of ordered antiemetic medications  - Provide nonpharmacologic comfort measures as appropriate  - Advance diet as tolerated, if ordered  - Obtain nutritional consult as needed  - Evaluate fluid balance  Outcome: Progressing  Goal: Maintains or returns to baseline bowel function  Description: INTERVENTIONS:  - Assess bowel function  - Maintain adequate hydration with IV or PO as ordered and tolerated  - Evaluate effectiveness of GI medications  - Encourage mobilization and activity  - Obtain nutritional consult as needed  - Establish a toileting routine/schedule  - Consider collaborating with pharmacy to review patient's medication profile  Outcome: Progressing  Goal: Maintains adequate nutritional intake (undernourished)  Description: INTERVENTIONS:  - Monitor percentage of each meal consumed  - Identify factors contributing to decreased intake, treat as appropriate  - Assist with meals as needed  - Monitor I&O, WT and lab values  - Obtain nutritional consult as needed  - Optimize oral hygiene and moisture  - Encourage food from home; allow for food preferences  - Enhance eating environment  Outcome: Progressing     Problem: SKIN/TISSUE INTEGRITY - ADULT  Goal: Skin integrity remains intact  Description: INTERVENTIONS  - Assess and document risk factors for pressure ulcer development  - Assess and document skin integrity  - Monitor for areas of redness and/or skin breakdown  - Initiate interventions, skin care algorithm/standards of care as needed  Outcome: Progressing  Goal: Incision(s), wounds(s) or drain site(s) healing without S/S of infection  Description: INTERVENTIONS:  - Assess and document risk factors for pressure ulcer development  - Assess and document skin integrity  - Assess and document dressing/incision, wound bed, drain sites and surrounding tissue  - Implement wound care per orders  - Initiate isolation precautions as appropriate  - Initiate Pressure Ulcer prevention bundle as indicated  Outcome: Progressing

## 2023-09-29 NOTE — DISCHARGE SUMMARY
General Medicine Discharge Summary     Patient ID:  Nikita Narvaez  77year old  7/13/1955    Admit date: 9/18/2023    Discharge date and time: 9/29/23    Attending Physician: Pb Rios DO     Consults: IP CONSULT TO CARDIOLOGY  IP CONSULT TO CARDIOLOGY  IP CONSULT TO PULMONOLOGY  CONSULT TO WOUND OSTOMY  IP CONSULT TO NEPHROLOGY  IP CONSULT TO FOOD AND NUTRITION SERVICES  IP CONSULT TO CARDIAC REHAB  IP CONSULT TO FOOD AND NUTRITION SERVICES  IP CONSULT TO CARDIAC REHAB  IP CONSULT TO FOOD AND NUTRITION SERVICES  IP CONSULT TO SOCIAL WORK  IP CONSULT TO INTERVENTIONAL RADIOLOGY  IP CONSULT TO SOCIAL WORK    Primary Care Physician: Shaheen Santillan MD     Reason for admission: Pulmonary edema     Risk For Readmission: Low     Discharge Diagnoses: NSTEMI (non-ST elevated myocardial infarction) ([facility] [I21.4]  Acute respiratory failure with hypoxia ([facility] [J96.01]  See Additional Discharge Diagnoses in Hospital Course    Discharged Condition: good    Follow-up with labs/images appointments:   Close follow-up with PCP suggested , will see provider to KAREN   Follow-up with renal 1-2 weeks  Cards follow-up in 2 weeks       Exam  Gen: No acute distress  Pulm: Lungs clear,  CV: Heart with regular rate and rhythm  Abd: Abdomen soft,   EXT: no edema     HPI:   Per Dr. Johnson Spar:      This is a 70-year-old female with a history of CKD stage IV, coronary artery disease s/p cabg and stent, HL, gerd, hypothyroidism, old cva,hypertension, type 2 diabetes mellitus, depression, anxiety, gout who was admitted to [facility] 9/9/2023-9/12/2023 for acute on chronic diastolic chf/flash pulmonary edema in setting of tony on ckd with abnl stress test with plans for outpt cath who presented with acute respiratory failure in the setting of hypertensive crisis, pulmonary edema and NSTEMI see below for details       Hospital Course:   Admitted for SOB, found to be due to pulm edema, HTN and NSTEMI , she was intubated on admission and found to have worsening renal function, she underwent cath and had OSMIN Ro Ostial Ramus and distal ramus now on DAPT. After cath renal function worsened and she was started on HD with great improvement in her breathing and her blood pressure control. Hospital course was complicated by worsening anxiety , psych consult offered but patient declined. She will need to have close follow-up with cardiology for further medication titration     Meds adjust as listed below include coreg and Lipitor, we did also slightly reduce her insulin dose due to worsening renal function. Operative Procedures:      Imaging: XR CHEST AP PORTABLE  (CPT=71045)    Result Date: 9/27/2023  CONCLUSION:   No radiographic evidence of acute cardiopulmonary abnormality. Right central venous dialysis catheter terminates overlying the high right atrium. Recommend correlation with desired positioning. Endotracheal and esophagogastric tubes have been removed.     elm-remote    Dictated by (CST): Salvatore Urena MD on 9/27/2023 at 8:33 AM     Finalized by (CST): Salvatore Urena MD on 9/27/2023 at 8:38 AM           Disposition: home    Activity: activity as tolerated  Diet: regular diet  Wound Care: none needed  Code Status: Full Code  O2: None    Home Medication Changes: See list below     Med list     Medication List        START taking these medications      ticagrelor 90 MG Tabs  Commonly known as: Brilinta            CHANGE how you take these medications      atorvastatin 80 MG Tabs  Commonly known as: Lipitor  What changed:   medication strength  how much to take     carvedilol 12.5 MG Tabs  Commonly known as: Coreg  What changed: how much to take     Toujeo SoloStar 300 UNIT/ML Sopn  Generic drug: Insulin Glargine (1 Unit Dial)  What changed: how much to take            CONTINUE taking these medications      allopurinol 300 MG Tabs  Commonly known as: Zyloprim  TAKE ONE-HALF (1/2) TABLET DAILY     amLODIPine 5 MG Tabs  Commonly known as: Norvasc amphetamine-dextroamphetamine 20 MG Tabs  Commonly known as: Adderall  Take 1 tablet (20 mg total) by mouth 2 (two) times daily. aspirin 81 MG Tbec     Ally Contour Next Test Strp  TEST BLOOD GLUCOSE THREE TIMES DAILY     buPROPion  MG Tb24  Commonly known as: Wellbutrin XL  Take 1 tablet (300 mg total) by mouth daily. Calcium Carbonate-Vitamin D 500-400 MG-UNIT Tabs  Commonly known as: OSCAL-500     cloNIDine 0.2 MG Tabs  Commonly known as: Catapres  Take 1 tablet (0.2 mg total) by mouth 2 (two) times daily. diazePAM 5 MG Tabs  Commonly known as: Valium  Take 1 tablet (5 mg total) by mouth in the morning and 1 tablet (5 mg total) before bedtime. hydrALAZINE 100 MG Tabs  Commonly known as: Apresoline     Insulin Pen Needle 29G X 12.7MM Misc  Commonly known as: BD Pen Needle Original U/F  USE AS DIRECTED DAILY     levothyroxine 125 MCG Tabs  Commonly known as: Synthroid  Take 1 tablet (125 mcg total) by mouth before breakfast.     omega-3-acid ethyl esters 1 g Caps  Commonly known as: Lovaza  Take 2 capsules (2 g total) by mouth 2 (two) times daily. pantoprazole 40 MG Tbec  Commonly known as: Protonix  TAKE 1 TABLET EVERY MORNING BEFORE BREAKFAST     sertraline 100 MG Tabs  Commonly known as: Zoloft  Take 2 tablets (200 mg total) by mouth daily. Tab-A-Michelle Tabs              FU   Follow-up Information       Mitzi Trujillo MD Follow up in 2 day(s). Specialty: Internal Medicine  Contact information:  [address] [place] [address]  [phone]               Dawn Potter MD Follow up in 2 week(s). Specialty: NEPHROLOGY  Contact information:  [address] [phone]               Briseida Howard MD Follow up in 2 week(s).     Specialties: Cardiovascular Diseases, CARDIOLOGY  Contact information:  [facility] [address] [address] [phone]                             GA instructions:      I reconciled current and discharge medications on day of discharge, discussed changes with patient and noted changes above.        Total Time Coordinating Care: 35 minutes    Patient had opportunity to ask questions and state understand and agree with therapeutic plan as outlined    Thank You,    Vern Sanchez DO   Hospitalist with [facility]

## 2023-09-29 NOTE — DISCHARGE PLANNING
Received call from Yon at [facility] requesting report on this patient. Informed Yon that patient's primary RN Zoraida Flores has already left for the day. This writer answered Afshan's questions regarding patient based on information available in chart.      Nevaeh Polanco, Discharge Leader [id number]

## 2023-09-29 NOTE — PLAN OF CARE
Patient is alert and oriented, RA, dialysis completed 1.5L removed. PT to work with pt today. Pt is medially cleared will be discharging at 2pm today. Problem: Patient Centered Care  Goal: Patient preferences are identified and integrated in the patient's plan of care  Description: Interventions:  - What would you like us to know as we care for you?  Pt is very grateful to be alive  - Provide timely, complete, and accurate information to patient/family  - Incorporate patient and family knowledge, values, beliefs, and cultural backgrounds into the planning and delivery of care  - Encourage patient/family to participate in care and decision-making at the level they choose  - Honor patient and family perspectives and choices  Outcome: Progressing     Problem: RESPIRATORY - ADULT  Goal: Achieves optimal ventilation and oxygenation  Description: INTERVENTIONS:  - Assess for changes in respiratory status  - Assess for changes in mentation and behavior  - Position to facilitate oxygenation and minimize respiratory effort  - Oxygen supplementation based on oxygen saturation or ABGs  - Provide Smoking Cessation handout, if applicable  - Encourage broncho-pulmonary hygiene including cough, deep breathe, Incentive Spirometry  - Assess the need for suctioning and perform as needed  - Assess and instruct to report SOB or any respiratory difficulty  - Respiratory Therapy support as indicated  - Manage/alleviate anxiety  - Monitor for signs/symptoms of CO2 retention  Outcome: Progressing     Problem: Patient/Family Goals  Goal: Patient/Family Long Term Goal  Description: Patient's Long Term Goal: return to baseline ADL     Interventions:  - healthy diet, maintain regular medications   - See additional Care Plan goals for specific interventions  Outcome: Progressing  Goal: Patient/Family Short Term Goal  Description: Patient's Short Term Goal: discharge home     Interventions:   - cardiac meds / PT & OT   - See additional Care Plan goals for specific interventions  Outcome: Progressing     Problem: SAFETY ADULT - FALL  Goal: Free from fall injury  Description: INTERVENTIONS:  - Assess pt frequently for physical needs  - Identify cognitive and physical deficits and behaviors that affect risk of falls. - [place] fall precautions as indicated by assessment.  - Educate pt/family on patient safety including physical limitations  - Instruct pt to call for assistance with activity based on assessment  - Modify environment to reduce risk of injury  - Provide assistive devices as appropriate  - Consider OT/PT consult to assist with strengthening/mobility  - Encourage toileting schedule  Outcome: Progressing     Problem: CARDIOVASCULAR - ADULT  Goal: Maintains optimal cardiac output and hemodynamic stability  Description: INTERVENTIONS:  - Monitor vital signs, rhythm, and trends  - Monitor for bleeding, hypotension and signs of decreased cardiac output  - Evaluate effectiveness of vasoactive medications to optimize hemodynamic stability  - Monitor arterial and/or venous puncture sites for bleeding and/or hematoma  - Assess quality of pulses, skin color and temperature  - Assess for signs of decreased coronary artery perfusion - ex.  Angina  - Evaluate fluid balance, assess for edema, trend weights  Outcome: Progressing  Goal: Absence of cardiac arrhythmias or at baseline  Description: INTERVENTIONS:  - Continuous cardiac monitoring, monitor vital signs, obtain 12 lead EKG if indicated  - Evaluate effectiveness of antiarrhythmic and heart rate control medications as ordered  - Initiate emergency measures for life threatening arrhythmias  - Monitor electrolytes and administer replacement therapy as ordered  Outcome: Progressing     Problem: GASTROINTESTINAL - ADULT  Goal: Minimal or absence of nausea and vomiting  Description: INTERVENTIONS:  - Maintain adequate hydration with IV or PO as ordered and tolerated  - Nasogastric tube to low intermittent suction as ordered  - Evaluate effectiveness of ordered antiemetic medications  - Provide nonpharmacologic comfort measures as appropriate  - Advance diet as tolerated, if ordered  - Obtain nutritional consult as needed  - Evaluate fluid balance  Outcome: Progressing  Goal: Maintains or returns to baseline bowel function  Description: INTERVENTIONS:  - Assess bowel function  - Maintain adequate hydration with IV or PO as ordered and tolerated  - Evaluate effectiveness of GI medications  - Encourage mobilization and activity  - Obtain nutritional consult as needed  - Establish a toileting routine/schedule  - Consider collaborating with pharmacy to review patient's medication profile  Outcome: Progressing  Goal: Maintains adequate nutritional intake (undernourished)  Description: INTERVENTIONS:  - Monitor percentage of each meal consumed  - Identify factors contributing to decreased intake, treat as appropriate  - Assist with meals as needed  - Monitor I&O, WT and lab values  - Obtain nutritional consult as needed  - Optimize oral hygiene and moisture  - Encourage food from home; allow for food preferences  - Enhance eating environment  Outcome: Progressing     Problem: METABOLIC/FLUID AND ELECTROLYTES - ADULT  Goal: Glucose maintained within prescribed range  Description: INTERVENTIONS:  - Monitor Blood Glucose as ordered  - Assess for signs and symptoms of hyperglycemia and hypoglycemia  - Administer ordered medications to maintain glucose within target range  - Assess barriers to adequate nutritional intake and initiate nutrition consult as needed  - Instruct patient on self management of diabetes  Outcome: Progressing  Goal: Electrolytes maintained within normal limits  Description: INTERVENTIONS:  - Monitor labs and rhythm and assess patient for signs and symptoms of electrolyte imbalances  - Administer electrolyte replacement as ordered  - Monitor response to electrolyte replacements, including rhythm and repeat lab results as appropriate  - Fluid restriction as ordered  - Instruct patient on fluid and nutrition restrictions as appropriate  Outcome: Progressing  Goal: Hemodynamic stability and optimal renal function maintained  Description: INTERVENTIONS:  - Monitor labs and assess for signs and symptoms of volume excess or deficit  - Monitor intake, output and patient weight  - Monitor urine specific gravity, serum osmolarity and serum sodium as indicated or ordered  - Monitor response to interventions for patient's volume status, including labs, urine output, blood pressure (other measures as available)  - Encourage oral intake as appropriate  - Instruct patient on fluid and nutrition restrictions as appropriate  Outcome: Progressing     Problem: SKIN/TISSUE INTEGRITY - ADULT  Goal: Skin integrity remains intact  Description: INTERVENTIONS  - Assess and document risk factors for pressure ulcer development  - Assess and document skin integrity  - Monitor for areas of redness and/or skin breakdown  - Initiate interventions, skin care algorithm/standards of care as needed  Outcome: Progressing  Goal: Incision(s), wounds(s) or drain site(s) healing without S/S of infection  Description: INTERVENTIONS:  - Assess and document risk factors for pressure ulcer development  - Assess and document skin integrity  - Assess and document dressing/incision, wound bed, drain sites and surrounding tissue  - Implement wound care per orders  - Initiate isolation precautions as appropriate  - Initiate Pressure Ulcer prevention bundle as indicated  Outcome: Progressing

## 2023-09-29 NOTE — PHYSICAL THERAPY NOTE
PHYSICAL THERAPY TREATMENT NOTE - INPATIENT     Room Number: 332/332-A       Presenting Problem: Acute respiratory failure hypoxia, weakness    Problem List  Principal Problem:    Acute respiratory failure with hypoxia ([facility])  Active Problems:    CKD (chronic kidney disease) stage 4, GFR 15-29 ml/min ([facility])    [facility]STEM[facility] (non-ST elevated [facility]) [facility]    ESRD on hemodialysis ([facility]      PHYSICAL THERAPY ASSESSMENT   Chart reviewed. RN Kwabena Bowie approved participation in physical therapy. PPE worn by therapist: gloves. Patient was not wearing a mask during session. Patient presented in bed with did not rate pain. Patient with fair  progress towards goals during this session. Education provided on Physical therapy plan of care and physiological benefits of out of bed mobility. Patient with good carryover. Pt is received in the bed and was cleared for therapy session. Pt with improved mobility this session but sit recommend KAREN. Pt still with some weakness and decreased activity tolerance and fatigues quickly. Pt's  Pt is min A with bed mobility and to transfer to the EOB. Pt was able to scoot herself to the EOB with cueing. Pt sat EOB for a few minutes and denied any dizziness and light headedness. Pt then was min A from the height of the bed with the RW for sit<>stand transfers. Pt was able to take a shuffling few steps to the chair with the RW min A. Pt declined further AMB at this time due to pt increased fatigue level due to her just getting finished with receiving dialysis. Pt was repositioned and left in the chair with all needs within reach and alarm system activated. Reported to the RN on the status of the pt. Pt is on track to [facility] to [facility] once medically cleared. Bed mobility: Min assist  Transfers: Min assist  Gait Assistance: Minimum assistance  Distance (ft): few steps to the chair  Assistive Device: Rolling walker  Pattern: Shuffle          .  Patient was left in bedside chair and alarm activated at end of session with all needs in reach. The patient's Approx Degree of Impairment: 54.16% has been calculated based on documentation in the [facility] '6 clicks' Inpatient Basic Mobility Short Form. Research supports that patients with this level of impairment may benefit from Subacute Rehab. RN aware of patient status post session. DISCHARGE RECOMMENDATIONS  PT Discharge Recommendations: Sub-acute rehabilitation     PLAN  PT Treatment Plan: Bed mobility; Body mechanics; Coordination; Endurance; Patient education;Gait training;Strengthening;Transfer training;Balance training    SUBJECTIVE  Pt was agreeable to therapy session. OBJECTIVE  Precautions: Bed/chair alarm    WEIGHT BEARING RESTRICTION  Weight Bearing Restriction: None                PAIN ASSESSMENT   Rating: Unable to rate  Location: overall soreness and back pain  Management Techniques: Activity promotion; Body mechanics; Relaxation;Repositioning    BALANCE                                                                                                                       Static Sitting: Fair  Dynamic Sitting: Fair -           Static Standing: Poor +  Dynamic Standing: Poor +    ACTIVITY TOLERANCE                         O2 WALK       AM-PAC '6-Clicks' INPATIENT SHORT FORM - BASIC MOBILITY  How much difficulty does the patient currently have. .. Patient Difficulty: Turning over in bed (including adjusting bedclothes, sheets and blankets)?: A Little   Patient Difficulty: Sitting down on and standing up from a chair with arms (e.g., wheelchair, bedside commode, etc.): A Little   Patient Difficulty: Moving from lying on back to sitting on the side of the bed?: A Lot   How much help from another person does the patient currently need. ..    Help from Another: Moving to and from a bed to a chair (including a wheelchair)?: A Little   Help from Another: Need to walk in hospital room?: A Little   Help from Another: Climbing 3-5 steps with a railing?: A Lot AM-PAC Score:  Raw Score: 16   Approx Degree of Impairment: 54.16%   Standardized Score (AM-PAC Scale): 40.78   CMS Modifier (G-Code): CK          Patient End of Session: Up in chair;Needs met;Call light within reach;RN aware of session/findings; All patient questions and concerns addressed; Alarm set    CURRENT GOALS   Goals to be met by: 10/15/2023, goals revised 9/27/22  Patient Goal Patient's self-stated goal is: Return home    Goal #1 Patient is able to demonstrate supine - sit EOB @ level: minimal assistance      Goal #1   Current Status  min A   Goal #2 Patient is able to demonstrate transfers Sit to/from Stand at assistance level: minimal assistance with walker - rolling      Goal #2  Current Status  min A with the RW   Goal #3 Patient is able to ambulate 75 feet with assist device: walker - rolling at assistance level: minimal assistance   Goal #3   Current Status  SPT and few steps with the RW min A   Goal #4  Patient to perform bed to chair transfers with RW with CGA   Goal #4   Current Status Min A with the RW   Goal #5 Patient to demonstrate independence with home activity/exercise instructions provided to patient in preparation for discharge.    Goal #5   Current Status  In progress   Goal #6     Goal #6  Current Status             Therapeutic Activity: 30 minutes

## 2023-09-29 NOTE — CM/SW NOTE
09/29/23 1018   Discharge disposition   Expected discharge disposition subacute   Post Acute Care Provider Other  ([facility])   Home services after discharge None   Discharge transportation [facility]     The patient received a MDO for discharge. The patient will be transported to the [place] [address] via [address] at 2pm.   PCS complete. The number to call report is [phone]    The facility has been notified of transport time. RN to inform family. SW/CM to remain available for support and/or discharge planning.      Tess DICK, [facility]  Discharge Planner [id number]

## 2023-09-29 NOTE — DISCHARGE PLANNING
This RN called to give report at [facility] [place] [address]. This RN left a voicemail at admissions with my phone number [phone], as well as tried calling other extensions with no luck of getting through to give report to a RN. IV and tele removed. Answered all questions at this time. Patient is stable at time of transport. Pt stated she was feeling dizzy, relayed message to MD reporting feeling dizzy from chair to bed ambulation. Checked /65 HR 70. Per MD ok for discharge. All belongings taken with the patient.

## 2023-09-29 NOTE — PHYSICAL THERAPY NOTE
Attempted treatment pt having dialysis at this time. Will follow up later as appropriate and schedule permitting.

## 2023-09-29 NOTE — PROGRESS NOTES
R chest HD cath site intact, purse string suture removed at the bedside, sorbaview dressing applied.

## 2023-10-02 NOTE — PAYOR COMM NOTE
--------------  DISCHARGE REVIEW    Payor: [facility] [place] #:  [id number]  Authorization Number: [id number]    Admit date: 9/19/23  Admit time:   1:56 AM  Discharge Date: 9/29/2023  3:43 PM     Admitting Physician: Alanna Lopez MD  Attending Physician:  No att. providers found  Primary Care Physician: Viri Weber MD          Discharge Summary Notes        Discharge Summary signed by Lupillo White DO at 9/29/2023  3:14 PM       Author: Lupillo White DO Specialty: HOSPITALIST Author Type: Physician    Filed: 9/29/2023  3:14 PM Date of Service: 9/29/2023  2:53 PM Status: Signed    : Lupillo White DO (Physician)           General Medicine Discharge Summary     Patient ID:  Raquel Croft  77year old  7/13/1955    Admit date: 9/18/2023    Discharge date and time: 9/29/23    Attending Physician: Lupillo White DO     Consults: IP CONSULT TO CARDIOLOGY  IP CONSULT TO CARDIOLOGY  IP CONSULT TO PULMONOLOGY  CONSULT TO WOUND OSTOMY  IP CONSULT TO NEPHROLOGY  IP CONSULT TO FOOD AND NUTRITION SERVICES  IP CONSULT TO CARDIAC REHAB  IP CONSULT TO FOOD AND NUTRITION SERVICES  IP CONSULT TO CARDIAC REHAB  IP CONSULT TO FOOD AND NUTRITION SERVICES  IP CONSULT TO SOCIAL WORK  IP CONSULT TO INTERVENTIONAL RADIOLOGY  IP CONSULT TO SOCIAL WORK    Primary Care Physician: Divine Gilliam MD     Reason for admission: Pulmonary edema     Risk For Readmission: Low     Discharge Diagnoses: NSTEMI (non-ST elevated myocardial infarction) ([facility] [address] [I21.4]  Acute respiratory failure with hypoxia ([facility] [address] [J96.01]  See Additional Discharge Diagnoses in Hospital Course    Discharged Condition: good    Follow-up with labs/images appointments:   Close follow-up with PCP suggested , will see provider to [facility]   Follow-up with renal 1-2 weeks  Cards follow-up in 2 weeks       Exam  Gen: No acute distress  Pulm: Lungs clear,  CV: Heart with regular rate and rhythm  Abd: Abdomen soft,   EXT: no edema     HPI:   Per  Ignacio: This is a 78-year-old female with a history of CKD stage IV, coronary artery disease s/p cabg and stent, HL, gerd, hypothyroidism, old cva,hypertension, type 2 diabetes mellitus, depression, anxiety, gout who was admitted to [facility] 9/9/2023-9/12/2023 for acute on chronic diastolic chf/flash pulmonary edema in setting of tony on ckd with abnl stress test with plans for outpt cath who presented with acute respiratory failure in the setting of hypertensive crisis, pulmonary edema and NSTEMI see below for details       Hospital Course:   Admitted for SOB, found to be due to pulm edema, HTN and NSTEMI , she was intubated on admission and found to have worsening renal function, she underwent cath and had OSMIN Ro Ostial Ramus and distal ramus now on DAPT. After cath renal function worsened and she was started on HD with great improvement in her breathing and her blood pressure control. Hospital course was complicated by worsening anxiety , psych consult offered but patient declined. She will need to have close follow-up with cardiology for further medication titration     Meds adjust as listed below include coreg and Lipitor, we did also slightly reduce her insulin dose due to worsening renal function. Operative Procedures:      Imaging: XR CHEST AP PORTABLE  (CPT=71045)    Result Date: 9/27/2023  CONCLUSION:   No radiographic evidence of acute cardiopulmonary abnormality. Right central venous dialysis catheter terminates overlying the high right atrium. Recommend correlation with desired positioning. Endotracheal and esophagogastric tubes have been removed.     elm-remote    Dictated by (CST): Emilia Green MD on 9/27/2023 at 8:33 AM     Finalized by (CST): Emilia Green MD on 9/27/2023 at 8:38 AM           Disposition: home    Activity: activity as tolerated  Diet: regular diet  Wound Care: none needed  Code Status: Full Code  O2: None    Home Medication Changes: See list below     Med list Medication List        START taking these medications      ticagrelor 90 MG Tabs  Commonly known as: Brilinta            CHANGE how you take these medications      atorvastatin 80 MG Tabs  Commonly known as: Lipitor  What changed:   medication strength  how much to take     carvedilol 12.5 MG Tabs  Commonly known as: Coreg  What changed: how much to take     Toujeo SoloStar 300 UNIT/ML Sopn  Generic drug: Insulin Glargine (1 Unit Dial)  What changed: how much to take            CONTINUE taking these medications      allopurinol 300 MG Tabs  Commonly known as: Zyloprim  TAKE ONE-HALF (1/2) TABLET DAILY     amLODIPine 5 MG Tabs  Commonly known as: Norvasc     amphetamine-dextroamphetamine 20 MG Tabs  Commonly known as: Adderall  Take 1 tablet (20 mg total) by mouth 2 (two) times daily. aspirin 81 MG Tbec     Ally Contour Next Test Strp  TEST BLOOD GLUCOSE THREE TIMES DAILY     buPROPion  MG Tb24  Commonly known as: Wellbutrin XL  Take 1 tablet (300 mg total) by mouth daily. Calcium Carbonate-Vitamin D 500-400 MG-UNIT Tabs  Commonly known as: OSCAL-500     cloNIDine 0.2 MG Tabs  Commonly known as: Catapres  Take 1 tablet (0.2 mg total) by mouth 2 (two) times daily. diazePAM 5 MG Tabs  Commonly known as: Valium  Take 1 tablet (5 mg total) by mouth in the morning and 1 tablet (5 mg total) before bedtime. hydrALAZINE 100 MG Tabs  Commonly known as: Apresoline     Insulin Pen Needle 29G X 12.7MM Misc  Commonly known as: BD Pen Needle Original U/F  USE AS DIRECTED DAILY     levothyroxine 125 MCG Tabs  Commonly known as: Synthroid  Take 1 tablet (125 mcg total) by mouth before breakfast.     omega-3-acid ethyl esters 1 g Caps  Commonly known as: Lovaza  Take 2 capsules (2 g total) by mouth 2 (two) times daily.      pantoprazole 40 MG Tbec  Commonly known as: Protonix  TAKE 1 TABLET EVERY MORNING BEFORE BREAKFAST     sertraline 100 MG Tabs  Commonly known as: Zoloft  Take 2 tablets (200 mg total) by mouth daily. Tab-A-Michelle Tabs              FU   Follow-up Information       Alyx Mario MD Follow up in 2 day(s). Specialty: Internal Medicine  Contact information:  [address]  [phone]               Marky Alvarado MD Follow up in 2 week(s). Specialty: NEPHROLOGY  Contact information:  [address]  [phone]               Ketty Renee MD Follow up in 2 week(s). Specialties: Cardiovascular Diseases, CARDIOLOGY  Contact information:  [facility]  [phone]                             NE instructions:      I reconciled current and discharge medications on day of discharge, discussed changes with patient and noted changes above.        Total Time Coordinating Care: 35 minutes    Patient had opportunity to ask questions and state understand and agree with therapeutic plan as outlined    Thank You,    Aliya Delarosa DO   Hospitalist with [facility]         Electronically signed by Mckinley Barbosa DO on 9/29/2023  3:14 PM         REVIEWER COMMENTS

## 2023-10-19 ENCOUNTER — PATIENT OUTREACH (OUTPATIENT)
Dept: INTERNAL MEDICINE CLINIC | Facility: CLINIC | Age: 68
End: 2023-10-19

## 2023-10-19 NOTE — PROGRESS NOTES
Returned call to pt to confirm the following scheduled appts :     Evins Landau in 2  day(s)  Specialty: Internal Medicine  [facility]  [address] [facility]  [phone]  Monday Brianne@[facility] [employer] w/ Dr. Hector David in 2 week(s)  Specialty: Cardiovascular Diseases, [address] [facility]  [address] [facility] [place]  [address], [facility]   [phone]  Friday 11/03 @9:30am w/ CHANTAL Villavicencio    Patient verbalized that she understands and confirms each scheduled appt date & time.     Closing encounter

## 2023-10-19 NOTE — PROGRESS NOTES
Returned call to patient.   She is requesting assistance with  scheduling with the following MD's as she's just returning home from a rehab facility :    Jack Emanuel in 2  day(s)  Specialty: Internal Medicine  [address] Deisy Saint-Nicolas Laurelyn Ala in 2 week(s)  Specialty: Cardiovascular Diseases, [address]  [phone] [facility]

## 2023-12-19 ENCOUNTER — OFFICE VISIT (OUTPATIENT)
Dept: SURGERY | Facility: CLINIC | Age: 68
End: 2023-12-19

## 2023-12-19 VITALS — BODY MASS INDEX: 30 KG/M2 | WEIGHT: 174 LBS

## 2023-12-19 DIAGNOSIS — N18.6 ESRD ON HEMODIALYSIS (HCC): Primary | ICD-10-CM

## 2023-12-19 DIAGNOSIS — Z99.2 ESRD ON HEMODIALYSIS (HCC): Primary | ICD-10-CM

## 2023-12-19 PROCEDURE — 1160F RVW MEDS BY RX/DR IN RCRD: CPT | Performed by: SURGERY

## 2023-12-19 PROCEDURE — 1126F AMNT PAIN NOTED NONE PRSNT: CPT | Performed by: SURGERY

## 2023-12-19 PROCEDURE — 99204 OFFICE O/P NEW MOD 45 MIN: CPT | Performed by: SURGERY

## 2023-12-19 PROCEDURE — 1159F MED LIST DOCD IN RCRD: CPT | Performed by: SURGERY

## 2023-12-19 NOTE — H&P (VIEW-ONLY)
HPI:    Patient ID: Cassy Patterson is a 68 year old female presenting with   Chief Complaint   Patient presents with    Consult     Patient is here for a peritoneal dialysis catheter.  At this time she has a port.  Still urinates, complaints of lightheadedness, denies fevers, appetite is poor.   .    Consult        Past Medical History:   Diagnosis Date    Adrenal adenoma 2011    left adrenoma    Anxiety     Chronic kidney disease (CKD), stage III (moderate) (HCC)     CORONARY ARTERY DISEASE     Coronary atherosclerosis     Depression     DIABETES     Diverticulosis of colon     Esophageal reflux     Fainting episodes     GERD     GOUT     High blood pressure     High cholesterol     HYPERTENSION     Hypothyroid 2011    MENOPAUSE     Osteoarthritis     Other and unspecified hyperlipidemia     Pneumonia, organism unspecified(486)     Polyp of colon     Renal artery stenosis (HCC) 2011    right kidney    Type II or unspecified type diabetes mellitus without mention of complication, not stated as uncontrolled     Unspecified essential hypertension     Unspecified sleep apnea     PSG Merit  AHI 68, auto-pap      Past Surgical History:   Procedure Laterality Date    ANGIOPLASTY (CORONARY)  3/2015    stents placed in heart    CABG  2011    CABGx4 vessel    CATH ANGIO  2023    CATH BARE METAL STENT (BMS)      CATH PERCUTANEOUS  TRANSLUMINAL CORONARY ANGIOPLASTY  3/3/2015    CATH PERCUTANEOUS  TRANSLUMINAL CORONARY ANGIOPLASTY Right 2017    right Coronary artery, OSMIN    COLONOSCOPY      HC PLMT CORONARY DRUG ELUTING STENT EA ADDL  3/5/2015    HISTOPATHOLOGY REPORT  13    cecal polyps - 1 tubular adenoma    IMPACT TOOTH REM BONY W/COMP Bilateral 1973    wisdom teeth    KNEE REPLACEMENT SURGERY  3/24/16    right TKA     LEFT HEART CATH,PERCUTANEOUS  2011    CAD    LEFT HEART CATH,PERCUTANEOUS  3/2/2015          RENAL ANGIO, CARDIAC CATH  2011    SLEEP STUDY,  ATTENDED  12/10/2012    TONSILLECTOMY Bilateral 1958     Family History   Problem Relation Age of Onset    Cancer Father         prostate, liver    Hypertension Father     Obesity Father     Breast Cancer Father 70        age at dx 70    Heart Surgery Mother         Mitral valve replaced    Heart Disorder Mother         CHF    Hypertension Mother     Arthritis Mother         TKA    Breast Cancer Mother     Stroke Maternal Grandmother     Stroke Maternal Grandfather     Breast Cancer Paternal Grandmother 78        age at dx 78    Cancer Paternal Grandfather         lung    Obesity Brother     Diabetes Brother     Alcohol and Other Disorders Associated Son     Substance Abuse Son     Obesity Sister     Diabetes Sister     Traumatic brain injury Brother      Social History     Socioeconomic History    Marital status:      Spouse name: Not on file    Number of children: 2    Years of education: 15    Highest education level: Not on file   Occupational History    Occupation: [employer]     Comment: [employer]   Tobacco Use    Smoking status: Former     Packs/day: 1.00     Years: 35.00     Additional pack years: 0.00     Total pack years: 35.00     Types: Cigarettes     Quit date: 2014     Years since quittin.2    Smokeless tobacco: Never   Vaping Use    Vaping Use: Never used   Substance and Sexual Activity    Alcohol use: Yes     Alcohol/week: 1.0 standard drink of alcohol     Types: 1 Glasses of wine per week     Comment: occasionally    Drug use: No    Sexual activity: Not Currently     Partners: Male   Other Topics Concern     Service No    Blood Transfusions No    Caffeine Concern Yes     Comment: coffee    Occupational Exposure No    Hobby Hazards No    Sleep Concern Yes     Comment: STEFFEN    Stress Concern No    Weight Concern No    Special Diet No    Back Care No    Exercise Yes    Bike Helmet Yes    Seat Belt Yes    Self-Exams Yes   Social History Narrative    Lives alone.    Enjoys  reading, pets, knitting, cooking     Social Determinants of Health     Financial Resource Strain: Not on file   Food Insecurity: Not on file   Transportation Needs: Not on file   Physical Activity: Not on file   Stress: Not on file   Social Connections: Not on file   Housing Stability: Not on file       Review of Systems   Constitutional: Negative.    HENT: Negative.     Eyes: Negative.    Respiratory: Negative.     Cardiovascular: Negative.    Gastrointestinal: Negative.    Endocrine: Negative.    Genitourinary: Negative.    Musculoskeletal: Negative.    Skin: Negative.    Allergic/Immunologic: Negative.    Neurological: Negative.    Hematological:  Does not bruise/bleed easily.   Psychiatric/Behavioral: Negative.             Current Outpatient Medications   Medication Sig Dispense Refill    buPROPion  MG Oral Tablet 24 Hr Take 1 tablet (300 mg total) by mouth daily. 90 tablet 0    sertraline 100 MG Oral Tab Take 2 tablets (200 mg total) by mouth daily. 180 tablet 0    diazePAM 5 MG Oral Tab Take 1 tablet (5 mg total) by mouth in the morning and 1 tablet (5 mg total) before bedtime. 60 tablet 0    Insulin Glargine, 1 Unit Dial, (TOUJEO SOLOSTAR) 300 UNIT/ML Subcutaneous Solution Pen-injector Inject 20 Units into the skin every evening.      atorvastatin 80 MG Oral Tab Take 1 tablet (80 mg total) by mouth daily.      carvedilol 12.5 MG Oral Tab Take 3 tablets (37.5 mg total) by mouth 2 (two) times daily with meals.      ticagrelor 90 MG Oral Tab Take 1 tablet (90 mg total) by mouth every 12 (twelve) hours.      amLODIPine 5 MG Oral Tab Take 1 tablet (5 mg total) by mouth daily.      Omega-3-acid Ethyl Esters 1 g Oral Cap Take 2 capsules (2 g total) by mouth 2 (two) times daily. 360 capsule 0    cloNIDine 0.2 MG Oral Tab Take 1 tablet (0.2 mg total) by mouth 2 (two) times daily. 180 tablet 1    allopurinol 300 MG Oral Tab TAKE ONE-HALF (1/2) TABLET DAILY 45 tablet 4    Levothyroxine Sodium 125 MCG Oral Tab Take  1 tablet (125 mcg total) by mouth before breakfast. 30 tablet 11    Pantoprazole Sodium 40 MG Oral Tab EC TAKE 1 TABLET EVERY MORNING BEFORE BREAKFAST 90 tablet 3    Insulin Pen Needle (BD PEN NEEDLE ORIGINAL U/F) 29G X 12.7MM Does not apply Misc USE AS DIRECTED DAILY 100 each 3    hydrALAZINE HCl 100 MG Oral Tab Take 1 tablet (100 mg total) by mouth 3 (three) times daily.      ERICKSON CONTOUR NEXT TEST In Vitro Strip TEST BLOOD GLUCOSE THREE TIMES DAILY 300 strip 4    aspirin 81 MG Oral Tab EC Take 1 tablet (81 mg total) by mouth nightly. 30 tablet 11    Calcium Carbonate-Vitamin D (OSCAL-500) 500-400 MG-UNIT Oral Tab Take 1 tablet by mouth daily.      Multiple Vitamin (TAB-A-RENAN) Oral Tab Take 1 tablet by mouth nightly.         Allergies:  Allergies   Allergen Reactions    Plavix [Clopidogrel] RASH    Ultram [Tramadol] FEVER and ANXIETY     Serotonin syndrome to scheduled tramadol in setting of use of SSRI    Sulfa Antibiotics HIVES and UNKNOWN      PHYSICAL EXAM:   Wt 174 lb (78.9 kg)   BMI 29.85 kg/m²   Physical Exam  Vitals reviewed.   Constitutional:       Appearance: Normal appearance. She is well-developed.   HENT:      Head: Normocephalic and atraumatic.   Cardiovascular:      Rate and Rhythm: Normal rate and regular rhythm.   Pulmonary:      Effort: Pulmonary effort is normal.      Breath sounds: Normal breath sounds.   Abdominal:      General: There is no distension.      Palpations: Abdomen is soft. There is no mass.      Tenderness: There is no abdominal tenderness. There is no guarding or rebound.   Musculoskeletal:         General: Normal range of motion.      Cervical back: Normal range of motion and neck supple.   Skin:     General: Skin is warm and dry.   Neurological:      Mental Status: She is alert and oriented to person, place, and time.   Psychiatric:         Speech: Speech normal.         Behavior: Behavior normal.                 ASSESSMENT/PLAN:   Diagnoses and all orders for this  visit:    ESRD on hemodialysis (HCC)    Plan for lap left peritoneal dialysis catheter placement.  Hold anticoagulation perioperatively.         Joe Kern MD  12/19/2023

## 2023-12-26 ENCOUNTER — TELEPHONE (OUTPATIENT)
Dept: SURGERY | Facility: CLINIC | Age: 68
End: 2023-12-26

## 2023-12-26 DIAGNOSIS — Z99.2 STAGE 5 CHRONIC KIDNEY DISEASE ON CHRONIC DIALYSIS (HCC): Primary | ICD-10-CM

## 2023-12-26 DIAGNOSIS — N18.6 STAGE 5 CHRONIC KIDNEY DISEASE ON CHRONIC DIALYSIS (HCC): Primary | ICD-10-CM

## 2023-12-26 NOTE — PAT NURSING NOTE
Several calls made to patient for pre op interview.  No response.  Called patient's son, Neal.  Per Neal- patient unable to stop Brillinta for 7 days per cardiology.  Patient told son she did not know what to do.   Dr. Kern's office notified of above.  Spoke to Steffi in Dr. Kern;s office.

## 2024-01-04 RX ORDER — SODIUM CHLORIDE, SODIUM LACTATE, POTASSIUM CHLORIDE, CALCIUM CHLORIDE 600; 310; 30; 20 MG/100ML; MG/100ML; MG/100ML; MG/100ML
INJECTION, SOLUTION INTRAVENOUS CONTINUOUS
Status: CANCELLED | OUTPATIENT
Start: 2024-01-04

## 2024-01-10 ENCOUNTER — HOSPITAL ENCOUNTER (OUTPATIENT)
Facility: HOSPITAL | Age: 69
Setting detail: HOSPITAL OUTPATIENT SURGERY
Discharge: HOME | End: 2024-01-10
Attending: SURGERY | Admitting: SURGERY
Payer: MEDICARE

## 2024-01-10 ENCOUNTER — ANESTHESIA EVENT (OUTPATIENT)
Dept: SURGERY | Facility: HOSPITAL | Age: 69
End: 2024-01-10
Payer: MEDICARE

## 2024-01-10 ENCOUNTER — ANESTHESIA (OUTPATIENT)
Dept: SURGERY | Facility: HOSPITAL | Age: 69
End: 2024-01-10
Payer: MEDICARE

## 2024-01-10 VITALS
HEART RATE: 81 BPM | HEIGHT: 64 IN | OXYGEN SATURATION: 100 % | BODY MASS INDEX: 27.66 KG/M2 | WEIGHT: 162 LBS | DIASTOLIC BLOOD PRESSURE: 68 MMHG | TEMPERATURE: 98 F | SYSTOLIC BLOOD PRESSURE: 139 MMHG | RESPIRATION RATE: 12 BRPM

## 2024-01-10 DIAGNOSIS — N18.6 ESRD ON HEMODIALYSIS (HCC): Primary | ICD-10-CM

## 2024-01-10 DIAGNOSIS — Z99.2 ESRD ON HEMODIALYSIS (HCC): Primary | ICD-10-CM

## 2024-01-10 LAB
GLUCOSE BLDC GLUCOMTR-MCNC: 149 MG/DL (ref 70–99)
GLUCOSE BLDC GLUCOMTR-MCNC: 157 MG/DL (ref 70–99)
POTASSIUM SERPL-SCNC: 3.5 MMOL/L (ref 3.5–5.1)

## 2024-01-10 PROCEDURE — 49324 LAP INSERT TUNNEL IP CATH: CPT | Performed by: SURGERY

## 2024-01-10 PROCEDURE — 0WHG43Z INSERTION OF INFUSION DEVICE INTO PERITONEAL CAVITY, PERCUTANEOUS ENDOSCOPIC APPROACH: ICD-10-PCS | Performed by: SURGERY

## 2024-01-10 RX ORDER — HYDROCODONE BITARTRATE AND ACETAMINOPHEN 5; 325 MG/1; MG/1
1 TABLET ORAL EVERY 6 HOURS PRN
Qty: 30 TABLET | Refills: 0 | Status: SHIPPED | OUTPATIENT
Start: 2024-01-10

## 2024-01-10 RX ORDER — METOCLOPRAMIDE HYDROCHLORIDE 5 MG/ML
10 INJECTION INTRAMUSCULAR; INTRAVENOUS EVERY 8 HOURS PRN
Status: DISCONTINUED | OUTPATIENT
Start: 2024-01-10 | End: 2024-01-10

## 2024-01-10 RX ORDER — POLYETHYLENE GLYCOL 3350 17 G/17G
17 POWDER, FOR SOLUTION ORAL DAILY
Qty: 14 PACKET | Refills: 0 | Status: SHIPPED | OUTPATIENT
Start: 2024-01-10 | End: 2024-01-24

## 2024-01-10 RX ORDER — LIDOCAINE HYDROCHLORIDE 10 MG/ML
INJECTION, SOLUTION EPIDURAL; INFILTRATION; INTRACAUDAL; PERINEURAL AS NEEDED
Status: DISCONTINUED | OUTPATIENT
Start: 2024-01-10 | End: 2024-01-10 | Stop reason: SURG

## 2024-01-10 RX ORDER — PHENYLEPHRINE HCL 10 MG/ML
VIAL (ML) INJECTION AS NEEDED
Status: DISCONTINUED | OUTPATIENT
Start: 2024-01-10 | End: 2024-01-10 | Stop reason: SURG

## 2024-01-10 RX ORDER — NALOXONE HYDROCHLORIDE 0.4 MG/ML
0.08 INJECTION, SOLUTION INTRAMUSCULAR; INTRAVENOUS; SUBCUTANEOUS AS NEEDED
Status: DISCONTINUED | OUTPATIENT
Start: 2024-01-10 | End: 2024-01-10

## 2024-01-10 RX ORDER — EPHEDRINE SULFATE 50 MG/ML
INJECTION, SOLUTION INTRAVENOUS AS NEEDED
Status: DISCONTINUED | OUTPATIENT
Start: 2024-01-10 | End: 2024-01-10 | Stop reason: SURG

## 2024-01-10 RX ORDER — SODIUM CHLORIDE, SODIUM LACTATE, POTASSIUM CHLORIDE, CALCIUM CHLORIDE 600; 310; 30; 20 MG/100ML; MG/100ML; MG/100ML; MG/100ML
INJECTION, SOLUTION INTRAVENOUS CONTINUOUS
Status: DISCONTINUED | OUTPATIENT
Start: 2024-01-10 | End: 2024-01-10

## 2024-01-10 RX ORDER — HYDRALAZINE HYDROCHLORIDE 20 MG/ML
INJECTION INTRAMUSCULAR; INTRAVENOUS AS NEEDED
Status: DISCONTINUED | OUTPATIENT
Start: 2024-01-10 | End: 2024-01-10 | Stop reason: SURG

## 2024-01-10 RX ORDER — HYDROMORPHONE HYDROCHLORIDE 1 MG/ML
0.4 INJECTION, SOLUTION INTRAMUSCULAR; INTRAVENOUS; SUBCUTANEOUS EVERY 5 MIN PRN
Status: DISCONTINUED | OUTPATIENT
Start: 2024-01-10 | End: 2024-01-10

## 2024-01-10 RX ORDER — ONDANSETRON 2 MG/ML
INJECTION INTRAMUSCULAR; INTRAVENOUS AS NEEDED
Status: DISCONTINUED | OUTPATIENT
Start: 2024-01-10 | End: 2024-01-10 | Stop reason: SURG

## 2024-01-10 RX ORDER — HYDROMORPHONE HYDROCHLORIDE 1 MG/ML
0.6 INJECTION, SOLUTION INTRAMUSCULAR; INTRAVENOUS; SUBCUTANEOUS EVERY 5 MIN PRN
Status: DISCONTINUED | OUTPATIENT
Start: 2024-01-10 | End: 2024-01-10

## 2024-01-10 RX ORDER — ROCURONIUM BROMIDE 10 MG/ML
INJECTION, SOLUTION INTRAVENOUS AS NEEDED
Status: DISCONTINUED | OUTPATIENT
Start: 2024-01-10 | End: 2024-01-10 | Stop reason: SURG

## 2024-01-10 RX ORDER — DEXTROSE MONOHYDRATE 25 G/50ML
50 INJECTION, SOLUTION INTRAVENOUS
Status: DISCONTINUED | OUTPATIENT
Start: 2024-01-10 | End: 2024-01-10

## 2024-01-10 RX ORDER — HYDROMORPHONE HYDROCHLORIDE 1 MG/ML
0.2 INJECTION, SOLUTION INTRAMUSCULAR; INTRAVENOUS; SUBCUTANEOUS EVERY 5 MIN PRN
Status: DISCONTINUED | OUTPATIENT
Start: 2024-01-10 | End: 2024-01-10

## 2024-01-10 RX ORDER — BUPIVACAINE HYDROCHLORIDE 5 MG/ML
INJECTION, SOLUTION EPIDURAL; INTRACAUDAL AS NEEDED
Status: DISCONTINUED | OUTPATIENT
Start: 2024-01-10 | End: 2024-01-10 | Stop reason: HOSPADM

## 2024-01-10 RX ORDER — NICOTINE POLACRILEX 4 MG
15 LOZENGE BUCCAL
Status: DISCONTINUED | OUTPATIENT
Start: 2024-01-10 | End: 2024-01-10

## 2024-01-10 RX ORDER — GLYCOPYRROLATE 0.2 MG/ML
INJECTION, SOLUTION INTRAMUSCULAR; INTRAVENOUS AS NEEDED
Status: DISCONTINUED | OUTPATIENT
Start: 2024-01-10 | End: 2024-01-10 | Stop reason: SURG

## 2024-01-10 RX ORDER — NICOTINE POLACRILEX 4 MG
30 LOZENGE BUCCAL
Status: DISCONTINUED | OUTPATIENT
Start: 2024-01-10 | End: 2024-01-10

## 2024-01-10 RX ORDER — SODIUM CHLORIDE 9 MG/ML
INJECTION, SOLUTION INTRAVENOUS CONTINUOUS
Status: DISCONTINUED | OUTPATIENT
Start: 2024-01-10 | End: 2024-01-10

## 2024-01-10 RX ORDER — ACETAMINOPHEN 500 MG
1000 TABLET ORAL ONCE
Status: COMPLETED | OUTPATIENT
Start: 2024-01-10 | End: 2024-01-10

## 2024-01-10 RX ORDER — CEFAZOLIN SODIUM/WATER 2 G/20 ML
2 SYRINGE (ML) INTRAVENOUS ONCE
Status: COMPLETED | OUTPATIENT
Start: 2024-01-10 | End: 2024-01-10

## 2024-01-10 RX ORDER — MIDAZOLAM HYDROCHLORIDE 1 MG/ML
INJECTION INTRAMUSCULAR; INTRAVENOUS AS NEEDED
Status: DISCONTINUED | OUTPATIENT
Start: 2024-01-10 | End: 2024-01-10 | Stop reason: SURG

## 2024-01-10 RX ORDER — ONDANSETRON 2 MG/ML
4 INJECTION INTRAMUSCULAR; INTRAVENOUS EVERY 6 HOURS PRN
Status: DISCONTINUED | OUTPATIENT
Start: 2024-01-10 | End: 2024-01-10

## 2024-01-10 RX ADMIN — HYDRALAZINE HYDROCHLORIDE 5 MG: 20 INJECTION INTRAMUSCULAR; INTRAVENOUS at 12:52:00

## 2024-01-10 RX ADMIN — GLYCOPYRROLATE 0.2 MG: 0.2 INJECTION, SOLUTION INTRAMUSCULAR; INTRAVENOUS at 12:18:00

## 2024-01-10 RX ADMIN — CEFAZOLIN SODIUM/WATER 2 G: 2 G/20 ML SYRINGE (ML) INTRAVENOUS at 12:26:00

## 2024-01-10 RX ADMIN — LIDOCAINE HYDROCHLORIDE 50 MG: 10 INJECTION, SOLUTION EPIDURAL; INFILTRATION; INTRACAUDAL; PERINEURAL at 12:20:00

## 2024-01-10 RX ADMIN — EPHEDRINE SULFATE 5 MG: 50 INJECTION, SOLUTION INTRAVENOUS at 12:34:00

## 2024-01-10 RX ADMIN — MIDAZOLAM HYDROCHLORIDE 2 MG: 1 INJECTION INTRAMUSCULAR; INTRAVENOUS at 12:13:00

## 2024-01-10 RX ADMIN — ONDANSETRON 4 MG: 2 INJECTION INTRAMUSCULAR; INTRAVENOUS at 12:38:00

## 2024-01-10 RX ADMIN — ROCURONIUM BROMIDE 10 MG: 10 INJECTION, SOLUTION INTRAVENOUS at 12:27:00

## 2024-01-10 RX ADMIN — PHENYLEPHRINE HCL 200 MCG: 10 MG/ML VIAL (ML) INJECTION at 12:36:00

## 2024-01-10 RX ADMIN — SODIUM CHLORIDE: 9 INJECTION, SOLUTION INTRAVENOUS at 12:12:00

## 2024-01-10 RX ADMIN — EPHEDRINE SULFATE 5 MG: 50 INJECTION, SOLUTION INTRAVENOUS at 12:38:00

## 2024-01-10 RX ADMIN — ROCURONIUM BROMIDE 10 MG: 10 INJECTION, SOLUTION INTRAVENOUS at 12:21:00

## 2024-01-10 NOTE — INTERVAL H&P NOTE
Pre-op Diagnosis: ESRD on hemodialysis (HCC) [N18.6, Z99.2]    The above referenced H&P was reviewed by Joe Kern MD on 1/10/2024, the patient was examined and no significant changes have occurred in the patient's condition since the H&P was performed.  I discussed with the patient and/or legal representative the potential benefits, risks and side effects of this procedure; the likelihood of the patient achieving goals; and potential problems that might occur during recuperation.  I discussed reasonable alternatives to the procedure, including risks, benefits and side effects related to the alternatives and risks related to not receiving this procedure.  We will proceed with procedure as planned.

## 2024-01-10 NOTE — ANESTHESIA POSTPROCEDURE EVALUATION
Patient: Cassy Patterson    Procedure Summary       Date: 01/10/24 Room / Location: [facility] MAIN OR  / [facility] MAIN OR    Anesthesia Start: 1212 Anesthesia Stop: 1306    Procedure: Laparoscopic peritoneal dialysis cath insertion (Abdomen) Diagnosis:       ESRD on hemodialysis (HCC)      (ESRD on hemodialysis (HCC) [N18.6, Z99.2])    Surgeons: Joe Kern MD Anesthesiologist: Jessica Chaudhary DO    Anesthesia Type: general ASA Status: 4            Anesthesia Type: general    Vitals Value Taken Time   /77 01/10/24 1300   Temp 97.2 01/10/24 1306   Pulse 83 01/10/24 1306   Resp 12 01/10/24 1306   SpO2 100 % 01/10/24 1306   Vitals shown include unfiled device data.    [facility] AN Post Evaluation:   Patient Evaluated in PACU  Patient Participation: complete - patient participated  Level of Consciousness: awake  Pain Score: 0  Pain Management: adequate  Airway Patency:patent  Dental exam unchanged from preop  Yes    Cardiovascular Status: hemodynamically stable  Respiratory Status: spontaneous ventilation, nasal cannula, airway suctioned, unassisted and nonlabored ventilation  Postoperative Hydration stable      Jessica Chaudhary DO  1/10/2024 1:06 PM

## 2024-01-10 NOTE — OPERATIVE REPORT
[facility]     Operative Report    Patient Name:  Cassy Patterson  MR:  [id number]  :  1955  DOS:  01/10/24    Preop Dx:  ESRD on hemodialysis (HCC) [N18.6, Z99.2]  Postop Dx:  ESRD on hemodialysis (HCC) [N18.6, Z99.2]  Procedure:  Laparoscopic peritoneal dialysis catheter placement  Surgeon:  Joe Kern MD  Surgical Assistant.: Fareed Campos CSA, Swathi Malloy MD  EBL: 5 ml  Complication:  None    INDICATION:  Pt is a 68 year old female who with ESRD on hemodialysis (HCC) [N18.6, Z99.2] who is scheduled for a Laparoscopic peritoneal dialysis cath insertion.    CONSENT:  An informed consent discussion was held with the patient regarding the nature of ESRD requiring peritoneal dialysis, the treatment options and the details of the procedure.  The risks including but not limited to bleeding, wound infection, intra-abdominal infection, injury to the colon, small intestine, catheter malfunction, and incisional hernia were discussed.  The patient expressed understanding and want to proceed with the planned procedure.    TECHNIQUE:  The patient was taken to the OR and placed in supine position.  General anesthesia was established and the abdomen was prepped in standard fashion.  Pneumoperitoneum was obtained using Veress needle technique through a supra-umbilical incision.  A 5-mm trocar was inserted under direct visualization and no injury occurred.  Examination of the abdomen showed no obvious abnormality.  A 5-mm trocar was inserted in the right abdomen and an 8-mm trocar was tunneled obliquely through the left abdomen.  The PD catheter was introduced through the 8-mm trocar and placed in the pelvis.  The inner cuff was just outside the peritoneum and the outer cuff was within the subcutaneous tissue.  The catheter was tested and brisk antegrade/retrograde flow was seen.  The catheter was secured to the skin using 2-0 nylon.  The abdomen was irrigated with saline and found to be hemostatic.   The ports were withdrawn under direct visualization and no port site bleeding was seen.  The skin incisions were closed using 4-0 vicryl.  Sterile dressings were applied.  All instrument and sponge counts were correct.  I was present during the critical portions of the procedure.    Joe Kern MD

## 2024-01-10 NOTE — ANESTHESIA PROCEDURE NOTES
Airway  Date/Time: 1/10/2024 12:23 PM  Urgency: Elective    Airway not difficult    General Information and Staff    Patient location during procedure: OR  Anesthesiologist: Jessica Chaudhary DO  Performed: anesthesiologist   Performed by: Jessica Chaudhary DO  Authorized by: Jessica Chaudhary DO      Indications and Patient Condition  Indications for airway management: anesthesia  Spontaneous Ventilation: absent  Sedation level: deep  Preoxygenated: yes  Patient position: sniffing  Mask difficulty assessment: 1 - vent by mask    Final Airway Details  Final airway type: endotracheal airway      Successful airway: ETT  Cuffed: yes   Successful intubation technique: direct laryngoscopy  Facilitating devices/methods: intubating stylet  Endotracheal tube insertion site: oral  Blade: Calista  Blade size: #4  ETT size (mm): 7.0    Cormack-Lehane Classification: grade IIA - partial view of glottis  Placement verified by: capnometry   Measured from: teeth  ETT to teeth (cm): 20  Number of attempts at approach: 1  Number of other approaches attempted: 0

## 2024-01-10 NOTE — ANESTHESIA PREPROCEDURE EVALUATION
Anesthesia PreOp Note    HPI:     Cassy Patterson is a 68 year old female who presents for preoperative consultation requested by: Joe Kern MD    Date of Surgery: 1/10/2024    Procedure(s):  Laparoscopic peritoneal dialysis cath insertion  Indication: ESRD on hemodialysis ([facility]) [N18.6, Z99.2]    Relevant Problems   No relevant active problems       NPO:  Last Liquid Consumption Date: 01/09/24  Last Liquid Consumption Time: 2200  Last Solid Consumption Date: 01/09/24  Last Solid Consumption Time: 2200  Last Liquid Consumption Date: 01/09/24          History Review:  Patient Active Problem List    Diagnosis Date Noted    ESRD on hemodialysis ([facility]) 09/26/2023    NSTEMI (non-ST elevated myocardial infarction) ([facility]) 09/19/2023    Acute respiratory failure with hypoxia ([facility]) 09/18/2023    Chronic pulmonary edema 09/09/2023    Renal insufficiency 09/09/2023    Primary hypertension 09/09/2023    Hypoxia 09/09/2023    AILIN (acute kidney injury) ([facility]) 09/09/2023    Syncope, unspecified syncope type 03/24/2023    Closed head injury, initial encounter 03/24/2023    Laceration of scalp, initial encounter 03/24/2023    Proteinuria 02/10/2020    Persistent depressive disorder with anxious distress, currently severe     S/P primary angioplasty with coronary stent 10/02/2017    Hypertensive urgency     Status post total left knee replacement 07/15/2016    Diabetes mellitus due to underlying condition, controlled, with stage 4 chronic kidney disease, with long-term current use of insulin ([facility]) 07/06/2016    Primary localized osteoarthrosis, lower leg, left 04/26/2016    Total right knee replacement Global 6/22/2016 03/28/2016    CKD (chronic kidney disease) stage 4, GFR 15-29 ml/min ([facility]) 03/07/2016    Essential hypertension, benign 11/25/2015    Localized osteoarthritis of knees, bilateral 10/05/2015    Major depressive disorder, recurrent episode, moderate ([facility])     FRANKY (generalized anxiety disorder) 11/02/2014    Major  depression, recurrent (HCC)     Obsessive compulsive disorder     Syncope 02/12/2014    Central obesity 10/06/2012    Gouty arthropathy, unspecified 06/29/2012    Obstructive sleep apnea (adult) (pediatric) 06/29/2012    Esophageal reflux 06/29/2012    Hypothyroid 09/22/2011    Coronary atherosclerosis 07/07/2011    Pure hypercholesterolemia 07/07/2011    Gout 07/07/2011    Adrenal adenoma 07/07/2011    Metabolic syndrome 07/07/2011    BMI 33.0-33.9,adult 07/07/2011    Renal artery atherosclerosis (HCC) 09/29/2008       Past Medical History:   Diagnosis Date    Adrenal adenoma 06/17/2011    left adrenoma    Anxiety     Cataract     Chronic kidney disease (CKD), stage III (moderate) ([facility]) 2010    CORONARY ARTERY DISEASE     Coronary atherosclerosis     Depression     DIABETES 2006    Diarrhea     Disorder of thyroid     Diverticulosis of colon     Esophageal reflux     Fainting episodes     GERD     GOUT     Heart attack ([facility]) 09/2023    High blood pressure     High cholesterol     History of adverse reaction to anesthesia     delusions/hallucinations for a few days after knee replacement    History of blood transfusion 2012    Quadruple Bypass    History of electroconvulsive therapy 12/2022    HYPERTENSION     Hypothyroid 09/2011    MENOPAUSE 2001    Osteoarthritis     Other and unspecified hyperlipidemia     Personal history of other medical treatment 2021    Transcranial Magnetic Stimulation (TMS)    Pneumonia, organism unspecified(486)     Polyp of colon     Renal artery stenosis ([facility]) 05/2011    right kidney    Type II or unspecified type diabetes mellitus without mention of complication, not stated as uncontrolled     Unspecified essential hypertension     Unspecified sleep apnea     PSG Merit 2012 AHI 68, auto-pap     Visual impairment        Past Surgical History:   Procedure Laterality Date    ANGIOPLASTY (CORONARY)  3/2015    stents placed in heart    CABG  6/1/2011    CABGx4 vessel    CATH ANGIO   2023    CATH BARE METAL STENT (BMS)      CATH BARE METAL STENT (BMS)  2023    2 stents    CATH PERCUTANEOUS  TRANSLUMINAL CORONARY ANGIOPLASTY  3/3/2015    CATH PERCUTANEOUS  TRANSLUMINAL CORONARY ANGIOPLASTY Right 2017    right Coronary artery, OSMIN    COLONOSCOPY      HC PLMT CORONARY DRUG ELUTING STENT EA ADDL  3/5/2015    HISTOPATHOLOGY REPORT  13    cecal polyps - 1 tubular adenoma    IMPACT TOOTH REM BONY W/COMP Bilateral 1973    wisdom teeth    KNEE REPLACEMENT SURGERY Bilateral 3/24/16    right TKA L TKA not at the same time    LEFT HEART CATH,PERCUTANEOUS  2011    CAD    LEFT HEART CATH,PERCUTANEOUS  3/2/2015          RENAL ANGIO, CARDIAC CATH  2011    SLEEP STUDY, ATTENDED  12/10/2012    TONSILLECTOMY Bilateral        Medications Prior to Admission   Medication Sig Dispense Refill Last Dose    buPROPion  MG Oral Tablet 24 Hr Take 1 tablet (300 mg total) by mouth daily. (Patient taking differently: Take 1 tablet (300 mg total) by mouth nightly.) 90 tablet 0 2024 at     sertraline 100 MG Oral Tab Take 2 tablets (200 mg total) by mouth daily. (Patient taking differently: Take 2 tablets (200 mg total) by mouth nightly.) 180 tablet 0 2024 at 2000    diazePAM 5 MG Oral Tab Take 1 tablet (5 mg total) by mouth in the morning and 1 tablet (5 mg total) before bedtime. 60 tablet 0 2024 at 0800    Insulin Glargine, 1 Unit Dial, (TOUJEO SOLOSTAR) 300 UNIT/ML Subcutaneous Solution Pen-injector Inject 22 Units into the skin every evening.   2024 at     atorvastatin 80 MG Oral Tab Take 1 tablet (80 mg total) by mouth daily.   2024 at 0800    carvedilol 12.5 MG Oral Tab Take 3 tablets (37.5 mg total) by mouth 2 (two) times daily with meals.   1/10/2024 at 0900    ticagrelor 90 MG Oral Tab Take 1 tablet (90 mg total) by mouth every 12 (twelve) hours.   2024    amLODIPine 5 MG Oral Tab Take 1 tablet (5 mg total) by mouth nightly.   2024 at 2000     Omega-3-acid Ethyl Esters 1 g Oral Cap Take 2 capsules (2 g total) by mouth 2 (two) times daily. 360 capsule 0 Past Month    allopurinol 300 MG Oral Tab TAKE ONE-HALF (1/2) TABLET DAILY 45 tablet 4 1/9/2024 at 0800    Levothyroxine Sodium 125 MCG Oral Tab Take 1 tablet (125 mcg total) by mouth before breakfast. 30 tablet 11 1/10/2024 at 0800    Pantoprazole Sodium 40 MG Oral Tab EC TAKE 1 TABLET EVERY MORNING BEFORE BREAKFAST 90 tablet 3 1/10/2024 at 0800    hydrALAZINE HCl 100 MG Oral Tab Take 1 tablet (100 mg total) by mouth 3 (three) times daily. Takes as needed, depending on BP. 110 systolic do not take.   1/10/2024 at 0900    aspirin 81 MG Oral Tab EC Take 1 tablet (81 mg total) by mouth nightly. 30 tablet 11 1/9/2024 at 2000    Calcium Carbonate-Vitamin D (OSCAL-500) 500-400 MG-UNIT Oral Tab Take 1 tablet by mouth daily.   Past Week    Multiple Vitamin (TAB-A-RENAN) Oral Tab Take 1 tablet by mouth nightly.   Past Month    cloNIDine 0.2 MG Oral Tab Take 1 tablet (0.2 mg total) by mouth 2 (two) times daily. 180 tablet 1     Insulin Pen Needle (BD PEN NEEDLE ORIGINAL U/F) 29G X 12.7MM Does not apply Misc USE AS DIRECTED DAILY 100 each 3     ERICKSON CONTOUR NEXT TEST In Vitro Strip TEST BLOOD GLUCOSE THREE TIMES DAILY 300 strip 4      Current Facility-Administered Medications Ordered in Epic   Medication Dose Route Frequency Provider Last Rate Last Admin    metoprolol tartrate (Lopressor) tab 25 mg  25 mg Oral Once PRN Joe Kern MD        sodium chloride 0.9% infusion   Intravenous Continuous Joe Kern MD 20 mL/hr at 01/10/24 1056 New Bag at 01/10/24 1056    ceFAZolin (Ancef) 2 g in 20mL IV syringe premix  2 g Intravenous Once Joe Kern MD         No current [facility]-ordered outpatient medications on file.       Allergies   Allergen Reactions    Plavix [Clopidogrel] RASH    Ultram [Tramadol] FEVER and ANXIETY     Serotonin syndrome to scheduled tramadol in setting of use of SSRI    Sulfa Antibiotics HIVES and  UNKNOWN       Family History   Problem Relation Age of Onset    Cancer Father         prostate, liver    Hypertension Father     Obesity Father     Breast Cancer Father 70        age at dx 70    Heart Surgery Mother         Mitral valve replaced    Heart Disorder Mother         CHF    Hypertension Mother     Arthritis Mother         TKA    Breast Cancer Mother     Stroke Maternal Grandmother     Stroke Maternal Grandfather     Breast Cancer Paternal Grandmother 78        age at dx 78    Cancer Paternal Grandfather         lung    Obesity Brother     Diabetes Brother     Alcohol and Other Disorders Associated Son     Substance Abuse Son     Obesity Sister     Diabetes Sister     Traumatic brain injury Brother      Social History     Socioeconomic History    Marital status:     Number of children: 2    Years of education: 15   Occupational History    Occupation: [employer]     Comment: [employer]   Tobacco Use    Smoking status: Former     Packs/day: 1.00     Years: 35.00     Additional pack years: 0.00     Total pack years: 35.00     Types: Cigarettes     Quit date: 2014     Years since quittin.3    Smokeless tobacco: Never   Vaping Use    Vaping Use: Never used   Substance and Sexual Activity    Alcohol use: Yes     Alcohol/week: 1.0 standard drink of alcohol     Types: 1 Glasses of wine per week     Comment: occasionally    Drug use: Not Currently     Comment: in her 20s    Sexual activity: Not Currently     Partners: Male   Other Topics Concern     Service No    Blood Transfusions No    Caffeine Concern Yes     Comment: coffee    Occupational Exposure No    Hobby Hazards No    Sleep Concern Yes     Comment: STEFFEN    Stress Concern No    Weight Concern No    Special Diet No    Back Care No    Exercise Yes    Bike Helmet Yes    Seat Belt Yes    Self-Exams Yes       Available pre-op labs reviewed.     Lab Results   Component Value Date    PGLU 157 (H) 01/10/2024          Vital Signs:  Body mass  index is 27.81 kg/m².   height is 1.626 m (5' 4\") and weight is 73.5 kg (162 lb). Her oral temperature is 97.9 °F (36.6 °C). Her blood pressure is 109/60 and her pulse is 67. Her respiration is 16 and oxygen saturation is 97%.   Vitals:    01/04/24 1716 01/10/24 1050 01/10/24 1102   BP:  (!) 88/68 109/60   Pulse:  72 67   Resp:  18 16   Temp:  97.9 °F (36.6 °C)    TempSrc:  Oral    SpO2:  97% 97%   Weight: 74.4 kg (164 lb) 73.5 kg (162 lb)    Height: 1.626 m (5' 4\") 1.626 m (5' 4\")         Anesthesia Evaluation     Patient summary reviewed and Nursing notes reviewed    No history of anesthetic complications   Airway   Mallampati: II  TM distance: >3 FB  Neck ROM: full  Dental          Pulmonary - normal exam   (+) sleep apnea  (-) asthma, shortness of breath, recent URI  Cardiovascular   (+) hypertension, past MI, CAD, CABG/stent (s/p angioplasty)    ECG reviewed  Rhythm: regular  Rate: normal  ROS comment: ECHO (09/19/2023)  Conclusions:     1. Left ventricle: The cavity size was normal. Wall thickness was mildly      increased. Systolic function was normal. The estimated ejection fraction      was 60%, by visual assessment. No diagnostic evidence for regional wall      motion abnormalities.   2. Left atrium: The atrium was mildly to moderately dilated.   3. Mitral valve: The annulus was moderately calcified. There was mild      regurgitation.     -----------------------------------------------------------      Neuro/Psych    (+)  TIA, anxiety/panic attacks,  depression    (-) seizures, CVA    GI/Hepatic/Renal    (+) GERD, chronic renal disease dialysis and ESRD  (-) liver disease    Endo/Other    (+) diabetes mellitus, hypothyroidism, arthritis    Comments: Gout   Abdominal  - normal exam     Other findings: Small fresh laceration on the right earlobe             Anesthesia Plan:   ASA:  4  Plan:   General  Airway:  ETT  Post-op Pain Management: IV analgesics  Informed Consent Plan and Risks Discussed With:   Patient  Discussed plan with:  Surgeon      I have informed Cassy Queenmarjoriebeatriz and/or legal guardian or family member of the nature of the anesthetic plan, benefits, risks including possible dental damage if relevant, major complications, and any alternative forms of anesthetic management.   All of the patient's questions were answered to the best of my ability. The patient desires the anesthetic management as planned.  Jessica Chaudhary DO  1/10/2024 11:21 AM  Present on Admission:  **None**

## 2024-01-10 NOTE — OR PREOP
Pt brought back to room by this RN. Upon assessment this RN noted a cut and bleeding to her right ear. Pt stated that she felt dizzy earlier this morning and she was trying to turn her wheelchair so she could sit in it and she \"sat hard\" into it. Pt denies any LOC, is alert x4 and denies any pain or headache.   Pt was instructed to step on the scale to obtain a weight and patient stated she felt lightheaded. She was helped back into her wheelchair. Pt BP at that time was 88/68. Pt stated she no longer felt dizzy, but has had similar episodes to this over the last few weeks. Blood sugar was 157. Last dialysis was Monday and would be due today.   Pt did take Carvedilol and hydralazine this morning and last dose of amlodipine was last night. Now patients BP is 109/60.Pt denies any dizziness at this time and is resting comfortably in the stretcher. Call light within reach and warm blanket provided.    Dr. Chaudhary made aware of all this information and is currently in the Operating Room with Dr. Kern and will inform him of all these findings.   No further orders at this time, will continue to monitor patient.

## 2024-02-23 ENCOUNTER — TELEPHONE (OUTPATIENT)
Dept: NEPHROLOGY | Facility: CLINIC | Age: 69
End: 2024-02-23

## 2024-02-23 DIAGNOSIS — N18.6 ESRD ON HEMODIALYSIS (HCC): ICD-10-CM

## 2024-02-23 DIAGNOSIS — N18.4 CKD (CHRONIC KIDNEY DISEASE) STAGE 4, GFR 15-29 ML/MIN (HCC): Primary | ICD-10-CM

## 2024-02-23 DIAGNOSIS — Z99.2 ESRD ON HEMODIALYSIS (HCC): ICD-10-CM

## 2024-02-23 NOTE — TELEPHONE ENCOUNTER
Dr Peter LAUREN received a call from [facility] renal that pt need CVC removal ,Rn generated order.

## 2024-02-24 ENCOUNTER — HOSPITAL ENCOUNTER (INPATIENT)
Facility: HOSPITAL | Age: 69
LOS: 2 days | Discharge: HOME HEALTH | End: 2024-02-27
Attending: EMERGENCY MEDICINE | Admitting: HOSPITALIST
Payer: MEDICARE

## 2024-02-24 ENCOUNTER — APPOINTMENT (OUTPATIENT)
Dept: GENERAL RADIOLOGY | Facility: HOSPITAL | Age: 69
End: 2024-02-24
Payer: MEDICARE

## 2024-02-24 DIAGNOSIS — N18.6 ESRD ON HEMODIALYSIS (HCC): ICD-10-CM

## 2024-02-24 DIAGNOSIS — J18.9 COMMUNITY ACQUIRED PNEUMONIA, UNSPECIFIED LATERALITY: Primary | ICD-10-CM

## 2024-02-24 DIAGNOSIS — Z99.2 ESRD ON PERITONEAL DIALYSIS (HCC): ICD-10-CM

## 2024-02-24 DIAGNOSIS — N18.6 ESRD ON PERITONEAL DIALYSIS (HCC): ICD-10-CM

## 2024-02-24 DIAGNOSIS — Z99.2 ESRD ON HEMODIALYSIS (HCC): ICD-10-CM

## 2024-02-24 LAB
ALBUMIN SERPL-MCNC: 3.5 G/DL (ref 3.2–4.8)
ALP LIVER SERPL-CCNC: 110 U/L
ALT SERPL-CCNC: <7 U/L
ANION GAP SERPL CALC-SCNC: 8 MMOL/L (ref 0–18)
AST SERPL-CCNC: 10 U/L (ref ?–34)
BASOPHILS # BLD AUTO: 0.05 X10(3) UL (ref 0–0.2)
BASOPHILS NFR BLD AUTO: 0.5 %
BILIRUB DIRECT SERPL-MCNC: 0.1 MG/DL (ref ?–0.3)
BILIRUB SERPL-MCNC: 0.5 MG/DL (ref 0.2–1.1)
BUN BLD-MCNC: 39 MG/DL (ref 9–23)
BUN/CREAT SERPL: 8.4 (ref 10–20)
CALCIUM BLD-MCNC: 8.4 MG/DL (ref 8.7–10.4)
CHLORIDE SERPL-SCNC: 105 MMOL/L (ref 98–112)
CO2 SERPL-SCNC: 28 MMOL/L (ref 21–32)
CREAT BLD-MCNC: 4.63 MG/DL
DEPRECATED RDW RBC AUTO: 47.1 FL (ref 35.1–46.3)
EGFRCR SERPLBLD CKD-EPI 2021: 10 ML/MIN/1.73M2 (ref 60–?)
EOSINOPHIL # BLD AUTO: 0.12 X10(3) UL (ref 0–0.7)
EOSINOPHIL NFR BLD AUTO: 1.3 %
ERYTHROCYTE [DISTWIDTH] IN BLOOD BY AUTOMATED COUNT: 13.3 % (ref 11–15)
FLUAV + FLUBV RNA SPEC NAA+PROBE: NEGATIVE
FLUAV + FLUBV RNA SPEC NAA+PROBE: NEGATIVE
GLUCOSE BLD-MCNC: 143 MG/DL (ref 70–99)
HCT VFR BLD AUTO: 30.2 %
HGB BLD-MCNC: 10.2 G/DL
IMM GRANULOCYTES # BLD AUTO: 0.02 X10(3) UL (ref 0–1)
IMM GRANULOCYTES NFR BLD: 0.2 %
LACTATE SERPL-SCNC: 1.6 MMOL/L (ref 0.5–2)
LYMPHOCYTES # BLD AUTO: 1.81 X10(3) UL (ref 1–4)
LYMPHOCYTES NFR BLD AUTO: 19 %
MCH RBC QN AUTO: 32.5 PG (ref 26–34)
MCHC RBC AUTO-ENTMCNC: 33.8 G/DL (ref 31–37)
MCV RBC AUTO: 96.2 FL
MONOCYTES # BLD AUTO: 0.53 X10(3) UL (ref 0.1–1)
MONOCYTES NFR BLD AUTO: 5.6 %
NEUTROPHILS # BLD AUTO: 7.01 X10 (3) UL (ref 1.5–7.7)
NEUTROPHILS # BLD AUTO: 7.01 X10(3) UL (ref 1.5–7.7)
NEUTROPHILS NFR BLD AUTO: 73.4 %
OSMOLALITY SERPL CALC.SUM OF ELEC: 304 MOSM/KG (ref 275–295)
PLATELET # BLD AUTO: 256 10(3)UL (ref 150–450)
POTASSIUM SERPL-SCNC: 3.3 MMOL/L (ref 3.5–5.1)
PROCALCITONIN SERPL-MCNC: 0.11 NG/ML (ref ?–0.05)
PROT SERPL-MCNC: 6.2 G/DL (ref 5.7–8.2)
RBC # BLD AUTO: 3.14 X10(6)UL
RSV RNA SPEC NAA+PROBE: NEGATIVE
SARS-COV-2 RNA RESP QL NAA+PROBE: NOT DETECTED
SODIUM SERPL-SCNC: 141 MMOL/L (ref 136–145)
WBC # BLD AUTO: 9.5 X10(3) UL (ref 4–11)

## 2024-02-24 PROCEDURE — 71045 X-RAY EXAM CHEST 1 VIEW: CPT | Performed by: EMERGENCY MEDICINE

## 2024-02-24 RX ORDER — AZITHROMYCIN 250 MG/1
500 TABLET, FILM COATED ORAL ONCE
Status: COMPLETED | OUTPATIENT
Start: 2024-02-24 | End: 2024-02-24

## 2024-02-25 PROBLEM — N18.6 ESRD ON PERITONEAL DIALYSIS (HCC): Status: ACTIVE | Noted: 2024-02-25

## 2024-02-25 PROBLEM — Z99.2 ESRD ON PERITONEAL DIALYSIS (HCC): Status: ACTIVE | Noted: 2024-02-25

## 2024-02-25 LAB
ADENOVIRUS PCR:: NOT DETECTED
ALBUMIN SERPL-MCNC: 3 G/DL (ref 3.2–4.8)
ALBUMIN/GLOB SERPL: 1.3 {RATIO} (ref 1–2)
ALP LIVER SERPL-CCNC: 90 U/L
ALT SERPL-CCNC: <7 U/L
ANION GAP SERPL CALC-SCNC: 7 MMOL/L (ref 0–18)
AST SERPL-CCNC: <8 U/L (ref ?–34)
ATRIAL RATE: 97 BPM
B PARAPERT DNA SPEC QL NAA+PROBE: NOT DETECTED
B PERT DNA SPEC QL NAA+PROBE: NOT DETECTED
BASOPHILS # BLD AUTO: 0.05 X10(3) UL (ref 0–0.2)
BASOPHILS NFR BLD AUTO: 0.6 %
BILIRUB SERPL-MCNC: 0.4 MG/DL (ref 0.2–1.1)
BUN BLD-MCNC: 38 MG/DL (ref 9–23)
BUN/CREAT SERPL: 8.1 (ref 10–20)
C PNEUM DNA SPEC QL NAA+PROBE: NOT DETECTED
CALCIUM BLD-MCNC: 8.1 MG/DL (ref 8.7–10.4)
CHLORIDE SERPL-SCNC: 105 MMOL/L (ref 98–112)
CO2 SERPL-SCNC: 26 MMOL/L (ref 21–32)
CORONAVIRUS 229E PCR:: NOT DETECTED
CORONAVIRUS HKU1 PCR:: NOT DETECTED
CORONAVIRUS NL63 PCR:: NOT DETECTED
CORONAVIRUS OC43 PCR:: NOT DETECTED
CREAT BLD-MCNC: 4.69 MG/DL
DEPRECATED RDW RBC AUTO: 47.1 FL (ref 35.1–46.3)
EGFRCR SERPLBLD CKD-EPI 2021: 9 ML/MIN/1.73M2 (ref 60–?)
EOSINOPHIL # BLD AUTO: 0.1 X10(3) UL (ref 0–0.7)
EOSINOPHIL NFR BLD AUTO: 1.2 %
ERYTHROCYTE [DISTWIDTH] IN BLOOD BY AUTOMATED COUNT: 13.2 % (ref 11–15)
EST. AVERAGE GLUCOSE BLD GHB EST-MCNC: 123 MG/DL (ref 68–126)
FLUAV RNA SPEC QL NAA+PROBE: NOT DETECTED
FLUBV RNA SPEC QL NAA+PROBE: NOT DETECTED
GLOBULIN PLAS-MCNC: 2.4 G/DL (ref 2.8–4.4)
GLUCOSE BLD-MCNC: 149 MG/DL (ref 70–99)
GLUCOSE BLDC GLUCOMTR-MCNC: 115 MG/DL (ref 70–99)
GLUCOSE BLDC GLUCOMTR-MCNC: 153 MG/DL (ref 70–99)
GLUCOSE BLDC GLUCOMTR-MCNC: 239 MG/DL (ref 70–99)
GLUCOSE BLDC GLUCOMTR-MCNC: 283 MG/DL (ref 70–99)
GLUCOSE BLDC GLUCOMTR-MCNC: 98 MG/DL (ref 70–99)
HBA1C MFR BLD: 5.9 % (ref ?–5.7)
HCT VFR BLD AUTO: 26.8 %
HGB BLD-MCNC: 9 G/DL
IMM GRANULOCYTES # BLD AUTO: 0.02 X10(3) UL (ref 0–1)
IMM GRANULOCYTES NFR BLD: 0.2 %
LYMPHOCYTES # BLD AUTO: 1.52 X10(3) UL (ref 1–4)
LYMPHOCYTES NFR BLD AUTO: 18.1 %
MAGNESIUM SERPL-MCNC: 1.8 MG/DL (ref 1.6–2.6)
MCH RBC QN AUTO: 32.5 PG (ref 26–34)
MCHC RBC AUTO-ENTMCNC: 33.6 G/DL (ref 31–37)
MCV RBC AUTO: 96.8 FL
METAPNEUMOVIRUS PCR:: NOT DETECTED
MONOCYTES # BLD AUTO: 0.44 X10(3) UL (ref 0.1–1)
MONOCYTES NFR BLD AUTO: 5.2 %
MYCOPLASMA PNEUMONIA PCR:: NOT DETECTED
NEUTROPHILS # BLD AUTO: 6.26 X10 (3) UL (ref 1.5–7.7)
NEUTROPHILS # BLD AUTO: 6.26 X10(3) UL (ref 1.5–7.7)
NEUTROPHILS NFR BLD AUTO: 74.7 %
OSMOLALITY SERPL CALC.SUM OF ELEC: 298 MOSM/KG (ref 275–295)
P AXIS: 53 DEGREES
P-R INTERVAL: 114 MS
PARAINFLUENZA 1 PCR:: NOT DETECTED
PARAINFLUENZA 2 PCR:: NOT DETECTED
PARAINFLUENZA 3 PCR:: NOT DETECTED
PARAINFLUENZA 4 PCR:: NOT DETECTED
PLATELET # BLD AUTO: 218 10(3)UL (ref 150–450)
POTASSIUM SERPL-SCNC: 3.2 MMOL/L (ref 3.5–5.1)
PROT SERPL-MCNC: 5.4 G/DL (ref 5.7–8.2)
Q-T INTERVAL: 364 MS
QRS DURATION: 112 MS
QTC CALCULATION (BEZET): 462 MS
R AXIS: 3 DEGREES
RBC # BLD AUTO: 2.77 X10(6)UL
RHINOVIRUS/ENTERO PCR:: NOT DETECTED
RSV RNA SPEC QL NAA+PROBE: NOT DETECTED
SARS-COV-2 RNA NPH QL NAA+NON-PROBE: NOT DETECTED
SODIUM SERPL-SCNC: 138 MMOL/L (ref 136–145)
T AXIS: 86 DEGREES
VENTRICULAR RATE: 97 BPM
WBC # BLD AUTO: 8.4 X10(3) UL (ref 4–11)

## 2024-02-25 PROCEDURE — 90945 DIALYSIS ONE EVALUATION: CPT | Performed by: INTERNAL MEDICINE

## 2024-02-25 PROCEDURE — 3E1M39Z IRRIGATION OF PERITONEAL CAVITY USING DIALYSATE, PERCUTANEOUS APPROACH: ICD-10-PCS | Performed by: STUDENT IN AN ORGANIZED HEALTH CARE EDUCATION/TRAINING PROGRAM

## 2024-02-25 PROCEDURE — 99223 1ST HOSP IP/OBS HIGH 75: CPT | Performed by: INTERNAL MEDICINE

## 2024-02-25 RX ORDER — BUPROPION HYDROCHLORIDE 150 MG/1
300 TABLET ORAL DAILY
Status: DISCONTINUED | OUTPATIENT
Start: 2024-02-25 | End: 2024-02-28

## 2024-02-25 RX ORDER — DIAZEPAM 10 MG/1
5 TABLET ORAL 2 TIMES DAILY
Status: DISCONTINUED | OUTPATIENT
Start: 2024-02-25 | End: 2024-02-28

## 2024-02-25 RX ORDER — SERTRALINE HYDROCHLORIDE 100 MG/1
200 TABLET, FILM COATED ORAL DAILY
Status: DISCONTINUED | OUTPATIENT
Start: 2024-02-25 | End: 2024-02-28

## 2024-02-25 RX ORDER — AZITHROMYCIN 250 MG/1
500 TABLET, FILM COATED ORAL
Status: DISCONTINUED | OUTPATIENT
Start: 2024-02-25 | End: 2024-02-27

## 2024-02-25 RX ORDER — NICOTINE POLACRILEX 4 MG
15 LOZENGE BUCCAL
Status: DISCONTINUED | OUTPATIENT
Start: 2024-02-25 | End: 2024-02-28

## 2024-02-25 RX ORDER — AMLODIPINE BESYLATE 5 MG/1
5 TABLET ORAL NIGHTLY
Status: DISCONTINUED | OUTPATIENT
Start: 2024-02-25 | End: 2024-02-28

## 2024-02-25 RX ORDER — HEPARIN SODIUM 5000 [USP'U]/ML
5000 INJECTION, SOLUTION INTRAVENOUS; SUBCUTANEOUS EVERY 8 HOURS SCHEDULED
Status: DISCONTINUED | OUTPATIENT
Start: 2024-02-25 | End: 2024-02-28

## 2024-02-25 RX ORDER — DEXTROSE MONOHYDRATE 25 G/50ML
50 INJECTION, SOLUTION INTRAVENOUS
Status: DISCONTINUED | OUTPATIENT
Start: 2024-02-25 | End: 2024-02-28

## 2024-02-25 RX ORDER — ALLOPURINOL 300 MG/1
150 TABLET ORAL DAILY
Status: DISCONTINUED | OUTPATIENT
Start: 2024-02-25 | End: 2024-02-28

## 2024-02-25 RX ORDER — NICOTINE POLACRILEX 4 MG
30 LOZENGE BUCCAL
Status: DISCONTINUED | OUTPATIENT
Start: 2024-02-25 | End: 2024-02-28

## 2024-02-25 RX ORDER — CLONIDINE HYDROCHLORIDE 0.1 MG/1
0.2 TABLET ORAL 2 TIMES DAILY
Status: DISCONTINUED | OUTPATIENT
Start: 2024-02-25 | End: 2024-02-25

## 2024-02-25 RX ORDER — BENZONATATE 200 MG/1
200 CAPSULE ORAL 3 TIMES DAILY PRN
Status: DISCONTINUED | OUTPATIENT
Start: 2024-02-25 | End: 2024-02-28

## 2024-02-25 RX ORDER — CALCIUM CARBONATE-CHOLECALCIFEROL TAB 250 MG-125 UNIT 250-125 MG-UNIT
2 TAB ORAL DAILY
Status: DISCONTINUED | OUTPATIENT
Start: 2024-02-25 | End: 2024-02-28

## 2024-02-25 RX ORDER — HYDRALAZINE HYDROCHLORIDE 100 MG/1
100 TABLET, FILM COATED ORAL 3 TIMES DAILY
Status: DISCONTINUED | OUTPATIENT
Start: 2024-02-25 | End: 2024-02-28

## 2024-02-25 RX ORDER — METOCLOPRAMIDE HYDROCHLORIDE 5 MG/ML
5 INJECTION INTRAMUSCULAR; INTRAVENOUS EVERY 8 HOURS PRN
Status: DISCONTINUED | OUTPATIENT
Start: 2024-02-25 | End: 2024-02-28

## 2024-02-25 RX ORDER — ACETAMINOPHEN 500 MG
500 TABLET ORAL EVERY 4 HOURS PRN
Status: DISCONTINUED | OUTPATIENT
Start: 2024-02-25 | End: 2024-02-28

## 2024-02-25 RX ORDER — ASPIRIN 81 MG/1
81 TABLET ORAL NIGHTLY
Status: DISCONTINUED | OUTPATIENT
Start: 2024-02-25 | End: 2024-02-28

## 2024-02-25 RX ORDER — ONDANSETRON 2 MG/ML
4 INJECTION INTRAMUSCULAR; INTRAVENOUS EVERY 6 HOURS PRN
Status: DISCONTINUED | OUTPATIENT
Start: 2024-02-25 | End: 2024-02-28

## 2024-02-25 RX ORDER — PANTOPRAZOLE SODIUM 40 MG/1
40 TABLET, DELAYED RELEASE ORAL
Status: DISCONTINUED | OUTPATIENT
Start: 2024-02-25 | End: 2024-02-28

## 2024-02-25 RX ORDER — MULTIPLE VITAMINS W/ MINERALS TAB 9MG-400MCG
1 TAB ORAL NIGHTLY
Status: DISCONTINUED | OUTPATIENT
Start: 2024-02-25 | End: 2024-02-28

## 2024-02-25 RX ORDER — DEXTROSE MONOHYDRATE, SODIUM CHLORIDE, SODIUM LACTATE, CALCIUM CHLORIDE, MAGNESIUM CHLORIDE 2.5; 538; 448; 18.4; 5.08 G/100ML; MG/100ML; MG/100ML; MG/100ML; MG/100ML
5000 SOLUTION INTRAPERITONEAL
Status: COMPLETED | OUTPATIENT
Start: 2024-02-25 | End: 2024-02-25

## 2024-02-25 RX ORDER — HYDROCODONE BITARTRATE AND ACETAMINOPHEN 5; 325 MG/1; MG/1
1 TABLET ORAL EVERY 6 HOURS PRN
Status: DISCONTINUED | OUTPATIENT
Start: 2024-02-25 | End: 2024-02-28

## 2024-02-25 RX ORDER — ATORVASTATIN CALCIUM 80 MG/1
80 TABLET, FILM COATED ORAL DAILY
Status: DISCONTINUED | OUTPATIENT
Start: 2024-02-25 | End: 2024-02-28

## 2024-02-25 RX ORDER — INSULIN DEGLUDEC 100 U/ML
22 INJECTION, SOLUTION SUBCUTANEOUS NIGHTLY
Status: DISCONTINUED | OUTPATIENT
Start: 2024-02-25 | End: 2024-02-28

## 2024-02-25 NOTE — PLAN OF CARE
Problem: Diabetes/Glucose Control  Goal: Glucose maintained within prescribed range  Description: INTERVENTIONS:  - Monitor Blood Glucose as ordered  - Assess for signs and symptoms of hyperglycemia and hypoglycemia  - Administer ordered medications to maintain glucose within target range  - Assess barriers to adequate nutritional intake and initiate nutrition consult as needed  - Instruct patient on self management of diabetes  2/25/2024 1651 by Zenia Presotn RN  Outcome: Progressing  2/25/2024 1651 by Zenia Preston RN  Outcome: Progressing     Problem: PAIN - ADULT  Goal: Verbalizes/displays adequate comfort level or patient's stated pain goal  Description: INTERVENTIONS:  - Encourage pt to monitor pain and request assistance  - Assess pain using appropriate pain scale  - Administer analgesics based on type and severity of pain and evaluate response  - Implement non-pharmacological measures as appropriate and evaluate response  - Consider cultural and social influences on pain and pain management  - Manage/alleviate anxiety  - Utilize distraction and/or relaxation techniques  - Monitor for opioid side effects  - Notify MD/LIP if interventions unsuccessful or patient reports new pain  - Anticipate increased pain with activity and pre-medicate as appropriate  2/25/2024 1651 by Zenia Preston RN  Outcome: Progressing  2/25/2024 1651 by Zenia Preston RN  Outcome: Progressing     Problem: SAFETY ADULT - FALL  Goal: Free from fall injury  Description: INTERVENTIONS:  - Assess pt frequently for physical needs  - Identify cognitive and physical deficits and behaviors that affect risk of falls.  - Cisne fall precautions as indicated by assessment.  - Educate pt/family on patient safety including physical limitations  - Instruct pt to call for assistance with activity based on assessment  - Modify environment to reduce risk of injury  - Provide assistive devices as appropriate  - Consider OT/PT consult to  assist with strengthening/mobility  - Encourage toileting schedule  2/25/2024 1651 by Zenia Preston, RN  Outcome: Progressing  2/25/2024 1651 by Zenia Preston RN  Outcome: Progressing     Problem: SKIN/TISSUE INTEGRITY - ADULT  Goal: Skin integrity remains intact  Description: INTERVENTIONS  - Assess and document risk factors for pressure ulcer development  - Assess and document skin integrity  - Monitor for areas of redness and/or skin breakdown  - Initiate interventions, skin care algorithm/standards of care as needed  Outcome: Progressing     Problem: RISK FOR INFECTION - ADULT  Goal: Absence of fever/infection during anticipated neutropenic period  Description: INTERVENTIONS  - Monitor WBC  - Administer growth factors as ordered  - Implement neutropenic guidelines  2/25/2024 1651 by Zenia Preston, RN  Outcome: Not Progressing  2/25/2024 1651 by Zenia Preston, RN  Outcome: Not Progressing     Problem: RESPIRATORY - ADULT  Goal: Achieves optimal ventilation and oxygenation  Description: INTERVENTIONS:  - Assess for changes in respiratory status  - Assess for changes in mentation and behavior  - Position to facilitate oxygenation and minimize respiratory effort  - Oxygen supplementation based on oxygen saturation or ABGs  - Provide Smoking Cessation handout, if applicable  - Encourage broncho-pulmonary hygiene including cough, deep breathe, Incentive Spirometry  - Assess the need for suctioning and perform as needed  - Assess and instruct to report SOB or any respiratory difficulty  - Respiratory Therapy support as indicated  - Manage/alleviate anxiety  - Monitor for signs/symptoms of CO2 retention  2/25/2024 1651 by Zenia Preston, RN  Outcome: Not Progressing  2/25/2024 1651 by Zenia Preston, RN  Outcome: Progressing     On 3 L nasal cannula, patient reporting increased work of breathing while attempting to wean oxygen at this time.   Peritoneal dialysis started by Frerichieius RN.   Safety precautions in  place, frequent nursing rounding completed, call light within reach. All questions answered and needs met.

## 2024-02-25 NOTE — ED PROVIDER NOTES
Patient Seen in: [facility] Emergency Department      History     Chief Complaint   Patient presents with    Difficulty Breathing    Fever     Stated Complaint: reid fever    Subjective:   HPI    68-year-old female presents for evaluation for shortness of breath.  Patient reports that her symptoms started earlier today.  She denies any significant cough.  She denies any chest pain.  She does report a fever.  She recently started peritoneal dialysis and currently has vacillated.  She denies any abdominal pain or pain at her Port-A-Cath site.  She denies any leg pain or swelling.  She denies nausea or vomiting.    Objective:   Past Medical History:   Diagnosis Date    Adrenal adenoma 06/17/2011    left adrenoma    Anxiety     Cataract     Chronic kidney disease (CKD), stage III (moderate) ([facility]) 2010    CORONARY ARTERY DISEASE     Coronary atherosclerosis     Depression     DIABETES 2006    Dialysis patient ([facility])     Diarrhea     Disorder of thyroid     Diverticulosis of colon     Esophageal reflux     Fainting episodes     GERD     GOUT     Heart attack ([facility]) 09/2023    High blood pressure     High cholesterol     History of adverse reaction to anesthesia     delusions/hallucinations for a few days after knee replacement    History of blood transfusion 2012    Quadruple Bypass    History of electroconvulsive therapy 12/2022    HYPERTENSION     Hypothyroid 09/2011    MENOPAUSE 2001    Osteoarthritis     Other and unspecified hyperlipidemia     Personal history of other medical treatment 2021    Transcranial Magnetic Stimulation (TMS)    Pneumonia, organism unspecified(486)     Polyp of colon     Renal artery stenosis ([facility]) 05/2011    right kidney    Renal disorder     Type II or unspecified type diabetes mellitus without mention of complication, not stated as uncontrolled     Unspecified essential hypertension     Unspecified sleep apnea     PSG Merit 2012 AHI 68, auto-pap     Visual impairment                Past Surgical History:   Procedure Laterality Date    ANGIOPLASTY (CORONARY)  3/2015    stents placed in heart    CABG  2011    CABGx4 vessel    CATH ANGIO  2023    CATH BARE METAL STENT (BMS)      CATH BARE METAL STENT (BMS)  2023    2 stents    CATH PERCUTANEOUS  TRANSLUMINAL CORONARY ANGIOPLASTY  3/3/2015    CATH PERCUTANEOUS  TRANSLUMINAL CORONARY ANGIOPLASTY Right 2017    right Coronary artery, OSMIN    COLONOSCOPY      HC PLMT CORONARY DRUG ELUTING STENT EA ADDL  3/5/2015    HISTOPATHOLOGY REPORT  13    cecal polyps - 1 tubular adenoma    IMPACT TOOTH REM BONY W/COMP Bilateral 1973    wisdom teeth    KNEE REPLACEMENT SURGERY Bilateral 3/24/16    right TKA L TKA not at the same time    LEFT HEART CATH,PERCUTANEOUS  2011    CAD    LEFT HEART CATH,PERCUTANEOUS  3/2/2015          RENAL ANGIO, CARDIAC CATH  2011    SLEEP STUDY, ATTENDED  12/10/2012    TONSILLECTOMY Bilateral                 Social History     Socioeconomic History    Marital status:     Number of children: 2    Years of education: 15   Occupational History    Occupation: Data Systems     Comment: [employer]   Tobacco Use    Smoking status: Former     Packs/day: 1.00     Years: 35.00     Additional pack years: 0.00     Total pack years: 35.00     Types: Cigarettes     Quit date: 2014     Years since quittin.4    Smokeless tobacco: Never   Vaping Use    Vaping Use: Never used   Substance and Sexual Activity    Alcohol use: Yes     Alcohol/week: 1.0 standard drink of alcohol     Types: 1 Glasses of wine per week     Comment: occasionally    Drug use: Not Currently     Comment: in her 20s    Sexual activity: Not Currently     Partners: Male   Other Topics Concern     Service No    Blood Transfusions No    Caffeine Concern Yes     Comment: coffee    Occupational Exposure No    Hobby Hazards No    Sleep Concern Yes     Comment: STEFFEN    Stress Concern No    Weight Concern No    Special  Diet No    Back Care No    Exercise Yes    Bike Helmet Yes    Seat Belt Yes    Self-Exams Yes   Social History Narrative    Lives alone.    Enjoys reading, pets, knitting, cooking     Social Determinants of Health     Food Insecurity: No Food Insecurity (2/25/2024)    Food Insecurity     Food Insecurity: Never true   Transportation Needs: No Transportation Needs (2/25/2024)    Transportation Needs     Lack of Transportation: No   Housing Stability: Low Risk  (2/25/2024)    Housing Stability     Housing Instability: No              Review of Systems    Positive for stated complaint: reid fever  Other systems are as noted in HPI.  Constitutional and vital signs reviewed.      All other systems reviewed and negative except as noted above.    Physical Exam     ED Triage Vitals   BP 02/24/24 2100 (!) 190/90   Pulse 02/24/24 2014 99   Resp 02/24/24 2014 22   Temp 02/24/24 2014 (!) 100.5 °F (38.1 °C)   Temp src 02/24/24 2014 Oral   SpO2 02/24/24 2013 92 %   O2 Device 02/24/24 2013 None (Room air)       Current:BP (!) 174/81 (BP Location: Left arm)   Pulse 76   Temp 98.6 °F (37 °C) (Oral)   Resp 16   Ht 160 cm (5' 3\")   Wt 79.7 kg   SpO2 96%   BMI 31.14 kg/m²         Physical Exam  Vitals and nursing note reviewed.   Constitutional:       Appearance: Normal appearance.   HENT:      Head: Normocephalic and atraumatic.   Cardiovascular:      Rate and Rhythm: Normal rate and regular rhythm.      Pulses: Normal pulses.   Pulmonary:      Effort: Pulmonary effort is normal.      Breath sounds: Rhonchi present.   Chest:       Abdominal:      General: Bowel sounds are normal.      Palpations: Abdomen is soft.      Tenderness: There is no abdominal tenderness. There is no guarding or rebound.       Musculoskeletal:         General: Normal range of motion.      Cervical back: Normal range of motion.   Skin:     General: Skin is warm and dry.   Neurological:      General: No focal deficit present.      Mental Status: She is  alert.               ED Course     Labs Reviewed   BASIC METABOLIC PANEL (8) - Abnormal; Notable for the following components:       Result Value    Glucose 143 (*)     Potassium 3.3 (*)     BUN 39 (*)     Creatinine 4.63 (*)     BUN/CREA Ratio 8.4 (*)     Calcium, Total 8.4 (*)     Calculated Osmolality 304 (*)     eGFR-Cr 10 (*)     All other components within normal limits   HEPATIC FUNCTION PANEL (7) - Abnormal; Notable for the following components:    ALT <7 (*)     All other components within normal limits   PROCALCITONIN - Abnormal; Notable for the following components:    Procalcitonin 0.11 (*)     All other components within normal limits   HEMOGLOBIN A1C - Abnormal; Notable for the following components:    HgbA1C 5.9 (*)     All other components within normal limits   COMP METABOLIC PANEL (14) - Abnormal; Notable for the following components:    Glucose 149 (*)     Potassium 3.2 (*)     BUN 38 (*)     Creatinine 4.69 (*)     BUN/CREA Ratio 8.1 (*)     Calcium, Total 8.1 (*)     Calculated Osmolality 298 (*)     eGFR-Cr 9 (*)     ALT <7 (*)     Total Protein 5.4 (*)     Albumin 3.0 (*)     Globulin  2.4 (*)     All other components within normal limits   POCT GLUCOSE - Abnormal; Notable for the following components:    POC Glucose  153 (*)     All other components within normal limits   POCT GLUCOSE - Abnormal; Notable for the following components:    POC Glucose  115 (*)     All other components within normal limits   CBC W/ DIFFERENTIAL - Abnormal; Notable for the following components:    RBC 3.14 (*)     HGB 10.2 (*)     HCT 30.2 (*)     RDW-SD 47.1 (*)     All other components within normal limits   CBC W/ DIFFERENTIAL - Abnormal; Notable for the following components:    RBC 2.77 (*)     HGB 9.0 (*)     HCT 26.8 (*)     RDW-SD 47.1 (*)     All other components within normal limits   LACTIC ACID, PLASMA - Normal   MAGNESIUM - Normal   POCT GLUCOSE - Normal   SARS-COV-2/FLU A AND B/RSV BY PCR ([facility])  - Normal    Narrative:     This test is intended for the qualitative detection and differentiation of SARS-CoV-2, influenza A, influenza B, and respiratory syncytial virus (RSV) viral RNA in nasopharyngeal or nares swabs from individuals suspected of respiratory viral infection consistent with COVID-19 by their healthcare provider. Signs and symptoms of respiratory viral infection due to SARS-CoV-2, influenza, and RSV can be similar.    Test performed using the Xpert Xpress SARS-CoV-2/FLU/RSV (real time RT-PCR)  assay on the [employer] instrument, [address], [place] [address].   This test is being used under the Food and Drug Administration's Emergency Use Authorization.    The authorized Fact Sheet for Healthcare Providers for this assay is available upon request from the laboratory.   RESPIRATORY FLU EXPAND PANEL + COVID-19 - Normal    Narrative:     This test is intended for the simultaneous qualitative detection and differentiation of nucleic acids from multiple viral and bacterial respiratory organisms, including nucleic acid from Severe Acute Respiratory Syndrome Coronavirus 2 (SARS-CoV-2) in nasopharyngeal swab from individuals suspected of respiratory viral infection consistent with COVID-19 by their healthcare provider.    Test performed using the Xcalare Respiratory Panel 2.1 (RP2.1) assay on the [employer] 2.0 System, [employer], [employer] [address], [place] [address].    This test is being used under the Food and Drug Administration's Emergency Use Authorization.    The authorized Fact Sheet for Healthcare Providers for this assay is available upon request from the laboratory.    SARS and MERS coronaviruses are not tested on this assay.   CBC WITH DIFFERENTIAL WITH PLATELET    Narrative:     The following orders were created for panel order CBC With Differential With Platelet.  Procedure                               Abnormality         Status                     ---------                                -----------         ------                     CBC W/ DIFFERENTIAL[[id number]]          Abnormal            Final result                 Please view results for these tests on the individual orders.   CBC WITH DIFFERENTIAL WITH PLATELET    Narrative:     The following orders were created for panel order CBC With Differential With Platelet.  Procedure                               Abnormality         Status                     ---------                               -----------         ------                     CBC W/ DIFFERENTIAL[[id number]]          Abnormal            Final result                 Please view results for these tests on the individual orders.   CELL COUNT PERITONEAL FLUID   RAINBOW DRAW BLUE   RAINBOW DRAW GOLD   BLOOD CULTURE   BLOOD CULTURE   BODY FLUID CULT,AEROBIC AND ANAEROBIC     EKG    Rate, intervals and axes as noted on EKG Report.  Rate: 97  Rhythm: Sinus Rhythm  Reading: no stemi, interpreted by myself.              ED Course as of 02/25/24 1407  ------------------------------------------------------------  Time: 02/25 1407  Value: XR CHEST AP PORTABLE  (CPT=71045)  Comment: Per my independent interpretation, patient's CXR demonstrates pneumonia.                [facility]        Admission disposition: 2/24/2024 10:10 PM                                        Medical Decision Making  Differential diagnosis includes but is not limited to pneumonia, COVID, influenza, peritonitis, UTI, etc    CBC is unremarkable.  Chemistry consistent with known ESRD.  Chest x-ray concerning for volume overload versus pneumonia, in light of fever she will be covered for pneumonia.  Nothing on exam would suggest SBP at this time.  Blood cultures were obtained.  Patient does not meet severe sepsis or septic shock criteria. She is started on rocephin and azithro.  Nephrology is on consult.     Rhythm Strip: Rate 78 sinus The cardiac monitor was ordered secondary to the patient's dyspnea.     Complicating factors: The  patient  has a past medical history of Adrenal adenoma (2011), Anxiety, Cataract, Chronic kidney disease (CKD), stage III (moderate) ([facility]) (), CORONARY ARTERY DISEASE, Coronary atherosclerosis, Depression, DIABETES (), Dialysis patient ([facility]), Diarrhea, Disorder of thyroid, Diverticulosis of colon, Esophageal reflux, Fainting episodes, GERD, GOUT, Heart attack ([facility]) (2023), High blood pressure, High cholesterol, History of adverse reaction to anesthesia, History of blood transfusion (), History of electroconvulsive therapy (2022), HYPERTENSION, Hypothyroid (2011), MENOPAUSE (), Osteoarthritis, Other and unspecified hyperlipidemia, Personal history of other medical treatment (), Pneumonia, organism unspecified(486), Polyp of colon, Renal artery stenosis ([facility]) (2011), Renal disorder, Type II or unspecified type diabetes mellitus without mention of complication, not stated as uncontrolled, Unspecified essential hypertension, Unspecified sleep apnea, and Visual impairment. and  has a past surgical history that includes left heart cath,percutaneous (2011); renal angio, cardiac cath (2011); cabg (2011); tonsillectomy (Bilateral, 1958); impact tooth rem bony w/comp (Bilateral, ); Histopathology Report (13); ; cath percutaneous  transluminal coronary angioplasty (3/3/2015); left heart cath,percutaneous (3/2/2015);  plmt coronary drug eluting stent ea addl (3/5/2015); colonoscopy; angioplasty (coronary) (3/2015); cath bare metal stent (bms); knee replacement surgery (Bilateral, 3/24/16); cath percutaneous  transluminal coronary angioplasty (Right, 2017); sleep study, attended (12/10/2012); cath angio (2023); and cath bare metal stent (bms) (2023). that contribute to the medical complexity of this ED evaluation.     Medical Record Review: I personally reviewed available prior medical records for any recent pertinent discharge summaries, testing,  and procedures, and reviewed those reports.        Problems Addressed:  Community acquired pneumonia, unspecified laterality: acute illness or injury with systemic symptoms  ESRD on peritoneal dialysis (HCC): chronic illness or injury    Amount and/or Complexity of Data Reviewed  External Data Reviewed: notes.     Details: ECHO 9/19/23 shows EF 70%  Labs: ordered. Decision-making details documented in ED Course.  Radiology: ordered and independent interpretation performed. Decision-making details documented in ED Course.  ECG/medicine tests: ordered and independent interpretation performed. Decision-making details documented in ED Course.  Discussion of management or test interpretation with external provider(s): Discussed with Dr. Portillo who accepts admission.  [facility]     Risk  Decision regarding hospitalization.        Disposition and Plan     Clinical Impression:  1. Community acquired pneumonia, unspecified laterality    2. ESRD on peritoneal dialysis (HCC)         Disposition:  Admit  2/24/2024 10:10 pm    Follow-up:  No follow-up provider specified.  We recommend that you schedule follow up care with a primary care provider within the next three months to obtain basic health screening including reassessment of your blood pressure.      Medications Prescribed:  Current Discharge Medication List                            Hospital Problems       Present on Admission  Date Reviewed: 2/7/2024            ICD-10-CM Noted POA    * (Principal) Community acquired pneumonia, unspecified laterality J18.9 2/24/2024 Unknown    ESRD on peritoneal dialysis (HCC) N18.6, Z99.2 2/25/2024 Unknown

## 2024-02-25 NOTE — ED INITIAL ASSESSMENT (HPI)
Patient arrives with weakness, NEFTALY, and fever that started today. Patient recently started peritoneal dialysis two weeks ago.

## 2024-02-25 NOTE — RESPIRATORY THERAPY NOTE
CPAP Evaluation:    Pt states that she has history of sleep apnea but doesn't use CPAP when sleeping. Pt refusing to use CPAP during hospital stay.

## 2024-02-25 NOTE — H&P
[facility] Hospitalist H&P       CC: shortness of breath, fever     PCP: Ambreen Sandoval MD    History of Present Illness:   68-year-old female with known ESRD, now on peritoneal dialysis, coronary artery disease, hypertension, chronic diastolic CHF, type 2 diabetes, depression and anxiety, hypothyroidism, hyperlipidemia, GERD, who is here for evaluation of shortness of breath and fever.  The patient states that her symptoms started earlier in the day yesterday.  She denies any cough.  She denies any chest pain.  She reports symptoms of fever possibly given feeling cold.  Associated with this was also shortness of breath.  He has significant recent medical history including progression to end-stage renal disease after her cardiac issues and cardiac catheterization.  She was receiving hemodialysis however 2 weeks ago the patient switched over to peritoneal dialysis.  She has been doing well with this.  She did not do her peritoneal dialysis on Friday night.    Review of Systems  Comprehensive ROS reviewed and negative except for what's stated above.     PMH  ESRD, now on peritoneal dialysis, coronary artery disease, hypertension, chronic diastolic CHF, type 2 diabetes, depression and anxiety, hypothyroidism, hyperlipidemia, GERD    PSH  Past Surgical History:   Procedure Laterality Date    ANGIOPLASTY (CORONARY)  3/2015    stents placed in heart    CABG  6/1/2011    CABGx4 vessel    CATH ANGIO  09/21/2023    CATH BARE METAL STENT (BMS)      CATH BARE METAL STENT (BMS)  09/2023    2 stents    CATH PERCUTANEOUS  TRANSLUMINAL CORONARY ANGIOPLASTY  3/3/2015    CATH PERCUTANEOUS  TRANSLUMINAL CORONARY ANGIOPLASTY Right 09/19/2017    right Coronary artery, OSMIN    COLONOSCOPY      HC PLMT CORONARY DRUG ELUTING STENT EA ADDL  3/5/2015    HISTOPATHOLOGY REPORT  12/18/13    cecal polyps - 1 tubular adenoma    IMPACT TOOTH REM BONY W/COMP Bilateral 1973    wisdom teeth    KNEE REPLACEMENT SURGERY Bilateral 3/24/16     right TKA L TKA not at the same time    LEFT HEART CATH,PERCUTANEOUS  2011    CAD    LEFT HEART CATH,PERCUTANEOUS  3/2/2015          RENAL ANGIO, CARDIAC CATH  2011    SLEEP STUDY, ATTENDED  12/10/2012    TONSILLECTOMY Bilateral 195      ALL:  Allergies   Allergen Reactions    Plavix [Clopidogrel] RASH    Ultram [Tramadol] FEVER and ANXIETY     Serotonin syndrome to scheduled tramadol in setting of use of SSRI    Sulfa Antibiotics HIVES and UNKNOWN      Home Medications:  Outpatient Medications Marked as Taking for the 24 encounter (Hospital Encounter)   Medication Sig Dispense Refill    sertraline 100 MG Oral Tab Take 2 tablets (200 mg total) by mouth daily. 60 tablet 0    buPROPion  MG Oral Tablet 24 Hr Take 1 tablet (300 mg total) by mouth daily. 30 tablet 0    diazePAM 5 MG Oral Tab Take 1 tablet (5 mg total) by mouth 2 (two) times daily. 60 tablet 0    diazePAM 5 MG Oral Tab Take 1 tablet (5 mg total) by mouth in the morning and 1 tablet (5 mg total) before bedtime. 60 tablet 0    Insulin Glargine, 1 Unit Dial, (TOUJEO SOLOSTAR) 300 UNIT/ML Subcutaneous Solution Pen-injector Inject 22 Units into the skin every evening.      atorvastatin 80 MG Oral Tab Take 1 tablet (80 mg total) by mouth daily.      carvedilol 12.5 MG Oral Tab Take 3 tablets (37.5 mg total) by mouth 2 (two) times daily with meals.      ticagrelor 90 MG Oral Tab Take 1 tablet (90 mg total) by mouth every 12 (twelve) hours.      amLODIPine 5 MG Oral Tab Take 1 tablet (5 mg total) by mouth nightly.      allopurinol 300 MG Oral Tab TAKE ONE-HALF (1/2) TABLET DAILY 45 tablet 4    Levothyroxine Sodium 125 MCG Oral Tab Take 1 tablet (125 mcg total) by mouth before breakfast. 30 tablet 11    Pantoprazole Sodium 40 MG Oral Tab EC TAKE 1 TABLET EVERY MORNING BEFORE BREAKFAST 90 tablet 3    hydrALAZINE HCl 100 MG Oral Tab Take 1 tablet (100 mg total) by mouth 3 (three) times daily. Takes as needed, depending on BP. 110 systolic  do not take.      aspirin 81 MG Oral Tab EC Take 1 tablet (81 mg total) by mouth nightly. 30 tablet 11    Calcium Carbonate-Vitamin D (OSCAL-500) 500-400 MG-UNIT Oral Tab Take 1 tablet by mouth daily.       Soc Hx  Social History     Tobacco Use    Smoking status: Former     Packs/day: 1.00     Years: 35.00     Additional pack years: 0.00     Total pack years: 35.00     Types: Cigarettes     Quit date: 2014     Years since quittin.4    Smokeless tobacco: Never   Substance Use Topics    Alcohol use: Yes     Alcohol/week: 1.0 standard drink of alcohol     Types: 1 Glasses of wine per week     Comment: occasionally      Fam Hx  Family History   Problem Relation Age of Onset    Cancer Father         prostate, liver    Hypertension Father     Obesity Father     Breast Cancer Father 70        age at dx 70    Heart Surgery Mother         Mitral valve replaced    Heart Disorder Mother         CHF    Hypertension Mother     Arthritis Mother         TKA    Breast Cancer Mother     Stroke Maternal Grandmother     Stroke Maternal Grandfather     Breast Cancer Paternal Grandmother 78        age at dx 78    Cancer Paternal Grandfather         lung    Obesity Brother     Diabetes Brother     Alcohol and Other Disorders Associated Son     Substance Abuse Son     Obesity Sister     Diabetes Sister     Traumatic brain injury Brother      OBJECTIVE:  /77 (BP Location: Left arm)   Pulse 85   Temp 98.3 °F (36.8 °C) (Oral)   Resp 16   Ht 5' 3\" (1.6 m)   Wt 175 lb 12.8 oz (79.7 kg)   SpO2 97%   BMI 31.14 kg/m²   General: Alert, no acute distress  Lungs: crackles bilaterally  Chest: HD cath site appears ok  Heart: Regular rate and rhythm  Abdomen: soft, non tender, PD cath site appears ok, no pus, erythema  Extremities: No edema    Diagnostic Data:    CBC/Chem  Recent Labs   Lab 24  0158   WBC 9.5 8.4   HGB 10.2* 9.0*   MCV 96.2 96.8   .0 218.0       Recent Labs   Lab 24  02/25/24  0158    138   K 3.3* 3.2*    105   CO2 28.0 26.0   BUN 39* 38*   CREATSERUM 4.63* 4.69*   * 149*   CA 8.4* 8.1*   MG  --  1.8       Recent Labs   Lab 02/24/24 2025 02/25/24  0158   ALT <7* <7*   AST 10 <8   ALB 3.5 3.0*     Radiology: XR CHEST AP PORTABLE  (CPT=71045)    Result Date: 2/24/2024  CONCLUSION:  1. Diffuse bilateral abnormality may represent edema and or pneumonia.    Dictated by (CST): Helio Min MD on 2/24/2024 at 9:15 PM     Finalized by (CST): Helio Min MD on 2/24/2024 at 9:20 PM           ASSESSMENT / PLAN:   68-year-old female with known ESRD, now on peritoneal dialysis, coronary artery disease, hypertension, chronic diastolic CHF, type 2 diabetes, depression and anxiety, hypothyroidism, hyperlipidemia, GERD, who is here for evaluation of shortness of breath and fever.      Acute hypoxic respiratory failure  -etiology multifactorial, unclear at this time. Could be component of volume, but given her fever pneumonia is possible as well  -started on empiric IV antibiotics  -fluid removal today with peritoneal dialysis   -IV diuretic once today per renal after discussion     Fever  -blood cultures  -send PD fluid for cell count and culture   -empiric abx  -viral panel     Chronic diastolic CHF  -monitor volume status     CAD  -recnt PCI with OSMIN of ostial and distal ramus, in sept 2023. That admission renal function declined and progressed to ESRD   -is on asa, statin, beta blocker    Type 2 diabetes  -degludec 22 units HS with sliding scale     Hypothyroidism  -on synthroid     Depression/anxiety  -resume home meds     GERD  -on PPI    Gout  -resume allopurinol     Diet: renal diet   DVT prophylaxis: heparin sub q  Code status: FULL     [facility] Emergency Department Hospitalist

## 2024-02-25 NOTE — ED QUICK NOTES
Orders for admission, patient is aware of plan and ready to go upstairs. Any questions, please call ED RN rupinder at extension [phone].     Patient Covid vaccination status: Fully vaccinated     COVID Test Ordered in ED: SARS-CoV-2/Flu A and B/RSV by PCR ([facility])    COVID Suspicion at Admission: N/A    Running Infusions:      Mental Status/LOC at time of transport: alert    Other pertinent information:   CIWA score: N/A   NIH score:  N/A

## 2024-02-25 NOTE — PLAN OF CARE
Problem: Patient Centered Care  Goal: Patient preferences are identified and integrated in the patient's plan of care  Description: Interventions:  - What would you like us to know as we care for you? From [facility]   - Provide timely, complete, and accurate information to patient/family  - Incorporate patient and family knowledge, values, beliefs, and cultural backgrounds into the planning and delivery of care  - Encourage patient/family to participate in care and decision-making at the level they choose  - Honor patient and family perspectives and choices  Outcome: Progressing     Problem: Diabetes/Glucose Control  Goal: Glucose maintained within prescribed range  Description: INTERVENTIONS:  - Monitor Blood Glucose as ordered  - Assess for signs and symptoms of hyperglycemia and hypoglycemia  - Administer ordered medications to maintain glucose within target range  - Assess barriers to adequate nutritional intake and initiate nutrition consult as needed  - Instruct patient on self management of diabetes  Outcome: Progressing     Problem: Patient/Family Goals  Goal: Patient/Family Long Term Goal  Description: Patient's Long Term Goal: To discharge from hospital     Interventions:  -  Monitor vital signs   - Monitor appropriate labs   - Monitor blood glucose levels   - Pain management   - Administer medications per order   - Follow MD orders   - Diagnostics per order   - Update/inform patient and family on plan of care   - Discharge planning    - See additional Care Plan goals for specific interventions  Outcome: Progressing  Goal: Patient/Family Short Term Goal  Description: Patient's Short Term Goal: To wean oxygen     Interventions:   - Monitor vital signs   - Monitor appropriate labs   - Monitor blood glucose levels   - Pain management   - Administer medications per order   - Follow MD orders   - Diagnostics per order   - Update/inform patient and family on plan of care   - See  additional Care Plan goals for specific interventions  Outcome: Progressing     Problem: PAIN - ADULT  Goal: Verbalizes/displays adequate comfort level or patient's stated pain goal  Description: INTERVENTIONS:  - Encourage pt to monitor pain and request assistance  - Assess pain using appropriate pain scale  - Administer analgesics based on type and severity of pain and evaluate response  - Implement non-pharmacological measures as appropriate and evaluate response  - Consider cultural and social influences on pain and pain management  - Manage/alleviate anxiety  - Utilize distraction and/or relaxation techniques  - Monitor for opioid side effects  - Notify MD/LIP if interventions unsuccessful or patient reports new pain  - Anticipate increased pain with activity and pre-medicate as appropriate  Outcome: Progressing     Problem: RISK FOR INFECTION - ADULT  Goal: Absence of fever/infection during anticipated neutropenic period  Description: INTERVENTIONS  - Monitor WBC  - Administer growth factors as ordered  - Implement neutropenic guidelines  Outcome: Progressing     Problem: SAFETY ADULT - FALL  Goal: Free from fall injury  Description: INTERVENTIONS:  - Assess pt frequently for physical needs  - Identify cognitive and physical deficits and behaviors that affect risk of falls.  - [place] fall precautions as indicated by assessment.  - Educate pt/family on patient safety including physical limitations  - Instruct pt to call for assistance with activity based on assessment  - Modify environment to reduce risk of injury  - Provide assistive devices as appropriate  - Consider OT/PT consult to assist with strengthening/mobility  - Encourage toileting schedule  Outcome: Progressing     Problem: DISCHARGE PLANNING  Goal: Discharge to home or other facility with appropriate resources  Description: INTERVENTIONS:  - Identify barriers to discharge w/pt and caregiver  - Include patient/family/discharge partner in  discharge planning  - Arrange for needed discharge resources and transportation as appropriate  - Identify discharge learning needs (meds, wound care, etc)  - Arrange for interpreters to assist at discharge as needed  - Consider post-discharge preferences of patient/family/discharge partner  - Complete POLST form as appropriate  - Assess patient's ability to be responsible for managing their own health  - Refer to Case Management Department for coordinating discharge planning if the patient needs post-hospital services based on physician/LIP order or complex needs related to functional status, cognitive ability or social support system  Outcome: Progressing     Problem: RESPIRATORY - ADULT  Goal: Achieves optimal ventilation and oxygenation  Description: INTERVENTIONS:  - Assess for changes in respiratory status  - Assess for changes in mentation and behavior  - Position to facilitate oxygenation and minimize respiratory effort  - Oxygen supplementation based on oxygen saturation or ABGs  - Provide Smoking Cessation handout, if applicable  - Encourage broncho-pulmonary hygiene including cough, deep breathe, Incentive Spirometry  - Assess the need for suctioning and perform as needed  - Assess and instruct to report SOB or any respiratory difficulty  - Respiratory Therapy support as indicated  - Manage/alleviate anxiety  - Monitor for signs/symptoms of CO2 retention  Outcome: Progressing     Problem: SKIN/TISSUE INTEGRITY - ADULT  Goal: Skin integrity remains intact  Description: INTERVENTIONS  - Assess and document risk factors for pressure ulcer development  - Assess and document skin integrity  - Monitor for areas of redness and/or skin breakdown  - Initiate interventions, skin care algorithm/standards of care as needed  Outcome: Progressing       Monitoring vital signs, stable at this time. No acute changes at this moment. Monitoring blood glucose levels. Fall precautions in place- bed alarm on, bed locked in  lowest position, call light within reach. Frequent rounding by nursing staff.

## 2024-02-26 LAB
ALBUMIN SERPL-MCNC: 3.2 G/DL (ref 3.2–4.8)
ANION GAP SERPL CALC-SCNC: 3 MMOL/L (ref 0–18)
BASOPHILS # BLD AUTO: 0.04 X10(3) UL (ref 0–0.2)
BASOPHILS NFR BLD AUTO: 0.6 %
BUN BLD-MCNC: 27 MG/DL (ref 9–23)
BUN/CREAT SERPL: 7.1 (ref 10–20)
CALCIUM BLD-MCNC: 8.4 MG/DL (ref 8.7–10.4)
CHLORIDE SERPL-SCNC: 108 MMOL/L (ref 98–112)
CO2 SERPL-SCNC: 28 MMOL/L (ref 21–32)
COLOR FLD: COLORLESS
CREAT BLD-MCNC: 3.82 MG/DL
DEPRECATED HBV CORE AB SER IA-ACNC: 534.1 NG/ML
DEPRECATED RDW RBC AUTO: 48.3 FL (ref 35.1–46.3)
EGFRCR SERPLBLD CKD-EPI 2021: 12 ML/MIN/1.73M2 (ref 60–?)
EOSINOPHIL # BLD AUTO: 0.19 X10(3) UL (ref 0–0.7)
EOSINOPHIL NFR BLD AUTO: 2.6 %
ERYTHROCYTE [DISTWIDTH] IN BLOOD BY AUTOMATED COUNT: 13.1 % (ref 11–15)
GLUCOSE BLD-MCNC: 70 MG/DL (ref 70–99)
GLUCOSE BLDC GLUCOMTR-MCNC: 126 MG/DL (ref 70–99)
GLUCOSE BLDC GLUCOMTR-MCNC: 159 MG/DL (ref 70–99)
GLUCOSE BLDC GLUCOMTR-MCNC: 220 MG/DL (ref 70–99)
GLUCOSE BLDC GLUCOMTR-MCNC: 83 MG/DL (ref 70–99)
HCT VFR BLD AUTO: 29 %
HGB BLD-MCNC: 9.3 G/DL
IMM GRANULOCYTES # BLD AUTO: 0.02 X10(3) UL (ref 0–1)
IMM GRANULOCYTES NFR BLD: 0.3 %
IRON SATN MFR SERPL: 28 %
IRON SERPL-MCNC: 32 UG/DL
LYMPHOCYTES # BLD AUTO: 1.77 X10(3) UL (ref 1–4)
LYMPHOCYTES NFR BLD AUTO: 24.7 %
MCH RBC QN AUTO: 32.4 PG (ref 26–34)
MCHC RBC AUTO-ENTMCNC: 32.1 G/DL (ref 31–37)
MCV RBC AUTO: 101 FL
MONOCYTES # BLD AUTO: 0.51 X10(3) UL (ref 0.1–1)
MONOCYTES NFR BLD AUTO: 7.1 %
NEUTROPHILS # BLD AUTO: 4.64 X10 (3) UL (ref 1.5–7.7)
NEUTROPHILS # BLD AUTO: 4.64 X10(3) UL (ref 1.5–7.7)
NEUTROPHILS NFR BLD AUTO: 64.7 %
OSMOLALITY SERPL CALC.SUM OF ELEC: 292 MOSM/KG (ref 275–295)
PHOSPHATE SERPL-MCNC: 4.3 MG/DL (ref 2.4–5.1)
PLATELET # BLD AUTO: 223 10(3)UL (ref 150–450)
POTASSIUM SERPL-SCNC: 3.1 MMOL/L (ref 3.5–5.1)
POTASSIUM SERPL-SCNC: 3.5 MMOL/L (ref 3.5–5.1)
RBC # BLD AUTO: 2.87 X10(6)UL
SODIUM SERPL-SCNC: 139 MMOL/L (ref 136–145)
TIBC SERPL-MCNC: 116 UG/DL (ref 250–425)
TOTAL CELLS COUNTED PRT: 2 /CUMM (ref ?–1)
TRANSFERRIN SERPL-MCNC: 78 MG/DL (ref 250–380)
TURBIDITY CSF QL: CLEAR
WBC # BLD AUTO: 7.2 X10(3) UL (ref 4–11)
WBC # FLD: 1 /CUMM (ref ?–1)

## 2024-02-26 PROCEDURE — 90945 DIALYSIS ONE EVALUATION: CPT | Performed by: INTERNAL MEDICINE

## 2024-02-26 RX ORDER — CLONIDINE HYDROCHLORIDE 0.1 MG/1
0.1 TABLET ORAL 2 TIMES DAILY
COMMUNITY
Start: 2024-02-09

## 2024-02-26 RX ORDER — CLONIDINE HYDROCHLORIDE 0.1 MG/1
0.1 TABLET ORAL 2 TIMES DAILY
Status: DISCONTINUED | OUTPATIENT
Start: 2024-02-26 | End: 2024-02-28

## 2024-02-26 RX ORDER — DIAZEPAM 10 MG/1
5 TABLET ORAL 2 TIMES DAILY
Status: DISCONTINUED | OUTPATIENT
Start: 2024-02-26 | End: 2024-02-26

## 2024-02-26 RX ORDER — DEXTROSE MONOHYDRATE, SODIUM CHLORIDE, SODIUM LACTATE, CALCIUM CHLORIDE, MAGNESIUM CHLORIDE 2.5; 538; 448; 18.4; 5.08 G/100ML; MG/100ML; MG/100ML; MG/100ML; MG/100ML
5000 SOLUTION INTRAPERITONEAL
Status: COMPLETED | OUTPATIENT
Start: 2024-02-26 | End: 2024-02-26

## 2024-02-26 RX ORDER — POTASSIUM CHLORIDE 20 MEQ/1
40 TABLET, EXTENDED RELEASE ORAL EVERY 4 HOURS
Status: COMPLETED | OUTPATIENT
Start: 2024-02-26 | End: 2024-02-26

## 2024-02-26 NOTE — PLAN OF CARE
Problem: Patient Centered Care  Goal: Patient preferences are identified and integrated in the patient's plan of care  Description: Interventions:  - What would you like us to know as we care for you? From [facility]   - Provide timely, complete, and accurate information to patient/family  - Incorporate patient and family knowledge, values, beliefs, and cultural backgrounds into the planning and delivery of care  - Encourage patient/family to participate in care and decision-making at the level they choose  - Honor patient and family perspectives and choices  Outcome: Progressing     Problem: Diabetes/Glucose Control  Goal: Glucose maintained within prescribed range  Description: INTERVENTIONS:  - Monitor Blood Glucose as ordered  - Assess for signs and symptoms of hyperglycemia and hypoglycemia  - Administer ordered medications to maintain glucose within target range  - Assess barriers to adequate nutritional intake and initiate nutrition consult as needed  - Instruct patient on self management of diabetes  Outcome: Progressing     Problem: Patient/Family Goals  Goal: Patient/Family Long Term Goal  Description: Patient's Long Term Goal: To discharge from hospital     Interventions:  -  Monitor vital signs   - Monitor appropriate labs   - Monitor blood glucose levels   - Pain management   - Administer medications per order   - Follow MD orders   - Diagnostics per order   - Update/inform patient and family on plan of care   - Discharge planning    - See additional Care Plan goals for specific interventions  Outcome: Progressing  Goal: Patient/Family Short Term Goal  Description: Patient's Short Term Goal: To wean oxygen     Interventions:   - Monitor vital signs   - Monitor appropriate labs   - Monitor blood glucose levels   - Pain management   - Administer medications per order   - Follow MD orders   - Diagnostics per order   - Update/inform patient and family on plan of care   - See  additional Care Plan goals for specific interventions  Outcome: Progressing     Problem: PAIN - ADULT  Goal: Verbalizes/displays adequate comfort level or patient's stated pain goal  Description: INTERVENTIONS:  - Encourage pt to monitor pain and request assistance  - Assess pain using appropriate pain scale  - Administer analgesics based on type and severity of pain and evaluate response  - Implement non-pharmacological measures as appropriate and evaluate response  - Consider cultural and social influences on pain and pain management  - Manage/alleviate anxiety  - Utilize distraction and/or relaxation techniques  - Monitor for opioid side effects  - Notify MD/LIP if interventions unsuccessful or patient reports new pain  - Anticipate increased pain with activity and pre-medicate as appropriate  Outcome: Progressing     Problem: RISK FOR INFECTION - ADULT  Goal: Absence of fever/infection during anticipated neutropenic period  Description: INTERVENTIONS  - Monitor WBC  - Administer growth factors as ordered  - Implement neutropenic guidelines  Outcome: Progressing     Problem: SAFETY ADULT - FALL  Goal: Free from fall injury  Description: INTERVENTIONS:  - Assess pt frequently for physical needs  - Identify cognitive and physical deficits and behaviors that affect risk of falls.  - [place] fall precautions as indicated by assessment.  - Educate pt/family on patient safety including physical limitations  - Instruct pt to call for assistance with activity based on assessment  - Modify environment to reduce risk of injury  - Provide assistive devices as appropriate  - Consider OT/PT consult to assist with strengthening/mobility  - Encourage toileting schedule  Outcome: Progressing     Problem: DISCHARGE PLANNING  Goal: Discharge to home or other facility with appropriate resources  Description: INTERVENTIONS:  - Identify barriers to discharge w/pt and caregiver  - Include patient/family/discharge partner in  discharge planning  - Arrange for needed discharge resources and transportation as appropriate  - Identify discharge learning needs (meds, wound care, etc)  - Arrange for interpreters to assist at discharge as needed  - Consider post-discharge preferences of patient/family/discharge partner  - Complete POLST form as appropriate  - Assess patient's ability to be responsible for managing their own health  - Refer to Case Management Department for coordinating discharge planning if the patient needs post-hospital services based on physician/LIP order or complex needs related to functional status, cognitive ability or social support system  Outcome: Progressing     Problem: RESPIRATORY - ADULT  Goal: Achieves optimal ventilation and oxygenation  Description: INTERVENTIONS:  - Assess for changes in respiratory status  - Assess for changes in mentation and behavior  - Position to facilitate oxygenation and minimize respiratory effort  - Oxygen supplementation based on oxygen saturation or ABGs  - Provide Smoking Cessation handout, if applicable  - Encourage broncho-pulmonary hygiene including cough, deep breathe, Incentive Spirometry  - Assess the need for suctioning and perform as needed  - Assess and instruct to report SOB or any respiratory difficulty  - Respiratory Therapy support as indicated  - Manage/alleviate anxiety  - Monitor for signs/symptoms of CO2 retention  Outcome: Progressing     Problem: SKIN/TISSUE INTEGRITY - ADULT  Goal: Skin integrity remains intact  Description: INTERVENTIONS  - Assess and document risk factors for pressure ulcer development  - Assess and document skin integrity  - Monitor for areas of redness and/or skin breakdown  - Initiate interventions, skin care algorithm/standards of care as needed  Outcome: Progressing       Monitoring vital signs. No acute changes at this moment. Monitoring blood glucose levels. Fall precautions in place- bed alarm on, bed locked in lowest position, call  light within reach. Frequent rounding by nursing staff.

## 2024-02-26 NOTE — OCCUPATIONAL THERAPY NOTE
OCCUPATIONAL THERAPY EVALUATION - INPATIENT     Room Number: 514/514-A  Evaluation Date: 2024  Type of Evaluation: Initial  Presenting Problem: CAP    Physician Order: IP Consult to Occupational Therapy  Reason for Therapy: ADL/IADL Dysfunction and Discharge Planning    OCCUPATIONAL THERAPY ASSESSMENT   Patient is a 68 year old female admitted 2024 for CAP.  Prior to admission, patient's baseline is Mod I with ADLs, IADLs.  Patient is currently functioning near baseline with self care and transfers.  Patient is requiring up to Min A as a result of the following impairments: endurance, activity tolerance, balance, mobility. Occupational Therapy will continue to follow for duration of hospitalization.    Patient will benefit from continued skilled OT Services in prep to return to [facility] with  services    PLAN  OT Treatment Plan: Balance activities;Energy conservation/work simplification techniques;ADL training;Functional transfer training;Endurance training;Patient/Family education;Patient/Family training;Compensatory technique education  OT Device Recommendations: TBD    OCCUPATIONAL THERAPY MEDICAL/SOCIAL HISTORY   Problem List  Principal Problem:    Community acquired pneumonia, unspecified laterality  Active Problems:    ESRD on peritoneal dialysis (HCC)    HOME SITUATION  Type of Home: Independent living facility  Home Layout: One level  Lives With: Alone  Toilet and Equipment: Comfort height toilet  Shower/Tub and Equipment: Tub-shower combo; Grab bar  Drives: -- (Not for awhile, but still has her license)  Patient Regularly Uses: None    Use of Assistive Device(s): None; owns a RW    Prior Level of [place]: Mod I with ADLs, IADLs, ambulatory no device    SUBJECTIVE  I feel so much better    OCCUPATIONAL THERAPY EXAMINATION    OBJECTIVE  Precautions: None  Fall Risk: Standard fall risk    PAIN ASSESSMENT  Ratin    ACTIVITY TOLERANCE  Pulse: 80        BP: 143/61  BP Location: Left arm  BP  Method: Automatic  Patient Position: Sitting    O2 SATURATIONS  Oxygen Therapy  SPO2% on Oxygen at Rest: 98  At rest oxygen flow (liters per minute): 3  SPO2% Ambulation on Oxygen: 94  Ambulation oxygen flow (liters per minute): 1    COGNITION  WNL:    VISION  WNL    PERCEPTION  WNL    SENSATION  WNL    Communication: WNL    Behavioral/Emotional/Social: Very pleasant, cooperative, and motivated     RANGE OF MOTION   Upper extremity ROM is within functional limits     STRENGTH ASSESSMENT  Upper extremity strength is within functional limits     COORDINATION  Gross Motor: WFL   Fine Motor: WFL     ACTIVITIES OF DAILY LIVING ASSESSMENT  AM-PAC ‘6-Clicks’ Inpatient Daily Activity Short Form  How much help from another person does the patient currently need…  -   Putting on and taking off regular lower body clothing?: A Lot  -   Bathing (including washing, rinsing, drying)?: A Little  -   Toileting, which includes using toilet, bedpan or urinal? : A Little  -   Putting on and taking off regular upper body clothing?: A Little  -   Taking care of personal grooming such as brushing teeth?: None  -   Eating meals?: None    AM-PAC Score:  Score: 19  Approx Degree of Impairment: 42.8%  Standardized Score (AM-PAC Scale): 40.22  CMS Modifier (G-Code): CK    FUNCTIONAL TRANSFER ASSESSMENT  Sit to Stand: Edge of Bed  Edge of Bed: Supervision  Toilet Transfer: Supervision    BED MOBILITY  Rolling: Supervision  Supine to Sit : Supervision  Sit to Supine (OT): Supervision  Scooting: sup    BALANCE ASSESSMENT  Static Sitting: Supervision  Static Standing: Supervision    FUNCTIONAL ADL ASSESSMENT  Eating: Independent  Grooming Seated: Supervision  Bathing Seated: Supervision  UB Dressing Seated: Supervision  LB Dressing Seated: Stand-by Assist  Toileting Seated: Stand-by Assist         Skilled Therapy Provided: Activity promotion, education on energy conservation and pacing tasks; up and ambulatory in room with initially RW and  progressing to no device; managing self care up to Min A for mobility related ADLs; tolerated all tasks presented; VSS throughout; Continue skilled occupational therapy while IP to maximize patient function and increase patient participation, safety, and independence with basic ADL and everyday activities.       EDUCATION PROVIDED  Patient: Role of Occupational Therapy; Plan of Care; Discharge Recommendations; Adaptive Equipment Recommendations; Functional Transfer Techniques; Posture/Positioning; Compensatory ADL Techniques  Patient's Response to Education: Verbalized Understanding; Returned Demonstration    The patient's Approx Degree of Impairment: 42.8% has been calculated based on documentation in the [facility] '6 clicks' Inpatient Daily Activity Short Form.  Research supports that patients with this level of impairment may benefit from HH.  Final disposition will be made by interdisciplinary medical team.     Patient End of Session: Up in chair;Needs met;Call light within reach;RN aware of session/findings;All patient questions and concerns addressed;Alarm set    OT Goals  Patients self stated goal is: to return to ILF     Patient will complete functional transfer with Mod I   Comment:     Patient will complete toileting with [place]   Comment:     Patient will tolerate standing for 3-5 minutes in prep for adls with Mod I    Comment:    Patient will complete item retrieval with Mod I   Comment:          Goals  on: 3/15  Frequency: 3-x week     Patient Evaluation Complexity Level:   Occupational Profile/Medical History LOW - Brief history including review of medical or therapy records    Specific performance deficits impacting engagement in ADL/IADL LOW  1 - 3 performance deficits    Client Assessment/Performance Deficits LOW - No comorbidities nor modifications of tasks    Clinical Decision Making LOW - Analysis of occupational profile, problem-focused assessments, limited treatment options    Overall  Complexity LOW     OT Session Time: 15 minutes  Self-Care Home Management: 15 minutes    Jim Nunez, Occupational Therapist, OTR/L ext [phone]

## 2024-02-26 NOTE — PHYSICAL THERAPY NOTE
PHYSICAL THERAPY EVALUATION - INPATIENT     Room Number: 514/514-A  Evaluation Date: 2024  Type of Evaluation: Initial   Physician Order: PT Eval and Treat    Presenting Problem: PNA     Reason for Therapy: Mobility Dysfunction and Discharge Planning    PHYSICAL THERAPY ASSESSMENT   Patient is a 68 year old female admitted 2024 for PNA.  Prior to admission, patient's baseline is independent in ADL's and ambulation with no assistive device.  Patient is currently functioning near baseline with bed mobility, transfers, and gait.  Patient is requiring supervision as a result of the following impairments: medical status.  Physical Therapy will continue to follow for duration of hospitalization.    Patient will benefit from continued skilled PT Services at discharge to promote functional independence in home.  Anticipate patient will return home with home health PT.    PLAN  PT Treatment Plan: Bed mobility;Body mechanics;Endurance;Energy conservation;Patient education;Gait training;Transfer training;Balance training  Rehab Potential : Good  Frequency (Obs): 3-5x/week    PHYSICAL THERAPY MEDICAL/SOCIAL HISTORY   Problem List  Principal Problem:    Community acquired pneumonia, unspecified laterality  Active Problems:    ESRD on peritoneal dialysis (HCC)    HOME SITUATION  Home Situation  Type of Home: Independent living facility  Home Layout: One level  Stairs to Enter : 0  Railing: No  Stairs to Bedroom: 0  Railing: No  Lives With: Alone  Drives: Yes  Patient Owned Equipment: Rolling walker (does not use at baseline)  Patient Regularly Uses: Glasses;Reading glasses     SUBJECTIVE  \"I have two cats\"    PHYSICAL THERAPY EXAMINATION   OBJECTIVE  Precautions: None  Fall Risk: Standard fall risk    WEIGHT BEARING RESTRICTION  Weight Bearing Restriction: None                PAIN ASSESSMENT  Ratin          COGNITION  Overall Cognitive Status:  WFL - within functional limits    RANGE OF MOTION AND STRENGTH  ASSESSMENT  Lower extremity ROM is within functional limits  BLE WNL  Lower extremity strength is within functional limits  BLE WNL    BALANCE  Static Sitting: Good  Dynamic Sitting: Good  Static Standing: Fair +  Dynamic Standing: Fair +    AM-PAC '6-Clicks' INPATIENT SHORT FORM - BASIC MOBILITY  How much difficulty does the patient currently have...  Patient Difficulty: Turning over in bed (including adjusting bedclothes, sheets and blankets)?: None   Patient Difficulty: Sitting down on and standing up from a chair with arms (e.g., wheelchair, bedside commode, etc.): None   Patient Difficulty: Moving from lying on back to sitting on the side of the bed?: None   How much help from another person does the patient currently need...   Help from Another: Moving to and from a bed to a chair (including a wheelchair)?: A Little   Help from Another: Need to walk in hospital room?: A Little   Help from Another: Climbing 3-5 steps with a railing?: A Little     AM-PAC Score:  Raw Score: 21   Approx Degree of Impairment: 28.97%   Standardized Score (AM-PAC Scale): 50.25   CMS Modifier (G-Code): CJ    FUNCTIONAL ABILITY STATUS  Functional Mobility/Gait Assessment  Gait Assistance: Supervision  Distance (ft): 100ft  Assistive Device: None  Rolling:  not tested   Supine to Sit: supervision  Sit to Supine:  not tested   Sit to Stand: supervision    Exercise/Education Provided:  Bed mobility  Body mechanics  Gait training  Transfer training    The patient's Approx Degree of Impairment: 28.97% has been calculated based on documentation in the [facility] '6 clicks' Inpatient Basic Mobility Short Form.  Research supports that patients with this level of impairment may benefit from home, however patient would benefit from home health. Patient received supine in bed, agreeable to physical therapy evaluation. Vital signs monitored as noted above, no adverse symptoms and patient stable during session. Education with patient provided verbally  on physical therapy plan of care, physiological benefits of out of bed mobility, and energy conservation/activity pacing. Next session anticipate to progress bed mobility, transfers, and gait. Patient currently with SBA for mobility during the session with no assistive device, patient also began session on 2 liters of oxygen, was able to decrease to 1 liter of oxygen with RN permission, oxygen sat 97% and heart rate 79, after the session oxygen sat 95% and heart rate 103. Blood pressure 143/61.     Patient history and/or personal factors that may impact the plan of care include home accessibility concerns and history of falls. Based on the physical therapy examination of the noted systems and functional activity/participation limitations, the patient presentation is stable given the patient has history of frequent/recent falls. Final disposition will be made by interdisciplinary medical team.    Patient End of Session: Up in chair;Needs met;Call light within reach;RN aware of session/findings;All patient questions and concerns addressed    CURRENT GOALS  Goals to be met by: 3/15/24  Patient Goal Patient's self-stated goal is: to go home   Goal #1 Patient is able to demonstrate supine - sit EOB @ level: independent     Goal #1   Current Status    Goal #2 Patient is able to demonstrate transfers Sit to/from Stand at assistance level: independent with none     Goal #2  Current Status    Goal #3 Patient is able to ambulate 100 feet with assist device: none at assistance level: independent   Goal #3   Current Status    Goal #4    Goal #4   Current Status    Goal #5 Patient to demonstrate independence with home activity/exercise instructions provided to patient in preparation for discharge.   Goal #5   Current Status    Goal #6    Goal #6  Current Status      Patient Evaluation Complexity Level:  History Low - no personal factors and/or co-morbidities   Examination of body systems Low -  addressing 1-2 elements   Clinical  Presentation Low- Stable   Clinical Decision Making  Low Complexity     Gait Training: 10 minutes  Therapeutic Activity:  13 minutes

## 2024-02-26 NOTE — CM/SW NOTE
02/26/24 1300   CM/SW Referral Data   Referral Source Social Work (self-referral)   Reason for Referral Discharge planning   Informant Patient;EMR;Clinical Staff Member   Medical Hx   Does patient have an established PCP? Yes  (Dr. Ambreen Sandoval)   Significant Past Medical/Mental Health Hx ESRD   Patient Info   Advanced directives? Yes   Patient's Current Mental Status at Time of Assessment Alert;Oriented   Patient's Home Environment Independent Living   Number of Levels in Home 1   Patient lives with Alone  (2 cats)   Patient Status Prior to Admission   Independent with ADLs and Mobility No   Pt. requires assistance with Meals;Ambulating   Services in place prior to admission DME/Supplies at home;Dialysis   Type of DME/Supplies Straight Cane;Wheeled Walker   Dialysis Clinic  [facility]   Scheduled Dialysis days Other  (Daily)   Dialysis scheduled time   (Nightly PD)   Discharge Needs   Anticipated D/C needs No anticipated discharge needs     SW self-referred to this case for discharge planning.    SW met with pt at bedside.  Pt sitting up in the chair eating lunch.    Pt admitted from [facility].  Pt states she sometimes eats in the communal dining [place] and sometimes eats in her apartment.  Pt states light cleaning is done every 2 weeks.  Pt uses a cane as needed at home.  Pt has a rolling walker PRN.    Pt has ESRD and is curr w/nightly PD followed by  [facility] [place] [facility].  Pt independent with home PD.    Pt does not use O2 or sleep machine at home.  Pt to be weaned as tolerated while admitted.    Pt has hx of home health care and rehab stay in October 2023.  Pt is agreeable to [facility] if continued weakness persists.    SW does not anticipate pt will have any discharge needs.    PLAN: DC home w/home health care (pending list/choice)    / to remain available for support and/or discharge planning.     Stacie Trevino MSW, LSW [id number]

## 2024-02-26 NOTE — PAYOR COMM NOTE
--------------  ADMISSION REVIEW     Payor: [employer]  Subscriber #:  [id number]  Authorization Number: [id number]    Admit date: 2/25/24  Admit time: 12:14 AM       2/24 Patient Seen in: [facility] Emergency Department    History   Stated Complaint: reid fever    68-year-old female presents for evaluation for shortness of breath.  Patient reports that her symptoms started earlier today.  She denies any significant cough.  She denies any chest pain.  She does report a fever.  She recently started peritoneal dialysis and currently has vacillated.  She denies any abdominal pain or pain at her Port-A-Cath site.  She denies any leg pain or swelling.  She denies nausea or vomiting.    Objective:   Past Medical History:   Diagnosis Date    Adrenal adenoma 06/17/2011    left adrenoma    Anxiety     Cataract     Chronic kidney disease (CKD), stage III (moderate) (formerly [facility]) 2010    CORONARY ARTERY DISEASE     Coronary atherosclerosis     Depression     DIABETES 2006    Dialysis patient (formerly [facility])     Diarrhea     Disorder of thyroid     Diverticulosis of colon     Esophageal reflux     Fainting episodes     GERD     GOUT     Heart attack (formerly [facility]) 09/2023    High blood pressure     High cholesterol     History of adverse reaction to anesthesia     delusions/hallucinations for a few days after knee replacement    History of blood transfusion 2012    Quadruple Bypass    History of electroconvulsive therapy 12/2022    HYPERTENSION     Hypothyroid 09/2011    MENOPAUSE 2001    Osteoarthritis     Other and unspecified hyperlipidemia     Personal history of other medical treatment 2021    Transcranial Magnetic Stimulation (TMS)    Pneumonia, organism unspecified(486)     Polyp of colon     Renal artery stenosis (formerly [facility]) 05/2011    right kidney    Renal disorder     Type II or unspecified type diabetes mellitus without mention of complication, not stated as uncontrolled     Unspecified essential hypertension     Unspecified  sleep apnea     PSG Merit 2012 AHI 68, auto-pap     Visual impairment      Past Surgical History:   Procedure Laterality Date    ANGIOPLASTY (CORONARY)  3/2015    stents placed in heart    CABG  2011    CABGx4 vessel    CATH ANGIO  2023    CATH BARE METAL STENT (BMS)      CATH BARE METAL STENT (BMS)  2023    2 stents    CATH PERCUTANEOUS  TRANSLUMINAL CORONARY ANGIOPLASTY  3/3/2015    CATH PERCUTANEOUS  TRANSLUMINAL CORONARY ANGIOPLASTY Right 2017    right Coronary artery, OSMIN    COLONOSCOPY      HC PLMT CORONARY DRUG ELUTING STENT EA ADDL  3/5/2015    HISTOPATHOLOGY REPORT  13    cecal polyps - 1 tubular adenoma    IMPACT TOOTH REM BONY W/COMP Bilateral 1973    wisdom teeth    KNEE REPLACEMENT SURGERY Bilateral 3/24/16    right TKA L TKA not at the same time    LEFT HEART CATH,PERCUTANEOUS  2011    CAD    LEFT HEART CATH,PERCUTANEOUS  3/2/2015          RENAL ANGIO, CARDIAC CATH  2011    SLEEP STUDY, ATTENDED  12/10/2012    TONSILLECTOMY Bilateral 1958       Physical Exam     ED Triage Vitals   BP 24 2100 (!) 190/90   Pulse 24 99   Resp 24 22   Temp 24 2014 (!) 100.5 °F (38.1 °C)   Temp src 24 Oral   SpO2 24 92 %   O2 Device 24 None (Room air)     Current:BP (!) 174/81 (BP Location: Left arm)   Pulse 76   Temp 98.6 °F (37 °C) (Oral)   Resp 16   Ht 160 cm (5' 3\")   Wt 79.7 kg   SpO2 96%   BMI 31.14 kg/m²     Physical Exam  Vitals and nursing note reviewed.   Constitutional:       Appearance: Normal appearance.   HENT:      Head: Normocephalic and atraumatic.   Cardiovascular:      Rate and Rhythm: Normal rate and regular rhythm.      Pulses: Normal pulses.   Pulmonary:      Effort: Pulmonary effort is normal.      Breath sounds: Rhonchi present.   Abdominal:      General: Bowel sounds are normal.      Palpations: Abdomen is soft.      Tenderness: There is no abdominal tenderness. There is no guarding or  rebound.     Labs Reviewed   BASIC METABOLIC PANEL (8) - Abnormal; Notable for the following components:       Result Value    Glucose 143 (*)     Potassium 3.3 (*)     BUN 39 (*)     Creatinine 4.63 (*)     BUN/CREA Ratio 8.4 (*)     Calcium, Total 8.4 (*)     Calculated Osmolality 304 (*)     eGFR-Cr 10 (*)     All other components within normal limits   HEPATIC FUNCTION PANEL (7) - Abnormal; Notable for the following components:    ALT <7 (*)     All other components within normal limits   PROCALCITONIN - Abnormal; Notable for the following components:    Procalcitonin 0.11 (*)     All other components within normal limits   HEMOGLOBIN A1C - Abnormal; Notable for the following components:    HgbA1C 5.9 (*)     All other components within normal limits   COMP METABOLIC PANEL (14) - Abnormal; Notable for the following components:    Glucose 149 (*)     Potassium 3.2 (*)     BUN 38 (*)     Creatinine 4.69 (*)     BUN/CREA Ratio 8.1 (*)     Calcium, Total 8.1 (*)     Calculated Osmolality 298 (*)     eGFR-Cr 9 (*)     ALT <7 (*)     Total Protein 5.4 (*)     Albumin 3.0 (*)     Globulin  2.4 (*)     All other components within normal limits   POCT GLUCOSE - Abnormal; Notable for the following components:    POC Glucose  153 (*)     All other components within normal limits   POCT GLUCOSE - Abnormal; Notable for the following components:    POC Glucose  115 (*)     All other components within normal limits   CBC W/ DIFFERENTIAL - Abnormal; Notable for the following components:    RBC 3.14 (*)     HGB 10.2 (*)     HCT 30.2 (*)     RDW-SD 47.1 (*)     All other components within normal limits   CBC W/ DIFFERENTIAL - Abnormal; Notable for the following components:    RBC 2.77 (*)     HGB 9.0 (*)     HCT 26.8 (*)     RDW-SD 47.1 (*)     All other components within normal limits   LACTIC ACID, PLASMA - Normal   SARS-COV-2/FLU A AND B/RSV BY PCR (GENEXPERT) - Normal   RESPIRATORY FLU EXPAND PANEL + COVID-19 - Normal   CBC  WITH DIFFERENTIAL WITH PLATELET   CELL COUNT PERITONEAL FLUID   BLOOD CULTURE   BLOOD CULTURE   BODY FLUID CULT,AEROBIC AND ANAEROBIC     EKG 97  Sinus Rhythm  Reading: no stemi, interpreted by myself.    XR CHEST AP PORTABLE    Per my independent interpretation, patient's CXR demonstrates pneumonia.      Medical Decision Making  Differential diagnosis includes but is not limited to pneumonia, COVID, influenza, peritonitis, UTI, etc    CBC is unremarkable.  Chemistry consistent with known ESRD.  Chest x-ray concerning for volume overload versus pneumonia, in light of fever she will be covered for pneumonia.  Nothing on exam would suggest SBP at this time.  Blood cultures were obtained.  Patient does not meet severe sepsis or septic shock criteria. She is started on rocephin and azithro.  Nephrology is on consult.   Amount and/or Complexity of Data Reviewed  External Data Reviewed: notes.     Details: ECHO 9/19/23 shows EF 70%  Labs: ordered. Decision-making details documented in ED Course.  Radiology: ordered and independent interpretation performed. Decision-making details documented in ED Course.  ECG/medicine tests: ordered and independent interpretation performed. Decision-making details documented in ED Course.  Discussion of management or test interpretation with external provider(s): Discussed with Dr. Portillo who accepts admission.  Dr. Dimas accepts neprho consult.     Disposition and Plan     Clinical Impression:  1. Community acquired pneumonia, unspecified laterality    2. ESRD on peritoneal dialysis (HCC)       Hospital Problems       Present on Admission  Date Reviewed: 2/7/2024            ICD-10-CM Noted POA    * (Principal) Community acquired pneumonia, unspecified laterality J18.9 2/24/2024 Unknown    ESRD on peritoneal dialysis (HCC) N18.6, Z99.2 2/25/2024 Unknown         2/25:    History and Physical      68-year-old female with known ESRD, now on peritoneal dialysis, coronary artery disease,  hypertension, chronic diastolic CHF, type 2 diabetes, depression and anxiety, hypothyroidism, hyperlipidemia, GERD, who is here for evaluation of shortness of breath and fever.  The patient states that her symptoms started earlier in the day yesterday.  She denies any cough.  She denies any chest pain.  She reports symptoms of fever possibly given feeling cold.  Associated with this was also shortness of breath.  He has significant recent medical history including progression to end-stage renal disease after her cardiac issues and cardiac catheterization.  She was receiving hemodialysis however 2 weeks ago the patient switched over to peritoneal dialysis.  She has been doing well with this.  She did not do her peritoneal dialysis on Friday night.       OBJECTIVE:  /77 (BP Location: Left arm)   Pulse 85   Temp 98.3 °F (36.8 °C) (Oral)   Resp 16   Ht 5' 3\" (1.6 m)   Wt 175 lb 12.8 oz (79.7 kg)   SpO2 97%   BMI 31.14 kg/m²   General: Alert, no acute distress  Lungs: crackles bilaterally  Chest: HD cath site appears ok  Heart: Regular rate and rhythm  Abdomen: soft, non tender, PD cath site appears ok, no pus, erythema  Extremities: No edema     Lab 02/24/24 2025 02/25/24  0158   WBC 9.5 8.4   HGB 10.2* 9.0*   MCV 96.2 96.8   .0 218.0        138   K 3.3* 3.2*    105   CO2 28.0 26.0   BUN 39* 38*   CREATSERUM 4.63* 4.69*   * 149*   CA 8.4* 8.1*   MG  --  1.8      ALT <7* <7*   AST 10 <8   ALB 3.5 3.0*      XR CHEST AP PORTABLE     1. Diffuse bilateral abnormality may represent edema and or pneumonia      ASSESSMENT / PLAN:   68-year-old female with known ESRD, now on peritoneal dialysis, coronary artery disease, hypertension, chronic diastolic CHF, type 2 diabetes, depression and anxiety, hypothyroidism, hyperlipidemia, GERD, who is here for evaluation of shortness of breath and fever.       Acute hypoxic respiratory failure  -etiology multifactorial, unclear at this time. Could be  component of volume, but given her fever pneumonia is possible as well  -started on empiric IV antibiotics  -fluid removal today with peritoneal dialysis   -IV diuretic once today per renal after discussion      Fever  -blood cultures  -send PD fluid for cell count and culture   -empiric abx  -viral panel      Chronic diastolic CHF  -monitor volume status      CAD  -recnt PCI with OSMIN of ostial and distal ramus, in sept 2023. That admission renal function declined and progressed to ESRD   -is on asa, statin, beta blocker     Type 2 diabetes  -degludec 22 units HS with sliding scale      Hypothyroidism  -on synthroid      Depression/anxiety  -resume home meds      GERD  -on PPI     Gout  -resume allopurinol      Diet: renal diet   DVT prophylaxis: heparin sub q  Code status: FULL       RENAL:    Patient is a 68 year old female who was admitted to the hospital for Community acquired pneumonia, unspecified laterality:     Patient came for evaluation for shortness of breath.  Patient reports that her symptoms started earlier yesterday.  She denies any significant cough.  She denies any chest pain.  She does report a fever.       Progress to end-stage renal disease back in fall.  She has been receiving hemodialysis 3 times a week.  2 weeks ago she switched over to peritoneal dialysis.  Overall she felt much better with this regimen and had more energy.     This was until Friday when she became extremely short of breath and tired and developed a fever.  She did not do her peritoneal dialysis on Friday night.  Impression/Receommendations:      1 - ESRD  I will plan to do her peritoneal dialysis today itself.  Will draw sample from her peritoneal fluid for culture     2 -respiratory failure/fevers  Rocephin) for presumed pneumonia empiric antibiotics (He is currently on oxygen.  She is on Rocephin and azithromycin for presumed pneumonia     3 - CHFpEF  She appears mildly volume overloaded.  Will address this with dialysis      4 - Dm 2 w nephropathy  Accu-Cheks     5 - Anemia  Check [employer]    NURSING:    On 3 L nasal cannula, patient reporting increased work of breathing while attempting to wean oxygen at this time.   Peritoneal dialysis started by David EVANS.       MEDICATIONS ADMINISTERED IN LAST 1 DAY:    azithromycin (Zithromax) tab 500 mg       Date Action Dose Route User    2/25/2024 1740 Given 500 mg Oral Zenia Presotn RN          cefTRIAXone (Rocephin) 1 g in D5W 100 mL IVPB-ADD       Date Action Dose Route User    2/25/2024 2143 New Bag 1 g Intravenous Beau Johnson          cloNIDine (Catapres) tab 0.1 mg       Date Action Dose Route User    2/26/2024 0945 Given 0.1 mg Oral Celine Ramirez RN          dextrose 2.5%, calcium 2.5 meq/L peritoneal solution       Date Action Dose Route User    2/25/2024 1636 Given by Other 5,000 mL Intraperitoneal Zenia Preston RN          heparin (Porcine) 5000 UNIT/ML injection 5,000 Units       Date Action Dose Route User    2/26/2024 1329 Given by Other 5,000 Units Subcutaneous (Left Upper Abdomen) Celine Ramirez RN    2/26/2024 0610 Given 5,000 Units Subcutaneous (Left Upper Arm) Romario Johnsona    2/25/2024 2130 Given 5,000 Units Subcutaneous (Right Upper Arm) Beau Johnson          hydrALAZINE (Apresoline) tab 100 mg       Date Action Dose Route User    2/26/2024 0637 Given 100 mg Oral JORJE'Yuval Beau    2/25/2024 2130 Given 100 mg Oral JORJE'Yuval Beau    2/25/2024 1634 Given 100 mg Oral Zenia Preston RN          Vitals (last day)       Date/Time Temp Pulse Resp BP SpO2 Weight O2 Device O2 Flow Rate (L/min) [facility]    02/26/24 1327 98.8 °F (37.1 °C) 72 18 122/57 100 % -- Nasal cannula 1 L/min     02/26/24 0953 -- 80 -- 143/61 -- -- -- -- AA    02/26/24 0946 -- -- -- -- 95 % -- -- 2 L/min     02/26/24 0942 -- 81 -- 143/61 -- -- -- --     02/26/24 0900 98.3 °F (36.8 °C) 76 18 113/58 97 % -- Nasal cannula 3 L/min     02/26/24 0605 98.3 °F (36.8 °C) 74 18 161/71  98 % 171 lb 12.8 oz Nasal cannula 3 L/min     02/26/24 0006 98.4 °F (36.9 °C) 70 18 131/64 98 % -- Nasal cannula 3 L/min     02/25/24 2127 99 °F (37.2 °C) 81 18 165/69 100 % -- Nasal cannula 3 L/min     02/25/24 2000 -- 77 -- -- -- -- -- --     02/25/24 1737 98.9 °F (37.2 °C) -- -- 187/84 -- -- -- --     02/25/24 1629 100.1 °F (37.8 °C) 81 16 179/88 95 % -- Nasal cannula 3 L/min     02/25/24 1222 98.6 °F (37 °C) 76 16 174/81 96 % -- Nasal cannula 3 L/min     02/25/24 1002 -- -- -- -- 94 % -- -- 3 L/min     02/25/24 1001 -- 89 -- -- 94 % -- Nasal cannula 2 L/min     02/25/24 0952 -- -- -- -- 95 % -- Nasal cannula 3 L/min     02/25/24 0816 98.3 °F (36.8 °C) -- 16 158/77 97 % -- Nasal cannula 3 L/min [place]    02/25/24 0608 97.8 °F (36.6 °C) 85 18 150/74 97 % -- Nasal cannula 3 L/min     02/25/24 0303 -- 82 -- -- -- -- -- --     02/25/24 0145 98 °F (36.7 °C) 98 18 157/71 96 % -- Nasal cannula 3 L/min     02/25/24 0118 -- -- -- -- -- 175 lb 12.8 oz -- --     02/25/24 0013 100.9 °F (38.3 °C) 95 20 116/104 96 % 175 lb 12.8 oz Nasal cannula 3 L/min           2/24/24 2330 -- 91 21 172/76 Abnormal  -- -- -- --    02/24/24 2245 -- 92 20 185/82 Abnormal  -- -- -- --    02/24/24 2200 -- 91 22 189/81 Abnormal  97 % -- -- --    02/24/24 2130 -- 97 20 -- 98 % -- -- --    02/24/24 2115 -- 96 19 211/103 Abnormal  97 % -- -- --    02/24/24 2100 -- 97 17 190/90 Abnormal  97 % -- -- --    02/24/24 2014 100.5 °F (38.1 °C) Abnormal  99 22 -- -- -- -- --    02/24/24 2013 -- -- -- -- 92 % -- None (Room air) --

## 2024-02-26 NOTE — PROGRESS NOTES
[facility] AND CARE   Progress Note  -  Cassy Patterson Patient Status:  Inpatient    1955 MRN [id number]   Location [facility]5W Attending Remi Segovia, DO   Hosp Day # 1 PCP Amrbeen Sandoval MD     PCP: Ambreen Sandoval MD      SEE ATTENDING NOTE AT BOTTOM OF PAGE    Is this a shared or split note between Advanced Practice Provider and Physician? Yes    Assessment and Plan:    68-year-old female with known ESRD now on peritoneal dialysis, coronary artery disease, hypertension, chronic diastolic CHF, type 2 diabetes, depression and anxiety, hypothyroidism, hyperlipidemia, GERD, who is here for evaluation of shortness of breath and fever improved with PD and tx for possible PNA. Plans for home tomorrow if remains stable, see below for details       Acute hypoxic respiratory failure-resolved   -CXR with diffuse edema and or PNA  -negative for rsv, flu covid as well as complete RVP panel  -PD for volume removal  -on RA  -abx as below      Fever 2/2 ? PNA  -tmax 100.9. WBC normal   -blood cultures NGTD  -PD fluid for cell count and culture-does not suggest infection   -negative for rsv, flu covid as well as complete RVP panel  -abx-ceftriaxone day 2, will have pt complete 5 day course of abx for possible PNA with cefdinir at discharge      Acute on Chronic diastolic CHF ?  -volume removal with PD     ESRD on HD   Hypokalemia  -PD as per renal     Anemia   -ferritin 534. Iron, transferrin and tibc are low      CAD S/P OSMIN Ramus 2023  HL  HTN  -PCI with OSMIN of ostial and distal ramus, in 2023. That   -continue home asa, lipitor, norvac, catapres, hydralazine and ticagrelor     DM Type II  -HgbA1C 5.9  -home regimen: insulin degludec 22 units nightly   -currently on insulin degludec 22 units HS with sliding scale   -accuchecks  -ADA diet      Hypothyroidism  -continue synthroid      Depression/anxiety  -continue zoloft wellbutrin and valium      GERD  -on PPI     Gout  -resume allopurinol     GOC  -full  code    MA/TREYO Reach  -Re- Entry: no  -Consults: renal   -Discharge Needs:home PT  -Appointments: [ ] PCP MD CHAVO Hinson  -lytes in am  -diet-ADA    Prophy  -SCD  -heparin    Dispo  -plans for discharge tomorrow if remains stable   PCP: Ambreen Sandoval MD      Concerns regarding plan of care were discussed with patient. Patient agrees with plan as detailed above. Discussed plan of care with Dr. Segovia     Note: This chart was prepared using voice recognition software and may contain unintended word substitution errors.        Alison Kaiser RN, NP   [facility] Hospitalist Team  Contact via [employer] B[employer] (Check Availability)  2/26/2024    SUBJECTIVE:   Woke pt up from sleep. SOB improved after PD during the day yesterday. Low grade temps resolved. On RA. Agreeable to discharge tomorrow if all remains stable     OBJECTIVE:   Blood pressure 122/57, pulse 72, temperature 98.8 °F (37.1 °C), temperature source Oral, resp. rate 18, height 5' 3\" (1.6 m), weight 171 lb 12.8 oz (77.9 kg), SpO2 100%, not currently breastfeeding.    GENERAL: no apparent distress, overweight  NEURO: A/A Ox3  RESP: non labored, CTA  CARDIO: Regular  ABD: Pd line   EXTREMITIES: no edema    DIAGNOSTIC DATA:   Labs:     Recent Labs   Lab 02/24/24 2025 02/25/24 0158 02/26/24  0510   WBC 9.5 8.4 7.2   HGB 10.2* 9.0* 9.3*   MCV 96.2 96.8 101.0*   .0 218.0 223.0       Recent Labs   Lab 02/24/24 2025 02/25/24 0158 02/26/24  0510    138 139   K 3.3* 3.2* 3.1*    105 108   CO2 28.0 26.0 28.0   BUN 39* 38* 27*   CREATSERUM 4.63* 4.69* 3.82*   CA 8.4* 8.1* 8.4*   MG  --  1.8  --    PHOS  --   --  4.3   * 149* 70       Recent Labs   Lab 02/24/24 2025 02/25/24 0158 02/26/24  0510   ALT <7* <7*  --    AST 10 <8  --    ALB 3.5 3.0* 3.2       Recent Labs   Lab 02/25/24  1303 02/25/24  1713 02/25/24  2135 02/26/24  0837 02/26/24  1229   PGLU 115* 239* 283* 83 159*       No results for input(s): \"TROP\" in  the last 168 hours.        MEDICATIONS       cloNIDine  0.1 mg Oral BID    dextrose 2.5%, calcium 2.5 meq/L  5,000 mL Intraperitoneal Once in dialysis    allopurinol  150 mg Oral Daily    amLODIPine  5 mg Oral Nightly    aspirin  81 mg Oral Nightly    atorvastatin  80 mg Oral Daily    buPROPion ER  300 mg Oral Daily    calcium carbonate-vitamin D  2 tablet Oral Daily    carvedilol  37.5 mg Oral BID with meals    diazePAM  5 mg Oral BID    hydrALAZINE  100 mg Oral TID    insulin degludec  22 Units Subcutaneous Nightly    levothyroxine  125 mcg Oral Before breakfast    multivitamin with minerals  1 tablet Oral Nightly    pantoprazole  40 mg Oral QAM AC    sertraline  200 mg Oral Daily    ticagrelor  90 mg Oral Q12H    heparin  5,000 Units Subcutaneous Q8H JERRY    insulin aspart  1-5 Units Subcutaneous TID CC    cefTRIAXone  1 g Intravenous Q24H    azithromycin  500 mg Oral Daily       HYDROcodone-acetaminophen, glucose **OR** glucose **OR** glucose-vitamin C **OR** dextrose **OR** glucose **OR** glucose **OR** glucose-vitamin C, acetaminophen, ondansetron, metoclopramide, benzonatate    Alison Kaiser, NP      IMAGING     XR CHEST AP PORTABLE  (CPT=71045)    Result Date: 2/24/2024  CONCLUSION:  1. Diffuse bilateral abnormality may represent edema and or pneumonia.    Dictated by (CST): Helio Min MD on 2/24/2024 at 9:15 PM     Finalized by (CST): Helio Min MD on 2/24/2024 at 9:20 PM             SEE ATTENDING NOTE BELOW:   Patient seen and examined.  Agree with documentation as stated above     Subjective: She feels okay.  She is hoping not to have a long hospitalization.  I told her she should at least stay today.  Working on weaning oxygen as well.  Making sure no positive cultures.         Vitals:     02/26/24 1327   BP: 122/57   Pulse: 72   Resp: 18   Temp: 98.8 °F (37.1 °C)   Gen: no distress  Lungs: minimal crackles at bases  Abdomen: soft, non tender, PD cath site appears ok, no pus or  erythema  Extremities: no edema      A/P     68-year-old female with known ESRD, now on peritoneal dialysis, coronary artery disease, hypertension, chronic diastolic CHF, type 2 diabetes, depression and anxiety, hypothyroidism, hyperlipidemia, GERD, who is here for evaluation of shortness of breath and fever.       Acute hypoxic respiratory failure  -etiology multifactorial, unclear at this time. Could be component of volume, but given her fever pneumonia is possible as well. Starting to improve however  -may not need long course of abx  -started on empiric IV antibiotics  -fluid removal with peritoneal dialysis      Fever  -blood cultures no growth to date  -sent PD fluid for cell count and culture. Not infected appearing from cell count   -empiric abx  -viral panel negative  -consider 5 days total of antibiotics if work up negative    ESRD  -on PD   -discussed with renal. Should likely remove Perm Cath tomorrow in IR. Should call IR tomorrow to discuss and order removal. Discussed with renal today to not place order just yet until tomorrow.      Remi Segovia   [facility] hospital[facility]

## 2024-02-26 NOTE — PROGRESS NOTES
[facility]    Progress Note    Cassy Patterson Patient Status:  Inpatient    1955 MRN [id number]   Location [facility]5W Attending Remi Segovia, DO   Hosp Day # 1 PCP Ambreen Sandoval MD       Subjective:   Cassy Patterson is a(n) 68 year old female who I am seeing for ESRD    Feeling better      Objective:   /61 (BP Location: Left arm)   Pulse 80   Temp 98.3 °F (36.8 °C) (Oral)   Resp 18   Ht 5' 3\" (1.6 m)   Wt 171 lb 12.8 oz (77.9 kg)   SpO2 95%   BMI 30.43 kg/m²      Intake/Output Summary (Last 24 hours) at 2024 1305  Last data filed at 2024 0638  Gross per 24 hour   Intake 160 ml   Output 1281 ml   Net -1121 ml     Wt Readings from Last 1 Encounters:   24 171 lb 12.8 oz (77.9 kg)       Exam  Vital signs: Blood pressure 143/61, pulse 80, temperature 98.3 °F (36.8 °C), temperature source Oral, resp. rate 18, height 5' 3\" (1.6 m), weight 171 lb 12.8 oz (77.9 kg), SpO2 95%, not currently breastfeeding.    General: No acute distress. Alert and oriented x 3.  HEENT: Moist mucous membranes. EOMI. PERRLA  Neck:  No JVD.   Respiratory: Clear to auscultation bilaterally.    Cardiovascular: S1, S2.  Regular rate and rhythm.   Abdomen: Soft, nontender, nondistended.    Neurologic: No focal neurological deficits.  Musculoskeletal: Full range of motion of all extremities.  No swelling noted.  Access:    Results:     Recent Labs   Lab 24  0158 24  0510   RBC 3.14* 2.77* 2.87*   HGB 10.2* 9.0* 9.3*   HCT 30.2* 26.8* 29.0*   MCV 96.2 96.8 101.0*   MCH 32.5 32.5 32.4   MCHC 33.8 33.6 32.1   RDW 13.3 13.2 13.1   NEPRELIM 7.01 6.26 4.64   WBC 9.5 8.4 7.2   .0 218.0 223.0         Recent Labs   Lab 248 24  0510   * 149* 70   BUN 39* 38* 27*   CREATSERUM 4.63* 4.69* 3.82*   CA 8.4* 8.1* 8.4*    138 139   K 3.3* 3.2* 3.1*    105 108   CO2 28.0 26.0 28.0          XR CHEST AP PORTABLE   (CPT=71045)    Result Date: 2/24/2024  CONCLUSION:  1. Diffuse bilateral abnormality may represent edema and or pneumonia.    Dictated by (CST): Helio Min MD on 2/24/2024 at 9:15 PM     Finalized by (CST): Helio Min MD on 2/24/2024 at 9:20 PM           Assessment and Plan:     69 y/o F with h/o ESRD , CAD, DM, on PD ( switched from HD --> PD 2 weeks back ) presented to ER with sob/ fevers and being tx for PNA  Impression:    ESRD ON PD  Resp failure/ fevers/PNA: on abx  DM with nephropathy: ISS  CHFpEF: improved fluid status  Anemia: IV iron and evangelina      Plan:    PD tonight  Cx pending    Thank you very much for allowing me to participate in the care of your patient.  If you have any questions, please do not hesitate to contact me.     Veronica Shabazz MD  2/26/2024

## 2024-02-26 NOTE — PLAN OF CARE
Problem: RISK FOR INFECTION - ADULT  Goal: Absence of fever/infection during anticipated neutropenic period  Description: INTERVENTIONS  - Monitor WBC  - Administer growth factors as ordered  - Implement neutropenic guidelines  Outcome: Progressing     Problem: RESPIRATORY - ADULT  Goal: Achieves optimal ventilation and oxygenation  Description: INTERVENTIONS:  - Assess for changes in respiratory status  - Assess for changes in mentation and behavior  - Position to facilitate oxygenation and minimize respiratory effort  - Oxygen supplementation based on oxygen saturation or ABGs  - Provide Smoking Cessation handout, if applicable  - Encourage broncho-pulmonary hygiene including cough, deep breathe, Incentive Spirometry  - Assess the need for suctioning and perform as needed  - Assess and instruct to report SOB or any respiratory difficulty  - Respiratory Therapy support as indicated  - Manage/alleviate anxiety  - Monitor for signs/symptoms of CO2 retention  Outcome: Progressing     Problem: METABOLIC/FLUID AND ELECTROLYTES - ADULT  Goal: Electrolytes maintained within normal limits  Description: INTERVENTIONS:  - Monitor labs and rhythm and assess patient for signs and symptoms of electrolyte imbalances  - Administer electrolyte replacement as ordered  - Monitor response to electrolyte replacements, including rhythm and repeat lab results as appropriate  - Fluid restriction as ordered  - Instruct patient on fluid and nutrition restrictions as appropriate  Outcome: Progressing  Goal: Hemodynamic stability and optimal renal function maintained  Description: INTERVENTIONS:  - Monitor labs and assess for signs and symptoms of volume excess or deficit  - Monitor intake, output and patient weight  - Monitor urine specific gravity, serum osmolarity and serum sodium as indicated or ordered  - Monitor response to interventions for patient's volume status, including labs, urine output, blood pressure (other measures as  available)  - Encourage oral intake as appropriate  - Instruct patient on fluid and nutrition restrictions as appropriate  Outcome: Progressing   Patient weaned off O2 and O2 remain at 100% even after ambulation. IS given to patient and instructed on using hourly. Patient ambulates with a standby assist and no assistive devices. Continue IV abx as ordered. K+ replaced as ordered. PD treatment ordered for tonight.

## 2024-02-26 NOTE — PROGRESS NOTES
Patient seen and examined. Agree with documentation as above     Subjective: she feels ok. She is hoping not to have \"a long hospitalization\". I told her she should at least stay today. Working on weaning oxygen as well, and making sure no positive cultures.     Vitals:    02/26/24 1327   BP: 122/57   Pulse: 72   Resp: 18   Temp: 98.8 °F (37.1 °C)   Gen: no distress  Heart: RRR  Lungs: minimal crackles at bases  Abdomen: soft, non tender, PD cath site appears ok, no pus, erythema  Extremities: No edema    A/P    68-year-old female with known ESRD, now on peritoneal dialysis, coronary artery disease, hypertension, chronic diastolic CHF, type 2 diabetes, depression and anxiety, hypothyroidism, hyperlipidemia, GERD, who is here for evaluation of shortness of breath and fever.       Acute hypoxic respiratory failure, improved   -wean O2  -etiology multifactorial, unclear at this time. Could be component of volume, but given her fever pneumonia is possible as well  -started on empiric IV antibiotics  -fluid removal with peritoneal dialysis      Fever  -blood cultures  -sent PD fluid for cell count and culture   -empiric abx  -viral panel   -would treat total of 5 days with abx if work up negative     For chronic issues see above in NP note    Remi Whyte [facility] and [facility] hospitalist

## 2024-02-27 ENCOUNTER — APPOINTMENT (OUTPATIENT)
Dept: INTERVENTIONAL RADIOLOGY/VASCULAR | Facility: HOSPITAL | Age: 69
End: 2024-02-27
Attending: NURSE PRACTITIONER
Payer: MEDICARE

## 2024-02-27 VITALS
SYSTOLIC BLOOD PRESSURE: 151 MMHG | BODY MASS INDEX: 30.44 KG/M2 | RESPIRATION RATE: 16 BRPM | HEIGHT: 63 IN | HEART RATE: 80 BPM | WEIGHT: 171.81 LBS | OXYGEN SATURATION: 97 % | DIASTOLIC BLOOD PRESSURE: 79 MMHG | TEMPERATURE: 99 F

## 2024-02-27 LAB
ALBUMIN SERPL-MCNC: 3.3 G/DL (ref 3.2–4.8)
ANION GAP SERPL CALC-SCNC: 7 MMOL/L (ref 0–18)
BASOPHILS # BLD AUTO: 0.04 X10(3) UL (ref 0–0.2)
BASOPHILS NFR BLD AUTO: 0.7 %
BUN BLD-MCNC: 36 MG/DL (ref 9–23)
BUN/CREAT SERPL: 10.6 (ref 10–20)
CALCIUM BLD-MCNC: 8.5 MG/DL (ref 8.7–10.4)
CHLORIDE SERPL-SCNC: 109 MMOL/L (ref 98–112)
CO2 SERPL-SCNC: 28 MMOL/L (ref 21–32)
CREAT BLD-MCNC: 3.41 MG/DL
DEPRECATED RDW RBC AUTO: 46.8 FL (ref 35.1–46.3)
EGFRCR SERPLBLD CKD-EPI 2021: 14 ML/MIN/1.73M2 (ref 60–?)
EOSINOPHIL # BLD AUTO: 0.22 X10(3) UL (ref 0–0.7)
EOSINOPHIL NFR BLD AUTO: 3.7 %
ERYTHROCYTE [DISTWIDTH] IN BLOOD BY AUTOMATED COUNT: 13.2 % (ref 11–15)
GLUCOSE BLD-MCNC: 92 MG/DL (ref 70–99)
GLUCOSE BLDC GLUCOMTR-MCNC: 73 MG/DL (ref 70–99)
GLUCOSE BLDC GLUCOMTR-MCNC: 75 MG/DL (ref 70–99)
HCT VFR BLD AUTO: 26.2 %
HGB BLD-MCNC: 9 G/DL
IMM GRANULOCYTES # BLD AUTO: 0.03 X10(3) UL (ref 0–1)
IMM GRANULOCYTES NFR BLD: 0.5 %
LYMPHOCYTES # BLD AUTO: 1.29 X10(3) UL (ref 1–4)
LYMPHOCYTES NFR BLD AUTO: 21.8 %
MCH RBC QN AUTO: 33.5 PG (ref 26–34)
MCHC RBC AUTO-ENTMCNC: 34.4 G/DL (ref 31–37)
MCV RBC AUTO: 97.4 FL
MONOCYTES # BLD AUTO: 0.48 X10(3) UL (ref 0.1–1)
MONOCYTES NFR BLD AUTO: 8.1 %
NEUTROPHILS # BLD AUTO: 3.85 X10 (3) UL (ref 1.5–7.7)
NEUTROPHILS # BLD AUTO: 3.85 X10(3) UL (ref 1.5–7.7)
NEUTROPHILS NFR BLD AUTO: 65.2 %
OSMOLALITY SERPL CALC.SUM OF ELEC: 306 MOSM/KG (ref 275–295)
PHOSPHATE SERPL-MCNC: 3.1 MG/DL (ref 2.4–5.1)
PLATELET # BLD AUTO: 214 10(3)UL (ref 150–450)
POTASSIUM SERPL-SCNC: 3.7 MMOL/L (ref 3.5–5.1)
RBC # BLD AUTO: 2.69 X10(6)UL
SODIUM SERPL-SCNC: 144 MMOL/L (ref 136–145)
WBC # BLD AUTO: 5.9 X10(3) UL (ref 4–11)

## 2024-02-27 PROCEDURE — 0JPT3XZ REMOVAL OF TUNNELED VASCULAR ACCESS DEVICE FROM TRUNK SUBCUTANEOUS TISSUE AND FASCIA, PERCUTANEOUS APPROACH: ICD-10-PCS | Performed by: NURSE PRACTITIONER

## 2024-02-27 PROCEDURE — 02PA33Z REMOVAL OF INFUSION DEVICE FROM HEART, PERCUTANEOUS APPROACH: ICD-10-PCS | Performed by: NURSE PRACTITIONER

## 2024-02-27 PROCEDURE — 90945 DIALYSIS ONE EVALUATION: CPT | Performed by: INTERNAL MEDICINE

## 2024-02-27 RX ORDER — POLYETHYLENE GLYCOL 3350 17 G/17G
17 POWDER, FOR SOLUTION ORAL DAILY
Qty: 14 PACKET | Refills: 0 | Status: SHIPPED | OUTPATIENT
Start: 2024-02-27 | End: 2024-03-12

## 2024-02-27 RX ORDER — AZITHROMYCIN 250 MG/1
500 TABLET, FILM COATED ORAL
Status: COMPLETED | OUTPATIENT
Start: 2024-02-27 | End: 2024-02-27

## 2024-02-27 RX ORDER — CEFDINIR 300 MG/1
300 CAPSULE ORAL 2 TIMES DAILY
Qty: 3 CAPSULE | Refills: 0 | Status: SHIPPED | OUTPATIENT
Start: 2024-02-27 | End: 2024-02-29

## 2024-02-27 RX ORDER — LIDOCAINE HYDROCHLORIDE 20 MG/ML
INJECTION, SOLUTION EPIDURAL; INFILTRATION; INTRACAUDAL; PERINEURAL
Status: COMPLETED
Start: 2024-02-27 | End: 2024-02-27

## 2024-02-27 RX ORDER — DEXTROSE MONOHYDRATE, SODIUM CHLORIDE, SODIUM LACTATE, CALCIUM CHLORIDE, MAGNESIUM CHLORIDE 2.5; 538; 448; 18.4; 5.08 G/100ML; MG/100ML; MG/100ML; MG/100ML; MG/100ML
5000 SOLUTION INTRAPERITONEAL
Status: DISCONTINUED | OUTPATIENT
Start: 2024-02-27 | End: 2024-02-28

## 2024-02-27 NOTE — PLAN OF CARE
Pt alert and oriented. Up with stand by assist in room. on RA- tolerating well. Peritoneal dialysis done during the night. BP elevated- md aware- carvedilol given early per md orders. iv antibiotics infused. Safety precautions in place  Problem: Patient Centered Care  Goal: Patient preferences are identified and integrated in the patient's plan of care  Description: Interventions:  - What would you like us to know as we care for you? from [facility] living  - Provide timely, complete, and accurate information to patient/family  - Incorporate patient and family knowledge, values, beliefs, and cultural backgrounds into the planning and delivery of care  - Encourage patient/family to participate in care and decision-making at the level they choose  - Honor patient and family perspectives and choices  Outcome: Progressing     Problem: Diabetes/Glucose Control  Goal: Glucose maintained within prescribed range  Description: INTERVENTIONS:  - Monitor Blood Glucose as ordered  - Assess for signs and symptoms of hyperglycemia and hypoglycemia  - Administer ordered medications to maintain glucose within target range  - Assess barriers to adequate nutritional intake and initiate nutrition consult as needed  - Instruct patient on self management of diabetes  Outcome: Progressing     Problem: Patient/Family Goals  Goal: Patient/Family Long Term Goal  Description: Patient's Long Term Goal: discharge home    Interventions:- Monitor vitals  - Monitor appropriate labs  - Administer medications as ordered  - Follow MD's orders  - Update patient on plan of care   - Discharge planning     -   - See additional Care Plan goals for specific interventions  Outcome: Progressing  Goal: Patient/Family Short Term Goal  Description: Patient's Short Term Goal: improve breahting quality    Interventions:   - iv antibiotics  - encourage ambulation  -IS    - See additional Care Plan goals for specific interventions  Outcome:  Progressing     Problem: PAIN - ADULT  Goal: Verbalizes/displays adequate comfort level or patient's stated pain goal  Description: INTERVENTIONS:  - Encourage pt to monitor pain and request assistance  - Assess pain using appropriate pain scale  - Administer analgesics based on type and severity of pain and evaluate response  - Implement non-pharmacological measures as appropriate and evaluate response  - Consider cultural and social influences on pain and pain management  - Manage/alleviate anxiety  - Utilize distraction and/or relaxation techniques  - Monitor for opioid side effects  - Notify MD/LIP if interventions unsuccessful or patient reports new pain  - Anticipate increased pain with activity and pre-medicate as appropriate  Outcome: Progressing     Problem: RISK FOR INFECTION - ADULT  Goal: Absence of fever/infection during anticipated neutropenic period  Description: INTERVENTIONS  - Monitor WBC  - Administer growth factors as ordered  - Implement neutropenic guidelines  Outcome: Progressing     Problem: SAFETY ADULT - FALL  Goal: Free from fall injury  Description: INTERVENTIONS:  - Assess pt frequently for physical needs  - Identify cognitive and physical deficits and behaviors that affect risk of falls.  - [place] fall precautions as indicated by assessment.  - Educate pt/family on patient safety including physical limitations  - Instruct pt to call for assistance with activity based on assessment  - Modify environment to reduce risk of injury  - Provide assistive devices as appropriate  - Consider OT/PT consult to assist with strengthening/mobility  - Encourage toileting schedule  Outcome: Progressing     Problem: DISCHARGE PLANNING  Goal: Discharge to home or other facility with appropriate resources  Description: INTERVENTIONS:  - Identify barriers to discharge w/pt and caregiver  - Include patient/family/discharge partner in discharge planning  - Arrange for needed discharge resources and  transportation as appropriate  - Identify discharge learning needs (meds, wound care, etc)  - Arrange for interpreters to assist at discharge as needed  - Consider post-discharge preferences of patient/family/discharge partner  - Complete POLST form as appropriate  - Assess patient's ability to be responsible for managing their own health  - Refer to Case Management Department for coordinating discharge planning if the patient needs post-hospital services based on physician/LIP order or complex needs related to functional status, cognitive ability or social support system  Outcome: Progressing     Problem: RESPIRATORY - ADULT  Goal: Achieves optimal ventilation and oxygenation  Description: INTERVENTIONS:  - Assess for changes in respiratory status  - Assess for changes in mentation and behavior  - Position to facilitate oxygenation and minimize respiratory effort  - Oxygen supplementation based on oxygen saturation or ABGs  - Provide Smoking Cessation handout, if applicable  - Encourage broncho-pulmonary hygiene including cough, deep breathe, Incentive Spirometry  - Assess the need for suctioning and perform as needed  - Assess and instruct to report SOB or any respiratory difficulty  - Respiratory Therapy support as indicated  - Manage/alleviate anxiety  - Monitor for signs/symptoms of CO2 retention  Outcome: Progressing     Problem: SKIN/TISSUE INTEGRITY - ADULT  Goal: Skin integrity remains intact  Description: INTERVENTIONS  - Assess and document risk factors for pressure ulcer development  - Assess and document skin integrity  - Monitor for areas of redness and/or skin breakdown  - Initiate interventions, skin care algorithm/standards of care as needed  Outcome: Progressing     Problem: METABOLIC/FLUID AND ELECTROLYTES - ADULT  Goal: Electrolytes maintained within normal limits  Description: INTERVENTIONS:  - Monitor labs and rhythm and assess patient for signs and symptoms of electrolyte imbalances  -  Administer electrolyte replacement as ordered  - Monitor response to electrolyte replacements, including rhythm and repeat lab results as appropriate  - Fluid restriction as ordered  - Instruct patient on fluid and nutrition restrictions as appropriate  Outcome: Progressing  Goal: Hemodynamic stability and optimal renal function maintained  Description: INTERVENTIONS:  - Monitor labs and assess for signs and symptoms of volume excess or deficit  - Monitor intake, output and patient weight  - Monitor urine specific gravity, serum osmolarity and serum sodium as indicated or ordered  - Monitor response to interventions for patient's volume status, including labs, urine output, blood pressure (other measures as available)  - Encourage oral intake as appropriate  - Instruct patient on fluid and nutrition restrictions as appropriate  Outcome: Progressing

## 2024-02-27 NOTE — PAYOR COMM NOTE
--------------  2/26 CONTINUED STAY REVIEW    Payor: [facility] MEDICARE  Subscriber #:  [id number]  Authorization Number: [id number]    RENAL:    67 y/o F with h/o ESRD , CAD, DM, on PD ( switched from HD --> PD 2 weeks back ) presented to ER with sob/ fevers and being tx for PNA  Impression:     ESRD ON PD  Resp failure/ fevers/PNA: on abx  DM with nephropathy: ISS  CHFpEF: improved fluid status  Anemia: IV iron and evangelina      Plan:     PD tonight  Cx pending      HOSPITALIST:    She feels okay.  She is hoping not to have a long hospitalization.  I told her she should at least stay today.  Working on weaning oxygen as well.  Making sure no positive cultures.           Vitals:     02/26/24 1327   BP: 122/57   Pulse: 72   Resp: 18   Temp: 98.8 °F (37.1 °C)   Gen: no distress  Lungs: minimal crackles at bases  Abdomen: soft, non tender, PD cath site appears ok, no pus or erythema  Extremities: no edema      A/P     68-year-old female with known ESRD, now on peritoneal dialysis, coronary artery disease, hypertension, chronic diastolic CHF, type 2 diabetes, depression and anxiety, hypothyroidism, hyperlipidemia, GERD, who is here for evaluation of shortness of breath and fever.       Acute hypoxic respiratory failure  -etiology multifactorial, unclear at this time. Could be component of volume, but given her fever pneumonia is possible as well. Starting to improve however  -may not need long course of abx  -started on empiric IV antibiotics  -fluid removal with peritoneal dialysis      Fever  -blood cultures no growth to date  -sent PD fluid for cell count and culture. Not infected appearing from cell count   -empiric abx  -viral panel negative  -consider 5 days total of antibiotics if work up negative     ESRD  -on PD   -discussed with renal. Should likely remove Perm Cath tomorrow in IR. Should call IR tomorrow to discuss and order removal. Discussed with renal today to not place order just yet until tomorrow.        Lab 02/24/24  2025 02/25/24  0158 02/26/24  0510   WBC 9.5 8.4 7.2   HGB 10.2* 9.0* 9.3*   MCV 96.2 96.8 101.0*   .0 218.0 223.0       138 139   K 3.3* 3.2* 3.1*    105 108   CO2 28.0 26.0 28.0   BUN 39* 38* 27*   CREATSERUM 4.63* 4.69* 3.82*   CA 8.4* 8.1* 8.4*   MG  --  1.8  --    PHOS  --   --  4.3   * 149* 70      ALT <7* <7*  --    AST 10 <8  --    ALB 3.5 3.0* 3.2      Lab 02/25/24  1303 02/25/24  1713 02/25/24  2135 02/26/24  0837 02/26/24  1229   PGLU 115* 239* 283* 83 159*         MEDICATIONS ADMINISTERED IN LAST 1 DAY:    cefTRIAXone (Rocephin) 1 g in D5W 100 mL IVPB-ADD       Date Action Dose Route User    2/26/2024 2130 New Bag 1 g Intravenous Lety Muniz RN          dextrose 2.5%, calcium 2.5 meq/L peritoneal solution       Date Action Dose Route User    2/26/2024 2000 Given by Other 5,000 mL Intraperitoneal Lety Muniz RN          epoetin diane (Epogen, Procrit) 5,000 Units injection       Date Action Dose Route User    2/27/2024 1426 Given 5,000 Units Intravenous Mary Morgan RN          heparin (Porcine) 5000 UNIT/ML injection 5,000 Units       Date Action Dose Route User    2/27/2024 1426 Given 5,000 Units Subcutaneous (Right Lower Arm) Mary Morgan RN    2/27/2024 0610 Given 5,000 Units Subcutaneous (Left Lower Abdomen) Lety Muniz RN    2/26/2024 2130 Given 5,000 Units Subcutaneous (Left Lower Abdomen) Lety Muniz RN          iron sucrose (Venofer) 20 mg/mL injection 200 mg       Date Action Dose Route User    2/27/2024 1426 New Bag 200 mg Intravenous Morgan, Mary, RN            Vitals (last day)       Date/Time Temp Pulse Resp BP SpO2 Weight O2 Device O2 Flow Rate (L/min) [facility]    02/26/24 2012 98.9 °F (37.2 °C) 80 18 139/83 100 % -- None (Room air) -- NM    02/26/24 1636 -- 78 18 135/66 97 % -- None (Room air) --     02/26/24 1338 -- -- -- -- 100 % -- None (Room air) --     02/26/24 1327 98.8 °F (37.1 °C) 72 18 122/57 100 %  -- Nasal cannula 1 L/min     02/26/24 0953 -- 80 -- 143/61 -- -- -- --     02/26/24 0946 -- -- -- -- 95 % -- -- 2 L/min     02/26/24 0942 -- 81 -- 143/61 -- -- -- --     02/26/24 0900 98.3 °F (36.8 °C) 76 18 113/58 97 % -- Nasal cannula 3 L/min     02/26/24 0605 98.3 °F (36.8 °C) 74 18 161/71 98 % 171 lb 12.8 oz Nasal cannula 3 L/min     02/26/24 0006 98.4 °F (36.9 °C) 70 18 131/64 98 % -- Nasal cannula 3 L/min AD

## 2024-02-27 NOTE — PLAN OF CARE
A&Ox4. Pt ambulates self, monitoring blood glucose levels per order- ACHS, voiding via purewick, tolerating diet, on room air. Peritoneal dialysis: 1779mL removed per PD RN. PD RN provided pt education and this RN reinforced education. Frequent rounding by nursing staff. Safety precautions maintained/call light within reach.    Patient discharged to [facility] with son. IV removed, discharge education provided, patient sent home with all personal belongings, and discharge instructions. Addressed additional questions.    Problem: Patient Centered Care  Goal: Patient preferences are identified and integrated in the patient's plan of care  Description: Interventions:  - What would you like us to know as we care for you?  - Provide timely, complete, and accurate information to patient/family  - Incorporate patient and family knowledge, values, beliefs, and cultural backgrounds into the planning and delivery of care  - Encourage patient/family to participate in care and decision-making at the level they choose  - Honor patient and family perspectives and choices  Outcome: Adequate for Discharge     Problem: Diabetes/Glucose Control  Goal: Glucose maintained within prescribed range  Description: INTERVENTIONS:  - Monitor Blood Glucose as ordered  - Assess for signs and symptoms of hyperglycemia and hypoglycemia  - Administer ordered medications to maintain glucose within target range  - Assess barriers to adequate nutritional intake and initiate nutrition consult as needed  - Instruct patient on self management of diabetes  Outcome: Adequate for Discharge        Problem: PAIN - ADULT  Goal: Verbalizes/displays adequate comfort level or patient's stated pain goal  Description: INTERVENTIONS:  - Encourage pt to monitor pain and request assistance  - Assess pain using appropriate pain scale  - Administer analgesics based on type and severity of pain and evaluate response  - Implement non-pharmacological  measures as appropriate and evaluate response  - Consider cultural and social influences on pain and pain management  - Manage/alleviate anxiety  - Utilize distraction and/or relaxation techniques  - Monitor for opioid side effects  - Notify MD/LIP if interventions unsuccessful or patient reports new pain  - Anticipate increased pain with activity and pre-medicate as appropriate  Outcome: Adequate for Discharge     Problem: RISK FOR INFECTION - ADULT  Goal: Absence of fever/infection during anticipated neutropenic period  Description: INTERVENTIONS  - Monitor WBC  - Administer growth factors as ordered  - Implement neutropenic guidelines  Outcome: Adequate for Discharge     Problem: SAFETY ADULT - FALL  Goal: Free from fall injury  Description: INTERVENTIONS:  - Assess pt frequently for physical needs  - Identify cognitive and physical deficits and behaviors that affect risk of falls.  - [place] fall precautions as indicated by assessment.  - Educate pt/family on patient safety including physical limitations  - Instruct pt to call for assistance with activity based on assessment  - Modify environment to reduce risk of injury  - Provide assistive devices as appropriate  - Consider OT/PT consult to assist with strengthening/mobility  - Encourage toileting schedule  Outcome: Adequate for Discharge     Problem: DISCHARGE PLANNING  Goal: Discharge to home or other facility with appropriate resources  Description: INTERVENTIONS:  - Identify barriers to discharge w/pt and caregiver  - Include patient/family/discharge partner in discharge planning  - Arrange for needed discharge resources and transportation as appropriate  - Identify discharge learning needs (meds, wound care, etc)  - Arrange for interpreters to assist at discharge as needed  - Consider post-discharge preferences of patient/family/discharge partner  - Complete POLST form as appropriate  - Assess patient's ability to be responsible for managing their own  health  - Refer to Case Management Department for coordinating discharge planning if the patient needs post-hospital services based on physician/LIP order or complex needs related to functional status, cognitive ability or social support system  Outcome: Adequate for Discharge     Problem: RESPIRATORY - ADULT  Goal: Achieves optimal ventilation and oxygenation  Description: INTERVENTIONS:  - Assess for changes in respiratory status  - Assess for changes in mentation and behavior  - Position to facilitate oxygenation and minimize respiratory effort  - Oxygen supplementation based on oxygen saturation or ABGs  - Provide Smoking Cessation handout, if applicable  - Encourage broncho-pulmonary hygiene including cough, deep breathe, Incentive Spirometry  - Assess the need for suctioning and perform as needed  - Assess and instruct to report SOB or any respiratory difficulty  - Respiratory Therapy support as indicated  - Manage/alleviate anxiety  - Monitor for signs/symptoms of CO2 retention  Outcome: Adequate for Discharge     Problem: SKIN/TISSUE INTEGRITY - ADULT  Goal: Skin integrity remains intact  Description: INTERVENTIONS  - Assess and document risk factors for pressure ulcer development  - Assess and document skin integrity  - Monitor for areas of redness and/or skin breakdown  - Initiate interventions, skin care algorithm/standards of care as needed  Outcome: Adequate for Discharge     Problem: METABOLIC/FLUID AND ELECTROLYTES - ADULT  Goal: Electrolytes maintained within normal limits  Description: INTERVENTIONS:  - Monitor labs and rhythm and assess patient for signs and symptoms of electrolyte imbalances  - Administer electrolyte replacement as ordered  - Monitor response to electrolyte replacements, including rhythm and repeat lab results as appropriate  - Fluid restriction as ordered  - Instruct patient on fluid and nutrition restrictions as appropriate  Outcome: Adequate for Discharge  Goal: Hemodynamic  stability and optimal renal function maintained  Description: INTERVENTIONS:  - Monitor labs and assess for signs and symptoms of volume excess or deficit  - Monitor intake, output and patient weight  - Monitor urine specific gravity, serum osmolarity and serum sodium as indicated or ordered  - Monitor response to interventions for patient's volume status, including labs, urine output, blood pressure (other measures as available)  - Encourage oral intake as appropriate  - Instruct patient on fluid and nutrition restrictions as appropriate  Outcome: Adequate for Discharge

## 2024-02-27 NOTE — PROGRESS NOTES
Pt In IR bedside procedure in room 514 for Tunneled dialysis catheter removal. 15 of Lidocaine was given and catheter was removed intact. Pat tolerated procedure well. Report given to Sheyla EVANS

## 2024-02-27 NOTE — DISCHARGE SUMMARY
[facility] [place] [facility] Hospitalist Discharge Summary   Patient ID:  Cassy Patterson  [id number]  68 year old  7/13/1955    Admit date: 2/24/2024  Discharge date: 2/27/2024    Primary Care Physician: Ambreen Sandoval MD   Attending Physician: Taylor Maria DO   Consults:   Consultants         Provider   Role Specialty     Rajesh Hines MD  Consulting Physician INTERVENTIONAL, RADIOLOGY     Sima Dimas MD  Consulting Physician NEPHROLOGY            Hospital Discharge Diagnoses:   Community acquired pneumonia, unspecified laterality  ----See D/C Summary for further Dx    Risk of Readmission Lace+ Score: 79  59-90 High Risk  29-58 Medium Risk  0-28   Low Risk.    TCM Follow-Up Recommendation:  LACE > 58: High Risk of readmission after discharge from the hospital.    Please note that only IHP DMG and EMG patients enrolled in the Medicare ACO, [facility] ACO and [facility] HMOs will be handled by the \[facility] [place] [facility]\"" Care Management team.  For all other patients, please follow usual protocol for discharge care transition.    Reason for admission  Per H/P Dated 2/25/2024 by Dr Segovia   68-year-old female with known ESRD, now on peritoneal dialysis, coronary artery disease, hypertension, chronic diastolic CHF, type 2 diabetes, depression and anxiety, hypothyroidism, hyperlipidemia, GERD, who is here for evaluation of shortness of breath and fever.  The patient states that her symptoms started earlier in the day yesterday.  She denies any cough.  She denies any chest pain.  She reports symptoms of fever possibly given feeling cold.  Associated with this was also shortness of breath.  He has significant recent medical history including progression to end-stage renal disease after her cardiac issues and cardiac catheterization.  She was receiving hemodialysis however 2 weeks ago the patient switched over to peritoneal dialysis.  She has been doing well with this.  She did not do her peritoneal dialysis on Friday night.     Hospital  Course:  68-year-old female with known ESRD now on peritoneal dialysis, coronary artery disease, hypertension, chronic diastolic CHF, type 2 diabetes, depression and anxiety, hypothyroidism, hyperlipidemia, GERD, who is here for evaluation of shortness of breath and fever improved with PD and tx for possible PNA. HD line removed by IR as pt is now on PD. Pt to follow up with pcp 2/29 as scheduled, see below for details       Acute hypoxic respiratory failure-resolved   PNA  -CXR with diffuse edema and or PNA  -negative for rsv, flu covid as well as complete RVP panel  -abx-cefdinir to complete 5 day course at discharge     Acute on Chronic diastolic CHF   -volume removal with PD      ESRD now on PD  Hypokalemia  -2/27 S/P removal of hd tunneled line by IR   -PD at home  -follow up with renal      Anemia   -ferritin 534. Iron, transferrin and tibc are low   -s/p epogen per sylvie in hospital       CAD S/P OSMIN Ramus 9/2023  HL  HTN  -PCI with OSMIN of ostial and distal ramus, in sept 2023. That   -continue home asa, lipitor, norvac, catapres, hydralazine and ticagrelor     DM Type II  -HgbA1C 5.9  -continue home regimen: insulin degludec 22 units nightly   -accuchecks  -ADA diet      Hypothyroidism  -continue synthroid      Depression/anxiety  -continue zoloft wellbutrin and valium      GERD  -continue PPI     Gout  -continue allopurinol      GOC  -full code     MA/ACGRABIEL Reach  -Re- Entry: no  -Consults: renal   -Discharge Needs:home PT  -Appointments: [x ] PCP Ambreen Sandoval MD 2/29 at 12:40     EXAM:   GENERAL: no apparent distress, overweight  NEURO: A/A Ox3  RESP: non labored, CTA  CARDIO: Regular, no murmur  ABD: PD line   EXTREMITIES: no edema    Radiology:   XR CHEST AP PORTABLE  (CPT=71045)    Result Date: 2/24/2024  CONCLUSION:  1. Diffuse bilateral abnormality may represent edema and or pneumonia.    Dictated by (CST): Helio Min MD on 2/24/2024 at 9:15 PM     Finalized by (CST): Helio Min MD on 2/24/2024  at 9:20 PM           Discharge Instructions     Medication List        CHANGE how you take these medications      diazePAM 5 MG Tabs  Commonly known as: Valium  Take 1 tablet (5 mg total) by mouth 2 (two) times daily.  What changed: Another medication with the same name was removed. Continue taking this medication, and follow the directions you see here.            CONTINUE taking these medications      allopurinol 300 MG Tabs  Commonly known as: Zyloprim  TAKE ONE-HALF (1/2) TABLET DAILY     aspirin 81 MG Tbec     atorvastatin 80 MG Tabs  Commonly known as: Lipitor     Ally Contour Next Test Strp  TEST BLOOD GLUCOSE THREE TIMES DAILY     buPROPion  MG Tb24  Commonly known as: Wellbutrin XL  Take 1 tablet (300 mg total) by mouth daily.     Calcium Carbonate-Vitamin D 500-400 MG-UNIT Tabs  Commonly known as: OSCAL-500     carvedilol 12.5 MG Tabs  Commonly known as: Coreg     cloNIDine 0.1 MG Tabs  Commonly known as: Catapres     hydrALAZINE 100 MG Tabs  Commonly known as: Apresoline     HYDROcodone-acetaminophen 5-325 MG Tabs  Commonly known as: Norco  Take 1 tablet by mouth every 6 (six) hours as needed for Pain.     Insulin Pen Needle 29G X 12.7MM Misc  Commonly known as: BD Pen Needle Original U/F  USE AS DIRECTED DAILY     levothyroxine 125 MCG Tabs  Commonly known as: Synthroid  Take 1 tablet (125 mcg total) by mouth before breakfast.     pantoprazole 40 MG Tbec  Commonly known as: Protonix  TAKE 1 TABLET EVERY MORNING BEFORE BREAKFAST     sertraline 100 MG Tabs  Commonly known as: Zoloft  Take 2 tablets (200 mg total) by mouth daily.     ticagrelor 90 MG Tabs  Commonly known as: Brilinta     Toujeo SoloStar 300 UNIT/ML Sopn  Generic drug: Insulin Glargine (1 Unit Dial)            ASK your doctor about these medications      amLODIPine 5 MG Tabs  Commonly known as: Norvasc     Polyethylene Glycol 3350 17 g Pack  Commonly known as: MiraLax  Take 17 g by mouth daily for 14 days.  Ask about: Should I take  this medication?              Important follow up:   Follow-up Information       Ambreen Sandoval MD Follow up on 2/29/2024.    Specialty: Internal Medicine  Why: 2/29 at 12:40 pm  Contact information:  [address] [facility]  [address]  [facility] [address]  [phone]                             Disposition: home  Discharged Condition: stable  =========================================================================================================================    I Reconciled current and discharge medications on day of discharge  Patient had opportunity to ask questions and state understand and agree with therapeutic plan as outlined    Total Time Coordinating Care: greater than 30 minutes  Is this a shared or split note between Advanced Practice Provider and Physician? Yes    Note: This chart was prepared using voice recognition software and may contain unintended word substitution errors.     Alison Kaiser RN, NP   [facility] Hospitalist Team   2/27/2024      SEE ATTENDING NOTE BELOW          Patient seen and examined independently.  Discussed with APN and agree with note above    Patient improved.  Stable for discharge    GEN: NAD  RESP: CTAB  CV: RRR    Time of discharge >30 minutes    Taylor Maria, DO

## 2024-02-27 NOTE — PROGRESS NOTES
[facility]    Progress Note    Cassy Patterson Patient Status:  Inpatient    1955 MRN [id number]   Location [facility]W Attending Remi Segovia, DO   Hosp Day # 2 PCP Ambreen Sandoval MD       Subjective:   Cassy Patterson is a(n) 68 year old female who I am seeing for ESRD    Feeling better  Tolerated PD  No sob      Objective:   /70 (BP Location: Left arm)   Pulse 71   Temp 98.5 °F (36.9 °C) (Oral)   Resp 18   Ht 5' 3\" (1.6 m)   Wt 171 lb 12.8 oz (77.9 kg)   SpO2 96%   BMI 30.43 kg/m²      Intake/Output Summary (Last 24 hours) at 2024 1156  Last data filed at 2024 1031  Gross per 24 hour   Intake 1052 ml   Output 350 ml   Net 702 ml     Wt Readings from Last 1 Encounters:   24 171 lb 12.8 oz (77.9 kg)       Exam  Vital signs: Blood pressure 140/70, pulse 71, temperature 98.5 °F (36.9 °C), temperature source Oral, resp. rate 18, height 5' 3\" (1.6 m), weight 171 lb 12.8 oz (77.9 kg), SpO2 96%, not currently breastfeeding.    General: No acute distress. Alert and oriented x 3.  HEENT: Moist mucous membranes. EOMI. PERRLA  Neck:  No JVD.   Respiratory: Clear to auscultation bilaterally.    Cardiovascular: S1, S2.  Regular rate and rhythm.   Abdomen: Soft, nontender, nondistended.    Neurologic: No focal neurological deficits.  Musculoskeletal: Full range of motion of all extremities.  No swelling noted.  Access: TDC and PD cath    Results:     Recent Labs   Lab 24  0158 24  0510 24  0541   RBC 2.77* 2.87* 2.69*   HGB 9.0* 9.3* 9.0*   HCT 26.8* 29.0* 26.2*   MCV 96.8 101.0* 97.4   MCH 32.5 32.4 33.5   MCHC 33.6 32.1 34.4   RDW 13.2 13.1 13.2   NEPRELIM 6.26 4.64 3.85   WBC 8.4 7.2 5.9   .0 223.0 214.0         Recent Labs   Lab 24  0158 24  0510 24  1455 24  0541   * 70  --  92   BUN 38* 27*  --  36*   CREATSERUM 4.69* 3.82*  --  3.41*   CA 8.1* 8.4*  --  8.5*    139  --  144   K 3.2* 3.1* 3.5 3.7   CL  105 108  --  109   CO2 26.0 28.0  --  28.0          No results found.    Assessment and Plan:     67 y/o F with h/o ESRD , CAD, DM, on PD ( switched from HD --> PD 2 weeks back ) presented to ER with sob/ fevers and being tx for PNA  Impression:    ESRD ON PD. Cx negative  Resp failure/ fevers/PNA: on abx/ UF with PD and improved  DM with nephropathy: ISS  CHFpEF: improved fluid status. On RA  Anemia: IV iron and evangelina  Access : removed TDC prior to dc. Since doing PD now      Plan:    IV iron and EVANGELINA  PD tonight if still in hospital  TDC removal prior to DC    Thank you very much for allowing me to participate in the care of your patient.  If you have any questions, please do not hesitate to contact me.     Veronica Shabazz MD

## 2024-02-27 NOTE — PROGRESS NOTES
[facility] AND CARE   Progress Note  -  Cassy Patterson Patient Status:  Inpatient    1955 MRN [id number]   Location [facility]5W Attending Remi Segovia, DO   Hosp Day # 2 PCP Ambreen Sandoval MD     PCP: Ambreen Sandoval MD      SEE ATTENDING NOTE AT BOTTOM OF PAGE    Is this a shared or split note between Advanced Practice Provider and Physician? Yes    Assessment and Plan:    68-year-old female with known ESRD now on peritoneal dialysis, coronary artery disease, hypertension, chronic diastolic CHF, type 2 diabetes, depression and anxiety, hypothyroidism, hyperlipidemia, GERD, who is here for evaluation of shortness of breath and fever improved with PD and tx for possible PNA. Plans for home after HD line removed,  see below for details       Acute hypoxic respiratory failure-resolved   PNA  -CXR with diffuse edema and or PNA  -negative for rsv, flu covid as well as complete RVP panel  -abx-ceftriaxone day 4, will have pt complete 5 day course of abx for possible PNA with cefdinir at discharge. To complete zithromax today  -on RA     Acute on Chronic diastolic CHF ?  -volume removal with PD     ESRD on HD   Hypokalemia  -PD as per renal   -IR for removal of hd tunneled line     Anemia   -ferritin 534. Iron, transferrin and tibc are low   -epogen per renal      CAD S/P OSMIN Ramus 2023  HL  HTN  -PCI with OSMIN of ostial and distal ramus, in 2023. That   -continue home asa, lipitor, norvac, catapres, hydralazine and ticagrelor     DM Type II  -HgbA1C 5.9  -home regimen: insulin degludec 22 units nightly   -currently on insulin degludec 22 units HS with sliding scale   -accuchecks  -ADA diet      Hypothyroidism  -continue synthroid      Depression/anxiety  -continue zoloft wellbutrin and valium      GERD  -on PPI     Gout  -continue allopurinol     GOC  -full code    MA/ACGRABIEL Reach  -Re- Entry: no  -Consults: renal   -Discharge Needs:home PT  -Appointments: [x ] PCP Ambreen Sandoval MD  at 12:40      CHAVO  -lytes in am  -diet-ADA    Prophy  -SCD  -heparin    Dispo  -plans for discharge after HD line removed   PCP: Ambreen Sandoval MD      Concerns regarding plan of care were discussed with patient. Patient agrees with plan as detailed above. Discussed plan of care with Dr. Maria     Note: This chart was prepared using voice recognition software and may contain unintended word substitution errors.      Alison Kaiser RN, NP   [facility] Hospitalist Team  Contact via [employer] and Bubble (Check Availability)    SUBJECTIVE:   Denies shortness of breath.  Per renal recommending removal of tunneled HD line as patient is now on PD.  Per patient she does not drive and would like line removed prior to discharge as it would be difficult to coordinate ride.    OBJECTIVE:   Blood pressure 140/70, pulse 71, temperature 98.5 °F (36.9 °C), temperature source Oral, resp. rate 18, height 5' 3\" (1.6 m), weight 171 lb 12.8 oz (77.9 kg), SpO2 96%, not currently breastfeeding.    GENERAL: no apparent distress, overweight  NEURO: A/A Ox3  CHEST: HD line   RESP: non labored, CTA  CARDIO: Regular  ABD: PD line   EXTREMITIES: no edema    DIAGNOSTIC DATA:   Labs:     Recent Labs   Lab 02/24/24 2025 02/25/24  0158 02/26/24  0510 02/27/24  0541   WBC 9.5 8.4 7.2 5.9   HGB 10.2* 9.0* 9.3* 9.0*   MCV 96.2 96.8 101.0* 97.4   .0 218.0 223.0 214.0       Recent Labs   Lab 02/24/24 2025 02/25/24 0158 02/26/24  0510 02/26/24  1455 02/27/24  0541    138 139  --  144   K 3.3* 3.2* 3.1* 3.5 3.7    105 108  --  109   CO2 28.0 26.0 28.0  --  28.0   BUN 39* 38* 27*  --  36*   CREATSERUM 4.63* 4.69* 3.82*  --  3.41*   CA 8.4* 8.1* 8.4*  --  8.5*   MG  --  1.8  --   --   --    PHOS  --   --  4.3  --  3.1   * 149* 70  --  92       Recent Labs   Lab 02/24/24 2025 02/25/24  0158 02/26/24  0510 02/27/24  0541   ALT <7* <7*  --   --    AST 10 <8  --   --    ALB 3.5 3.0* 3.2 3.3       Recent Labs   Lab  02/26/24  0837 02/26/24  1229 02/26/24  1806 02/26/24  2150 02/27/24  0850   PGLU 83 159* 126* 220* 73       No results for input(s): \"TROP\" in the last 168 hours.        MEDICATIONS       epoetin diane  5,000 Units Subcutaneous Once    iron sucrose  200 mg Intravenous Once    dextrose 2.5%, calcium 2.5 meq/L  5,000 mL Intraperitoneal Once in dialysis    cloNIDine  0.1 mg Oral BID    allopurinol  150 mg Oral Daily    amLODIPine  5 mg Oral Nightly    aspirin  81 mg Oral Nightly    atorvastatin  80 mg Oral Daily    buPROPion ER  300 mg Oral Daily    calcium carbonate-vitamin D  2 tablet Oral Daily    carvedilol  37.5 mg Oral BID with meals    diazePAM  5 mg Oral BID    hydrALAZINE  100 mg Oral TID    insulin degludec  22 Units Subcutaneous Nightly    levothyroxine  125 mcg Oral Before breakfast    multivitamin with minerals  1 tablet Oral Nightly    pantoprazole  40 mg Oral QAM AC    sertraline  200 mg Oral Daily    ticagrelor  90 mg Oral Q12H    heparin  5,000 Units Subcutaneous Q8H JERRY    insulin aspart  1-5 Units Subcutaneous TID CC    cefTRIAXone  1 g Intravenous Q24H    azithromycin  500 mg Oral Daily       HYDROcodone-acetaminophen, glucose **OR** glucose **OR** glucose-vitamin C **OR** dextrose **OR** glucose **OR** glucose **OR** glucose-vitamin C, acetaminophen, ondansetron, metoclopramide, benzonatate    Alison Kaiser, PASCUAL      IMAGING     XR CHEST AP PORTABLE  (CPT=71045)    Result Date: 2/24/2024  CONCLUSION:  1. Diffuse bilateral abnormality may represent edema and or pneumonia.    Dictated by (CST): Helio Min MD on 2/24/2024 at 9:15 PM     Finalized by (CST): Helio Min MD on 2/24/2024 at 9:20 PM             SEE ATTENDING NOTE BELOW:   Patient seen and examined independently.  Discussed with JUAN CARLOS and agree with note above.    S:  Patient seen and examined. States that she is feeling okay. Feelings like her breathing has returned back to her BL    Objective:  /70 (BP Location: Left arm)    Pulse 71   Temp 98.5 °F (36.9 °C) (Oral)   Resp 18   Ht 5' 3\" (1.6 m)   Wt 171 lb 12.8 oz (77.9 kg)   SpO2 96%   BMI 30.43 kg/m²     GENERAL: no apparent distress, overweight  NEURO: A/A Ox3  CHEST: HD line   RESP: non labored, CTA  CARDIO: Regular  ABD: PD line   EXTREMITIES: no edema    Assessment and Plan    68-year-old female with known ESRD now on peritoneal dialysis, coronary artery disease, hypertension, chronic diastolic CHF, type 2 diabetes, depression and anxiety, hypothyroidism, hyperlipidemia, GERD, who is here for evaluation of shortness of breath and fever improved with PD and tx for possible PNA. Plans for home after HD line removed,  see below for details       Acute hypoxic respiratory failure-resolved   PNA  -CXR with diffuse edema and or PNA  -negative for rsv, flu covid as well as complete RVP panel  -abx-ceftriaxone day 4, will have pt complete 5 day course of abx for possible PNA with cefdinir at discharge. To complete zithromax today  -on RA     Acute on Chronic diastolic CHF ?  -volume removal with PD     ESRD on HD   Hypokalemia  -PD as per renal   -IR for removal of hd tunneled line     Anemia   -ferritin 534. Iron, transferrin and tibc are low   -epogen per renal      CAD S/P OSMIN Ramus 9/2023  HL  HTN  -PCI with OSMIN of ostial and distal ramus, in sept 2023. That   -continue home asa, lipitor, norvac, catapres, hydralazine and ticagrelor     DM Type II  -HgbA1C 5.9  -home regimen: insulin degludec 22 units nightly   -currently on insulin degludec 22 units HS with sliding scale   -accuchecks  -ADA diet      Hypothyroidism  -continue synthroid      Depression/anxiety  -continue zoloft wellbutrin and valium      GERD  -on PPI     Gout  -continue allopurinol     Rest as above with above    Taylor Maria DO

## 2024-02-28 NOTE — CM/SW NOTE
Call from RN stating patient will be discharged tonight and she is refusing home health care    David referral cancelled    Samira NEGRETE, CCM, MSN    Emergency Room  [facility]  Clinical Transitions Leader  [phone]

## 2024-02-29 NOTE — PAYOR COMM NOTE
--------------  DISCHARGE REVIEW    Payor: [employer]  Subscriber #:  [id number]  Authorization Number: [id number]    Admit date: 2/25/24  Admit time:  12:14 AM  Discharge Date: 2/27/2024  7:00 PM           [facility] Hospitalist Discharge Summary   Patient ID:  Cassy Patterson  [id number]  68 year old  7/13/1955    Admit date: 2/24/2024  Discharge date: 2/27/2024    Primary Care Physician: Ambreen Sandoval MD   Attending Physician: Taylor Maria DO   Consults:   Consultants         Provider   Role Specialty     Rajesh Hines MD  Consulting Physician INTERVENTIONAL, RADIOLOGY     Sima Dimas MD  Consulting Physician NEPHROLOGY            Hospital Discharge Diagnoses:   Community acquired pneumonia, unspecified laterality  ----See D/C Summary for further Dx    Risk of Readmission Lace+ Score: 79  59-90 High Risk  29-58 Medium Risk  0-28   Low Risk.    TCM Follow-Up Recommendation:  LACE > 58: High Risk of readmission after discharge from the hospital.    Please note that only IHP DMG and EMG patients enrolled in the Medicare ACO, [facility] ACO and [facility] HMOs will be handled by the [facility] Care Management team.  For all other patients, please follow usual protocol for discharge care transition.    Reason for admission  Per H/P Dated 2/25/2024 by Dr Segovia   68-year-old female with known ESRD, now on peritoneal dialysis, coronary artery disease, hypertension, chronic diastolic CHF, type 2 diabetes, depression and anxiety, hypothyroidism, hyperlipidemia, GERD, who is here for evaluation of shortness of breath and fever.  The patient states that her symptoms started earlier in the day yesterday.  She denies any cough.  She denies any chest pain.  She reports symptoms of fever possibly given feeling cold.  Associated with this was also shortness of breath.  He has significant recent medical history including progression to end-stage renal disease after her cardiac issues and cardiac  catheterization.  She was receiving hemodialysis however 2 weeks ago the patient switched over to peritoneal dialysis.  She has been doing well with this.  She did not do her peritoneal dialysis on Friday night.     Hospital Course:  68-year-old female with known ESRD now on peritoneal dialysis, coronary artery disease, hypertension, chronic diastolic CHF, type 2 diabetes, depression and anxiety, hypothyroidism, hyperlipidemia, GERD, who is here for evaluation of shortness of breath and fever improved with PD and tx for possible PNA. HD line removed by IR as pt is now on PD. Pt to follow up with pcp 2/29 as scheduled, see below for details       Acute hypoxic respiratory failure-resolved   PNA  -CXR with diffuse edema and or PNA  -negative for rsv, flu covid as well as complete RVP panel  -abx-cefdinir to complete 5 day course at discharge     Acute on Chronic diastolic CHF   -volume removal with PD      ESRD now on PD  Hypokalemia  -2/27 S/P removal of hd tunneled line by IR   -PD at home  -follow up with renal      Anemia   -ferritin 534. Iron, transferrin and tibc are low   -s/p epogen per sylvie in hospital       CAD S/P OSMIN Ramus 9/2023  HL  HTN  -PCI with OSMIN of ostial and distal ramus, in sept 2023. That   -continue home asa, lipitor, norvac, catapres, hydralazine and ticagrelor     DM Type II  -HgbA1C 5.9  -continue home regimen: insulin degludec 22 units nightly   -accuchecks  -ADA diet      Hypothyroidism  -continue synthroid      Depression/anxiety  -continue zoloft wellbutrin and valium      GERD  -continue PPI     Gout  -continue allopurinol      GO  -full code     MA/SANDEEP Reach  -Re- Entry: no  -Consults: renal   -Discharge Needs:home PT  -Appointments: [x ] PCP Ambreen Sandoval MD 2/29 at 12:40     EXAM:   GENERAL: no apparent distress, overweight  NEURO: A/A Ox3  RESP: non labored, CTA  CARDIO: Regular, no murmur  ABD: PD line   EXTREMITIES: no edema    Radiology:   XR CHEST AP PORTABLE   (CPT=71045)    Result Date: 2/24/2024  CONCLUSION:  1. Diffuse bilateral abnormality may represent edema and or pneumonia.    Dictated by (CST): Helio Min MD on 2/24/2024 at 9:15 PM     Finalized by (CST): Helio Min MD on 2/24/2024 at 9:20 PM           Discharge Instructions     Medication List        CHANGE how you take these medications      diazePAM 5 MG Tabs  Commonly known as: Valium  Take 1 tablet (5 mg total) by mouth 2 (two) times daily.  What changed: Another medication with the same name was removed. Continue taking this medication, and follow the directions you see here.            CONTINUE taking these medications      allopurinol 300 MG Tabs  Commonly known as: Zyloprim  TAKE ONE-HALF (1/2) TABLET DAILY     aspirin 81 MG Tbec     atorvastatin 80 MG Tabs  Commonly known as: Lipitor     Ally Contour Next Test Strp  TEST BLOOD GLUCOSE THREE TIMES DAILY     buPROPion  MG Tb24  Commonly known as: Wellbutrin XL  Take 1 tablet (300 mg total) by mouth daily.     Calcium Carbonate-Vitamin D 500-400 MG-UNIT Tabs  Commonly known as: OSCAL-500     carvedilol 12.5 MG Tabs  Commonly known as: Coreg     cloNIDine 0.1 MG Tabs  Commonly known as: Catapres     hydrALAZINE 100 MG Tabs  Commonly known as: Apresoline     HYDROcodone-acetaminophen 5-325 MG Tabs  Commonly known as: Norco  Take 1 tablet by mouth every 6 (six) hours as needed for Pain.     Insulin Pen Needle 29G X 12.7MM Misc  Commonly known as: BD Pen Needle Original U/F  USE AS DIRECTED DAILY     levothyroxine 125 MCG Tabs  Commonly known as: Synthroid  Take 1 tablet (125 mcg total) by mouth before breakfast.     pantoprazole 40 MG Tbec  Commonly known as: Protonix  TAKE 1 TABLET EVERY MORNING BEFORE BREAKFAST     sertraline 100 MG Tabs  Commonly known as: Zoloft  Take 2 tablets (200 mg total) by mouth daily.     ticagrelor 90 MG Tabs  Commonly known as: Brilinta     Toujeo SoloStar 300 UNIT/ML Sopn  Generic drug: Insulin Glargine (1 Unit  Dial)            ASK your doctor about these medications      amLODIPine 5 MG Tabs  Commonly known as: Norvasc     Polyethylene Glycol 3350 17 g Pack  Commonly known as: MiraLax  Take 17 g by mouth daily for 14 days.  Ask about: Should I take this medication?              Important follow up:   Follow-up Information       Ambreen Sandoval MD Follow up on 2/29/2024.    Specialty: Internal Medicine  Why: 2/29 at 12:40 pm  Contact information:  [address] [place] [address]  [address]  [facility] [address]  [phone]                             Disposition: home  Discharged Condition: stable  =========================================================================================================================    I Reconciled current and discharge medications on day of discharge  Patient had opportunity to ask questions and state understand and agree with therapeutic plan as outlined    Total Time Coordinating Care: greater than 30 minutes  Is this a shared or split note between Advanced Practice Provider and Physician? Yes    Note: This chart was prepared using voice recognition software and may contain unintended word substitution errors.     Alison Kaiser RN, NP   [facility] Hospitalist Team   2/27/2024      SEE ATTENDING NOTE BELOW          Patient seen and examined independently.  Discussed with APN and agree with note above    Patient improved.  Stable for discharge    GEN: NAD  RESP: CTAB  CV: RRR    Time of discharge >30 minutes    Taylor Maria DO         Electronically signed by Taylor Maria DO on 2/29/2024  8:02 AM         REVIEWER COMMENTS

## 2024-02-29 NOTE — CM/SW NOTE
LORA received call from Nitza from [facility] home health.    Nitza stated pt contacted them and would like to continue with home health services.    LORA explained pt had told her she was not current with any home health agencies and stated \"I do not need it\".    LORA re-sent referral to [facility] in [place].  LORA entered resumption of care order for RN/PT.  Nitza confirmed they can access [place] for clinicals.    [facility] Fax: [phone]    Stacie Trevino MSW, LSW [id number]

## 2024-05-31 ENCOUNTER — APPOINTMENT (OUTPATIENT)
Dept: CT IMAGING | Facility: HOSPITAL | Age: 69
End: 2024-05-31
Attending: EMERGENCY MEDICINE
Payer: MEDICARE

## 2024-05-31 ENCOUNTER — HOSPITAL ENCOUNTER (EMERGENCY)
Facility: HOSPITAL | Age: 69
Discharge: HOME | End: 2024-05-31
Attending: EMERGENCY MEDICINE
Payer: MEDICARE

## 2024-05-31 VITALS
DIASTOLIC BLOOD PRESSURE: 68 MMHG | SYSTOLIC BLOOD PRESSURE: 142 MMHG | WEIGHT: 171.75 LBS | BODY MASS INDEX: 30.43 KG/M2 | HEART RATE: 62 BPM | HEIGHT: 63 IN | RESPIRATION RATE: 20 BRPM | TEMPERATURE: 97 F | OXYGEN SATURATION: 100 %

## 2024-05-31 DIAGNOSIS — S01.81XA FACIAL LACERATION, INITIAL ENCOUNTER: ICD-10-CM

## 2024-05-31 DIAGNOSIS — S09.90XA INJURY OF HEAD, INITIAL ENCOUNTER: Primary | ICD-10-CM

## 2024-05-31 LAB — GLUCOSE BLDC GLUCOMTR-MCNC: 116 MG/DL (ref 70–99)

## 2024-05-31 PROCEDURE — 12011 RPR F/E/E/N/L/M 2.5 CM/<: CPT

## 2024-05-31 PROCEDURE — 99284 EMERGENCY DEPT VISIT MOD MDM: CPT

## 2024-05-31 PROCEDURE — 82962 GLUCOSE BLOOD TEST: CPT

## 2024-05-31 PROCEDURE — 70450 CT HEAD/BRAIN W/O DYE: CPT | Performed by: EMERGENCY MEDICINE

## 2024-05-31 PROCEDURE — 70486 CT MAXILLOFACIAL W/O DYE: CPT | Performed by: EMERGENCY MEDICINE

## 2024-05-31 PROCEDURE — 72125 CT NECK SPINE W/O DYE: CPT | Performed by: EMERGENCY MEDICINE

## 2024-05-31 RX ORDER — LIDOCAINE HCL/EPINEPHRINE/PF 2%-1:200K
20 VIAL (ML) INJECTION ONCE
Status: COMPLETED | OUTPATIENT
Start: 2024-05-31 | End: 2024-05-31

## 2024-05-31 NOTE — ED INITIAL ASSESSMENT (HPI)
Pt brought in by EMS for mechanical fall around 1300. Pt reports tripping on her feet and falling onto deck. Hit her face, laceration to right eyebrow, bleeding controlled. Denies LOC, no blood thinners. RR even and nonlabored, speaking in full sentences.

## 2024-05-31 NOTE — ED PROVIDER NOTES
Patient Seen in: Health system Emergency Department      History     Chief Complaint   Patient presents with    Fall     Stated Complaint:     Subjective:   HPI    Patient presents emergency department complaining of right-sided head pain.  She states that she tripped and fell and struck her head on the concrete.  She sustained a small laceration and has pain and swelling in her head, right side of her face and some mild discomfort in her neck.  She is up-to-date on tetanus vaccination.  There is no visual disturbance.  There is no loss of consciousness.  There is no vomiting.    Objective:   Past Medical History:    Adrenal adenoma    left adrenoma    Anxiety    Cataract    Chronic kidney disease (CKD), stage III (moderate) ([facility])    CORONARY ARTERY DISEASE    Coronary atherosclerosis    Depression    DIABETES    Dialysis patient ([facility])    Diarrhea    Disorder of thyroid    Diverticulosis of colon    Esophageal reflux    Fainting episodes    GERD    GOUT    Heart attack (HCC)    High blood pressure    High cholesterol    History of adverse reaction to anesthesia    delusions/hallucinations for a few days after knee replacement    History of blood transfusion    Quadruple Bypass    History of electroconvulsive therapy    HYPERTENSION    Hypothyroid    MENOPAUSE    Osteoarthritis    Other and unspecified hyperlipidemia    Personal history of other medical treatment    Transcranial Magnetic Stimulation (TMS)    Pneumonia, organism unspecified(486)    Polyp of colon    Renal artery stenosis (HCC)    right kidney    Renal disorder    Type II or unspecified type diabetes mellitus without mention of complication, not stated as uncontrolled    Unspecified essential hypertension    Unspecified sleep apnea    PSG Merit 2012 AHI 68, auto-pap     Visual impairment              Past Surgical History:   Procedure Laterality Date    Angioplasty (coronary)  3/2015    stents placed in heart    Cabg  6/1/2011    CABGx4 vessel     Cath angio  2023    Cath bare metal stent (bms)      Cath bare metal stent (bms)  2023    2 stents    Cath percutaneous  transluminal coronary angioplasty  3/3/2015    Cath percutaneous  transluminal coronary angioplasty Right 2017    right Coronary artery, OSMIN    Colonoscopy      Hc plmt coronary drug eluting stent ea addl  3/5/2015    Histopathology report  13    cecal polyps - 1 tubular adenoma    Impact tooth rem bony w/comp Bilateral 1973    wisdom teeth    Knee replacement surgery Bilateral 3/24/16    right TKA L TKA not at the same time    Left heart cath,percutaneous  2011    CAD    Left heart cath,percutaneous  3/2/2015          Renal angio, cardiac cath  2011    Sleep study, attended  12/10/2012    Tonsillectomy Bilateral 1958                Social History     Socioeconomic History    Marital status:     Number of children: 2    Years of education: 15   Occupational History    Occupation: [employer]     Comment: [employer]   Tobacco Use    Smoking status: Former     Current packs/day: 0.00     Average packs/day: 1 pack/day for 35.0 years (35.0 ttl pk-yrs)     Types: Cigarettes     Start date: 1979     Quit date: 2014     Years since quittin.6    Smokeless tobacco: Never   Vaping Use    Vaping status: Never Used   Substance and Sexual Activity    Alcohol use: Yes     Alcohol/week: 1.0 standard drink of alcohol     Types: 1 Glasses of wine per week     Comment: occasionally    Drug use: Not Currently     Comment: in her 20s    Sexual activity: Not Currently     Partners: Male   Other Topics Concern     Service No    Blood Transfusions No    Caffeine Concern Yes     Comment: coffee    Occupational Exposure No    Hobby Hazards No    Sleep Concern Yes     Comment: STEFFEN    Stress Concern No    Weight Concern No    Special Diet No    Back Care No    Exercise Yes    Bike Helmet Yes    Seat Belt Yes    Self-Exams Yes   Social History Narrative    Lives  alone.    Enjoys reading, pets, knitting, cooking     Social Determinants of Health     Food Insecurity: No Food Insecurity (2/25/2024)    Food Insecurity     Food Insecurity: Never true   Transportation Needs: No Transportation Needs (2/25/2024)    Transportation Needs     Lack of Transportation: No   Physical Activity: Inactive (6/14/2019)    Received from [employer] [employer]    Exercise Vital Sign     Days of Exercise per Week: 0 days     Minutes of Exercise per Session: 0 min   Housing Stability: Low Risk  (2/25/2024)    Housing Stability     Housing Instability: No              Review of Systems    Positive for stated complaint:   Other systems are as noted in HPI.  Constitutional and vital signs reviewed.      All other systems reviewed and negative except as noted above.    Physical Exam     ED Triage Vitals [05/31/24 1409]   /78   Pulse 64   Resp 16   Temp 98.7 °F (37.1 °C)   Temp src Temporal   SpO2 99 %   O2 Device None (Room air)       Current Vitals:   Vital Signs  BP: 142/68  Pulse: 62  Resp: 20  Temp: 98.7 °F (37.1 °C)  Temp src: Temporal    Oxygen Therapy  SpO2: 100 %  O2 Device: None (Room air)            Physical Exam  Vitals and nursing note reviewed.   Constitutional:       General: She is not in acute distress.     Appearance: She is well-developed.   HENT:      Head: Normocephalic.      Comments: There is tenderness to the right zygomatic arch and lateral right facial bones.     Nose: Nose normal.      Mouth/Throat:      Mouth: Mucous membranes are moist.   Eyes:      Conjunctiva/sclera: Conjunctivae normal.   Cardiovascular:      Rate and Rhythm: Normal rate and regular rhythm.      Heart sounds: No murmur heard.  Pulmonary:      Effort: Pulmonary effort is normal. No respiratory distress.      Breath sounds: Normal breath sounds.   Abdominal:      General: There is no distension.      Palpations: Abdomen is soft.      Tenderness: There is no abdominal  tenderness.   Musculoskeletal:         General: Normal range of motion.      Cervical back: Normal range of motion and neck supple. Tenderness present.   Skin:     Comments: There is a 1.5 cm laceration over the right temple   Neurological:      General: No focal deficit present.      Mental Status: She is alert and oriented to person, place, and time.      Cranial Nerves: No cranial nerve deficit.      Sensory: No sensory deficit.      Motor: No weakness.      Coordination: Coordination normal.      Deep Tendon Reflexes: Reflexes normal.               ED Course   Labs Reviewed - No data to display                   MDM                        Medical Decision Making  Differential diagnosis considered for concussion, contusion, fracture, intracranial hemorrhage, laceration.    Problems Addressed:  Facial laceration, initial encounter: acute illness or injury  Injury of head, initial encounter: acute illness or injury    Amount and/or Complexity of Data Reviewed  Radiology: ordered and independent interpretation performed. Decision-making details documented in ED Course.     Details: CT scan of the facial bones, cervical spine and head all show no evidence of fracture, dislocation or intracranial hemorrhage.  Discussion of management or test interpretation with external provider(s): Laceration Repair    Injury:  Body area: Right temple  Laceration length (cm):  2.5cm  Foreign bodies:  None  Tendon involvement:  None  Nerve involvement:  None  Vascular damage?  None    Anesthesia:   Local anesthetic:  Lidocaine 1%    Procedure:  Patient was prepped and draped in usual sterile fashion.    The wound was irrigated copiously with normal saline prior to closure.  Skin closure:  6-0 nylon #3    Patient tolerated the procedure well with no immediate complications.  Right temple        Disposition and Plan     Clinical Impression:  1. Injury of head, initial encounter    2. Facial laceration, initial encounter          Disposition:  Discharge  5/31/2024  4:44 pm    Follow-up:  Ambreen Sandoval MD  [address] [place] [address]  [address]  [facility] [address]  [phone]    Follow up in 1 week(s)  For suture removal    We recommend that you schedule follow up care with a primary care provider within the next three months to obtain basic health screening including reassessment of your blood pressure.      Medications Prescribed:  Current Discharge Medication List

## 2024-06-07 ENCOUNTER — HOSPITAL ENCOUNTER (OUTPATIENT)
Age: 69
Discharge: HOME | End: 2024-06-07
Payer: MEDICARE

## 2024-06-07 VITALS
SYSTOLIC BLOOD PRESSURE: 111 MMHG | TEMPERATURE: 99 F | RESPIRATION RATE: 18 BRPM | DIASTOLIC BLOOD PRESSURE: 61 MMHG | HEART RATE: 86 BPM | OXYGEN SATURATION: 99 %

## 2024-06-07 DIAGNOSIS — Z48.02 ENCOUNTER FOR REMOVAL OF SUTURES: Primary | ICD-10-CM

## 2024-06-07 PROCEDURE — 99024 POSTOP FOLLOW-UP VISIT: CPT | Performed by: NURSE PRACTITIONER

## 2024-06-07 NOTE — ED PROVIDER NOTES
Patient Seen in: [facility]    History   CC: suture removal   HPI: Cassy Patterson 68 year old female  who presents for suture removal to the right eyebrow placed 1 week ago per patient.  Denies complaints or complications associated.  States has been healing well.  Denies new injury/trauma.    Past Medical History:    Adrenal adenoma    left adrenoma    Anxiety    Cataract    Chronic kidney disease (CKD), stage III (moderate) (HCC)    CORONARY ARTERY DISEASE    Coronary atherosclerosis    Depression    DIABETES    Dialysis patient (HCC)    Diarrhea    Disorder of thyroid    Diverticulosis of colon    Esophageal reflux    Fainting episodes    GERD    GOUT    Heart attack (HCC)    High blood pressure    High cholesterol    History of adverse reaction to anesthesia    delusions/hallucinations for a few days after knee replacement    History of blood transfusion    Quadruple Bypass    History of electroconvulsive therapy    HYPERTENSION    Hypothyroid    MENOPAUSE    Osteoarthritis    Other and unspecified hyperlipidemia    Personal history of other medical treatment    Transcranial Magnetic Stimulation (TMS)    Pneumonia, organism unspecified(486)    Polyp of colon    Renal artery stenosis (HCC)    right kidney    Renal disorder    Type II or unspecified type diabetes mellitus without mention of complication, not stated as uncontrolled    Unspecified essential hypertension    Unspecified sleep apnea    PSG Merit 2012 AHI 68, auto-pap     Visual impairment       Past Surgical History:   Procedure Laterality Date    Angioplasty (coronary)  3/2015    stents placed in heart    Cabg  6/1/2011    CABGx4 vessel    Cath angio  09/21/2023    Cath bare metal stent (bms)      Cath bare metal stent (bms)  09/2023    2 stents    Cath percutaneous  transluminal coronary angioplasty  3/3/2015    Cath percutaneous  transluminal coronary angioplasty Right 09/19/2017    right Coronary artery, OSMIN    Colonoscopy      [facility]  coronary drug eluting stent ea addl  3/5/2015    Histopathology report  13    cecal polyps - 1 tubular adenoma    Impact tooth rem bony w/comp Bilateral 1973    wisdom teeth    Knee replacement surgery Bilateral 3/24/16    right TKA L TKA not at the same time    Left heart cath,percutaneous  2011    CAD    Left heart cath,percutaneous  3/2/2015          Renal angio, cardiac cath  2011    Sleep study, attended  12/10/2012    Tonsillectomy Bilateral 1958       Family History   Problem Relation Age of Onset    Cancer Father         prostate, liver    Hypertension Father     Obesity Father     Breast Cancer Father 70        age at dx 70    Heart Surgery Mother         Mitral valve replaced    Heart Disorder Mother         CHF    Hypertension Mother     Arthritis Mother         TKA    Breast Cancer Mother     Stroke Maternal Grandmother     Stroke Maternal Grandfather     Breast Cancer Paternal Grandmother 78        age at dx 78    Cancer Paternal Grandfather         lung    Obesity Brother     Diabetes Brother     Alcohol and Other Disorders Associated Son     Substance Abuse Son     Obesity Sister     Diabetes Sister     Traumatic brain injury Brother        Social History     Socioeconomic History    Marital status:     Number of children: 2    Years of education: 15   Occupational History    Occupation: [employer]     Comment: [employer]   Tobacco Use    Smoking status: Former     Current packs/day: 0.00     Average packs/day: 1 pack/day for 35.0 years (35.0 ttl pk-yrs)     Types: Cigarettes     Start date: 1979     Quit date: 2014     Years since quittin.7    Smokeless tobacco: Never   Vaping Use    Vaping status: Never Used   Substance and Sexual Activity    Alcohol use: Yes     Alcohol/week: 1.0 standard drink of alcohol     Types: 1 Glasses of wine per week     Comment: occasionally    Drug use: Not Currently     Comment: in her 20s    Sexual activity: Not Currently      Partners: Male   Other Topics Concern     Service No    Blood Transfusions No    Caffeine Concern Yes     Comment: coffee    Occupational Exposure No    Hobby Hazards No    Sleep Concern Yes     Comment: [employer]FFEN    Stress Concern No    Weight Concern No    Special Diet No    Back Care No    Exercise Yes    Bike Helmet Yes    Seat Belt Yes    Self-Exams Yes   Social History Narrative    Lives alone.    Enjoys reading, pets, knitting, cooking     Social Determinants of Health     Food Insecurity: No Food Insecurity (2/25/2024)    Food Insecurity     Food Insecurity: Never true   Transportation Needs: No Transportation Needs (2/25/2024)    Transportation Needs     Lack of Transportation: No   Physical Activity: Inactive (6/14/2019)    Received from [employer] [employer]    Exercise Vital Sign     Days of Exercise per Week: 0 days     Minutes of Exercise per Session: 0 min   Housing Stability: Low Risk  (2/25/2024)    Housing Stability     Housing Instability: No       ROS:  Review of Systems    Positive for stated complaint: suture removal  Other systems are as noted in HPI.  Constitutional and vital signs reviewed.      All other systems reviewed and negative except as noted above.    PSFH elements reviewed from today and agreed except as otherwise stated in HPI.             Constitutional and vital signs reviewed.        Physical Exam     ED Triage Vitals [06/07/24 1608]   /61   Pulse 86   Resp 18   Temp 98.9 °F (37.2 °C)   Temp src Temporal   SpO2 99 %   O2 Device None (Room air)       Current:/61   Pulse 86   Temp 98.9 °F (37.2 °C) (Temporal)   Resp 18   SpO2 99%         PE:  General - Appears well, non-toxic and in NAD  Head - +healing laceration noted to right lateral eyebrow with surrounding ecchymosis.   Skin - + healing laceration noted to the right lateral eyebrow with surrounding ecchymosis.  No surrounding erythema associated.  Not bleeding or draining.  Skin is  otherwise pink warm and dry throughout, mmm, cap refill <2seconds  Neuro - A&O x4, sensation equal to both medial and lateral aspects of extremities, steady gait  MSK - makes purposeful movements of all extremities  Psych - Interactive and appropriate      ED Course   Labs Reviewed - No data to display    MDM     Manage 3 simple interrupted sutures removed from right lateral eyebrow without complication.  Tolerated well.  Continued wound care for home reviewed, precautions reviewed, follow-up and return/ED precautions reviewed.  Patient is historian and demonstrates understanding of all instruction and agrees with plan of care.      Disposition and Plan     Clinical Impression:  1. Encounter for removal of sutures        Disposition:  Discharge    Follow-up:  Ambreen Sandoval MD  [address] [place] [address]  [address]  [facility] [address] [phone]    Go in 1 week  As needed      Medications Prescribed:  Current Discharge Medication List

## 2024-07-30 ENCOUNTER — HOSPITAL ENCOUNTER (EMERGENCY)
Facility: HOSPITAL | Age: 69
Discharge: HOME | End: 2024-07-30
Attending: EMERGENCY MEDICINE
Payer: MEDICARE

## 2024-07-30 ENCOUNTER — APPOINTMENT (OUTPATIENT)
Dept: CT IMAGING | Facility: HOSPITAL | Age: 69
End: 2024-07-30
Attending: EMERGENCY MEDICINE
Payer: MEDICARE

## 2024-07-30 VITALS
SYSTOLIC BLOOD PRESSURE: 169 MMHG | HEART RATE: 56 BPM | TEMPERATURE: 98 F | HEIGHT: 64 IN | OXYGEN SATURATION: 100 % | BODY MASS INDEX: 30.05 KG/M2 | WEIGHT: 176 LBS | DIASTOLIC BLOOD PRESSURE: 77 MMHG | RESPIRATION RATE: 14 BRPM

## 2024-07-30 DIAGNOSIS — I95.1 ORTHOSTATIC HYPOTENSION: Primary | ICD-10-CM

## 2024-07-30 LAB
ALBUMIN SERPL-MCNC: 3.9 G/DL (ref 3.2–4.8)
ALBUMIN/GLOB SERPL: 1.4 {RATIO} (ref 1–2)
ALP LIVER SERPL-CCNC: 156 U/L
ALT SERPL-CCNC: 11 U/L
ANION GAP SERPL CALC-SCNC: 12 MMOL/L (ref 0–18)
AST SERPL-CCNC: 16 U/L (ref ?–34)
BASOPHILS # BLD AUTO: 0.1 X10(3) UL (ref 0–0.2)
BASOPHILS NFR BLD AUTO: 0.9 %
BILIRUB SERPL-MCNC: 0.3 MG/DL (ref 0.2–1.1)
BUN BLD-MCNC: 37 MG/DL (ref 9–23)
BUN/CREAT SERPL: 7.6 (ref 10–20)
CALCIUM BLD-MCNC: 9.1 MG/DL (ref 8.7–10.4)
CHLORIDE SERPL-SCNC: 97 MMOL/L (ref 98–112)
CO2 SERPL-SCNC: 24 MMOL/L (ref 21–32)
CREAT BLD-MCNC: 4.89 MG/DL
DEPRECATED RDW RBC AUTO: 47.8 FL (ref 35.1–46.3)
EGFRCR SERPLBLD CKD-EPI 2021: 9 ML/MIN/1.73M2 (ref 60–?)
EOSINOPHIL # BLD AUTO: 0.22 X10(3) UL (ref 0–0.7)
EOSINOPHIL NFR BLD AUTO: 2 %
ERYTHROCYTE [DISTWIDTH] IN BLOOD BY AUTOMATED COUNT: 14 % (ref 11–15)
GLOBULIN PLAS-MCNC: 2.8 G/DL (ref 2–3.5)
GLUCOSE BLD-MCNC: 110 MG/DL (ref 70–99)
HCT VFR BLD AUTO: 36.1 %
HGB BLD-MCNC: 12.5 G/DL
IMM GRANULOCYTES # BLD AUTO: 0.05 X10(3) UL (ref 0–1)
IMM GRANULOCYTES NFR BLD: 0.4 %
LYMPHOCYTES # BLD AUTO: 4.07 X10(3) UL (ref 1–4)
LYMPHOCYTES NFR BLD AUTO: 36.2 %
MCH RBC QN AUTO: 32.6 PG (ref 26–34)
MCHC RBC AUTO-ENTMCNC: 34.6 G/DL (ref 31–37)
MCV RBC AUTO: 94 FL
MONOCYTES # BLD AUTO: 0.66 X10(3) UL (ref 0.1–1)
MONOCYTES NFR BLD AUTO: 5.9 %
NEUTROPHILS # BLD AUTO: 6.13 X10 (3) UL (ref 1.5–7.7)
NEUTROPHILS # BLD AUTO: 6.13 X10(3) UL (ref 1.5–7.7)
NEUTROPHILS NFR BLD AUTO: 54.6 %
OSMOLALITY SERPL CALC.SUM OF ELEC: 285 MOSM/KG (ref 275–295)
PLATELET # BLD AUTO: 307 10(3)UL (ref 150–450)
POTASSIUM SERPL-SCNC: 3.4 MMOL/L (ref 3.5–5.1)
PROT SERPL-MCNC: 6.7 G/DL (ref 5.7–8.2)
RBC # BLD AUTO: 3.84 X10(6)UL
SODIUM SERPL-SCNC: 133 MMOL/L (ref 136–145)
WBC # BLD AUTO: 11.2 X10(3) UL (ref 4–11)

## 2024-07-30 PROCEDURE — 99285 EMERGENCY DEPT VISIT HI MDM: CPT

## 2024-07-30 PROCEDURE — 36415 COLL VENOUS BLD VENIPUNCTURE: CPT

## 2024-07-30 PROCEDURE — 85025 COMPLETE CBC W/AUTO DIFF WBC: CPT | Performed by: EMERGENCY MEDICINE

## 2024-07-30 PROCEDURE — 93005 ELECTROCARDIOGRAM TRACING: CPT

## 2024-07-30 PROCEDURE — 80053 COMPREHEN METABOLIC PANEL: CPT | Performed by: EMERGENCY MEDICINE

## 2024-07-30 PROCEDURE — 90471 IMMUNIZATION ADMIN: CPT

## 2024-07-30 PROCEDURE — 70450 CT HEAD/BRAIN W/O DYE: CPT | Performed by: EMERGENCY MEDICINE

## 2024-07-30 PROCEDURE — 93010 ELECTROCARDIOGRAM REPORT: CPT

## 2024-07-30 NOTE — ED INITIAL ASSESSMENT (HPI)
Pt to ED via EMS w/ c/o fall that happened this afternoon. Pt states that she fell and hit the back of her head on the corner of a wall. Pt states she felt dizzy prior to fall. Denies LOC.   Small laceration noted to back of head - bleeding controlled.   On aspirin.   Peritoneal dialysis nightly per pt.

## 2024-07-30 NOTE — ED PROVIDER NOTES
Patient Seen in: [facility] Emergency Department    History     Chief Complaint   Patient presents with    Fall    Laceration/Abrasion       HPI    69-year-old female with a history of orthostatic hypotension who since last year has had multiple episodes of lightheadedness with standing up, reports this occurred earlier today when she fell during an episode of feeling lightheaded which made her felt slightly off balance and then she fell.  She denies any vision changes or diplopia.  She did strike the back of her head.  She denies any focal weakness or numbness or paresthesias.  No chest pain or dyspnea. Denies any dizziness current      History reviewed.   Past Medical History:    Adrenal adenoma    left adrenoma    Anxiety    Cataract    Chronic kidney disease (CKD), stage III (moderate) (Formerly [facility])    CORONARY ARTERY DISEASE    Coronary atherosclerosis    Depression    DIABETES    Dialysis patient (HCC)    Diarrhea    Disorder of thyroid    Diverticulosis of colon    Esophageal reflux    Fainting episodes    GERD    GOUT    Heart attack (HCC)    High blood pressure    High cholesterol    History of adverse reaction to anesthesia    delusions/hallucinations for a few days after knee replacement    History of blood transfusion    Quadruple Bypass    History of electroconvulsive therapy    HYPERTENSION    Hypothyroid    MENOPAUSE    Osteoarthritis    Other and unspecified hyperlipidemia    Personal history of other medical treatment    Transcranial Magnetic Stimulation (TMS)    Pneumonia, organism unspecified(486)    Polyp of colon    Renal artery stenosis (HCC)    right kidney    Renal disorder    Type II or unspecified type diabetes mellitus without mention of complication, not stated as uncontrolled    Unspecified essential hypertension    Unspecified sleep apnea    PSG Merit 2012 AHI 68, auto-pap     Visual impairment       History reviewed.   Past Surgical History:   Procedure Laterality Date    Angioplasty  (coronary)  3/2015    stents placed in heart    Cabg  2011    CABGx4 vessel    Cath angio  2023    Cath bare metal stent (bms)      Cath bare metal stent (bms)  2023    2 stents    Cath percutaneous  transluminal coronary angioplasty  3/3/2015    Cath percutaneous  transluminal coronary angioplasty Right 2017    right Coronary artery, OSMIN    Colonoscopy      Hc plmt coronary drug eluting stent ea addl  3/5/2015    Histopathology report  13    cecal polyps - 1 tubular adenoma    Impact tooth rem bony w/comp Bilateral 1973    wisdom teeth    Knee replacement surgery Bilateral 3/24/16    right TKA L TKA not at the same time    Left heart cath,percutaneous  2011    CAD    Left heart cath,percutaneous  3/2/2015          Renal angio, cardiac cath  2011    Sleep study, attended  12/10/2012    Tonsillectomy Bilateral          Medications :  (Not in a hospital admission)       Family History   Problem Relation Age of Onset    Cancer Father         prostate, liver    Hypertension Father     Obesity Father     Breast Cancer Father 70        age at dx 70    Heart Surgery Mother         Mitral valve replaced    Heart Disorder Mother         CHF    Hypertension Mother     Arthritis Mother         TKA    Breast Cancer Mother     Stroke Maternal Grandmother     Stroke Maternal Grandfather     Breast Cancer Paternal Grandmother 78        age at dx 78    Cancer Paternal Grandfather         lung    Obesity Brother     Diabetes Brother     Alcohol and Other Disorders Associated Son     Substance Abuse Son     Obesity Sister     Diabetes Sister     Traumatic brain injury Brother        Smoking Status:   Social History     Socioeconomic History    Marital status:     Number of children: 2    Years of education: 15   Occupational History    Occupation: [employer]     Comment: [employer]   Tobacco Use    Smoking status: Former     Current packs/day: 0.00     Average packs/day: 1 pack/day  for 35.0 years (35.0 ttl pk-yrs)     Types: Cigarettes     Start date: 1979     Quit date: 2014     Years since quittin.8    Smokeless tobacco: Never   Vaping Use    Vaping status: Never Used   Substance and Sexual Activity    Alcohol use: Yes     Alcohol/week: 1.0 standard drink of alcohol     Types: 1 Glasses of wine per week     Comment: occasionally    Drug use: Not Currently     Comment: in her 20s    Sexual activity: Not Currently     Partners: Male   Other Topics Concern     Service No    Blood Transfusions No    Caffeine Concern Yes     Comment: coffee    Occupational Exposure No    Hobby Hazards No    Sleep Concern Yes     Comment: STEFFEN    Stress Concern No    Weight Concern No    Special Diet No    Back Care No    Exercise Yes    Bike Helmet Yes    Seat Belt Yes    Self-Exams Yes       Constitutional and vital signs reviewed.      Social History and Family History elements reviewed from today, pertinent positives to the presenting problem noted.    Physical Exam     ED Triage Vitals [24 1654]   BP (!) 171/100   Pulse 61   Resp 18   Temp 97.6 °F (36.4 °C)   Temp src Temporal   SpO2 99 %   O2 Device None (Room air)       All measures to prevent infection transmission during my interaction with the patient were taken. The patient was already wearing a droplet mask on my arrival to the room. Personal protective equipment was worn throughout the duration of the exam.  Handwashing was performed prior to and after the exam.  Stethoscope and any equipment used during my examination was cleaned with super sani-cloth germicidal wipes following the exam.     Physical Exam    General: NAD  Head: Occipital abrasion  Mouth/Throat/Ears/Nose: Oropharynx is clear    Eyes: Conjunctivae and EOM are normal.   Neck: Normal range of motion. Supple.   Cardiovascular: Normal rate, regular rhythm, normal heart sounds.  Respiratory/Chest: Clear and equal bilaterally. Exhibits no  tenderness.  Gastrointestinal: Soft, non-tender, non-distended. Bowel sounds are normal.   Musculoskeletal:No swelling or deformity.   Neurological: Alert and appropriate. No focal deficits. AOx4  CN II-XII grossly intact  5/5 strength: Left  strength, deltoid abduction, achilles, patella  5/5 strength: Right  strength, deltoid abduction, achilles, patella   SILT to bilateral upper and lower extremities   normal gait . Normal FTN, HTS     Skin: Skin is warm and dry. No pallor.  Psychiatric: Has a normal mood and affect.      ED Course        Labs Reviewed   COMP METABOLIC PANEL (14) - Abnormal; Notable for the following components:       Result Value    Glucose 110 (*)     Sodium 133 (*)     Potassium 3.4 (*)     Chloride 97 (*)     BUN 37 (*)     Creatinine 4.89 (*)     BUN/CREA Ratio 7.6 (*)     eGFR-Cr 9 (*)     Alkaline Phosphatase 156 (*)     All other components within normal limits   CBC W/ DIFFERENTIAL - Abnormal; Notable for the following components:    WBC 11.2 (*)     RDW-SD 47.8 (*)     Lymphocyte Absolute 4.07 (*)     All other components within normal limits   CBC WITH DIFFERENTIAL WITH PLATELET    Narrative:     The following orders were created for panel order CBC With Differential With Platelet.                  Procedure                               Abnormality         Status                                     ---------                               -----------         ------                                     CBC W/ DIFFERENTIAL[[id number]]          Abnormal            Final result                                                 Please view results for these tests on the individual orders.   RAINBOW DRAW LAVENDER   RAINBOW DRAW LIGHT GREEN   RAINBOW DRAW BLUE     EKG    Rate, intervals and axes as noted on EKG Report.  Rate: 58  Rhythm: Sinus Rhythm  Reading: Sinus bradycardia.  No STEMI.  This is my interpretation.           As Interpreted by me    Imaging Results Available and Reviewed  while in ED: CT BRAIN OR HEAD (CPT=70450)    Result Date: 7/30/2024  CONCLUSION:  1.  Hematoma in the posterior right parietal scalp near the vertex.  No acute calvarial fracture.  No acute intracranial process. 2.  There are mild microvascular white matter ischemic changes, likely related to long-standing hypertension and/or diabetes. 3.  Atherosclerosis in the anterior and posterior circulations.  Dictated by (CST): Hollis Garcia MD on 7/30/2024 at 6:19 PM     Finalized by (CST): Hollis Garcia MD on 7/30/2024 at 6:20 PM         ED Medications Administered:   Medications   Tetanus-Diphth-Acell Pertussis (Tdap) (Boostrix) injection 0.5 mL (0.5 mL Intramuscular Given 7/30/24 1739)         MDM     Vitals:    07/30/24 1700 07/30/24 1730 07/30/24 1800 07/30/24 1830   BP: (!) 162/76 (!) 169/97 (!) 168/96 (!) 169/77   Pulse: 59 59 64 56   Resp: 11 14 21 14   Temp:       TempSrc:       SpO2: 99% 99% 100% 100%   Weight:       Height:         *I personally reviewed and interpreted all ED vitals.    Pulse Ox: 100%, Room air, Normal     Monitor Interpretation:   normal sinus rhythm as interpreted by me.  The cardiac monitor was ordered given fall.      Medical Decision Making      Differential Diagnosis/ Diagnostic Considerations: Intracranial hemorrhage, scalp abrasion, orthostatic hypotension    Complicating Factors: The patient already has end stage renal disease to contribute to the complexity of this ED evaluation.    I reviewed prior chart records including urgent care visit note from June 7, 2024.  Patient here after an episode of orthostatic lightheadedness resulting in fall, she has no neurologic deficits, CT head without intracranial hemorrhage on my interpretation.  We discussed very slowly standing up to avoid this from occurring however reported that she may need modification of her medications to further avoid this and recommended following up closely with nephrology, PCP and provided referral to neurology for  neurogenic orthostatic hypertension.  Tetanus updated    Discharged in stable condition.  Patient is comfortable with the plan.      Disposition and Plan     Clinical Impression:  1. Orthostatic hypotension        Disposition:  Discharge    Follow-up:  Ambreen Sandoval MD  [address] [place] [address]  [address]  [facility] [address] [phone]    Schedule an appointment as soon as possible for a visit in 1 day(s)      Sohail Galdamez,   [address] [facility] [address]  [facility] [id number] [phone]    Schedule an appointment as soon as possible for a visit in 1 day(s)        Medications Prescribed:  Current Discharge Medication List

## 2024-07-31 LAB
ATRIAL RATE: 58 BPM
P AXIS: 48 DEGREES
P-R INTERVAL: 122 MS
Q-T INTERVAL: 484 MS
QRS DURATION: 88 MS
QTC CALCULATION (BEZET): 475 MS
R AXIS: -17 DEGREES
T AXIS: 94 DEGREES
VENTRICULAR RATE: 58 BPM

## 2024-08-07 ENCOUNTER — OFFICE VISIT (OUTPATIENT)
Dept: NEUROLOGY | Facility: CLINIC | Age: 69
End: 2024-08-07
Payer: COMMERCIAL

## 2024-08-07 VITALS — HEART RATE: 78 BPM | SYSTOLIC BLOOD PRESSURE: 107 MMHG | OXYGEN SATURATION: 98 % | DIASTOLIC BLOOD PRESSURE: 67 MMHG

## 2024-08-07 DIAGNOSIS — I95.1 ORTHOSTATIC HYPOTENSION: Primary | ICD-10-CM

## 2024-08-07 PROCEDURE — 1159F MED LIST DOCD IN RCRD: CPT | Performed by: OTHER

## 2024-08-07 PROCEDURE — 3074F SYST BP LT 130 MM HG: CPT | Performed by: OTHER

## 2024-08-07 PROCEDURE — 99205 OFFICE O/P NEW HI 60 MIN: CPT | Performed by: OTHER

## 2024-08-07 PROCEDURE — 1160F RVW MEDS BY RX/DR IN RCRD: CPT | Performed by: OTHER

## 2024-08-07 PROCEDURE — 3078F DIAST BP <80 MM HG: CPT | Performed by: OTHER

## 2024-08-07 PROCEDURE — G2211 COMPLEX E/M VISIT ADD ON: HCPCS | Performed by: OTHER

## 2024-08-07 NOTE — PATIENT INSTRUCTIONS
Buy a pulse oximeter  Check your orthostatic blood pressure three times a day  You need to check it laying down, sitting up, and after 1 minute of standing. You need to document your pulse as well. When you are standing you can have something to support you like a cane.  You may have neurogenic orthostatic hypotension.  This means that when you stand up, your heart rate does not speed up enough to compensate for your drop in blood pressure.  Because of this, you do not get blood to your brain and you pass out.      Have to be careful, because this medication will make your blood pressure elevated when you lay down.  You should not take it within 5 hours of bedtime.  You should not rest or sleep in the supine position.  You need to lay with the head up position.    These are other things you can do to keep your blood pressure up:    Ask your nephrologist if this is okay --> Liberalization of water intake, up to 2.5 Liters per Day.  Recommend bolus water drinking (500 mL or 16 ounces) to raise your  blood pressure.  Bolus water drinking has a fast pressor effect and can increase the blood pressure within 5 to 10 minutes.  This only lasts for 30 to 45 minutes.  Physical activity with recumbent exercises for instance a stationary bike, rowing machine, or in a swimming pool.  Sleeping with the head of the bed raised to 30 to 45 degrees  Can consider an abdominal binder but these are cumbersome and difficult to use.  Can also consider a waist high compression stocking that produces at least 15mmHg to 20 mmHg of pressure.  Knee-high or thigh-high stockings are not useful.  Again these can be very cumbersome and difficult to use.  Ask your nephrologist --> If the patient has anemia of chronic disease then recommend treatment with EPO  Pharmacological measures include removing any aggravating medications including nitrates, tricyclic antidepressants, diuretics, calcium channel blockers, alpha blockers, phosphodiesterase 5  inhibitors, centrally acting alpha 2 agonists (clonidine or tizanidine) and beta-blockers.

## 2024-08-07 NOTE — PROGRESS NOTES
[facility]  [address], [address]  [facility] [id number] [phone]          [facility]  NEW PATIENT EVALUATION  Reason for Admission/Consultation:  frequent falls, suspected orthostatic hypotension    Requested by:  Ambreen Sandoval MD  [address]  [address]  [place] [address]    PCP: Ambreen Sandoval MD  Chief Complaint: Neurologic Problem (NPT /Referred by ER to be seen by neuro for frequent falls, pt states that this has been going on for a long time. Sporadic, not daily. Pt is afraid to do several ADL's and had a heart attack last fall and as a result began dialysis. )      HPI:  Cassy Patterson is a 69 year old  female w/ a pmhx of  ESRD on iHD, prior hx of smoking,  renal artery stenosis,  hx of MI s/p PCI, CABG, HTN,  HLD, left adrenal adenoma,  diabetes, depression, hypothyroidism,  diverticulosis, gerd,  and STEFFEN     who presents for  frequent falls and suspected orthostatic hypotension.     She typically faints soon after she stands up.  Some days she \"walks around just fine.\"  Happens \"inconsistently.\"    Notes her blood pressure can become elevated.  Discussed risk factors for neurogenic vs non-neurogenic orthostatic hypotension.     She has no neuropathic pain.  Sometimes her toes feel numb.      Review and summation of prior records      ROS:  Pertinent positive and negatives per HPI.  All others were reviewed and negative.    Past Medical History:    Adrenal adenoma    left adrenoma    Anxiety    Cataract    Chronic kidney disease (CKD), stage III (moderate) ([facility])    CORONARY ARTERY DISEASE    Coronary atherosclerosis    Depression    DIABETES    Dialysis patient (HCC)    Diarrhea    Disorder of thyroid    Diverticulosis of colon    Esophageal reflux    Fainting episodes    GERD    GOUT    Heart attack (HCC)    High blood pressure    High cholesterol    History of adverse reaction to anesthesia    delusions/hallucinations for a few days after  knee replacement    History of blood transfusion    Quadruple Bypass    History of electroconvulsive therapy    HYPERTENSION    Hypothyroid    MENOPAUSE    Osteoarthritis    Other and unspecified hyperlipidemia    Personal history of other medical treatment    Transcranial Magnetic Stimulation (TMS)    Pneumonia, organism unspecified(486)    Polyp of colon    Renal artery stenosis (HCC)    right kidney    Renal disorder    Type II or unspecified type diabetes mellitus without mention of complication, not stated as uncontrolled    Unspecified essential hypertension    Unspecified sleep apnea    PSG Merit 2012 AHI 70, auto-pap     Visual impairment         Current Outpatient Medications:     buPROPion  MG Oral Tablet 24 Hr, Take 1 tablet (300 mg total) by mouth daily., Disp: 90 tablet, Rfl: 0    sertraline 100 MG Oral Tab, Take 2 tablets (200 mg total) by mouth daily., Disp: 180 tablet, Rfl: 0    diazePAM 5 MG Oral Tab, Take 1 tablet (5 mg total) by mouth 2 (two) times daily., Disp: 60 tablet, Rfl: 2    atorvastatin 80 MG Oral Tab, Take 1 tablet (80 mg total) by mouth daily., Disp: , Rfl:     carvedilol 12.5 MG Oral Tab, Take 3 tablets (37.5 mg total) by mouth 2 (two) times daily with meals., Disp: , Rfl:     amLODIPine 5 MG Oral Tab, Take 0.5 tablets (2.5 mg total) by mouth nightly., Disp: , Rfl:     allopurinol 300 MG Oral Tab, TAKE ONE-HALF (1/2) TABLET DAILY, Disp: 45 tablet, Rfl: 4    Levothyroxine Sodium 125 MCG Oral Tab, Take 1 tablet (125 mcg total) by mouth before breakfast., Disp: 30 tablet, Rfl: 11    Pantoprazole Sodium 40 MG Oral Tab EC, TAKE 1 TABLET EVERY MORNING BEFORE BREAKFAST, Disp: 90 tablet, Rfl: 3    hydrALAZINE HCl 100 MG Oral Tab, Take 1 tablet (100 mg total) by mouth 2 (two) times daily. Takes as needed, depending on BP. 110 systolic do not take., Disp: , Rfl:     aspirin 81 MG Oral Tab EC, Take 1 tablet (81 mg total) by mouth nightly., Disp: 30 tablet, Rfl: 11    cloNIDine 0.1 MG Oral  Tab, Take 1 tablet (0.1 mg total) by mouth 2 (two) times daily. (Patient not taking: Reported on 8/7/2024), Disp: , Rfl:     HYDROcodone-acetaminophen 5-325 MG Oral Tab, Take 1 tablet by mouth every 6 (six) hours as needed for Pain. (Patient not taking: Reported on 8/7/2024), Disp: 30 tablet, Rfl: 0    Insulin Glargine, 1 Unit Dial, (TOUJEO SOLOSTAR) 300 UNIT/ML Subcutaneous Solution Pen-injector, Inject 22 Units into the skin every evening., Disp: , Rfl:     ticagrelor 90 MG Oral Tab, Take 1 tablet (90 mg total) by mouth every 12 (twelve) hours. (Patient not taking: Reported on 8/7/2024), Disp: , Rfl:     Insulin Pen Needle (BD PEN NEEDLE ORIGINAL U/F) 29G X 12.7MM Does not apply Misc, USE AS DIRECTED DAILY, Disp: 100 each, Rfl: 3    ERICKSON CONTOUR NEXT TEST In Vitro Strip, TEST BLOOD GLUCOSE THREE TIMES DAILY, Disp: 300 strip, Rfl: 4    Calcium Carbonate-Vitamin D (OSCAL-500) 500-400 MG-UNIT Oral Tab, Take 1 tablet by mouth daily. (Patient not taking: Reported on 8/7/2024), Disp: , Rfl:     Allergies   Allergen Reactions    Plavix [Clopidogrel] RASH    Ultram [Tramadol] FEVER and ANXIETY     Serotonin syndrome to scheduled tramadol in setting of use of SSRI    Sulfa Antibiotics HIVES and UNKNOWN       Past Surgical History:   Procedure Laterality Date    Angioplasty (coronary)  3/2015    stents placed in heart    Cabg  6/1/2011    CABGx4 vessel    Cath angio  09/21/2023    Cath bare metal stent (bms)      Cath bare metal stent (bms)  09/2023    2 stents    Cath percutaneous  transluminal coronary angioplasty  3/3/2015    Cath percutaneous  transluminal coronary angioplasty Right 09/19/2017    right Coronary artery, OSMIN    Colonoscopy      Hc plmt coronary drug eluting stent ea addl  3/5/2015    Histopathology report  12/18/13    cecal polyps - 1 tubular adenoma    Impact tooth rem bony w/comp Bilateral 1973    wisdom teeth    Knee replacement surgery Bilateral 3/24/16    right TKA L TKA not at the same time    Left  heart cath,percutaneous  2011    CAD    Left heart cath,percutaneous  3/2/2015          Renal angio, cardiac cath  2011    Sleep study, attended  12/10/2012    Tonsillectomy Bilateral 1958       Family History   Problem Relation Age of Onset    Cancer Father         prostate, liver    Hypertension Father     Obesity Father     Breast Cancer Father 70        age at dx 70    Heart Surgery Mother         Mitral valve replaced    Heart Disorder Mother         CHF    Hypertension Mother     Arthritis Mother         TKA    Breast Cancer Mother     Stroke Maternal Grandmother     Stroke Maternal Grandfather     Breast Cancer Paternal Grandmother 78        age at dx 78    Cancer Paternal Grandfather         lung    Obesity Brother     Diabetes Brother     Alcohol and Other Disorders Associated Son     Substance Abuse Son     Obesity Sister     Diabetes Sister     Traumatic brain injury Brother        Social history:  History   Smoking Status    Former    Types: Cigarettes   Smokeless Tobacco    Never     History   Alcohol Use    1.0 standard drink of alcohol/week    1 Glasses of wine per week     Comment: occasionally     History   Drug Use Unknown     Comment: in her 20s       Family History   Problem Relation Age of Onset    Cancer Father         prostate, liver    Hypertension Father     Obesity Father     Breast Cancer Father 70        age at dx 70    Heart Surgery Mother         Mitral valve replaced    Heart Disorder Mother         CHF    Hypertension Mother     Arthritis Mother         TKA    Breast Cancer Mother     Stroke Maternal Grandmother     Stroke Maternal Grandfather     Breast Cancer Paternal Grandmother 78        age at dx 78    Cancer Paternal Grandfather         lung    Obesity Brother     Diabetes Brother     Alcohol and Other Disorders Associated Son     Substance Abuse Son     Obesity Sister     Diabetes Sister     Traumatic brain injury Brother        Objective:  Vitals  Blood pressure  107/67, pulse 78, SpO2 98%, not currently breastfeeding.  /67 (BP Location: Right arm)   Pulse 78   SpO2 98%   There is no height or weight on file to calculate BMI.  Vitals:    08/07/24 1218 08/07/24 1232 08/07/24 1233   Ortho BP:   78/68   Patient Position: Sitting  Standing   BP Location: Right arm Right arm Right arm   Orthostatic Pulse:   78        sitting  88/56  P: 78      Sitting  107/67  P:78    Standing  78/68  P:78      78-78/107-78 = 0      Exam:  - General: appears stated age and no distress     - Pulmonary:   No signs of respiratory distress.    Neurologic Exam  - Mental Status: Alert and attentive. Oriented x4.  Speech is spontaneous, fluent, and prosodic. Comprehension and repetition intact. Phrase length and rate are normal. No paraphasic errors, neologisms, anomia, acalculia, apraxia, anosognosia, or R/L confusion.   - Cranial Nerves: No gaze preference. Visual fields:normal.  Pupils are equally round and reactive to light  in a well lit room. EOMI. No nystagmus. No ptosis. V1 to V3 intact to LT and temperature in all 3 branches. No pathological facial asymmetry. No flattening of the nasolabial fold. .  Hearing grossly intact.  Tongue midline. No atrophy or fasiculations of the tongue noted. Palate and uvula elevate symmetrically.  Shoulder shrug symmetric.     - Motor:  normal tone, normal bulk. No interosseous wasting. No flattening of hypothenar eminences.       Right Left     Motor Strength   Deltoids 5 5  Triceps 5 5  Biceps 5 5  Wrist Extensors 5 5   5 5   Hip Flexors 5 5          Pronator drift: No pronator drift   Arm Rolling: No orbiting.   Finger Taps: Finger taps are symmetric in rate and amplitude.    Rapid movements: Rapid/fine movements are symmetric. As expected their dominant hand is slightly faster.   Foot Taps: Foot taps are symmetric.      Asterixis: No asterixis noted.   Tremor:  minimal postural tremor       Reflexes:    C5 C6 C7  L4 S1   R 2+ 2+  2+ 2+   L 2+ 2+   2+ 2+   Adductor Spread: No adductor spread noted.    Frontal release signs:Not assessed.    Jaw Jerk:    Anderson's sign:absent   Nonsustained clonus: Absent   Sustained clonus: Absent   - Sensory:   Light touch: normal  Temperature: gradient loss in UE  Pinprick: normal  Vibration: normal     - Cerebellum: No truncal ataxia. No titubations. No dysmetria, no dysdiadochokinesis. No overshoot.        Data reviewed    Test results/Imaging:   Lab Results   Component Value Date    TSH 2.670 09/11/2023     Lab Results   Component Value Date    HDL 57 09/18/2023     (H) 09/18/2023    TRIG 131 09/18/2023     Lab Results   Component Value Date    HGB 12.5 07/30/2024    HCT 36.1 07/30/2024    MCV 94.0 07/30/2024    WBC 11.2 (H) 07/30/2024    .0 07/30/2024      Lab Results   Component Value Date    GLUCOSE 183 (H) 08/27/2015    BUN 37 (H) 07/30/2024    CA 9.1 07/30/2024    ALT 11 07/30/2024    AST 16 07/30/2024    ALB 3.9 07/30/2024     (L) 07/30/2024    K 3.4 (L) 07/30/2024    CL 97 (L) 07/30/2024    CO2 24.0 07/30/2024      I have reviewed labs.    Performed an independent visualization of:  head ct  Imaging revealed:   kanu Patterson is a 69 year old  female w/ a pmhx of  ESRD on iHD, prior hx of smoking,  renal artery stenosis,  hx of MI s/p PCI, CABG, HTN,  HLD, left adrenal adenoma,  diabetes, depression, hypothyroidism,  diverticulosis, gerd,  and STEFFEN     who presents for  frequent falls and suspected orthostatic hypotension.     Orthostatic hypotension can be neurogenic or nonneurogenic.  Neurogenic causes include Parkinson disease, small fiber/peripheral neuropathy, and other alpha-synucleinapathies such as pure autonomic failure.    Nonneurogenic causes include medications, anemia, loss of muscle bulk.  one way to distinguish between them is the change in heart rate over the change in blood pressure.    Because her pulse did not change at all, this may be neurogenic  orthostatic syncope.  Will discuss with her cardiologist. She may benefit from starting midodrine.  Will have her check her orthostatic blood pressure at home for a month.   Neurogenic orthostatic hypotension is a feature of neurologic disorders affecting sympathetic pathways, including diabetes mellitus, neurodegenerative synucleinopathies, and amyloid neuropathies.    Suspected neurogenic orthostatic syncope  Differential Diagnosis:  Non-neurogenic orthostatic syncope  Differential diagnosis for neurogenic orthostatic hypotension:  CNS synucleinopathy:   Parkinson disease  Dementia with Lewy bodies  Multiple system atrophy  Pure autonomic failure; if no other associated symptoms  Autoimmune autonomic neuropathy  Small fiber neuropathy  Small fiber neuropathy due to diabetes  Small fiber neuropathy due to amyloidosis  Paraneoplastic neuropathy or ganglionopathy   Immune mediated neuropathy or ganglionopathy   Hereditary transthyretin amyloidosis   Diagnostics:  Orthostatic vitals at home. She needs a pulse ox.    Therapeutics:  Consider midodrine. Will talk to cardiology.  For a  less robust pressor effect consider atomoxetine 18 mg  or pyridostigmine.  \" a low dose of atomoxetine has a robust pressor effect in patients with MSA. Pyridostigmine, a peripheral acetylcholinesterase inhibitor, provides a pressor effect enhancing nicotinic neurotransmission at the level of the sympathetic ganglia and can also help patients with constipation. The key challenge with all these therapies is that the durability of the medications wanes with time, so working with the patient and caregiver to adjust lifestyle and medications and manage acute events (especially urinary tract infections, which are prone to causing dramatic worsening in orthostatic hypotension symptoms) is important.\"  For his supine hypertension consider the following:  \" Elevating the head of the bed at night can help with this symptom and should be discussed  with the patient. Other methods to reduce supine hypertension include carbohydrates (eg, alcoholic drinks) right before bedtime, short-acting antihypertensives such as nifedipine, or even transdermal nitroglycerin ointment.\"  Because carbohydrate-rich meals trigger insulin, a potent vasodilator, patients with neurogenic orthostatic hypotension should reduce carbohydrate content, eat smaller and more frequent meals, and choose low glycemic index carbohydrates.  Bolus water drinking produces a marked, albeit short-lived, increase in blood pressure in patients with neurogenic orthostatic hypotension.    Nonpharmacological treatment for orthostatic hypotension:  Physical activity with recumbent exercises for instance a stationary bike, rowing machine, or in a swimming pool.  Sleeping with the head of the bed raised to 30 to 45 degrees  If the patient has anemia of chronic disease then recommend treatment with EPO.  Can consider an abdominal binder but these are cumbersome and difficult to use.  Can also consider a waist high compression stocking that produces at least 15mmHg to 20 mmHg of pressure.  Knee-high or thigh-high stockings are not useful.  Again these can be very cumbersome and difficult to use.  Pharmacological measures include removing any aggravating medications including:   nitrates   tricyclic antidepressants   diuretics   calcium channel blockers - she is on amlodpine 2.5mg  alpha blockers - she was on clonidine.   phosphodiesterase 5 inhibitors  centrally acting alpha 2 agonists (clonidine or tizanidine)  beta-blockers.  She is on carvedilol  37.5 bid    There are no diagnoses linked to this encounter.    No orders of the defined types were placed in this encounter.          Education/Instructions given to: patient   Barriers to Learning:None  Content: Refer to note above. Evaluation/Outcome: Verbalized understanding    This document is not intended to support charting by exception.  Sections left blank  in a completed note should be presumed not to have been done.    Education and counseling provided to patient. Instructed patient to call my office or seek medical attention immediately if symptoms worsen.  All questions were answered. All side effects of drugs were discussed.     Today, I personally spent 62 minutes reviewing the prior notes, reviewing any relevant and available imaging, and in direct face to face time with the patient, of which greater than 50% of the time was spent in patient education, counseling, and coordination of care as described above.   Issues discussed: Diagnosis and implications on future health, benefits and side effects of present and future medications, test results as well as further testing and medications required.    Return to clinic in: Return in about 1 month (around 9/7/2024).    Sohail Galdamez DO  [facility]   08/07/24  12:38 PM

## 2024-09-20 ENCOUNTER — OFFICE VISIT (OUTPATIENT)
Dept: NEUROLOGY | Facility: CLINIC | Age: 69
End: 2024-09-20
Payer: COMMERCIAL

## 2024-09-20 VITALS
RESPIRATION RATE: 16 BRPM | HEART RATE: 79 BPM | SYSTOLIC BLOOD PRESSURE: 113 MMHG | BODY MASS INDEX: 29.32 KG/M2 | OXYGEN SATURATION: 97 % | HEIGHT: 65 IN | DIASTOLIC BLOOD PRESSURE: 74 MMHG | WEIGHT: 176 LBS

## 2024-09-20 DIAGNOSIS — I95.1 ORTHOSTATIC HYPOTENSION: Primary | ICD-10-CM

## 2024-09-20 PROBLEM — J41.0 SMOKERS' COUGH (HCC): Chronic | Status: ACTIVE | Noted: 2024-09-20

## 2024-09-20 PROCEDURE — 1160F RVW MEDS BY RX/DR IN RCRD: CPT | Performed by: OTHER

## 2024-09-20 PROCEDURE — 3074F SYST BP LT 130 MM HG: CPT | Performed by: OTHER

## 2024-09-20 PROCEDURE — 1159F MED LIST DOCD IN RCRD: CPT | Performed by: OTHER

## 2024-09-20 PROCEDURE — 1125F AMNT PAIN NOTED PAIN PRSNT: CPT | Performed by: OTHER

## 2024-09-20 PROCEDURE — 99214 OFFICE O/P EST MOD 30 MIN: CPT | Performed by: OTHER

## 2024-09-20 PROCEDURE — 3008F BODY MASS INDEX DOCD: CPT | Performed by: OTHER

## 2024-09-20 PROCEDURE — 3078F DIAST BP <80 MM HG: CPT | Performed by: OTHER

## 2024-09-20 RX ORDER — POTASSIUM CHLORIDE 1500 MG/1
1 TABLET, EXTENDED RELEASE ORAL DAILY
COMMUNITY
Start: 2024-06-21

## 2024-09-20 RX ORDER — ATOMOXETINE 18 MG/1
18 CAPSULE ORAL DAILY
Qty: 60 CAPSULE | Refills: 0 | Status: SHIPPED | OUTPATIENT
Start: 2024-09-20 | End: 2024-11-19

## 2024-09-20 RX ORDER — AMLODIPINE BESYLATE 10 MG/1
10 TABLET ORAL DAILY
COMMUNITY
Start: 2024-09-05

## 2024-09-20 NOTE — PATIENT INSTRUCTIONS
You have neurogenic orthostatic hypotension.  This means that when you stand up, your heart rate does not speed up enough to compensate for your drop in blood pressure.  Because of this, you do not get blood to your brain and you pass out.  You have been started on a medication called midodrine.    Have to be careful, because this medication will make your blood pressure elevated when you lay down.  You should not take it within 5 hours of bedtime.  You should not rest or sleep in the supine position if your  systolic blood pressure  (the top number) is  greater than 180.  You need to lay with the head up position or at 30 degrees.    These are other things you can do to keep your blood pressure up:    Liberalization of water intake, up to 2.5 Liters per Day.  Recommend bolus water drinking (500 mL or 16 ounces) to raise your  blood pressure.  Bolus water drinking has a fast pressor effect and can increase the blood pressure within 5 to 10 minutes.  This only lasts for 30 to 45 minutes.  Physical activity with recumbent exercises for instance a stationary bike, rowing machine, or in a swimming pool.  Sleeping with the head of the bed raised to 30 to 45 degrees  Can consider an abdominal binder but these are cumbersome and difficult to use.  Can also consider a waist high compression stocking that produces at least 15mmHg to 20 mmHg of pressure.  Knee-high or thigh-high stockings are not useful.  Again these can be very cumbersome and difficult to use.  If the patient has anemia of chronic disease then recommend treatment with EPO  Pharmacological measures include removing any aggravating medications including nitrates, tricyclic antidepressants, diuretics, calcium channel blockers, alpha blockers, phosphodiesterase 5 inhibitors, centrally acting alpha 2 agonists (clonidine or tizanidine) and beta-blockers.

## 2024-09-20 NOTE — PROGRESS NOTES
[facility] [address], [address]  [facility] [address]  [phone]        Neurology Clinic Follow Up Note    Chief Complaint:  Neurologic Problem (LOV 8/7/2024 Orthostatic hypotension. Patient reports she had a fall and hit her head, CNA witness the fall (from being dizzy), EMT evaluated pt, pt refused to visit the ER. Patient denies nausea, no vomiting.  )      HPI:   Cassy Patterson is a 69 year old  female w/ a pmhx of  ESRD on iHD, prior hx of smoking,  renal artery stenosis,  hx of MI s/p PCI, CABG, HTN,  HLD, left adrenal adenoma,  diabetes, depression, hypothyroidism,  diverticulosis, gerd,  and STEFFEN     who presents for  frequent falls and suspected orthostatic hypotension.     Orthostatic hypotension can be neurogenic or nonneurogenic.  Neurogenic causes include Parkinson disease, small fiber/peripheral neuropathy, and other alpha-synucleinapathies such as pure autonomic failure.    Nonneurogenic causes include medications, anemia, loss of muscle bulk.  one way to distinguish between them is the change in heart rate over the change in blood pressure.    Because her pulse did not change at all, this may be neurogenic orthostatic syncope. . Considered  starting midodrine.    She checked her orthostatic blood pressure at home for a month.   Neurogenic orthostatic hypotension is a feature of neurologic disorders affecting sympathetic pathways, including diabetes mellitus, neurodegenerative synucleinopathies, and amyloid neuropathies.    09/20/24 Interval History/Subjective :   Here in follow up for othostasis.  She has fallen since lcv  She fell after she stood up.                79-42/125-84 =  37/41 = 0.9    87-70/150-72 =   17/78 = 0.21    82-73/194-92 =  9/102 = 0.08    8/29/24  107-73/196-104  34/92 = 0.36    107-79/188-92 =  28/96 = 0.29    8/30/2024  120-76/182-105 =   44/77 = 0.57    104-82/158-99 =  22/59 =0.37      98-83/174-108  15/66 = 0.22    88-74/199-111  14/88  = 0.15    94-68/145-113  26/32 = 0.8125    123-94/216-117  29/99 =0.29    97-85/148-87  12/61    112-90/209-124    85-75/143-85    96-74/173-97 =      ROS: Pertinent positive and negatives per HPI.  All others were reviewed and negative.     Medications:  Current Outpatient Medications   Medication Instructions    allopurinol 300 MG Oral Tab TAKE ONE-HALF (1/2) TABLET DAILY    amLODIPine (NORVASC) 2.5 mg, Oral, Nightly    amLODIPine (NORVASC) 10 mg, Oral, Daily    amoxicillin clavulanate 875-125 MG Oral Tab 1 tablet, Oral, 2 times daily    aspirin 81 mg, Oral, Nightly    atorvastatin (LIPITOR) 80 mg, Oral, Daily    ERICKSON CONTOUR NEXT TEST In Vitro Strip TEST BLOOD GLUCOSE THREE TIMES DAILY    buPROPion ER (WELLBUTRIN XL) 300 mg, Oral, Daily    Calcium Carbonate-Vitamin D (OSCAL-500) 500-400 MG-UNIT Oral Tab 1 tablet, Daily    carvedilol (COREG) 37.5 mg, Oral, 2 times daily with meals    cloNIDine (CATAPRES) 0.1 mg, 2 times daily    diazePAM (VALIUM) 5 mg, Oral, 2 times daily    hydrALAZINE (APRESOLINE) 100 mg, Oral, 2 times daily, Takes as needed, depending on BP. 110 systolic do not take.    HYDROcodone-acetaminophen 5-325 MG Oral Tab 1 tablet, Oral, Every 6 hours PRN    Insulin Pen Needle (BD PEN NEEDLE ORIGINAL U/F) 29G X 12.7MM Does not apply Misc USE AS DIRECTED DAILY    levothyroxine (SYNTHROID) 125 mcg, Oral, Before breakfast    Pantoprazole Sodium 40 MG Oral Tab EC TAKE 1 TABLET EVERY MORNING BEFORE BREAKFAST    Potassium Chloride ER 20 MEQ Oral Tab CR 1 tablet, Oral, Daily    sertraline (ZOLOFT) 200 mg, Oral, Daily    ticagrelor (BRILINTA) 90 mg, Every 12 hours    Toujeo SoloStar 22 Units, Subcutaneous, Every evening        Reviewed and assessed      Objective:  Last vitals and weight :  Body mass index is 29.29 kg/m².   Vitals:    09/20/24 1015 09/20/24 1023 09/20/24 1029 09/20/24 1035   Ortho BP:  88/58 84/62 138/79   Patient Position: Sitting Sitting Standing Lying right side   BP Location: Right arm  Left arm Left arm Left arm   Orthostatic Pulse:  84 80 67      Blood pressure 113/74, pulse 79, resp. rate 16, height 65\", weight 176 lb (79.8 kg), SpO2 97%, not currently breastfeeding.  /74 (BP Location: Right arm, Patient Position: Sitting, Cuff Size: adult)   Pulse 79   Resp 16   Ht 65\"   Wt 176 lb (79.8 kg)   SpO2 97%   BMI 29.29 kg/m²   Exam:   - General: appears stated age and no distress     - Pulmonary:   No signs of respiratory distress.    Neurologic Exam  - Mental Status: Alert and attentive. Oriented x4.  Speech is spontaneous, fluent, and prosodic. Comprehension and repetition intact. Phrase length and rate are normal. No paraphasic errors, neologisms, anomia, acalculia, apraxia, anosognosia, or R/L confusion.   - Cranial Nerves: No gaze preference. Visual fields:normal.  Pupils are equally round and reactive to light  in a well lit room. EOMI. No nystagmus. No ptosis. V1 to V3 intact to LT and temperature in all 3 branches. No pathological facial asymmetry. No flattening of the nasolabial fold. .  Hearing grossly intact.  Tongue midline. No atrophy or fasiculations of the tongue noted. Palate and uvula elevate symmetrically.  Shoulder shrug symmetric.     - Motor:  normal tone, normal bulk. No interosseous wasting. No flattening of hypothenar eminences.       Right Left     Motor Strength   Deltoids 5 5  Triceps 5 5  Biceps 5 5  Wrist Extensors 5 5   5 5   Hip Flexors 5 5          Pronator drift: No pronator drift   Arm Rolling: No orbiting.   Finger Taps: Finger taps are symmetric in rate and amplitude.    Rapid movements: Rapid/fine movements are symmetric. As expected their dominant hand is slightly faster.   Foot Taps: Foot taps are symmetric.      Asterixis: No asterixis noted.   Tremor:  minimal postural tremor       Reflexes:    C5 C6 C7  L4 S1   R 2+ 2+  2+ 2+   L 2+ 2+  2+ 2+   Adductor Spread: No adductor spread noted.    Frontal release signs:Not assessed.    Jaw Jerk:     Anderson's sign:absent   Nonsustained clonus: Absent   Sustained clonus: Absent   - Sensory:   Light touch: normal  Temperature: gradient loss in UE  Pinprick: normal  Vibration: normal     - Cerebellum: No truncal ataxia. No titubations. No dysmetria, no dysdiadochokinesis. No overshoot.     Most recent lab results:   Reviewed and assessed  No results found for this or any previous visit (from the past 36 hour(s)).    Diagnostic studies:  Performed an independent visualization of  ct head from 7/30/2024  Imaging revealed:   agree w read    Assessment     Considered low dose midodrine. Could also start atomoxetine. She is aware it will raise her SBP, particularly when she is supine.        Plan   1. Orthostatic hypotension  - atomoxetine 18 MG Oral Cap; Take 1 capsule (18 mg total) by mouth daily.  Dispense: 60 capsule; Refill: 0       less robust pressor effect consider atomoxetine 18 mg  or pyridostigmine.    \" a low dose of atomoxetine has a robust pressor effect in patients with MSA. Pyridostigmine, a peripheral acetylcholinesterase inhibitor, provides a pressor effect enhancing nicotinic neurotransmission at the level of the sympathetic ganglia and can also help patients with constipation. The key challenge with all these therapies is that the durability of the medications wanes with time, so working with the patient and caregiver to adjust lifestyle and medications and manage acute events (especially urinary tract infections, which are prone to causing dramatic worsening in orthostatic hypotension symptoms) is important.\"    For his supine hypertension consider the following:  \" Elevating the head of the bed at night can help with this symptom and should be discussed with the patient. Other methods to reduce supine hypertension include carbohydrates (eg, alcoholic drinks) right before bedtime, short-acting antihypertensives such as nifedipine, or even transdermal nitroglycerin ointment.\"           This  document is not intended to support charting by exception.  Sections left blank in a completed note should be presumed not to have been done.      Disclaimer:   This record was dictated using  Dragon software. There may be errors due to voice recognition problems that were not realized and corrected during the completion of the note.    Thank you for allowing me to participate in the care of your patient.    Sohail Galdamez DO  9/20/2024

## 2024-11-08 ENCOUNTER — APPOINTMENT (OUTPATIENT)
Dept: GENERAL RADIOLOGY | Facility: HOSPITAL | Age: 69
End: 2024-11-08
Attending: STUDENT IN AN ORGANIZED HEALTH CARE EDUCATION/TRAINING PROGRAM
Payer: MEDICARE

## 2024-11-08 ENCOUNTER — HOSPITAL ENCOUNTER (EMERGENCY)
Facility: HOSPITAL | Age: 69
Discharge: HOME | End: 2024-11-08
Attending: STUDENT IN AN ORGANIZED HEALTH CARE EDUCATION/TRAINING PROGRAM
Payer: MEDICARE

## 2024-11-08 VITALS
DIASTOLIC BLOOD PRESSURE: 65 MMHG | RESPIRATION RATE: 15 BRPM | OXYGEN SATURATION: 98 % | BODY MASS INDEX: 31.54 KG/M2 | HEART RATE: 69 BPM | HEIGHT: 63 IN | SYSTOLIC BLOOD PRESSURE: 131 MMHG | TEMPERATURE: 98 F | WEIGHT: 178 LBS

## 2024-11-08 DIAGNOSIS — S22.41XA CLOSED FRACTURE OF MULTIPLE RIBS OF RIGHT SIDE, INITIAL ENCOUNTER: Primary | ICD-10-CM

## 2024-11-08 PROCEDURE — 99284 EMERGENCY DEPT VISIT MOD MDM: CPT

## 2024-11-08 PROCEDURE — 71101 X-RAY EXAM UNILAT RIBS/CHEST: CPT | Performed by: STUDENT IN AN ORGANIZED HEALTH CARE EDUCATION/TRAINING PROGRAM

## 2024-11-08 RX ORDER — ACETAMINOPHEN 500 MG
1000 TABLET ORAL ONCE
Status: COMPLETED | OUTPATIENT
Start: 2024-11-08 | End: 2024-11-08

## 2024-11-08 NOTE — ED PROVIDER NOTES
Patient Seen in: [facility] Emergency Department      History     Chief Complaint   Patient presents with    Fall     Stated Complaint: fall    Subjective:   HPI      69-year-old female history of hypertension hyperlipidemia, diabetes depression ESRD CAD presenting for the evaluation of a fall.  Yesterday she was going to the bathroom when she was sitting on the toilet she missed the toilet seat and fell onto a zahraa basket.  She injured her right elbow and right chest wall.  This occurred last night.  She is endorsing pain in her right lateral chest wall.  No shortness of breath.  No hemoptysis.  Denies blood thinner use.  Walks with a walker at baseline due to history of recurrent orthostatic syncopal episodes.    Objective:     Past Medical History:    Adrenal adenoma    left adrenoma    Anxiety    Cataract    Chronic kidney disease (CKD), stage III (moderate) ([facility])    CORONARY ARTERY DISEASE    Coronary atherosclerosis    Depression    DIABETES    Dialysis patient (HCC)    Diarrhea    Disorder of thyroid    Diverticulosis of colon    Esophageal reflux    Fainting episodes    GERD    GOUT    Heart attack (HCC)    High blood pressure    High cholesterol    History of adverse reaction to anesthesia    delusions/hallucinations for a few days after knee replacement    History of blood transfusion    Quadruple Bypass    History of electroconvulsive therapy    HYPERTENSION    Hypothyroid    MENOPAUSE    Osteoarthritis    Other and unspecified hyperlipidemia    Personal history of other medical treatment    Transcranial Magnetic Stimulation (TMS)    Pneumonia, organism unspecified(486)    Polyp of colon    Renal artery stenosis (HCC)    right kidney    Renal disorder    Type II or unspecified type diabetes mellitus without mention of complication, not stated as uncontrolled    Unspecified essential hypertension    Unspecified sleep apnea    PSG Merit 2012 AHI 68, auto-pap     Visual impairment               Past Surgical History:   Procedure Laterality Date    Angioplasty (coronary)  3/2015    stents placed in heart    Cabg  2011    CABGx4 vessel    Cath angio  2023    Cath bare metal stent (bms)      Cath bare metal stent (bms)  2023    2 stents    Cath percutaneous  transluminal coronary angioplasty  3/3/2015    Cath percutaneous  transluminal coronary angioplasty Right 2017    right Coronary artery, OSMIN    Colonoscopy      Hc plmt coronary drug eluting stent ea addl  3/5/2015    Histopathology report  13    cecal polyps - 1 tubular adenoma    Impact tooth rem bony w/comp Bilateral 1973    wisdom teeth    Knee replacement surgery Bilateral 3/24/16    right TKA L TKA not at the same time    Left heart cath,percutaneous  2011    CAD    Left heart cath,percutaneous  3/2/2015          Renal angio, cardiac cath  2011    Sleep study, attended  12/10/2012    Tonsillectomy Bilateral                 Social History     Socioeconomic History    Marital status:     Number of children: 2    Years of education: 15   Occupational History    Occupation: [employer]     Comment: [employer]   Tobacco Use    Smoking status: Former     Current packs/day: 0.00     Average packs/day: 1 pack/day for 35.0 years (35.0 ttl pk-yrs)     Types: Cigarettes     Start date: 1979     Quit date: 2014     Years since quitting: 10.1    Smokeless tobacco: Never   Vaping Use    Vaping status: Never Used   Substance and Sexual Activity    Alcohol use: Yes     Alcohol/week: 1.0 standard drink of alcohol     Types: 1 Glasses of wine per week     Comment: occasionally    Drug use: Not Currently     Comment: in her 20s    Sexual activity: Not Currently     Partners: Male   Other Topics Concern     Service No    Blood Transfusions No    Caffeine Concern Yes     Comment: coffee    Occupational Exposure No    Hobby Hazards No    Sleep Concern Yes     Comment: STEFFEN    Stress Concern No     Weight Concern No    Special Diet No    Back Care No    Exercise Yes    Bike Helmet Yes    Seat Belt Yes    Self-Exams Yes   Social History Narrative    Lives alone.    Enjoys reading, pets, knitting, cooking     Social Drivers of Health     Food Insecurity: No Food Insecurity (2/25/2024)    Food Insecurity     Food Insecurity: Never true   Transportation Needs: No Transportation Needs (2/25/2024)    Transportation Needs     Lack of Transportation: No   Physical Activity: Inactive (6/14/2019)    [facility]    Exercise Vital Sign     Days of Exercise per Week: 0 days     Minutes of Exercise per Session: 0 min   Housing Stability: Low Risk  (2/25/2024)    Housing Stability     Housing Instability: No                  Physical Exam     ED Triage Vitals   BP 11/08/24 1503 143/69   Pulse 11/08/24 1505 72   Resp 11/08/24 1503 16   Temp 11/08/24 1503 97.8 °F (36.6 °C)   Temp src 11/08/24 1503 Oral   SpO2 11/08/24 1503 98 %   O2 Device 11/08/24 1503 None (Room air)       Current Vitals:   Vital Signs  BP: 131/65  Pulse: 69  Resp: 15  Temp: 97.8 °F (36.6 °C)  Temp src: Oral  MAP (mmHg): 84    Oxygen Therapy  SpO2: 98 %  O2 Device: None (Room air)        Physical Exam  Constitutional: awake, alert, no sig distress  HENT: mmm, no lesions,  Neck: normal range of motion, no tenderness, supple.  Eyes: PERRL, EOMI, conjunctiva normal, no discharge. Sclera anicteric.  Cardiovascular: rr no murmur  Respiratory: Normal breath sounds, no respiratory distress, no wheezing,+tenderness and ecchymosis right lateral chest wall, no paradoxical chest wall movement  GI: Bowel sounds normal, Soft, no tenderness, no masses, no pulsatile masses.  : No CVA tenderness.  Skin: Warm, dry, ecchymosis right elbow and right knee.  Musculoskeletal: Intact distal pulses, no edema, no tenderness, no cyanosis, no clubbing. Good range of motion in all major joints. No tenderness to palpation or major  deformities noted. Back- No tenderness.  Neurologic: Alert & oriented x 3, normal motor function, normal sensory function, no focal deficits noted.  Psych: Calm, cooperative, nl affect        ED Course   Labs Reviewed - No data to display    ED Course as of 11/08/24 2325  ------------------------------------------------------------  Time: 11/08 1814  Comment: Reviewed x-ray dependently which is remarkable for rib fractures right eighth and ninth ribs without evidence of associated pneumothorax or hemothorax  ------------------------------------------------------------  Time: 11/08 1814  Comment: Discussed return precautions and follow up instructions with patient who voiced understanding and agreement with the plan. All questions answered.                 MDM      69M hx as above presenting for a fall. On arrival vss, reassuring  Ddx: rib contusion, Rib Fx, PTX, SANDY  Given APAP, Lidocaine patch  Will reassess following CXR w/ rib views        Medical Decision Making      Disposition and Plan     Clinical Impression:  1. Closed fracture of multiple ribs of right side, initial encounter         Disposition:  Discharge  11/8/2024  6:15 pm    Follow-up:  Ambreen Sandoval MD  [address]  [address]  [facility] [address]  [phone]    Follow up in 2 day(s)      [facility] Emergency Department  [address] [address]  [phone]  Follow up  As needed, If symptoms worsen    We recommend that you schedule follow up care with a primary care provider within the next three months to obtain basic health screening including reassessment of your blood pressure.      Medications Prescribed:  Discharge Medication List as of 11/8/2024  6:17 PM              Supplementary Documentation:

## 2024-11-08 NOTE — ED INITIAL ASSESSMENT (HPI)
Pt reports mechanical fall yesterday. Pt missed the toilet and fell injurying her right rips that hit a basket. Pt denies hitting her head or loc.

## 2024-12-05 NOTE — PROGRESS NOTES
[facility] [address], [address]  [facility] [address]  [phone]        Neurology Clinic Follow Up Note    Chief Complaint:  Neurologic Problem (LOV 9/20/2024 Seen for Orthostatic hypotension. Patient here today 3 month follow up for medication management · atomoxetine. .Patient reports is feeling much better, dizziness has been much better./Denies numbness, tingling, dizziness or nausea.  )      HPI:   Cassy Patterson is a 69 year old  female w/ a pmhx of  ESRD on iHD, prior hx of smoking,  renal artery stenosis,  hx of MI s/p PCI, CABG, HTN,  HLD, left adrenal adenoma,  diabetes, depression, hypothyroidism,  diverticulosis, gerd,  and STEFFEN     who presents for  frequent falls and suspected orthostatic hypotension.     Orthostatic hypotension can be neurogenic or nonneurogenic.  Neurogenic causes include Parkinson disease, small fiber/peripheral neuropathy, and other alpha-synucleinapathies such as pure autonomic failure.    Nonneurogenic causes include medications, anemia, loss of muscle bulk.  one way to distinguish between them is the change in heart rate over the change in blood pressure.    Because her pulse did not change at all, this may be neurogenic orthostatic syncope. . Considered  starting midodrine.    She checked her orthostatic blood pressure at home for a month.   Neurogenic orthostatic hypotension is a feature of neurologic disorders affecting sympathetic pathways, including diabetes mellitus, neurodegenerative synucleinopathies, and amyloid neuropathies.    12/06/24 Interval History/Subjective :   Here in follow up for othostasis.  Here w/ her daughter-in-law.  She is doing better.  She still has orthostasis   Her SBP can peak to 190  Comes down to 150        09/20/24 Interval History/Subjective :   Here in follow up for othostasis.  She has fallen since lcv  She fell after she stood up.                79-42/125-84 =  37/41 = 0.9    87-70/150-72 =   17/78 =  0.21    82-73/194-92 =  9/102 = 0.08    8/29/24  107-73/196-104  34/92 = 0.36    107-79/188-92 =  28/96 = 0.29    8/30/2024  120-76/182-105 =   44/77 = 0.57    104-82/158-99 =  22/59 =0.37      98-83/174-108  15/66 = 0.22    88-74/199-111  14/88 = 0.15    94-68/145-113  26/32 = 0.8125    123-94/216-117  29/99 =0.29    97-85/148-87  12/61    112-90/209-124    85-75/143-85    96-74/173-97 =      ROS: Pertinent positive and negatives per HPI.  All others were reviewed and negative.     Medications:  Current Outpatient Medications   Medication Instructions    allopurinol 300 MG Oral Tab TAKE ONE-HALF (1/2) TABLET DAILY    amLODIPine (NORVASC) 2.5 mg, Oral, Nightly    amLODIPine (NORVASC) 10 mg, Oral, Daily    amoxicillin clavulanate 875-125 MG Oral Tab 1 tablet, Oral, 2 times daily    aspirin 81 mg, Oral, Nightly    atorvastatin (LIPITOR) 80 mg, Oral, Daily    ERICKSON CONTOUR NEXT TEST In Vitro Strip TEST BLOOD GLUCOSE THREE TIMES DAILY    buPROPion ER (WELLBUTRIN XL) 300 mg, Oral, Daily    Calcium Carbonate-Vitamin D (OSCAL-500) 500-400 MG-UNIT Oral Tab 1 tablet, Daily    carvedilol (COREG) 37.5 mg, Oral, 2 times daily with meals    cloNIDine (CATAPRES) 0.1 mg, 2 times daily    diazePAM (VALIUM) 5 mg, Oral, 2 times daily    hydrALAZINE (APRESOLINE) 100 mg, Oral, 2 times daily, Takes as needed, depending on BP. 110 systolic do not take.    HYDROcodone-acetaminophen 5-325 MG Oral Tab 1 tablet, Oral, Every 6 hours PRN    Insulin Pen Needle (BD PEN NEEDLE ORIGINAL U/F) 29G X 12.7MM Does not apply Misc USE AS DIRECTED DAILY    levothyroxine (SYNTHROID) 125 mcg, Oral, Before breakfast    Pantoprazole Sodium 40 MG Oral Tab EC TAKE 1 TABLET EVERY MORNING BEFORE BREAKFAST    Potassium Chloride ER 20 MEQ Oral Tab CR 1 tablet, Oral, Daily    sertraline (ZOLOFT) 200 mg, Oral, Daily    ticagrelor (BRILINTA) 90 mg, Every 12 hours    Toujeo SoloStar 22 Units, Subcutaneous, Every evening       Reviewed and assessed      Objective:  Last  vitals and weight :  There is no height or weight on file to calculate BMI.   There were no vitals filed for this visit.     not currently breastfeeding.  There were no vitals taken for this visit.  Exam:   - General: appears stated age and no distress     - Pulmonary:   No signs of respiratory distress.    Neurologic Exam  - Mental Status: Alert and attentive. Oriented x4.  Speech is spontaneous, fluent, and prosodic. Comprehension and repetition intact. Phrase length and rate are normal. No paraphasic errors, neologisms, anomia, acalculia, apraxia, anosognosia, or R/L confusion.   - Cranial Nerves: No gaze preference. Visual fields:normal.  Pupils are equally round and reactive to light  in a well lit room. EOMI. No nystagmus. No ptosis. V1 to V3 intact to LT and temperature in all 3 branches. No pathological facial asymmetry. No flattening of the nasolabial fold. .  Hearing grossly intact.  Tongue midline. No atrophy or fasiculations of the tongue noted. Palate and uvula elevate symmetrically.  Shoulder shrug symmetric.     - Motor:  normal tone, normal bulk. No interosseous wasting. No flattening of hypothenar eminences.       Right Left     Motor Strength   Deltoids 5 5  Triceps 5 5  Biceps 5 5  Wrist Extensors 5 5   5 5   Hip Flexors 5 5          Pronator drift: No pronator drift   Arm Rolling: No orbiting.   Finger Taps: Finger taps are symmetric in rate and amplitude.    Rapid movements: Rapid/fine movements are symmetric. As expected their dominant hand is slightly faster.   Foot Taps: Foot taps are symmetric.      Asterixis: No asterixis noted.   Tremor:  minimal postural tremor       Reflexes:    C5 C6 C7  L4 S1   R 2+ 2+  2+ 2+   L 2+ 2+  2+ 2+   Adductor Spread: No adductor spread noted.    Frontal release signs:Not assessed.    Jaw Jerk:    Anderson's sign:absent   Nonsustained clonus: Absent   Sustained clonus: Absent   - Sensory:   Light touch: normal  Temperature: gradient loss in UE        -  Cerebellum: No truncal ataxia. No titubations. No dysmetria, no dysdiadochokinesis. No overshoot.     Most recent lab results:   Reviewed and assessed  No results found for this or any previous visit (from the past 36 hours).    Diagnostic studies:  Performed an independent visualization of  ct head from 7/30/2024  Imaging revealed:   agree w read    Assessment     Will increase atomoxetine. She is aware it will raise her SBP, particularly when she is supine.  Will add nifedipine PRN. She would be on 2 calcium channel blockers, which can make constipation worse.         Plan   1. Orthostatic hypotension  - atomoxetine 25 MG Oral Cap; Take 1 capsule (25 mg total) by mouth daily.  Dispense: 90 capsule; Refill: 1  - NIFEdipine 10 MG Oral Cap; Take 1 capsule (10 mg total) by mouth 3 (three) times daily as needed (if supine systolic blood pressure  is greater than 150).  Dispense: 270 capsule; Refill: 0    2. Supine hypertension  - NIFEdipine 10 MG Oral Cap; Take 1 capsule (10 mg total) by mouth 3 (three) times daily as needed (if supine systolic blood pressure  is greater than 150).  Dispense: 270 capsule; Refill: 0         less robust pressor effect consider atomoxetine 18 mg  or pyridostigmine.    \" a low dose of atomoxetine has a robust pressor effect in patients with MSA. Pyridostigmine, a peripheral acetylcholinesterase inhibitor, provides a pressor effect enhancing nicotinic neurotransmission at the level of the sympathetic ganglia and can also help patients with constipation. The key challenge with all these therapies is that the durability of the medications wanes with time, so working with the patient and caregiver to adjust lifestyle and medications and manage acute events (especially urinary tract infections, which are prone to causing dramatic worsening in orthostatic hypotension symptoms) is important.\"    For his supine hypertension consider the following:  \" Elevating the head of the bed at night can help  with this symptom and should be discussed with the patient. Other methods to reduce supine hypertension include carbohydrates (eg, alcoholic drinks) right before bedtime, short-acting antihypertensives such as nifedipine, or even transdermal nitroglycerin ointment.\"           This document is not intended to support charting by exception.  Sections left blank in a completed note should be presumed not to have been done.      Disclaimer:   This record was dictated using  Dragon software. There may be errors due to voice recognition problems that were not realized and corrected during the completion of the note.    Thank you for allowing me to participate in the care of your patient.    Sohail Galdamez DO  12/06/24

## 2024-12-06 ENCOUNTER — OFFICE VISIT (OUTPATIENT)
Dept: NEUROLOGY | Facility: CLINIC | Age: 69
End: 2024-12-06
Payer: COMMERCIAL

## 2024-12-06 VITALS
OXYGEN SATURATION: 98 % | SYSTOLIC BLOOD PRESSURE: 101 MMHG | HEART RATE: 87 BPM | RESPIRATION RATE: 16 BRPM | DIASTOLIC BLOOD PRESSURE: 63 MMHG

## 2024-12-06 DIAGNOSIS — I95.1 ORTHOSTATIC HYPOTENSION: Primary | ICD-10-CM

## 2024-12-06 DIAGNOSIS — I10 SUPINE HYPERTENSION: ICD-10-CM

## 2024-12-06 PROBLEM — E46 PROTEIN-CALORIE MALNUTRITION (HCC): Status: ACTIVE | Noted: 2023-10-15

## 2024-12-06 PROBLEM — M21.611 BILATERAL BUNIONS: Status: ACTIVE | Noted: 2023-06-28

## 2024-12-06 PROBLEM — F32.A DEPRESSION, UNSPECIFIED: Status: ACTIVE | Noted: 2023-09-29

## 2024-12-06 PROBLEM — M10.30 GOUT DUE TO RENAL IMPAIRMENT, UNSPECIFIED SITE: Status: ACTIVE | Noted: 2023-10-09

## 2024-12-06 PROBLEM — N17.9 ACUTE KIDNEY FAILURE, UNSPECIFIED: Status: ACTIVE | Noted: 2023-09-29

## 2024-12-06 PROBLEM — E11.21 TYPE 2 DIABETES MELLITUS WITH DIABETIC NEPHROPATHY (HCC): Status: ACTIVE | Noted: 2023-09-29

## 2024-12-06 PROBLEM — I70.0 ATHEROSCLEROSIS OF AORTA (HCC): Status: ACTIVE | Noted: 2024-10-28

## 2024-12-06 PROBLEM — N18.4 CHRONIC KIDNEY DISEASE, STAGE 4 (SEVERE) (HCC): Status: ACTIVE | Noted: 2023-09-29

## 2024-12-06 PROBLEM — I25.10 ATHEROSCLEROTIC HEART DISEASE OF NATIVE CORONARY ARTERY WITHOUT ANGINA PECTORIS: Status: ACTIVE | Noted: 2023-10-09

## 2024-12-06 PROBLEM — I50.32 CHRONIC DIASTOLIC CHF (CONGESTIVE HEART FAILURE) (HCC): Status: ACTIVE | Noted: 2023-09-29

## 2024-12-06 PROBLEM — N17.9 ACUTE KIDNEY FAILURE, UNSPECIFIED (HCC): Status: ACTIVE | Noted: 2023-09-29

## 2024-12-06 PROBLEM — D63.8 ANEMIA IN OTHER CHRONIC DISEASES CLASSIFIED ELSEWHERE: Status: ACTIVE | Noted: 2023-10-15

## 2024-12-06 PROBLEM — R74.8 ABNORMAL LEVELS OF OTHER SERUM ENZYMES: Status: ACTIVE | Noted: 2023-09-29

## 2024-12-06 PROBLEM — M21.612 BILATERAL BUNIONS: Status: ACTIVE | Noted: 2023-06-28

## 2024-12-06 PROBLEM — M10.00 IDIOPATHIC GOUT, UNSPECIFIED SITE: Status: ACTIVE | Noted: 2023-09-29

## 2024-12-06 PROBLEM — Z99.2 HYPERTENSIVE HEART FAILURE WITH END STAGE RENAL DISEASE ON DIALYSIS (HCC): Status: ACTIVE | Noted: 2022-06-20

## 2024-12-06 PROBLEM — F41.9 ANXIETY DISORDER, UNSPECIFIED: Status: ACTIVE | Noted: 2023-09-29

## 2024-12-06 PROBLEM — T78.2XXA ANAPHYLACTIC SHOCK, UNSPECIFIED, INITIAL ENCOUNTER: Status: ACTIVE | Noted: 2023-10-15

## 2024-12-06 PROBLEM — I15.8 OTHER SECONDARY HYPERTENSION: Status: ACTIVE | Noted: 2023-10-15

## 2024-12-06 PROBLEM — I16.9 HYPERTENSIVE CRISIS, UNSPECIFIED: Status: ACTIVE | Noted: 2023-09-29

## 2024-12-06 PROBLEM — E66.9 OBESITY, UNSPECIFIED: Status: ACTIVE | Noted: 2023-10-09

## 2024-12-06 PROBLEM — F41.1 GENERALIZED ANXIETY DISORDER: Status: ACTIVE | Noted: 2023-10-09

## 2024-12-06 PROBLEM — K21.9 GASTRO-ESOPHAGEAL REFLUX DISEASE WITHOUT ESOPHAGITIS: Status: ACTIVE | Noted: 2023-09-29

## 2024-12-06 PROBLEM — Z99.2 ESRD (END STAGE RENAL DISEASE) ON DIALYSIS (HCC): Status: ACTIVE | Noted: 2023-10-15

## 2024-12-06 PROBLEM — R42 DYSEQUILIBRIUM: Status: ACTIVE | Noted: 2023-06-28

## 2024-12-06 PROBLEM — I21.4 NON-ST ELEVATION (NSTEMI) MYOCARDIAL INFARCTION (HCC): Status: ACTIVE | Noted: 2023-09-29

## 2024-12-06 PROBLEM — D68.9 COAGULATION DEFECT, UNSPECIFIED (HCC): Status: ACTIVE | Noted: 2023-10-15

## 2024-12-06 PROBLEM — E78.00 PURE HYPERCHOLESTEROLEMIA, UNSPECIFIED: Status: ACTIVE | Noted: 2023-10-09

## 2024-12-06 PROBLEM — Z99.2 DEPENDENCE ON RENAL DIALYSIS: Status: ACTIVE | Noted: 2023-09-29

## 2024-12-06 PROBLEM — F33.1 MAJOR DEPRESSIVE DISORDER, RECURRENT, MODERATE (HCC): Status: ACTIVE | Noted: 2023-10-09

## 2024-12-06 PROBLEM — E11.22 TYPE 2 DIABETES MELLITUS WITH END-STAGE RENAL DISEASE (HCC): Status: ACTIVE | Noted: 2022-06-20

## 2024-12-06 PROBLEM — T78.40XA ALLERGY, UNSPECIFIED, INITIAL ENCOUNTER: Status: ACTIVE | Noted: 2023-10-15

## 2024-12-06 PROBLEM — I70.90 UNSPECIFIED ATHEROSCLEROSIS: Status: ACTIVE | Noted: 2023-09-29

## 2024-12-06 PROBLEM — I70.0 ATHEROSCLEROSIS OF AORTA: Status: ACTIVE | Noted: 2024-10-28

## 2024-12-06 PROBLEM — R53.1 WEAKNESS: Status: ACTIVE | Noted: 2023-09-29

## 2024-12-06 PROBLEM — I13.2 HYPERTENSIVE HEART FAILURE WITH END STAGE RENAL DISEASE ON DIALYSIS (HCC): Status: ACTIVE | Noted: 2022-06-20

## 2024-12-06 PROBLEM — Z99.2 DEPENDENCE ON RENAL DIALYSIS (HCC): Status: ACTIVE | Noted: 2023-09-29

## 2024-12-06 PROBLEM — E05.90 THYROTOXICOSIS, UNSPECIFIED WITHOUT THYROTOXIC CRISIS OR STORM: Status: ACTIVE | Noted: 2023-11-02

## 2024-12-06 PROBLEM — F33.41 RECURRENT MAJOR DEPRESSIVE DISORDER IN PARTIAL REMISSION: Status: ACTIVE | Noted: 2022-06-20

## 2024-12-06 PROBLEM — I13.0 HYPERTENSIVE HEART AND CHRONIC KIDNEY DISEASE WITH HEART FAILURE AND STAGE 1 THROUGH STAGE 4 CHRONIC KIDNEY DISEASE, OR UNSPECIFIED CHRONIC KIDNEY DISEASE (HCC): Status: ACTIVE | Noted: 2023-10-09

## 2024-12-06 PROBLEM — E03.9 HYPOTHYROIDISM, UNSPECIFIED: Status: ACTIVE | Noted: 2023-09-29

## 2024-12-06 PROBLEM — J96.90 RESPIRATORY FAILURE (HCC): Status: ACTIVE | Noted: 2023-09-29

## 2024-12-06 PROBLEM — R06.02 SHORTNESS OF BREATH: Status: ACTIVE | Noted: 2023-10-15

## 2024-12-06 PROBLEM — I46.9 CARDIAC ARREST, CAUSE UNSPECIFIED (HCC): Status: ACTIVE | Noted: 2023-09-29

## 2024-12-06 PROBLEM — N18.6 HYPERTENSIVE HEART FAILURE WITH END STAGE RENAL DISEASE ON DIALYSIS (HCC): Status: ACTIVE | Noted: 2022-06-20

## 2024-12-06 PROBLEM — N18.6 TYPE 2 DIABETES MELLITUS WITH END-STAGE RENAL DISEASE (HCC): Status: ACTIVE | Noted: 2022-06-20

## 2024-12-06 PROBLEM — N25.81 SECONDARY HYPERPARATHYROIDISM OF RENAL ORIGIN (HCC): Status: ACTIVE | Noted: 2023-10-15

## 2024-12-06 PROBLEM — E87.8 OTHER DISORDERS OF ELECTROLYTE AND FLUID BALANCE, NOT ELSEWHERE CLASSIFIED: Status: ACTIVE | Noted: 2023-10-15

## 2024-12-06 PROBLEM — R77.0 ABNORMALITY OF ALBUMIN: Status: ACTIVE | Noted: 2023-09-29

## 2024-12-06 PROBLEM — D50.9 IRON DEFICIENCY ANEMIA, UNSPECIFIED: Status: ACTIVE | Noted: 2023-10-15

## 2024-12-06 PROBLEM — F33.41 RECURRENT MAJOR DEPRESSIVE DISORDER IN PARTIAL REMISSION (HCC): Status: ACTIVE | Noted: 2022-06-20

## 2024-12-06 PROBLEM — N81.11 CYSTOCELE, MIDLINE: Status: ACTIVE | Noted: 2022-06-24

## 2024-12-06 PROBLEM — N18.6 ESRD (END STAGE RENAL DISEASE) ON DIALYSIS (HCC): Status: ACTIVE | Noted: 2023-10-15

## 2024-12-06 PROBLEM — E87.6 HYPOKALEMIA: Status: ACTIVE | Noted: 2023-12-13

## 2024-12-06 PROCEDURE — 3078F DIAST BP <80 MM HG: CPT | Performed by: OTHER

## 2024-12-06 PROCEDURE — 99214 OFFICE O/P EST MOD 30 MIN: CPT | Performed by: OTHER

## 2024-12-06 PROCEDURE — 3074F SYST BP LT 130 MM HG: CPT | Performed by: OTHER

## 2024-12-06 PROCEDURE — 1159F MED LIST DOCD IN RCRD: CPT | Performed by: OTHER

## 2024-12-06 PROCEDURE — 1160F RVW MEDS BY RX/DR IN RCRD: CPT | Performed by: OTHER

## 2024-12-06 RX ORDER — ATOMOXETINE 25 MG/1
25 CAPSULE ORAL DAILY
Qty: 90 CAPSULE | Refills: 1 | Status: SHIPPED | OUTPATIENT
Start: 2024-12-06 | End: 2025-06-04

## 2024-12-06 RX ORDER — NIFEDIPINE 10 MG/1
10 CAPSULE ORAL 3 TIMES DAILY PRN
Qty: 270 CAPSULE | Refills: 0 | Status: SHIPPED | OUTPATIENT
Start: 2024-12-06 | End: 2025-03-06

## 2025-02-05 ENCOUNTER — APPOINTMENT (OUTPATIENT)
Dept: CT IMAGING | Facility: HOSPITAL | Age: 70
End: 2025-02-05
Payer: MEDICARE

## 2025-02-05 ENCOUNTER — HOSPITAL ENCOUNTER (EMERGENCY)
Facility: HOSPITAL | Age: 70
Discharge: HOME | End: 2025-02-05
Payer: MEDICARE

## 2025-02-05 VITALS
RESPIRATION RATE: 18 BRPM | HEART RATE: 85 BPM | SYSTOLIC BLOOD PRESSURE: 166 MMHG | TEMPERATURE: 98 F | DIASTOLIC BLOOD PRESSURE: 95 MMHG | OXYGEN SATURATION: 92 %

## 2025-02-05 DIAGNOSIS — S01.81XA FACIAL LACERATION, INITIAL ENCOUNTER: Primary | ICD-10-CM

## 2025-02-05 DIAGNOSIS — R55 SYNCOPE AND COLLAPSE: ICD-10-CM

## 2025-02-05 DIAGNOSIS — S09.90XA INJURY OF HEAD, INITIAL ENCOUNTER: ICD-10-CM

## 2025-02-05 LAB
ANION GAP SERPL CALC-SCNC: 11 MMOL/L (ref 0–18)
ATRIAL RATE: 67 BPM
BASOPHILS # BLD AUTO: 0.07 X10(3) UL (ref 0–0.2)
BASOPHILS NFR BLD AUTO: 0.7 %
BUN BLD-MCNC: 31 MG/DL (ref 9–23)
BUN/CREAT SERPL: 7.2 (ref 10–20)
CALCIUM BLD-MCNC: 9.1 MG/DL (ref 8.7–10.4)
CHLORIDE SERPL-SCNC: 98 MMOL/L (ref 98–112)
CO2 SERPL-SCNC: 25 MMOL/L (ref 21–32)
CREAT BLD-MCNC: 4.3 MG/DL
DEPRECATED RDW RBC AUTO: 47.5 FL (ref 35.1–46.3)
EGFRCR SERPLBLD CKD-EPI 2021: 10 ML/MIN/1.73M2 (ref 60–?)
EOSINOPHIL # BLD AUTO: 0.15 X10(3) UL (ref 0–0.7)
EOSINOPHIL NFR BLD AUTO: 1.4 %
ERYTHROCYTE [DISTWIDTH] IN BLOOD BY AUTOMATED COUNT: 13.4 % (ref 11–15)
GLUCOSE BLD-MCNC: 137 MG/DL (ref 70–99)
HCT VFR BLD AUTO: 37.6 %
HGB BLD-MCNC: 13.1 G/DL
IMM GRANULOCYTES # BLD AUTO: 0.06 X10(3) UL (ref 0–1)
IMM GRANULOCYTES NFR BLD: 0.6 %
LYMPHOCYTES # BLD AUTO: 2.57 X10(3) UL (ref 1–4)
LYMPHOCYTES NFR BLD AUTO: 24.6 %
MCH RBC QN AUTO: 33.7 PG (ref 26–34)
MCHC RBC AUTO-ENTMCNC: 34.8 G/DL (ref 31–37)
MCV RBC AUTO: 96.7 FL
MONOCYTES # BLD AUTO: 0.5 X10(3) UL (ref 0.1–1)
MONOCYTES NFR BLD AUTO: 4.8 %
NEUTROPHILS # BLD AUTO: 7.1 X10 (3) UL (ref 1.5–7.7)
NEUTROPHILS # BLD AUTO: 7.1 X10(3) UL (ref 1.5–7.7)
NEUTROPHILS NFR BLD AUTO: 67.9 %
OSMOLALITY SERPL CALC.SUM OF ELEC: 287 MOSM/KG (ref 275–295)
P AXIS: 56 DEGREES
P-R INTERVAL: 134 MS
PLATELET # BLD AUTO: 278 10(3)UL (ref 150–450)
POTASSIUM SERPL-SCNC: 4.2 MMOL/L (ref 3.5–5.1)
Q-T INTERVAL: 442 MS
QRS DURATION: 80 MS
QTC CALCULATION (BEZET): 467 MS
R AXIS: -10 DEGREES
RBC # BLD AUTO: 3.89 X10(6)UL
SODIUM SERPL-SCNC: 134 MMOL/L (ref 136–145)
T AXIS: 32 DEGREES
TROPONIN I SERPL HS-MCNC: 14 NG/L
VENTRICULAR RATE: 67 BPM
WBC # BLD AUTO: 10.5 X10(3) UL (ref 4–11)

## 2025-02-05 PROCEDURE — 70450 CT HEAD/BRAIN W/O DYE: CPT | Performed by: NURSE PRACTITIONER

## 2025-02-05 PROCEDURE — 99284 EMERGENCY DEPT VISIT MOD MDM: CPT

## 2025-02-05 PROCEDURE — 36415 COLL VENOUS BLD VENIPUNCTURE: CPT

## 2025-02-05 PROCEDURE — 93005 ELECTROCARDIOGRAM TRACING: CPT

## 2025-02-05 PROCEDURE — 93010 ELECTROCARDIOGRAM REPORT: CPT

## 2025-02-05 PROCEDURE — 85025 COMPLETE CBC W/AUTO DIFF WBC: CPT | Performed by: NURSE PRACTITIONER

## 2025-02-05 PROCEDURE — 99285 EMERGENCY DEPT VISIT HI MDM: CPT

## 2025-02-05 PROCEDURE — 80048 BASIC METABOLIC PNL TOTAL CA: CPT | Performed by: NURSE PRACTITIONER

## 2025-02-05 PROCEDURE — 84484 ASSAY OF TROPONIN QUANT: CPT | Performed by: NURSE PRACTITIONER

## 2025-02-05 NOTE — ED INITIAL ASSESSMENT (HPI)
Pt to ED via EMS to triage c/o fall PTA. States she was finishing in the shower when she reached for a towel and got dizzy and fell. Hit head on side of bathtub-- wound noted to right anterior head. Pt states she did lose consciousness for about 5-10 seconds. Daily ASA. Pt A&Ox4

## 2025-02-05 NOTE — ED PROVIDER NOTES
Patient Seen in: [facility] Emergency Department      History     Chief Complaint   Patient presents with    Fall     Stated Complaint:     Subjective:   68yo/f w hx of orthostatic hypotension, anxiety, ESRD, CAD, GERD reports to the ED w c/o a syncopal episode and head injury. Patient recently left the shower, was putting towel on the towel rack. It fell, she bent over and felt dizzy. Syncopized, struck her head. Transient. Hx of the same from orthostatic hypotension. No chest pain or dyspnea. Sees Dr. Reed              Objective:     Past Medical History:    Adrenal adenoma    left adrenoma    Anxiety    Cataract    Chronic kidney disease (CKD), stage III (moderate) (HCC)    CORONARY ARTERY DISEASE    Coronary atherosclerosis    Depression    DIABETES    Dialysis patient    Diarrhea    Disorder of thyroid    Diverticulosis of colon    Esophageal reflux    Fainting episodes    GERD    GOUT    Heart attack (HCC)    High blood pressure    High cholesterol    History of adverse reaction to anesthesia    delusions/hallucinations for a few days after knee replacement    History of blood transfusion    Quadruple Bypass    History of electroconvulsive therapy    HYPERTENSION    Hypothyroid    MENOPAUSE    Osteoarthritis    Other and unspecified hyperlipidemia    Personal history of other medical treatment    Transcranial Magnetic Stimulation (TMS)    Pneumonia, organism unspecified(486)    Polyp of colon    Renal artery stenosis    right kidney    Renal disorder    Type II or unspecified type diabetes mellitus without mention of complication, not stated as uncontrolled    Unspecified essential hypertension    Unspecified sleep apnea    PSG Merit 2012 AHI 68, auto-pap     Visual impairment              Past Surgical History:   Procedure Laterality Date    Angioplasty (coronary)  3/2015    stents placed in heart    Cabg  6/1/2011    CABGx4 vessel    Cath angio  09/21/2023    Cath bare metal stent (bms)       Cath bare metal stent (bms)  2023    2 stents    Cath percutaneous  transluminal coronary angioplasty  3/3/2015    Cath percutaneous  transluminal coronary angioplasty Right 2017    right Coronary artery, OSMIN    Colonoscopy      Hc plmt coronary drug eluting stent ea addl  3/5/2015    Histopathology report  13    cecal polyps - 1 tubular adenoma    Impact tooth rem bony w/comp Bilateral 1973    wisdom teeth    Knee replacement surgery Bilateral 3/24/16    right TKA L TKA not at the same time    Left heart cath,percutaneous  2011    CAD    Left heart cath,percutaneous  3/2/2015          Renal angio, cardiac cath  2011    Sleep study, attended  12/10/2012    Tonsillectomy Bilateral 195                Social History     Socioeconomic History    Marital status:     Number of children: 2    Years of education: 15   Occupational History    Occupation: [employer]     Comment: [employer]   Tobacco Use    Smoking status: Former     Current packs/day: 0.00     Average packs/day: 1 pack/day for 35.0 years (35.0 ttl pk-yrs)     Types: Cigarettes     Start date: 1979     Quit date: 2014     Years since quitting: 10.3    Smokeless tobacco: Never   Vaping Use    Vaping status: Never Used   Substance and Sexual Activity    Alcohol use: Yes     Alcohol/week: 1.0 standard drink of alcohol     Types: 1 Glasses of wine per week     Comment: occasionally    Drug use: Not Currently     Comment: in her 20s    Sexual activity: Not Currently     Partners: Male   Other Topics Concern     Service No    Blood Transfusions No    Caffeine Concern Yes     Comment: coffee    Occupational Exposure No    Hobby Hazards No    Sleep Concern Yes     Comment: STEFFEN    Stress Concern No    Weight Concern No    Special Diet No    Back Care No    Exercise Yes    Bike Helmet Yes    Seat Belt Yes    Self-Exams Yes   Social History Narrative    Lives alone.    Enjoys reading, pets, knitting, cooking      Social Drivers of Health     Food Insecurity: No Food Insecurity (2/25/2024)    Food Insecurity     Food Insecurity: Never true   Transportation Needs: No Transportation Needs (2/25/2024)    Transportation Needs     Lack of Transportation: No   Housing Stability: Low Risk  (2/25/2024)    Housing Stability     Housing Instability: No                  Physical Exam     ED Triage Vitals [02/05/25 1216]   BP (!) 180/97   Pulse 66   Resp 20   Temp 97.8 °F (36.6 °C)   Temp src Oral   SpO2 99 %   O2 Device None (Room air)       Current Vitals:   Vital Signs  BP: 148/90  Pulse: 65  Resp: 18  Temp: 97.8 °F (36.6 °C)  Temp src: Oral    Oxygen Therapy  SpO2: 98 %  O2 Device: None (Room air)        Physical Exam  Vitals and nursing note reviewed.   Constitutional:       General: She is not in acute distress.     Appearance: She is well-developed.   HENT:      Head: Normocephalic and atraumatic.      Nose: Nose normal.      Mouth/Throat:      Mouth: Mucous membranes are moist.   Eyes:      Conjunctiva/sclera: Conjunctivae normal.      Pupils: Pupils are equal, round, and reactive to light.   Cardiovascular:      Rate and Rhythm: Normal rate and regular rhythm.      Heart sounds: Normal heart sounds.   Pulmonary:      Effort: Pulmonary effort is normal.      Breath sounds: Normal breath sounds.   Abdominal:      General: Bowel sounds are normal.      Palpations: Abdomen is soft.   Musculoskeletal:         General: No tenderness or deformity. Normal range of motion.      Cervical back: Normal range of motion and neck supple.   Skin:     General: Skin is warm and dry.      Capillary Refill: Capillary refill takes less than 2 seconds.      Findings: No rash.      Comments: Normal color   Neurological:      General: No focal deficit present.      Mental Status: She is alert and oriented to person, place, and time.      GCS: GCS eye subscore is 4. GCS verbal subscore is 5. GCS motor subscore is 6.      Cranial Nerves: No cranial  nerve deficit.      Gait: Gait normal.             ED Course     Labs Reviewed   CBC WITH DIFFERENTIAL WITH PLATELET - Abnormal; Notable for the following components:       Result Value    RDW-SD 47.5 (*)     All other components within normal limits   BASIC METABOLIC PANEL (8) - Abnormal; Notable for the following components:    Glucose 137 (*)     Sodium 134 (*)     BUN 31 (*)     Creatinine 4.30 (*)     BUN/CREA Ratio 7.2 (*)     eGFR-Cr 10 (*)     All other components within normal limits   TROPONIN I HIGH SENSITIVITY - Normal   RAINBOW DRAW BLUE     EKG    Rate, intervals and axes as noted on EKG Report.  Rate: 67  Rhythm: Sinus Rhythm  Reading: NSR no gisele no ectopy normal axis  Stable clinical condition  No comparison  S                  TECHNIQUE: CT images were obtained without contrast material.  Automated exposure control for dose reduction was used.  Dose information is transmitted to the ACR (American College of Radiology) NRDR (National Radiology Data Registry) which includes the  Dose Index Registry.     FINDINGS:  There is a small to moderate-sized soft tissue hematoma within the left frontal scalp.  There is no underlying calvarial fracture or acute intracranial hemorrhage.     There is no mass lesion, cerebral edema, or secondary signs of acute infarct.     There are mild global atrophic changes throughout the cerebral and cerebellar hemispheres.     There are very small sized areas of low-attenuation in the periventricular and deep subcortical white matter compatible with chronic ischemic white matter changes.     The ventricular system appears grossly normal with no evidence of hydrocephalus or ventricular obstruction.              Impression  CONCLUSION: Small to moderate-sized soft tissue hematoma within the left frontal scalp with no underlying calvarial fracture or acute intracranial process.           Dictated by (CST): Jeff Allison MD on 2/05/2025 at 1:30 PM      Finalized by (CST):  Jeff Allison MD on 2/05/2025 at 1:43 PM              Exam Ended: 02/05/25 13:28            Steri strip to small skin tear of right forehead hematoma  No crepitus    MDM              Medical Decision Making  70yo/f w hx and exam as stated; syncope/head injury    No chest pain or dyspnea  Hx of orthostatic hypotension, frequent falls/syncope  Ct non acute  Tdap utd  On Peritoneal dialysis ; no abd pain, cr stable, electrolytes stable  Trop neg  EKG non acute    Discussed w DUL[facility] Dr. Andrew howe to send  Patient declines observation admission      Amount and/or Complexity of Data Reviewed  Labs:  Decision-making details documented in ED Course.  Radiology:  Decision-making details documented in ED Course.  ECG/medicine tests:  Decision-making details documented in ED Course.    Risk  OTC drugs.  Prescription drug management.        Disposition and Plan     Clinical Impression:  1. Facial laceration, initial encounter    2. Syncope and collapse    3. Injury of head, initial encounter         Disposition:  Discharge  2/5/2025  2:54 pm    Follow-up:  Ambreen Sandoval MD  [address] [place] [address]  [address]  [facility] [address]  [phone]    Follow up in 2 day(s)      We recommend that you schedule follow up care with a primary care provider within the next three months to obtain basic health screening including reassessment of your blood pressure.      Medications Prescribed:  Current Discharge Medication List              Supplementary Documentation:

## 2025-03-04 ENCOUNTER — HOSPITAL ENCOUNTER (EMERGENCY)
Facility: HOSPITAL | Age: 70
Discharge: HOME | End: 2025-03-04
Attending: EMERGENCY MEDICINE
Payer: MEDICARE

## 2025-03-04 VITALS
HEART RATE: 77 BPM | OXYGEN SATURATION: 99 % | DIASTOLIC BLOOD PRESSURE: 64 MMHG | RESPIRATION RATE: 18 BRPM | SYSTOLIC BLOOD PRESSURE: 134 MMHG | TEMPERATURE: 100 F

## 2025-03-04 DIAGNOSIS — R29.6 FREQUENT FALLS: Primary | ICD-10-CM

## 2025-03-04 DIAGNOSIS — I95.1 ORTHOSTATIC HYPOTENSION: ICD-10-CM

## 2025-03-04 LAB
ALBUMIN SERPL-MCNC: 3.7 G/DL (ref 3.2–4.8)
ALBUMIN/GLOB SERPL: 1.5 {RATIO} (ref 1–2)
ALP LIVER SERPL-CCNC: 153 U/L
ALT SERPL-CCNC: 8 U/L
ANION GAP SERPL CALC-SCNC: 11 MMOL/L (ref 0–18)
AST SERPL-CCNC: 12 U/L (ref ?–34)
BASOPHILS # BLD AUTO: 0.05 X10(3) UL (ref 0–0.2)
BASOPHILS NFR BLD AUTO: 0.8 %
BILIRUB SERPL-MCNC: 0.3 MG/DL (ref 0.2–1.1)
BUN BLD-MCNC: 33 MG/DL (ref 9–23)
BUN/CREAT SERPL: 7.2 (ref 10–20)
CALCIUM BLD-MCNC: 8.5 MG/DL (ref 8.7–10.4)
CHLORIDE SERPL-SCNC: 97 MMOL/L (ref 98–112)
CO2 SERPL-SCNC: 27 MMOL/L (ref 21–32)
CREAT BLD-MCNC: 4.6 MG/DL
DEPRECATED RDW RBC AUTO: 47.6 FL (ref 35.1–46.3)
EGFRCR SERPLBLD CKD-EPI 2021: 10 ML/MIN/1.73M2 (ref 60–?)
EOSINOPHIL # BLD AUTO: 0.04 X10(3) UL (ref 0–0.7)
EOSINOPHIL NFR BLD AUTO: 0.6 %
ERYTHROCYTE [DISTWIDTH] IN BLOOD BY AUTOMATED COUNT: 13.8 % (ref 11–15)
GLOBULIN PLAS-MCNC: 2.4 G/DL (ref 2–3.5)
GLUCOSE BLD-MCNC: 94 MG/DL (ref 70–99)
GLUCOSE BLDC GLUCOMTR-MCNC: 109 MG/DL (ref 70–99)
HCT VFR BLD AUTO: 32.3 %
HGB BLD-MCNC: 10.8 G/DL
IMM GRANULOCYTES # BLD AUTO: 0.03 X10(3) UL (ref 0–1)
IMM GRANULOCYTES NFR BLD: 0.5 %
LYMPHOCYTES # BLD AUTO: 1.73 X10(3) UL (ref 1–4)
LYMPHOCYTES NFR BLD AUTO: 26.4 %
MCH RBC QN AUTO: 32 PG (ref 26–34)
MCHC RBC AUTO-ENTMCNC: 33.4 G/DL (ref 31–37)
MCV RBC AUTO: 95.6 FL
MONOCYTES # BLD AUTO: 0.72 X10(3) UL (ref 0.1–1)
MONOCYTES NFR BLD AUTO: 11 %
NEUTROPHILS # BLD AUTO: 3.98 X10 (3) UL (ref 1.5–7.7)
NEUTROPHILS # BLD AUTO: 3.98 X10(3) UL (ref 1.5–7.7)
NEUTROPHILS NFR BLD AUTO: 60.7 %
OSMOLALITY SERPL CALC.SUM OF ELEC: 287 MOSM/KG (ref 275–295)
PLATELET # BLD AUTO: 247 10(3)UL (ref 150–450)
POTASSIUM SERPL-SCNC: 4.1 MMOL/L (ref 3.5–5.1)
PROT SERPL-MCNC: 6.1 G/DL (ref 5.7–8.2)
RBC # BLD AUTO: 3.38 X10(6)UL
SODIUM SERPL-SCNC: 135 MMOL/L (ref 136–145)
WBC # BLD AUTO: 6.6 X10(3) UL (ref 4–11)

## 2025-03-04 PROCEDURE — 85025 COMPLETE CBC W/AUTO DIFF WBC: CPT | Performed by: EMERGENCY MEDICINE

## 2025-03-04 PROCEDURE — 99284 EMERGENCY DEPT VISIT MOD MDM: CPT

## 2025-03-04 PROCEDURE — 36415 COLL VENOUS BLD VENIPUNCTURE: CPT

## 2025-03-04 PROCEDURE — 93010 ELECTROCARDIOGRAM REPORT: CPT

## 2025-03-04 PROCEDURE — 82962 GLUCOSE BLOOD TEST: CPT

## 2025-03-04 PROCEDURE — 85025 COMPLETE CBC W/AUTO DIFF WBC: CPT

## 2025-03-04 PROCEDURE — 80053 COMPREHEN METABOLIC PANEL: CPT

## 2025-03-04 PROCEDURE — 93005 ELECTROCARDIOGRAM TRACING: CPT

## 2025-03-04 PROCEDURE — 80053 COMPREHEN METABOLIC PANEL: CPT | Performed by: EMERGENCY MEDICINE

## 2025-03-04 RX ORDER — ACETAMINOPHEN 500 MG
1000 TABLET ORAL ONCE
Status: COMPLETED | OUTPATIENT
Start: 2025-03-04 | End: 2025-03-04

## 2025-03-04 NOTE — ED INITIAL ASSESSMENT (HPI)
Pt to ED via [facility] from assisted living after 4 falls in 24 hours due to dizziness.   Hx of DM, CKD - pt states she does peritoneal dialysis at home 6 nights/week.     Denies hitting her head, denies any LOC. On aspirin.

## 2025-03-05 LAB
ATRIAL RATE: 83 BPM
P AXIS: 50 DEGREES
P-R INTERVAL: 132 MS
Q-T INTERVAL: 382 MS
QRS DURATION: 86 MS
QTC CALCULATION (BEZET): 448 MS
R AXIS: -3 DEGREES
T AXIS: 74 DEGREES
VENTRICULAR RATE: 83 BPM

## 2025-03-05 NOTE — ED PROVIDER NOTES
Patient Seen in: [facility] Emergency Department    History     Chief Complaint   Patient presents with    Fall    Dizziness       HPI    The patient presents to the ED after falling 4 times in the last 24 hours.  She states that she fell due to chronic intermittent dizziness caused by orthostatic hypotension.  Denies any injury in the falls.  She states that she currently feels fine.  She states her cardiologist and neurologist are working on a solution for her orthostatic hypotension.    History reviewed.   Past Medical History:    Adrenal adenoma    left adrenoma    Anxiety    Cataract    Chronic kidney disease (CKD), stage III (moderate) (HCC)    CORONARY ARTERY DISEASE    Coronary atherosclerosis    Depression    DIABETES    Dialysis patient    Diarrhea    Disorder of thyroid    Diverticulosis of colon    Esophageal reflux    Fainting episodes    GERD    GOUT    Heart attack (HCC)    High blood pressure    High cholesterol    History of adverse reaction to anesthesia    delusions/hallucinations for a few days after knee replacement    History of blood transfusion    Quadruple Bypass    History of electroconvulsive therapy    HYPERTENSION    Hypothyroid    MENOPAUSE    Osteoarthritis    Other and unspecified hyperlipidemia    Personal history of other medical treatment    Transcranial Magnetic Stimulation (TMS)    Pneumonia, organism unspecified(486)    Polyp of colon    Renal artery stenosis    right kidney    Renal disorder    Type II or unspecified type diabetes mellitus without mention of complication, not stated as uncontrolled    Unspecified essential hypertension    Unspecified sleep apnea    PSG Merit 2012 AHI 70, auto-pap     Visual impairment       History reviewed.   Past Surgical History:   Procedure Laterality Date    Angioplasty (coronary)  3/2015    stents placed in heart    Cabg  6/1/2011    CABGx4 vessel    Cath angio  09/21/2023    Cath bare metal stent (bms)      Cath bare metal stent  (bms)  2023    2 stents    Cath percutaneous  transluminal coronary angioplasty  3/3/2015    Cath percutaneous  transluminal coronary angioplasty Right 2017    right Coronary artery, OSMIN    Colonoscopy      Hc plmt coronary drug eluting stent ea addl  3/5/2015    Histopathology report  13    cecal polyps - 1 tubular adenoma    Impact tooth rem bony w/comp Bilateral 1973    wisdom teeth    Knee replacement surgery Bilateral 3/24/16    right TKA L TKA not at the same time    Left heart cath,percutaneous  2011    CAD    Left heart cath,percutaneous  3/2/2015          Renal angio, cardiac cath  2011    Sleep study, attended  12/10/2012    Tonsillectomy Bilateral          Medications :  Prescriptions Prior to Admission[1]     Family History   Problem Relation Age of Onset    Cancer Father         prostate, liver    Hypertension Father     Obesity Father     Breast Cancer Father 70        age at dx 70    Heart Surgery Mother         Mitral valve replaced    Heart Disorder Mother         CHF    Hypertension Mother     Arthritis Mother         TKA    Breast Cancer Mother     Stroke Maternal Grandmother     Stroke Maternal Grandfather     Breast Cancer Paternal Grandmother 78        age at dx 78    Cancer Paternal Grandfather         lung    Obesity Brother     Diabetes Brother     Alcohol and Other Disorders Associated Son     Substance Abuse Son     Obesity Sister     Diabetes Sister     Traumatic brain injury Brother        Smoking Status:   Social History     Socioeconomic History    Marital status:     Number of children: 2    Years of education: 15   Occupational History    Occupation: [employer]     Comment: [employer]   Tobacco Use    Smoking status: Former     Current packs/day: 0.00     Average packs/day: 1 pack/day for 35.0 years (35.0 ttl pk-yrs)     Types: Cigarettes     Start date: 1979     Quit date: 2014     Years since quitting: 10.4    Smokeless tobacco:  Never   Vaping Use    Vaping status: Never Used   Substance and Sexual Activity    Alcohol use: Yes     Alcohol/week: 1.0 standard drink of alcohol     Types: 1 Glasses of wine per week     Comment: occasionally    Drug use: Not Currently     Comment: in her 20s    Sexual activity: Not Currently     Partners: Male   Other Topics Concern     Service No    Blood Transfusions No    Caffeine Concern Yes     Comment: coffee    Occupational Exposure No    Hobby Hazards No    Sleep Concern Yes     Comment: STEFFEN    Stress Concern No    Weight Concern No    Special Diet No    Back Care No    Exercise Yes    Bike Helmet Yes    Seat Belt Yes    Self-Exams Yes       Constitutional and vital signs reviewed.      Social History and Family History elements reviewed from today, pertinent positives to the presenting problem noted.    Physical Exam     ED Triage Vitals [03/04/25 1742]   /78   Pulse 85   Resp 18   Temp 100.3 °F (37.9 °C)   Temp src Oral   SpO2 99 %   O2 Device None (Room air)       All measures to prevent infection transmission during my interaction with the patient were taken. Handwashing was performed prior to and after the exam.  Stethoscope and any equipment used during my examination was cleaned with super sani-cloth germicidal wipes following the exam.     Physical Exam  Vitals and nursing note reviewed.   Constitutional:       General: She is not in acute distress.     Appearance: She is well-developed. She is not ill-appearing or toxic-appearing.   HENT:      Head: Normocephalic and atraumatic.   Eyes:      General:         Right eye: No discharge.         Left eye: No discharge.      Extraocular Movements: Extraocular movements intact.      Conjunctiva/sclera: Conjunctivae normal.      Pupils: Pupils are equal, round, and reactive to light.   Neck:      Trachea: No tracheal deviation.   Cardiovascular:      Rate and Rhythm: Normal rate and regular rhythm.   Pulmonary:      Effort: Pulmonary  effort is normal. No respiratory distress.      Breath sounds: No stridor.   Musculoskeletal:         General: No deformity.   Skin:     General: Skin is warm and dry.   Neurological:      Mental Status: She is alert and oriented to person, place, and time.   Psychiatric:         Mood and Affect: Mood normal.         Behavior: Behavior normal.         ED Course        Labs Reviewed   CBC WITH DIFFERENTIAL WITH PLATELET - Abnormal; Notable for the following components:       Result Value    RBC 3.38 (*)     HGB 10.8 (*)     HCT 32.3 (*)     RDW-SD 47.6 (*)     All other components within normal limits   COMP METABOLIC PANEL (14) - Abnormal; Notable for the following components:    Sodium 135 (*)     Chloride 97 (*)     BUN 33 (*)     Creatinine 4.60 (*)     BUN/CREA Ratio 7.2 (*)     Calcium, Total 8.5 (*)     eGFR-Cr 10 (*)     ALT 8 (*)     Alkaline Phosphatase 153 (*)     All other components within normal limits   POCT GLUCOSE - Abnormal; Notable for the following components:    POC Glucose  109 (*)     All other components within normal limits   RAINBOW DRAW LAVENDER   RAINBOW DRAW LIGHT GREEN   RAINBOW DRAW BLUE   RAINBOW DRAW GOLD     EKG    Rate, intervals and axes as noted on EKG Report.  Rate: Normal, 83 bpm  Rhythm: Sinus Rhythm  Reading: Low voltage QRS, poor R wave progression, abnormal EKG           As Interpreted by me    Imaging Results Available and Reviewed while in ED: No results found.  ED Medications Administered:   Medications   acetaminophen (Tylenol Extra Strength) tab 1,000 mg (1,000 mg Oral Given 3/4/25 1808)         MDM     Vitals:    03/04/25 1742 03/04/25 1845   BP: 157/78 134/64   Pulse: 85 77   Resp: 18 18   Temp: 100.3 °F (37.9 °C)    TempSrc: Oral    SpO2: 99% 99%     *I personally reviewed and interpreted all ED vitals.    Pulse Ox: 99%, Room air, Normal     Monitor Interpretation:   normal sinus rhythm as interpreted by me.  The cardiac monitor was ordered to monitor heart  rate.    Differential Diagnosis/ Diagnostic Considerations: Orthostatic hypotension, frequent falls, other    Complicating Factors: The patient already has does not have any pertinent problems on file. to contribute to the complexity of this ED evaluation.    Medical Decision Making  Patient presents to the ED after having 4 falls in the past 24 hours.  Nondistressed in the ED and denies any injury.  Normal vital signs.  Patient will like to go home and declines further workup or treatment.  She states symptoms are constant and noted for the usual.  Recommended PCP follow-up.    Problems Addressed:  Frequent falls: acute illness or injury  Orthostatic hypotension: chronic illness or injury with exacerbation, progression, or side effects of treatment        Condition upon leaving the department: Stable    Disposition and Plan     Clinical Impression:  1. Frequent falls    2. Orthostatic hypotension        Disposition:  Discharge    Follow-up:  Ambreen Sandoval MD  [address] [place] [id number]  [phone]    Schedule an appointment as soon as possible for a visit in 3 day(s)        Medications Prescribed:  Discharge Medication List as of 3/4/2025  7:05 PM                           [1] (Not in a hospital admission)

## 2025-03-07 ENCOUNTER — APPOINTMENT (OUTPATIENT)
Dept: GENERAL RADIOLOGY | Facility: HOSPITAL | Age: 70
End: 2025-03-07
Attending: EMERGENCY MEDICINE
Payer: MEDICARE

## 2025-03-07 ENCOUNTER — HOSPITAL ENCOUNTER (OUTPATIENT)
Facility: HOSPITAL | Age: 70
Setting detail: OBSERVATION
Discharge: HOME HEALTH | End: 2025-03-10
Attending: EMERGENCY MEDICINE | Admitting: INTERNAL MEDICINE
Payer: MEDICARE

## 2025-03-07 DIAGNOSIS — R79.89 ELEVATED TROPONIN: ICD-10-CM

## 2025-03-07 DIAGNOSIS — R53.1 WEAKNESS GENERALIZED: ICD-10-CM

## 2025-03-07 DIAGNOSIS — U07.1 COVID: Primary | ICD-10-CM

## 2025-03-07 DIAGNOSIS — N18.6 ESRD ON PERITONEAL DIALYSIS (HCC): ICD-10-CM

## 2025-03-07 DIAGNOSIS — Z99.2 ESRD ON PERITONEAL DIALYSIS (HCC): ICD-10-CM

## 2025-03-07 LAB
ALBUMIN SERPL-MCNC: 3.8 G/DL (ref 3.2–4.8)
ALBUMIN/GLOB SERPL: 1.6 {RATIO} (ref 1–2)
ALP LIVER SERPL-CCNC: 122 U/L
ALT SERPL-CCNC: 13 U/L
ANION GAP SERPL CALC-SCNC: 12 MMOL/L (ref 0–18)
AST SERPL-CCNC: 22 U/L (ref ?–34)
ATRIAL RATE: 63 BPM
BASOPHILS # BLD AUTO: 0.02 X10(3) UL (ref 0–0.2)
BASOPHILS NFR BLD AUTO: 0.4 %
BILIRUB SERPL-MCNC: 0.3 MG/DL (ref 0.2–1.1)
BUN BLD-MCNC: 32 MG/DL (ref 9–23)
BUN/CREAT SERPL: 6.2 (ref 10–20)
CALCIUM BLD-MCNC: 8.1 MG/DL (ref 8.7–10.4)
CHLORIDE SERPL-SCNC: 99 MMOL/L (ref 98–112)
CHOLEST SERPL-MCNC: 203 MG/DL (ref ?–200)
CO2 SERPL-SCNC: 28 MMOL/L (ref 21–32)
CREAT BLD-MCNC: 5.13 MG/DL
DEPRECATED RDW RBC AUTO: 48.8 FL (ref 35.1–46.3)
EGFRCR SERPLBLD CKD-EPI 2021: 8 ML/MIN/1.73M2 (ref 60–?)
EOSINOPHIL # BLD AUTO: 0.08 X10(3) UL (ref 0–0.7)
EOSINOPHIL NFR BLD AUTO: 1.7 %
ERYTHROCYTE [DISTWIDTH] IN BLOOD BY AUTOMATED COUNT: 13.6 % (ref 11–15)
EST. AVERAGE GLUCOSE BLD GHB EST-MCNC: 157 MG/DL (ref 68–126)
GLOBULIN PLAS-MCNC: 2.4 G/DL (ref 2–3.5)
GLUCOSE BLD-MCNC: 155 MG/DL (ref 70–99)
GLUCOSE BLDC GLUCOMTR-MCNC: 124 MG/DL (ref 70–99)
GLUCOSE BLDC GLUCOMTR-MCNC: 156 MG/DL (ref 70–99)
GLUCOSE BLDC GLUCOMTR-MCNC: 168 MG/DL (ref 70–99)
HBA1C MFR BLD: 7.1 % (ref ?–5.7)
HCT VFR BLD AUTO: 37.9 %
HDLC SERPL-MCNC: 28 MG/DL (ref 40–59)
HGB BLD-MCNC: 12.7 G/DL
IMM GRANULOCYTES # BLD AUTO: 0.02 X10(3) UL (ref 0–1)
IMM GRANULOCYTES NFR BLD: 0.4 %
LDLC SERPL CALC-MCNC: 110 MG/DL (ref ?–100)
LYMPHOCYTES # BLD AUTO: 1.9 X10(3) UL (ref 1–4)
LYMPHOCYTES NFR BLD AUTO: 39.7 %
MCH RBC QN AUTO: 33.1 PG (ref 26–34)
MCHC RBC AUTO-ENTMCNC: 33.5 G/DL (ref 31–37)
MCV RBC AUTO: 98.7 FL
MONOCYTES # BLD AUTO: 0.35 X10(3) UL (ref 0.1–1)
MONOCYTES NFR BLD AUTO: 7.3 %
NEUTROPHILS # BLD AUTO: 2.41 X10 (3) UL (ref 1.5–7.7)
NEUTROPHILS # BLD AUTO: 2.41 X10(3) UL (ref 1.5–7.7)
NEUTROPHILS NFR BLD AUTO: 50.5 %
NONHDLC SERPL-MCNC: 175 MG/DL (ref ?–130)
OSMOLALITY SERPL CALC.SUM OF ELEC: 298 MOSM/KG (ref 275–295)
P AXIS: 61 DEGREES
P-R INTERVAL: 122 MS
PLATELET # BLD AUTO: 193 10(3)UL (ref 150–450)
POTASSIUM SERPL-SCNC: 3.8 MMOL/L (ref 3.5–5.1)
PROT SERPL-MCNC: 6.2 G/DL (ref 5.7–8.2)
Q-T INTERVAL: 468 MS
QRS DURATION: 78 MS
QTC CALCULATION (BEZET): 478 MS
R AXIS: 16 DEGREES
RBC # BLD AUTO: 3.84 X10(6)UL
SODIUM SERPL-SCNC: 139 MMOL/L (ref 136–145)
T AXIS: 55 DEGREES
TRIGL SERPL-MCNC: 377 MG/DL (ref 30–149)
TROPONIN I SERPL HS-MCNC: 108 NG/L
TROPONIN I SERPL HS-MCNC: 97 NG/L
TSI SER-ACNC: 1.54 UIU/ML (ref 0.55–4.78)
VENTRICULAR RATE: 63 BPM
VLDLC SERPL CALC-MCNC: 66 MG/DL (ref 0–30)
WBC # BLD AUTO: 4.8 X10(3) UL (ref 4–11)

## 2025-03-07 PROCEDURE — 71045 X-RAY EXAM CHEST 1 VIEW: CPT | Performed by: EMERGENCY MEDICINE

## 2025-03-07 PROCEDURE — 99223 1ST HOSP IP/OBS HIGH 75: CPT | Performed by: INTERNAL MEDICINE

## 2025-03-07 RX ORDER — INSULIN DEGLUDEC 100 U/ML
12 INJECTION, SOLUTION SUBCUTANEOUS NIGHTLY
Status: DISCONTINUED | OUTPATIENT
Start: 2025-03-07 | End: 2025-03-10

## 2025-03-07 RX ORDER — DIAZEPAM 5 MG/1
5 TABLET ORAL 2 TIMES DAILY
Status: DISCONTINUED | OUTPATIENT
Start: 2025-03-07 | End: 2025-03-10

## 2025-03-07 RX ORDER — ACETAMINOPHEN 500 MG
500 TABLET ORAL EVERY 6 HOURS PRN
Status: DISCONTINUED | OUTPATIENT
Start: 2025-03-07 | End: 2025-03-10

## 2025-03-07 RX ORDER — DEXTROSE MONOHYDRATE, SODIUM CHLORIDE, SODIUM LACTATE, CALCIUM CHLORIDE, MAGNESIUM CHLORIDE 1.5; 538; 448; 18.4; 5.08 G/100ML; MG/100ML; MG/100ML; MG/100ML; MG/100ML
5000 SOLUTION INTRAPERITONEAL
Status: DISCONTINUED | OUTPATIENT
Start: 2025-03-07 | End: 2025-03-10

## 2025-03-07 RX ORDER — PANTOPRAZOLE SODIUM 40 MG/1
40 TABLET, DELAYED RELEASE ORAL
Status: DISCONTINUED | OUTPATIENT
Start: 2025-03-08 | End: 2025-03-10

## 2025-03-07 RX ORDER — HEPARIN SODIUM 5000 [USP'U]/ML
5000 INJECTION, SOLUTION INTRAVENOUS; SUBCUTANEOUS EVERY 8 HOURS SCHEDULED
Status: DISCONTINUED | OUTPATIENT
Start: 2025-03-07 | End: 2025-03-10

## 2025-03-07 RX ORDER — BUPROPION HYDROCHLORIDE 300 MG/1
300 TABLET ORAL DAILY
Status: DISCONTINUED | OUTPATIENT
Start: 2025-03-08 | End: 2025-03-10

## 2025-03-07 RX ORDER — LEVOTHYROXINE SODIUM 125 UG/1
125 TABLET ORAL
Status: DISCONTINUED | OUTPATIENT
Start: 2025-03-08 | End: 2025-03-10

## 2025-03-07 RX ORDER — DEXTROSE MONOHYDRATE 25 G/50ML
50 INJECTION, SOLUTION INTRAVENOUS
Status: DISCONTINUED | OUTPATIENT
Start: 2025-03-07 | End: 2025-03-10

## 2025-03-07 RX ORDER — NICOTINE POLACRILEX 4 MG
15 LOZENGE BUCCAL
Status: DISCONTINUED | OUTPATIENT
Start: 2025-03-07 | End: 2025-03-10

## 2025-03-07 RX ORDER — SERTRALINE HYDROCHLORIDE 100 MG/1
200 TABLET, FILM COATED ORAL DAILY
Status: DISCONTINUED | OUTPATIENT
Start: 2025-03-08 | End: 2025-03-10

## 2025-03-07 RX ORDER — ALLOPURINOL 300 MG/1
150 TABLET ORAL DAILY
Status: DISCONTINUED | OUTPATIENT
Start: 2025-03-08 | End: 2025-03-10

## 2025-03-07 RX ORDER — ATOMOXETINE 25 MG/1
25 CAPSULE ORAL DAILY
Status: DISCONTINUED | OUTPATIENT
Start: 2025-03-08 | End: 2025-03-08

## 2025-03-07 RX ORDER — SODIUM CHLORIDE 9 MG/ML
INJECTION, SOLUTION INTRAVENOUS CONTINUOUS
Status: DISCONTINUED | OUTPATIENT
Start: 2025-03-07 | End: 2025-03-08

## 2025-03-07 RX ORDER — DEXTROSE MONOHYDRATE, SODIUM CHLORIDE, SODIUM LACTATE, CALCIUM CHLORIDE, MAGNESIUM CHLORIDE 1.5; 538; 448; 18.4; 5.08 G/100ML; MG/100ML; MG/100ML; MG/100ML; MG/100ML
2000 SOLUTION INTRAPERITONEAL
Status: DISCONTINUED | OUTPATIENT
Start: 2025-03-07 | End: 2025-03-10

## 2025-03-07 RX ORDER — NICOTINE POLACRILEX 4 MG
30 LOZENGE BUCCAL
Status: DISCONTINUED | OUTPATIENT
Start: 2025-03-07 | End: 2025-03-10

## 2025-03-07 RX ORDER — ALBUTEROL SULFATE 90 UG/1
4 INHALANT RESPIRATORY (INHALATION) EVERY 4 HOURS PRN
Status: DISCONTINUED | OUTPATIENT
Start: 2025-03-07 | End: 2025-03-10

## 2025-03-07 NOTE — ED PROVIDER NOTES
Patient Seen in: [facility] Emergency Department      History     Chief Complaint   Patient presents with    Fatigue    Syncope     Stated Complaint: weakness/ syncope    Subjective:   The history is provided by the patient and a relative (son gives most of the history).         69 year old female with multiple medical issues including CAD s/p CABG and stents, ESRD on PD, DM, HTN who presents with 4 days of progressive weakness now to the point that she can't walk without assistance from family. She does have years of orthostatic hypotension and frequent falls, but now states this feels different than usual. Family notes pt requiring much more assistance than usual just to stand and do ADLs.  + intermittent low grade fevers for same time  +COVID yesterday, PCP prescribed Paxlovid but pt has not started yet.  Pt lives in independent living, but now cannot bear weight to do any ADLs, generally fatigued with even minor activity but denies sob or CP.  +fainting spell on standing today with son, no head injury    Objective:     Past Medical History:    Adrenal adenoma    left adrenoma    Anxiety    Cataract    Chronic kidney disease (CKD), stage III (moderate) (HCC)    CORONARY ARTERY DISEASE    Coronary atherosclerosis    Depression    DIABETES    Dialysis patient    Diarrhea    Disorder of thyroid    Diverticulosis of colon    Esophageal reflux    Fainting episodes    GERD    GOUT    Heart attack (HCC)    High blood pressure    High cholesterol    History of adverse reaction to anesthesia    delusions/hallucinations for a few days after knee replacement    History of blood transfusion    Quadruple Bypass    History of electroconvulsive therapy    HYPERTENSION    Hypothyroid    MENOPAUSE    Osteoarthritis    Other and unspecified hyperlipidemia    Personal history of other medical treatment    Transcranial Magnetic Stimulation (TMS)    Pneumonia, organism unspecified(486)    Polyp of colon    Renal artery  stenosis    right kidney    Renal disorder    Type II or unspecified type diabetes mellitus without mention of complication, not stated as uncontrolled    Unspecified essential hypertension    Unspecified sleep apnea    PSG Merit 2012 AHI 68, auto-pap     Visual impairment              Past Surgical History:   Procedure Laterality Date    Angioplasty (coronary)  3/2015    stents placed in heart    Cabg  2011    CABGx4 vessel    Cath angio  2023    Cath bare metal stent (bms)      Cath bare metal stent (bms)  2023    2 stents    Cath percutaneous  transluminal coronary angioplasty  3/3/2015    Cath percutaneous  transluminal coronary angioplasty Right 2017    right Coronary artery, OSMIN    Colonoscopy      Hc plmt coronary drug eluting stent ea addl  3/5/2015    Histopathology report  13    cecal polyps - 1 tubular adenoma    Impact tooth rem bony w/comp Bilateral 1973    wisdom teeth    Knee replacement surgery Bilateral 3/24/16    right TKA L TKA not at the same time    Left heart cath,percutaneous  2011    CAD    Left heart cath,percutaneous  3/2/2015          Renal angio, cardiac cath  2011    Sleep study, attended  12/10/2012    Tonsillectomy Bilateral 1958                Social History     Socioeconomic History    Marital status:     Number of children: 2    Years of education: 15   Occupational History    Occupation: [employer]     Comment: [employer]   Tobacco Use    Smoking status: Former     Current packs/day: 0.00     Average packs/day: 1 pack/day for 35.0 years (35.0 ttl pk-yrs)     Types: Cigarettes     Start date: 1979     Quit date: 2014     Years since quitting: 10.4    Smokeless tobacco: Never   Vaping Use    Vaping status: Never Used   Substance and Sexual Activity    Alcohol use: Yes     Alcohol/week: 1.0 standard drink of alcohol     Types: 1 Glasses of wine per week     Comment: occasionally    Drug use: Not Currently     Comment: in her 20s     Sexual activity: Not Currently     Partners: Male   Other Topics Concern     Service No    Blood Transfusions No    Caffeine Concern Yes     Comment: coffee    Occupational Exposure No    Hobby Hazards No    Sleep Concern Yes     Comment: STEFFEN    Stress Concern No    Weight Concern No    Special Diet No    Back Care No    Exercise Yes    Bike Helmet Yes    Seat Belt Yes    Self-Exams Yes   Social History Narrative    Lives alone.    Enjoys reading, pets, knitting, cooking     Social Drivers of Health     Food Insecurity: No Food Insecurity (3/7/2025)    NCSS - Food Insecurity     Worried About Running Out of Food in the Last Year: No     Ran Out of Food in the Last Year: No   Transportation Needs: No Transportation Needs (3/7/2025)    NCSS - Transportation     Lack of Transportation: No   Housing Stability: Not At Risk (3/7/2025)    NCSS - Housing/Utilities     Has Housing: Yes     Worried About Losing Housing: No     Unable to Get Utilities: No                  Physical Exam     ED Triage Vitals   BP 03/07/25 1245 118/71   Pulse 03/07/25 1245 65   Resp 03/07/25 1245 16   Temp 03/07/25 1248 98.8 °F (37.1 °C)   Temp src 03/07/25 1248 Temporal   SpO2 03/07/25 1245 95 %   O2 Device 03/07/25 1522 None (Room air)       Current Vitals:   Vital Signs  BP: 99/53  Pulse: 65  Resp: 15  Temp: 98.2 °F (36.8 °C)  Temp src: Oral  MAP (mmHg): 65    Oxygen Therapy  SpO2: 97 %  O2 Device: None (Room air)  Pulse Oximetry Type: Intermittent        Physical Exam  Vitals and nursing note reviewed.   Constitutional:       General: She is not in acute distress.     Appearance: Normal appearance. She is well-developed. She is not ill-appearing, toxic-appearing or diaphoretic.   HENT:      Head: Normocephalic and atraumatic.   Eyes:      Conjunctiva/sclera: Conjunctivae normal.      Pupils: Pupils are equal, round, and reactive to light.   Cardiovascular:      Rate and Rhythm: Normal rate and regular rhythm.      Pulses: Normal  pulses.      Heart sounds: Normal heart sounds. No murmur heard.  Pulmonary:      Effort: Pulmonary effort is normal. No respiratory distress.      Breath sounds: Normal breath sounds. No wheezing.   Abdominal:      General: There is no distension.      Palpations: Abdomen is soft.      Tenderness: There is no abdominal tenderness. There is no guarding.       Musculoskeletal:         General: No tenderness. Normal range of motion.      Cervical back: Full passive range of motion without pain, normal range of motion and neck supple. No rigidity. Normal range of motion.      Right lower leg: No edema.      Left lower leg: No edema.   Skin:     General: Skin is warm and dry.      Findings: No rash.   Neurological:      Mental Status: She is alert and oriented to person, place, and time.      GCS: GCS eye subscore is 4. GCS verbal subscore is 5. GCS motor subscore is 6.      Sensory: Sensation is intact. No sensory deficit.      Motor: Motor function is intact. No weakness.   Psychiatric:         Attention and Perception: Attention normal.         Mood and Affect: Mood normal.         Behavior: Behavior normal. Behavior is cooperative.             ED Course     Labs Reviewed   CBC WITH DIFFERENTIAL WITH PLATELET - Abnormal; Notable for the following components:       Result Value    RDW-SD 48.8 (*)     All other components within normal limits   COMP METABOLIC PANEL (14) - Abnormal; Notable for the following components:    Glucose 155 (*)     BUN 32 (*)     Creatinine 5.13 (*)     BUN/CREA Ratio 6.2 (*)     Calcium, Total 8.1 (*)     Calculated Osmolality 298 (*)     eGFR-Cr 8 (*)     All other components within normal limits   TROPONIN I HIGH SENSITIVITY - Abnormal; Notable for the following components:    Troponin I (High Sensitivity) 108 (*)     All other components within normal limits   LIPID PANEL - Abnormal; Notable for the following components:    Cholesterol, Total 203 (*)     HDL Cholesterol 28 (*)      Triglycerides 377 (*)     LDL Cholesterol 110 (*)     VLDL 66 (*)     Non HDL Chol 175 (*)     All other components within normal limits   TROPONIN I HIGH SENSITIVITY - Abnormal; Notable for the following components:    Troponin I (High Sensitivity) 97 (*)     All other components within normal limits   HEMOGLOBIN A1C - Abnormal; Notable for the following components:    HgbA1C 7.1 (*)     Estimated Average Glucose 157 (*)     All other components within normal limits   POCT GLUCOSE - Abnormal; Notable for the following components:    POC Glucose  156 (*)     All other components within normal limits   POCT GLUCOSE - Abnormal; Notable for the following components:    POC Glucose  124 (*)     All other components within normal limits   POCT GLUCOSE - Abnormal; Notable for the following components:    POC Glucose  168 (*)     All other components within normal limits   TSH W REFLEX TO FREE T4 - Normal   URINALYSIS WITH CULTURE REFLEX   CBC WITH DIFFERENTIAL WITH PLATELET   RENAL FUNCTION PANEL   MAGNESIUM   RAINBOW DRAW LAVENDER   RAINBOW DRAW LIGHT GREEN   RAINBOW DRAW BLUE   RAINBOW DRAW GOLD     EKG    Rate, intervals and axes as noted on EKG Report.  Rate: 63  Rhythm: Sinus Rhythm  Reading: NSR                  MDM      Pulse Ox: 97%, Normal, RA    Cardiac Monitor:   Pulse Readings from Last 1 Encounters:   03/07/25 65   , sinus, normal for rate and rhythm         Radiology findings: XR CHEST AP PORTABLE  (CPT=71045)    Result Date: 3/7/2025  CONCLUSION:   Minimal bibasilar opacities, which are favored to reflect atelectasis/scarring.    Dictated by (CST): Todd Calles MD on 3/07/2025 at 2:45 PM     Finalized by (CST): Todd Calles MD on 3/07/2025 at 2:46 PM            Chronic Medical Conditions Potentially Contributing: orthostatic hypotension, ESRD on PD    Medications   albuterol (Ventolin HFA) 108 (90 Base) MCG/ACT inhaler 4 puff (has no administration in time range)   allopurinol (Zyloprim) tab 150 mg  (has no administration in time range)   atomoxetine (Strattera) cap 25 mg (PT OWN SUPPLY) ( Oral Automatically Held 3/11/25 0930)   buPROPion ER (Wellbutrin XL) 24 hr tab 300 mg (has no administration in time range)   carvedilol (Coreg) tab 37.5 mg (37.5 mg Oral Given 3/7/25 1834)   diazePAM (Valium) tab 5 mg (5 mg Oral Given 3/7/25 2206)   levothyroxine (Synthroid) tab 125 mcg (has no administration in time range)   pantoprazole (Protonix) DR tab 40 mg (has no administration in time range)   sertraline (Zoloft) tab 200 mg (has no administration in time range)   sodium chloride 0.9% infusion ( Intravenous New Bag 3/7/25 1835)   heparin (Porcine) 5000 UNIT/ML injection 5,000 Units (5,000 Units Subcutaneous Given 3/7/25 2205)   acetaminophen (Tylenol Extra Strength) tab 500 mg (has no administration in time range)   glucose (Dex4) 15 GM/59ML oral liquid 15 g (has no administration in time range)     Or   glucose (Glutose) 40% oral gel 15 g (has no administration in time range)     Or   glucose-vitamin C (Dex-4) chewable tab 4 tablet (has no administration in time range)     Or   dextrose 50% injection 50 mL (has no administration in time range)     Or   glucose (Dex4) 15 GM/59ML oral liquid 30 g (has no administration in time range)     Or   glucose (Glutose) 40% oral gel 30 g (has no administration in time range)     Or   glucose-vitamin C (Dex-4) chewable tab 8 tablet (has no administration in time range)   insulin degludec (Tresiba) 100 units/mL flextouch 12 Units (12 Units Subcutaneous Given 3/7/25 2206)   insulin aspart (NovoLOG) 100 Units/mL FlexPen 1-5 Units ( Subcutaneous Not Given 3/7/25 1836)   dextrose 1.5%-calcium 2.5 mEq/L peritoneal solution (has no administration in time range)   dextrose 1.5%-calcium 2.5 mEq/L peritoneal solution (has no administration in time range)       D/w Dr Segovia - will admit  D/w Dr Green - will consult    Admission disposition: 3/7/2025  2:20 PM           Medical Decision  Making      Disposition and Plan     Clinical Impression:  1. COVID    2. Weakness generalized    3. ESRD on peritoneal dialysis (HCC)    4. Elevated troponin         Disposition:  Admit  3/7/2025  2:20 pm    Follow-up:  No follow-up provider specified.  We recommend that you schedule follow up care with a primary care provider within the next three months to obtain basic health screening including reassessment of your blood pressure.      Medications Prescribed:  Current Discharge Medication List              Supplementary Documentation:         Hospital Problems       Present on Admission  Date Reviewed: 2/12/2025            ICD-10-CM Noted POA    * (Principal) COVID U07.1 3/7/2025 Unknown    Elevated troponin R79.89 3/7/2025 Unknown    ESRD on peritoneal dialysis (HCC) N18.6, Z99.2 2/25/2024 Unknown    Weakness generalized R53.1 3/7/2025 Unknown

## 2025-03-07 NOTE — H&P
[facility] Hospitalist H&P       CC: fatigue, weakness     PCP: Ambreen Sandoval MD    History of Present Illness:   This is a 69-year-old female with a complicated medical history including ESRD on peritoneal dialysis, diabetes, coronary artery disease, depression, orthostatic hypotension, chronic diastolic CHF, who is here for evaluation of weakness, presyncopal episodes, dizziness, weakness, decreased p.o. intake.  Recent diagnosis of COVID-19 this week.  She denies significant shortness of breath.  She has mild cough.  She is not requiring oxygen.  She does notice that her weakness is worse than her normal.  She had some episodes this week about 4-5 where she felt like she was going to pass out.    Review of Systems  Comprehensive ROS reviewed and negative except for what's stated above.     PMH  ESRD on peritoneal dialysis, diabetes, coronary artery disease, depression, orthostatic hypotension, chronic diastolic CHF    PSH  Past Surgical History:   Procedure Laterality Date    Angioplasty (coronary)  3/2015    stents placed in heart    Cabg  2011    CABGx4 vessel    Cath angio  2023    Cath bare metal stent (bms)      Cath bare metal stent (bms)  2023    2 stents    Cath percutaneous  transluminal coronary angioplasty  3/3/2015    Cath percutaneous  transluminal coronary angioplasty Right 2017    right Coronary artery, OSMIN    Colonoscopy      Hc plmt coronary drug eluting stent ea addl  3/5/2015    Histopathology report  13    cecal polyps - 1 tubular adenoma    Impact tooth rem bony w/comp Bilateral 1973    wisdom teeth    Knee replacement surgery Bilateral 3/24/16    right TKA L TKA not at the same time    Left heart cath,percutaneous  2011    CAD    Left heart cath,percutaneous  3/2/2015          Renal angio, cardiac cath  2011    Sleep study, attended  12/10/2012    Tonsillectomy Bilateral 195        ALL:  Allergies[1]     Home Medications:  Reviewed with  patient  Allopurinol 150 mg daily  Amlodipine 10 mg nightly  Attomoxetine 25 mg daily  Lipitor 80 mg nightly  Wellbutrin 300 mg daily  Coreg 37.5 mg twice daily  Valium 5 mg twice daily  Hydralazine 25 mg up to 3 times daily  Toujeo 22 units nightly  Synthroid 125 mcg daily  Pantoprazole 40 mg daily  Potassium chloride 20 mEq daily  Sertraline 200 mg daily    Soc Hx  Social History     Tobacco Use    Smoking status: Former     Current packs/day: 0.00     Average packs/day: 1 pack/day for 35.0 years (35.0 ttl pk-yrs)     Types: Cigarettes     Start date: 9/23/1979     Quit date: 9/23/2014     Years since quitting: 10.4    Smokeless tobacco: Never   Substance Use Topics    Alcohol use: Yes     Alcohol/week: 1.0 standard drink of alcohol     Types: 1 Glasses of wine per week     Comment: occasionally      Fam Hx  Family History   Problem Relation Age of Onset    Cancer Father         prostate, liver    Hypertension Father     Obesity Father     Breast Cancer Father 70        age at dx 70    Heart Surgery Mother         Mitral valve replaced    Heart Disorder Mother         CHF    Hypertension Mother     Arthritis Mother         TKA    Breast Cancer Mother     Stroke Maternal Grandmother     Stroke Maternal Grandfather     Breast Cancer Paternal Grandmother 78        age at dx 78    Cancer Paternal Grandfather         lung    Obesity Brother     Diabetes Brother     Alcohol and Other Disorders Associated Son     Substance Abuse Son     Obesity Sister     Diabetes Sister     Traumatic brain injury Brother      OBJECTIVE:  /74   Pulse 64   Temp 98.8 °F (37.1 °C) (Temporal)   Resp 15   Wt 175 lb (79.4 kg)   SpO2 97%   BMI 31.00 kg/m²   General: Alert, no acute distress  HEENT: oral mucosa normal   Lungs: clear to ausculation bilaterally  Heart: Regular rate and rhythm  Abdomen: soft, non tender  Extremities: No edema  Skin: no new rash, normal color    Diagnostic Data:    CBC/Chem  Recent Labs   Lab  03/04/25  1742 03/07/25  1248   WBC 6.6 4.8   HGB 10.8* 12.7   MCV 95.6 98.7   .0 193.0       Recent Labs   Lab 03/04/25  1742 03/07/25  1248   * 139   K 4.1 3.8   CL 97* 99   CO2 27.0 28.0   BUN 33* 32*   CREATSERUM 4.60* 5.13*   GLU 94 155*   CA 8.5* 8.1*       Recent Labs   Lab 03/04/25  1742 03/07/25  1248   ALT 8* 13   AST 12 22   ALB 3.7 3.8     ASSESSMENT / PLAN:   This is a 69-year-old female with a complicated medical history including ESRD on peritoneal dialysis, diabetes, coronary artery disease, depression, orthostatic hypotension, chronic diastolic CHF, who is here for evaluation of weakness, presyncopal episodes, dizziness, weakness, decreased p.o. intake.      COVID 19  Generalized weakness, presyncopal episodes  Likely recurrent orthostatic hypotension  -Suspect she is hypovolemic, dehydrated, from recent viral illness [place] positive for COVID, not eating and drinking much  -Okay to start low rate of saline infusion with 75cc/hr for 12 hours  -Would avoid hypotension, will not start all of her BP medicines today and hold many of them  -Encourage p.o. intake, oral hydration  -Was prescribed Paxlovid outpatient, attempting to order here however stating contraindicated with ESRD, will hold for now.    ESRD on peritoneal dialysis  -Nephrology consulted    Type 2 diabetes  -Will order some long-acting insulin but it much lower dosing than her home given low p.o. intake  -Low-dose sliding scale    Chronic diastolic CHF  CAD  -Normally on Lipitor.  States that she is off of dual antiplatelets, will readdress this and reviewed in outpatient records.    Chronic orthostatic hypotension  -Follows with neurology  -Resume her atomextine 25mg daily she uses     Fluids: low rate of saline  Diet: renal diet  DVT prophylaxis: heparin subq  Code status: full code    Disposition: observation     Remi [facility] Hospitalist            [1]   Allergies  Allergen Reactions    Clopidogrel  RASH    Sulfa Antibiotics HIVES and UNKNOWN    Tramadol ANXIETY, FEVER and OTHER (SEE COMMENTS)     Serotonin syndrome to scheduled tramadol in setting of use of SSRI    Serotonin Reuptake Inhibitors UNKNOWN

## 2025-03-07 NOTE — ED QUICK NOTES
Orders for admission, patient is aware of plan and ready to go upstairs. Any questions, please call ED RN leidy at extension [phone].     Patient Covid vaccination status: Fully vaccinated     COVID Test Ordered in ED: None    COVID Suspicion at Admission: N/A    Running Infusions:  None    Mental Status/LOC at time of transport: a/o x4    Other pertinent information: PD nightly  CIWA score: N/A   NIH score:  N/A

## 2025-03-07 NOTE — ED INITIAL ASSESSMENT (HPI)
Pt via EMS from home c/o weakness x 3 days and possible syncopal episode today. Per EMS, son is care giver and stated pt had syncopal episode. Per Ems- no fall, no head injury. Pt states she does not remember syncopal episode.     Hx orthostatic hypotension.    Dialysis q night completed with no problems per patient.

## 2025-03-08 PROBLEM — E83.42 HYPOMAGNESEMIA: Status: ACTIVE | Noted: 2025-03-08

## 2025-03-08 LAB
ALBUMIN SERPL-MCNC: 3.3 G/DL (ref 3.2–4.8)
ANION GAP SERPL CALC-SCNC: 13 MMOL/L (ref 0–18)
BASOPHILS # BLD AUTO: 0.02 X10(3) UL (ref 0–0.2)
BASOPHILS NFR BLD AUTO: 0.5 %
BUN BLD-MCNC: 31 MG/DL (ref 9–23)
BUN/CREAT SERPL: 6.4 (ref 10–20)
C DIFF TOX B STL QL: NEGATIVE
CALCIUM BLD-MCNC: 7.4 MG/DL (ref 8.7–10.4)
CHLORIDE SERPL-SCNC: 99 MMOL/L (ref 98–112)
CO2 SERPL-SCNC: 25 MMOL/L (ref 21–32)
CREAT BLD-MCNC: 4.88 MG/DL
DEPRECATED RDW RBC AUTO: 47.6 FL (ref 35.1–46.3)
EGFRCR SERPLBLD CKD-EPI 2021: 9 ML/MIN/1.73M2 (ref 60–?)
EOSINOPHIL # BLD AUTO: 0.15 X10(3) UL (ref 0–0.7)
EOSINOPHIL NFR BLD AUTO: 3.5 %
ERYTHROCYTE [DISTWIDTH] IN BLOOD BY AUTOMATED COUNT: 13.5 % (ref 11–15)
GLUCOSE BLD-MCNC: 154 MG/DL (ref 70–99)
GLUCOSE BLDC GLUCOMTR-MCNC: 129 MG/DL (ref 70–99)
GLUCOSE BLDC GLUCOMTR-MCNC: 145 MG/DL (ref 70–99)
GLUCOSE BLDC GLUCOMTR-MCNC: 149 MG/DL (ref 70–99)
GLUCOSE BLDC GLUCOMTR-MCNC: 178 MG/DL (ref 70–99)
HCT VFR BLD AUTO: 33.3 %
HGB BLD-MCNC: 11.4 G/DL
IMM GRANULOCYTES # BLD AUTO: 0.02 X10(3) UL (ref 0–1)
IMM GRANULOCYTES NFR BLD: 0.5 %
LYMPHOCYTES # BLD AUTO: 2.33 X10(3) UL (ref 1–4)
LYMPHOCYTES NFR BLD AUTO: 54.8 %
MAGNESIUM SERPL-MCNC: 1.5 MG/DL (ref 1.6–2.6)
MCH RBC QN AUTO: 33 PG (ref 26–34)
MCHC RBC AUTO-ENTMCNC: 34.2 G/DL (ref 31–37)
MCV RBC AUTO: 96.5 FL
MONOCYTES # BLD AUTO: 0.3 X10(3) UL (ref 0.1–1)
MONOCYTES NFR BLD AUTO: 7.1 %
NEUTROPHILS # BLD AUTO: 1.43 X10 (3) UL (ref 1.5–7.7)
NEUTROPHILS # BLD AUTO: 1.43 X10(3) UL (ref 1.5–7.7)
NEUTROPHILS NFR BLD AUTO: 33.6 %
OSMOLALITY SERPL CALC.SUM OF ELEC: 294 MOSM/KG (ref 275–295)
PHOSPHATE SERPL-MCNC: 4.2 MG/DL (ref 2.4–5.1)
PLATELET # BLD AUTO: 172 10(3)UL (ref 150–450)
POTASSIUM SERPL-SCNC: 2.9 MMOL/L (ref 3.5–5.1)
RBC # BLD AUTO: 3.45 X10(6)UL
SODIUM SERPL-SCNC: 137 MMOL/L (ref 136–145)
WBC # BLD AUTO: 4.3 X10(3) UL (ref 4–11)

## 2025-03-08 PROCEDURE — 90945 DIALYSIS ONE EVALUATION: CPT | Performed by: INTERNAL MEDICINE

## 2025-03-08 RX ORDER — ASPIRIN 81 MG/1
81 TABLET ORAL DAILY
Status: DISCONTINUED | OUTPATIENT
Start: 2025-03-08 | End: 2025-03-10

## 2025-03-08 RX ORDER — POTASSIUM CHLORIDE 1500 MG/1
40 TABLET, EXTENDED RELEASE ORAL ONCE
Status: COMPLETED | OUTPATIENT
Start: 2025-03-08 | End: 2025-03-08

## 2025-03-08 RX ORDER — POTASSIUM CHLORIDE 1500 MG/1
20 TABLET, EXTENDED RELEASE ORAL ONCE
Status: COMPLETED | OUTPATIENT
Start: 2025-03-08 | End: 2025-03-08

## 2025-03-08 RX ORDER — MAGNESIUM OXIDE 400 MG/1
400 TABLET ORAL ONCE
Status: COMPLETED | OUTPATIENT
Start: 2025-03-08 | End: 2025-03-08

## 2025-03-08 RX ORDER — ATORVASTATIN CALCIUM 80 MG/1
80 TABLET, FILM COATED ORAL NIGHTLY
Status: DISCONTINUED | OUTPATIENT
Start: 2025-03-08 | End: 2025-03-10

## 2025-03-08 RX ORDER — SODIUM CHLORIDE 9 MG/ML
INJECTION, SOLUTION INTRAVENOUS CONTINUOUS
Status: ACTIVE | OUTPATIENT
Start: 2025-03-08 | End: 2025-03-08

## 2025-03-08 NOTE — PLAN OF CARE
Patient is alert and oriented x 4 with stable vitals on room air. Patient positive for orthostatic blood pressure this am. Patient remained free from falls for the duration of the shift and is progressing towards discharge. Call light within reach.

## 2025-03-08 NOTE — PLAN OF CARE
Patient alert and oriented x 4. Vitals stable on room air. Patient denies pain. Patient to receive PD tonight. [facility] called. Call light within reach.    Problem: Patient Centered Care  Goal: Patient preferences are identified and integrated in the patient's plan of care  Description: Interventions:  - What would you like us to know as we care for you? From [facility]  - Provide timely, complete, and accurate information to patient/family  - Incorporate patient and family knowledge, values, beliefs, and cultural backgrounds into the planning and delivery of care  - Encourage patient/family to participate in care and decision-making at the level they choose  - Honor patient and family perspectives and choices  Outcome: Progressing     Problem: Patient/Family Goals  Goal: Patient/Family Long Term Goal  Description: Patient's Long Term Goal: Be able to discharge    Interventions:  - Medication administration  - See additional Care Plan goals for specific interventions  Outcome: Progressing  Goal: Patient/Family Short Term Goal  Description: Patient's Short Term Goal: Resolve weakness    Interventions:   - Medication administration  - Monitoring labs  - See additional Care Plan goals for specific interventions  Outcome: Progressing

## 2025-03-08 NOTE — PLAN OF CARE
Peritoneal dialysis on going. Stable vital signs. Enteric/ contact isolation maintained for Covid. Needs stool sample to r/o  C- diff- patient instructed & verbalized understanding. PT/OT  consult.    Problem: Patient Centered Care  Goal: Patient preferences are identified and integrated in the patient's plan of care  Description: Interventions:  - What would you like us to know as we care for you? Lives at [facility].  - Provide timely, complete, and accurate information to patient/family  - Incorporate patient and family knowledge, values, beliefs, and cultural backgrounds into the planning and delivery of care  - Encourage patient/family to participate in care and decision-making at the level they choose  - Honor patient and family perspectives and choices  Outcome: Progressing     Problem: CARDIOVASCULAR - ADULT  Goal: Maintains optimal cardiac output and hemodynamic stability  Description: INTERVENTIONS:  - Monitor vital signs, rhythm, and trends  - Monitor for bleeding, hypotension and signs of decreased cardiac output  - Evaluate effectiveness of vasoactive medications to optimize hemodynamic stability  - Monitor arterial and/or venous puncture sites for bleeding and/or hematoma  - Assess quality of pulses, skin color and temperature  - Assess for signs of decreased coronary artery perfusion - ex. Angina  - Evaluate fluid balance, assess for edema, trend weights  Outcome: Progressing  Goal: Absence of cardiac arrhythmias or at baseline  Description: INTERVENTIONS:  - Continuous cardiac monitoring, monitor vital signs, obtain 12 lead EKG if indicated  - Evaluate effectiveness of antiarrhythmic and heart rate control medications as ordered  - Initiate emergency measures for life threatening arrhythmias  - Monitor electrolytes and administer replacement therapy as ordered  Outcome: Progressing     Problem: RISK FOR INFECTION - ADULT  Goal: Absence of fever/infection during anticipated neutropenic  period  Description: INTERVENTIONS  - Monitor WBC  - Administer growth factors as ordered  - Implement neutropenic guidelines  Outcome: Progressing     Problem: SAFETY ADULT - FALL  Goal: Free from fall injury  Description: INTERVENTIONS:  - Assess pt frequently for physical needs  - Identify cognitive and physical deficits and behaviors that affect risk of falls.  - [place] fall precautions as indicated by assessment.  - Educate pt/family on patient safety including physical limitations  - Instruct pt to call for assistance with activity based on assessment  - Modify environment to reduce risk of injury  - Provide assistive devices as appropriate  - Consider OT/PT consult to assist with strengthening/mobility  - Encourage toileting schedule  Outcome: Progressing

## 2025-03-08 NOTE — PROGRESS NOTES
[facility] Hospitalist Progress Note     CC: Hospital Follow up    PCP: Ambreen Sandoval MD       Assessment/Plan:   This is a 69-year-old female with a complicated medical history including ESRD on peritoneal dialysis, diabetes, coronary artery disease, depression, orthostatic hypotension, chronic diastolic CHF, who is here for evaluation of weakness, presyncopal episodes, dizziness, weakness, decreased p.o. intake.       COVID 19  Generalized weakness, presyncopal episodes  Recurrent acute on chronic orthostatic hypotension  -Suspect she is hypovolemic, dehydrated, orthostatic, from recent viral illness, recently positive for COVID, not eating and drinking much  -gave saline infusion with 75cc/hr for 12 hours on admission  -had PD yesterday, maybe too much fluid removed? Giving back some volume today with saline again today  -Would avoid hypotension, will not start all of her BP medicines today, on coreg only for now   -Encourage p.o. intake, oral hydration  -Was prescribed Paxlovid outpatient, attempting to order here however stating contraindicated with ESRD, can hold for now. Does not have significant respiratory symptoms at all. Not on oxygen     ESRD on peritoneal dialysis  -Nephrology consulted     Type 2 diabetes  -long-acting insulin but at lower dosing than her home given low p.o. intake  -Low-dose sliding scale     Chronic diastolic CHF  CAD  -Normally on Lipitor and aspirin, resume today      Chronic orthostatic hypotension  -Follows with neurology  -Resume her atomextine 25mg daily if she can bring here  -giving 1 liter of fluid bolus this AM  -ambulate and mobilize as able to see how she is doing symptom wise      Fluids: low rate of saline  Diet: renal diet  DVT prophylaxis: heparin subq  Code status: full code     Disposition: observation      Remi Whyte [facility] Hospitalist      Subjective:     At rest and laying she feels ok.     OBJECTIVE:    Blood pressure 132/76, pulse  67, temperature 98.1 °F (36.7 °C), temperature source Oral, resp. rate 16, weight 176 lb 1.6 oz (79.9 kg), SpO2 95%, not currently breastfeeding.    Temp:  [97.9 °F (36.6 °C)-98.8 °F (37.1 °C)] 98.1 °F (36.7 °C)  Pulse:  [62-70] 67  Resp:  [14-16] 16  BP: ()/(53-93) 132/76  SpO2:  [92 %-97 %] 95 %      Intake/Output:    Intake/Output Summary (Last 24 hours) at 3/8/2025 0952  Last data filed at 3/8/2025 0747  Gross per 24 hour   Intake 1132.5 ml   Output 3441 ml   Net -2308.5 ml       Last 3 Weights   03/08/25 0457 176 lb 1.6 oz (79.9 kg)   03/07/25 1245 175 lb (79.4 kg)   11/08/24 1503 178 lb (80.7 kg)   09/20/24 1015 176 lb (79.8 kg)       /76 (BP Location: Right arm)   Pulse 67   Temp 98.1 °F (36.7 °C) (Oral)   Resp 16   Wt 176 lb 1.6 oz (79.9 kg)   SpO2 95%   BMI 31.19 kg/m²   General: Alert, no acute distress  Lungs: clear to ausculation bilaterally  Heart: Regular rate and rhythm  Abdomen: soft, non tender  Extremities: No edema    Data Review:       Labs:     Recent Labs   Lab 03/04/25  1742 03/07/25  1248 03/08/25  0745   RBC 3.38* 3.84 3.45*   HGB 10.8* 12.7 11.4*   HCT 32.3* 37.9 33.3*   MCV 95.6 98.7 96.5   MCH 32.0 33.1 33.0   MCHC 33.4 33.5 34.2   RDW 13.8 13.6 13.5   NEPRELIM 3.98 2.41 1.43*   WBC 6.6 4.8 4.3   .0 193.0 172.0         Recent Labs   Lab 03/04/25  1742 03/07/25  1248 03/08/25  0745   GLU 94 155* 154*   BUN 33* 32* 31*   CREATSERUM 4.60* 5.13* 4.88*   EGFRCR 10* 8* 9*   CA 8.5* 8.1* 7.4*   * 139 137   K 4.1 3.8 2.9*   CL 97* 99 99   CO2 27.0 28.0 25.0       Recent Labs   Lab 03/04/25  1742 03/07/25  1248 03/08/25  0745   ALT 8* 13  --    AST 12 22  --    ALB 3.7 3.8 3.3         Imaging:  XR CHEST AP PORTABLE  (CPT=71045)    Result Date: 3/7/2025  CONCLUSION:   Minimal bibasilar opacities, which are favored to reflect atelectasis/scarring.    Dictated by (CST): Todd Calles MD on 3/07/2025 at 2:45 PM     Finalized by (CST): Todd Calles MD on 3/07/2025 at  2:46 PM             Meds:      potassium chloride  40 mEq Oral Once    potassium chloride  20 mEq Oral Once    allopurinol  150 mg Oral Daily    [Held by provider] atomoxetine  25 mg Oral Daily    buPROPion ER  300 mg Oral Daily    carvedilol  37.5 mg Oral BID with meals    diazePAM  5 mg Oral BID    levothyroxine  125 mcg Oral Daily @ 0700    pantoprazole  40 mg Oral QAM AC    sertraline  200 mg Oral Daily    heparin  5,000 Units Subcutaneous Q8H JERRY    insulin degludec  12 Units Subcutaneous Nightly    insulin aspart  1-5 Units Subcutaneous TID CC    dextrose 1.5%-calcium 2.5 mEq/L  5,000 mL Intraperitoneal Once in dialysis    dextrose 1.5%-calcium 2.5 mEq/L  2,000 mL Intraperitoneal Once in dialysis      sodium chloride         albuterol    acetaminophen    glucose **OR** glucose **OR** glucose-vitamin C **OR** dextrose **OR** glucose **OR** glucose **OR** glucose-vitamin C

## 2025-03-08 NOTE — CONSULTS
[facility]    PATIENT'S NAME: LALA BAKER   ATTENDING PHYSICIAN: Remi Segovia DO   CONSULTING PHYSICIAN: Eulogio Green MD   PATIENT ACCOUNT#:   [id number]    LOCATION:  [address] [place] [address]  MEDICAL RECORD #:   [id number]       YOB: 1955  ADMISSION DATE:       03/07/2025      CONSULT DATE:  03/07/2025    REPORT OF CONSULTATION      HISTORY OF PRESENT ILLNESS:  The patient is a 69-year-old female with a history of end-stage renal disease, on chronic peritoneal dialysis; history of adult-onset diabetes mellitus; coronary artery disease; significant orthostatic hypotension; congestive heart failure; depression; who during this past week has developed gradually increasing generalized weakness to the point that she has been having difficulty ambulating.  This has also been associated with low-grade fevers and generalized weakness, slight cough.  She tested positive for COVID at the facility in which she lives yesterday.  The patient does have a history to of significant orthostatic hypotension.  She has had a number of episodes that resulted in syncope resulting in trauma.     The patient states she really has not been eating or drinking as well as she used to.  Just feels weak and drawn out.    PAST MEDICAL HISTORY:  As stated above.  She also does have some problems with depression and possibly ADHD.  The patient's last echocardiogram was done in September 2023 and showed a left ventricular ejection fraction of 60%.     MEDICATIONS:  Her current medications include diazepam, Wellbutrin, Zoloft, Strattera, potassium chloride, amlodipine, insulin, carvedilol, allopurinol, levothyroxine, Protonix, hydralazine, Os-Bari 500 with vitamin D.     ALLERGIES:  The patient is allergic to sulfa drugs, tramadol, serotonin reuptake inhibitors, clopidogrel.    SOCIAL HISTORY:  She is a nonsmoker.  Lives at assisted living facility.    FAMILY HISTORY:  Negative for renal pathology.    REVIEW OF SYSTEMS:   Again, the patient complains mainly of just feeling weaker than usual, decreased oral intake, low-grade fevers.  No significant shortness of breath, GI or urinary tract symptoms.      PHYSICAL EXAMINATION:    GENERAL:  The patient is awake and alert but just looks washed out.  VITAL SIGNS:  Blood pressure 138/80, with a pulse of 67, respirations 16, temperature 98.5, O2 saturation was 96% on room air.  Listed weight 175 pounds.    HEENT:  Mucous membranes were dry.  Skin turgor was diminished.  NECK:  Supple without JVD or lymphadenopathy.  LUNGS:  Clear to auscultation and percussion.  HEART:  Regular rate and rhythm with an S4, but no other gallops, murmurs, or rubs.  ABDOMEN:  Soft, flat, nontender without organomegaly, masses, or bruits.  EXTREMITIES:  No edema.    LABORATORY DATA:  An A1c of 7.1.  BUN and creatinine of 32 and 5.13, respectively.  White count 4.8, hemoglobin 12.7.    IMPRESSION:  The patient is a 69-year-old female now with:    1.   End-stage renal disease, on chronic peritoneal dialysis.  2.   COVID positive and is manifested by generalized weakness and decreasing oral intake along with low-grade fevers.  3.   Adult-onset diabetes mellitus.  4.   History of coronary artery disease and congestive heart failure.  5.   Significant orthostatic hypotension, which has led to syncopal episodes.  6.   History of depression.    PLAN:  The patient does appear to be mildly volume depleted.  Will give her 1 L of normal saline.  Otherwise, will follow with I's and O's, daily weights, and serial chemistries.  CCPD was arranged for tonight without fluid removal.  Check orthostatic blood pressures.  Otherwise COVID treatment as per the hospitalist.    Dictated By Eulogio Green MD  d: 03/07/2025 18:36:36  t: 03/07/2025 18:57:55  Job [id number]  [facility]/

## 2025-03-09 PROBLEM — E83.51 HYPOCALCEMIA: Status: ACTIVE | Noted: 2025-03-09

## 2025-03-09 LAB
ALBUMIN SERPL-MCNC: 3 G/DL (ref 3.2–4.8)
ANION GAP SERPL CALC-SCNC: 9 MMOL/L (ref 0–18)
BASOPHILS # BLD AUTO: 0.03 X10(3) UL (ref 0–0.2)
BASOPHILS NFR BLD AUTO: 0.7 %
BUN BLD-MCNC: 32 MG/DL (ref 9–23)
BUN/CREAT SERPL: 6.1 (ref 10–20)
CALCIUM BLD-MCNC: 6.9 MG/DL (ref 8.7–10.4)
CHLORIDE SERPL-SCNC: 107 MMOL/L (ref 98–112)
CO2 SERPL-SCNC: 23 MMOL/L (ref 21–32)
CREAT BLD-MCNC: 5.23 MG/DL
DEPRECATED RDW RBC AUTO: 47.8 FL (ref 35.1–46.3)
EGFRCR SERPLBLD CKD-EPI 2021: 8 ML/MIN/1.73M2 (ref 60–?)
EOSINOPHIL # BLD AUTO: 0.09 X10(3) UL (ref 0–0.7)
EOSINOPHIL NFR BLD AUTO: 2.2 %
ERYTHROCYTE [DISTWIDTH] IN BLOOD BY AUTOMATED COUNT: 13.2 % (ref 11–15)
GLUCOSE BLD-MCNC: 106 MG/DL (ref 70–99)
GLUCOSE BLDC GLUCOMTR-MCNC: 113 MG/DL (ref 70–99)
GLUCOSE BLDC GLUCOMTR-MCNC: 139 MG/DL (ref 70–99)
GLUCOSE BLDC GLUCOMTR-MCNC: 139 MG/DL (ref 70–99)
GLUCOSE BLDC GLUCOMTR-MCNC: 164 MG/DL (ref 70–99)
HCT VFR BLD AUTO: 31.5 %
HGB BLD-MCNC: 10.6 G/DL
IMM GRANULOCYTES # BLD AUTO: 0.01 X10(3) UL (ref 0–1)
IMM GRANULOCYTES NFR BLD: 0.2 %
LYMPHOCYTES # BLD AUTO: 2.13 X10(3) UL (ref 1–4)
LYMPHOCYTES NFR BLD AUTO: 52 %
MCH RBC QN AUTO: 33.1 PG (ref 26–34)
MCHC RBC AUTO-ENTMCNC: 33.7 G/DL (ref 31–37)
MCV RBC AUTO: 98.4 FL
MONOCYTES # BLD AUTO: 0.23 X10(3) UL (ref 0.1–1)
MONOCYTES NFR BLD AUTO: 5.6 %
NEUTROPHILS # BLD AUTO: 1.61 X10 (3) UL (ref 1.5–7.7)
NEUTROPHILS # BLD AUTO: 1.61 X10(3) UL (ref 1.5–7.7)
NEUTROPHILS NFR BLD AUTO: 39.3 %
OSMOLALITY SERPL CALC.SUM OF ELEC: 295 MOSM/KG (ref 275–295)
PHOSPHATE SERPL-MCNC: 3.3 MG/DL (ref 2.4–5.1)
PLATELET # BLD AUTO: 157 10(3)UL (ref 150–450)
POTASSIUM SERPL-SCNC: 4 MMOL/L (ref 3.5–5.1)
RBC # BLD AUTO: 3.2 X10(6)UL
SODIUM SERPL-SCNC: 139 MMOL/L (ref 136–145)
VIT D+METAB SERPL-MCNC: 29.2 NG/ML (ref 30–100)
WBC # BLD AUTO: 4.1 X10(3) UL (ref 4–11)

## 2025-03-09 PROCEDURE — 90945 DIALYSIS ONE EVALUATION: CPT | Performed by: INTERNAL MEDICINE

## 2025-03-09 RX ORDER — MIDODRINE HYDROCHLORIDE 2.5 MG/1
2.5 TABLET ORAL 3 TIMES DAILY
Status: DISCONTINUED | OUTPATIENT
Start: 2025-03-09 | End: 2025-03-10

## 2025-03-09 RX ORDER — DEXTROSE MONOHYDRATE, SODIUM CHLORIDE, SODIUM LACTATE, CALCIUM CHLORIDE, MAGNESIUM CHLORIDE 1.5; 538; 448; 18.4; 5.08 G/100ML; MG/100ML; MG/100ML; MG/100ML; MG/100ML
5000 SOLUTION INTRAPERITONEAL
Status: COMPLETED | OUTPATIENT
Start: 2025-03-09 | End: 2025-03-09

## 2025-03-09 RX ORDER — CHOLECALCIFEROL (VITAMIN D3) 25 MCG
2000 TABLET ORAL DAILY
Status: DISCONTINUED | OUTPATIENT
Start: 2025-03-09 | End: 2025-03-10

## 2025-03-09 NOTE — PROGRESS NOTES
[facility] [place] Hospitalist Progress Note     CC: Hospital Follow up    PCP: Ambreen Sandoval MD       Assessment/Plan:   This is a 69-year-old female with a complicated medical history including ESRD on peritoneal dialysis, diabetes, coronary artery disease, depression, orthostatic hypotension, chronic diastolic CHF, who is here for evaluation of weakness, presyncopal episodes, dizziness, weakness, decreased p.o. intake.       COVID 19  Generalized weakness, presyncopal episodes  Recurrent acute on chronic orthostatic hypotension  -Suspect she is hypovolemic, dehydrated, orthostatic, from recent viral illness, recently positive for COVID, not eating and drinking much  -gave saline infusion with 75cc/hr for 12 hours on admission, then another 1 liter total on 3/8  -PD was held last night   -Encourage p.o. intake, oral hydration  -Was prescribed Paxlovid outpatient,held here, stating contraindicated when try to order in epic given ESRD    Acute on Chronic orthostatic hypotension  -Follows with neurology  -would resume her atomextine 25mg daily if she can bring here  -gave 1 liter over 10 hours yesterday  -still a bit orthostatic this AM  -renal following too  -considering midodrine   -ambulate and mobilize as able to see how she is doing symptom wise      ESRD on peritoneal dialysis  -Nephrology consulted     Type 2 diabetes  -long-acting insulin but at lower dosing than her home given low p.o. intake  -Low-dose sliding scale     Chronic diastolic CHF  CAD  -Normally on Lipitor and aspirin, resume       Diet: renal diet  DVT prophylaxis: heparin subq  Code status: full code     Disposition: observation      [facility] Hospitalist      Subjective:     At rest and laying she feels ok.     OBJECTIVE:    Blood pressure 102/66, pulse 72, temperature 99.7 °F (37.6 °C), temperature source Oral, resp. rate 18, weight 177 lb 3.2 oz (80.4 kg), SpO2 96%, not currently breastfeeding.    Temp:  [98.3  °F (36.8 °C)-99.7 °F (37.6 °C)] 99.7 °F (37.6 °C)  Pulse:  [63-72] 72  Resp:  [14-18] 18  BP: (102-159)/(65-83) 102/66  SpO2:  [95 %-99 %] 96 %      Intake/Output:    Intake/Output Summary (Last 24 hours) at 3/9/2025 1251  Last data filed at 3/9/2025 1028  Gross per 24 hour   Intake 560 ml   Output 0 ml   Net 560 ml       Last 3 Weights   03/09/25 0522 177 lb 3.2 oz (80.4 kg)   03/08/25 0457 176 lb 1.6 oz (79.9 kg)   03/07/25 1245 175 lb (79.4 kg)   11/08/24 1503 178 lb (80.7 kg)   09/20/24 1015 176 lb (79.8 kg)       /66 (BP Location: Right arm)   Pulse 72   Temp 99.7 °F (37.6 °C) (Oral)   Resp 18   Wt 177 lb 3.2 oz (80.4 kg)   SpO2 96%   BMI 31.39 kg/m²   General: Alert, no acute distress  Lungs: clear to ausculation bilaterally  Heart: Regular rate and rhythm  Abdomen: soft, non tender  Extremities: No edema    Data Review:       Labs:     Recent Labs   Lab 03/07/25  1248 03/08/25  0745 03/09/25  0704   RBC 3.84 3.45* 3.20*   HGB 12.7 11.4* 10.6*   HCT 37.9 33.3* 31.5*   MCV 98.7 96.5 98.4   MCH 33.1 33.0 33.1   MCHC 33.5 34.2 33.7   RDW 13.6 13.5 13.2   NEPRELIM 2.41 1.43* 1.61   WBC 4.8 4.3 4.1   .0 172.0 157.0         Recent Labs   Lab 03/07/25  1248 03/08/25  0745 03/09/25  0704   * 154* 106*   BUN 32* 31* 32*   CREATSERUM 5.13* 4.88* 5.23*   EGFRCR 8* 9* 8*   CA 8.1* 7.4* 6.9*    137 139   K 3.8 2.9* 4.0   CL 99 99 107   CO2 28.0 25.0 23.0       Recent Labs   Lab 03/04/25  1742 03/07/25  1248 03/08/25  0745 03/09/25  0704   ALT 8* 13  --   --    AST 12 22  --   --    ALB 3.7 3.8 3.3 3.0*         Imaging:  XR CHEST AP PORTABLE  (CPT=71045)    Result Date: 3/7/2025  CONCLUSION:   Minimal bibasilar opacities, which are favored to reflect atelectasis/scarring.    Dictated by (CST): Todd Calles MD on 3/07/2025 at 2:45 PM     Finalized by (CST): Todd Calles MD on 3/07/2025 at 2:46 PM             Meds:      aspirin  81 mg Oral Daily    atorvastatin  80 mg Oral Nightly     allopurinol  150 mg Oral Daily    buPROPion ER  300 mg Oral Daily    carvedilol  37.5 mg Oral BID with meals    diazePAM  5 mg Oral BID    levothyroxine  125 mcg Oral Daily @ 0700    pantoprazole  40 mg Oral QAM AC    sertraline  200 mg Oral Daily    heparin  5,000 Units Subcutaneous Q8H JERRY    insulin degludec  12 Units Subcutaneous Nightly    insulin aspart  1-5 Units Subcutaneous TID CC    dextrose 1.5%-calcium 2.5 mEq/L  5,000 mL Intraperitoneal Once in dialysis    dextrose 1.5%-calcium 2.5 mEq/L  2,000 mL Intraperitoneal Once in dialysis           albuterol    acetaminophen    glucose **OR** glucose **OR** glucose-vitamin C **OR** dextrose **OR** glucose **OR** glucose **OR** glucose-vitamin C

## 2025-03-09 NOTE — PLAN OF CARE
Problem: CARDIOVASCULAR - ADULT  Goal: Maintains optimal cardiac output and hemodynamic stability  Description: INTERVENTIONS:  - Monitor vital signs, rhythm, and trends  - Monitor for bleeding, hypotension and signs of decreased cardiac output  - Evaluate effectiveness of vasoactive medications to optimize hemodynamic stability  - Monitor arterial and/or venous puncture sites for bleeding and/or hematoma  - Assess quality of pulses, skin color and temperature  - Assess for signs of decreased coronary artery perfusion - ex. Angina  - Evaluate fluid balance, assess for edema, trend weights  Outcome: Progressing  Goal: Absence of cardiac arrhythmias or at baseline  Description: INTERVENTIONS:  - Continuous cardiac monitoring, monitor vital signs, obtain 12 lead EKG if indicated  - Evaluate effectiveness of antiarrhythmic and heart rate control medications as ordered  - Initiate emergency measures for life threatening arrhythmias  - Monitor electrolytes and administer replacement therapy as ordered  Outcome: Progressing     Problem: RISK FOR INFECTION - ADULT  Goal: Absence of fever/infection during anticipated neutropenic period  Description: INTERVENTIONS  - Monitor WBC  - Administer growth factors as ordered  - Implement neutropenic guidelines  Outcome: Progressing     Problem: SAFETY ADULT - FALL  Goal: Free from fall injury  Description: INTERVENTIONS:  - Assess pt frequently for physical needs  - Identify cognitive and physical deficits and behaviors that affect risk of falls.  - [place] fall precautions as indicated by assessment.  - Educate pt/family on patient safety including physical limitations  - Instruct pt to call for assistance with activity based on assessment  - Modify environment to reduce risk of injury  - Provide assistive devices as appropriate  - Consider OT/PT consult to assist with strengthening/mobility  - Encourage toileting schedule  Outcome: Progressing

## 2025-03-09 NOTE — PLAN OF CARE
Pt/ot consult.    Problem: Patient Centered Care  Goal: Patient preferences are identified and integrated in the patient's plan of care  Description: Interventions:  - What would you like us to know as we care for you?lives at [facility].  - Provide timely, complete, and accurate information to patient/family  - Incorporate patient and family knowledge, values, beliefs, and cultural backgrounds into the planning and delivery of care  - Encourage patient/family to participate in care and decision-making at the level they choose  - Honor patient and family perspectives and choices  Outcome: Progressing       Problem: CARDIOVASCULAR - ADULT  Goal: Maintains optimal cardiac output and hemodynamic stability  Description: INTERVENTIONS:  - Monitor vital signs, rhythm, and trends  - Monitor for bleeding, hypotension and signs of decreased cardiac output  - Evaluate effectiveness of vasoactive medications to optimize hemodynamic stability  - Monitor arterial and/or venous puncture sites for bleeding and/or hematoma  - Assess quality of pulses, skin color and temperature  - Assess for signs of decreased coronary artery perfusion - ex. Angina  - Evaluate fluid balance, assess for edema, trend weights  Outcome: Progressing  Goal: Absence of cardiac arrhythmias or at baseline  Description: INTERVENTIONS:  - Continuous cardiac monitoring, monitor vital signs, obtain 12 lead EKG if indicated  - Evaluate effectiveness of antiarrhythmic and heart rate control medications as ordered  - Initiate emergency measures for life threatening arrhythmias  - Monitor electrolytes and administer replacement therapy as ordered  Outcome: Progressing     Problem: RISK FOR INFECTION - ADULT  Goal: Absence of fever/infection during anticipated neutropenic period  Description: INTERVENTIONS  - Monitor WBC  - Administer growth factors as ordered  - Implement neutropenic guidelines  Outcome: Progressing     Problem: SAFETY ADULT -  FALL  Goal: Free from fall injury  Description: INTERVENTIONS:  - Assess pt frequently for physical needs  - Identify cognitive and physical deficits and behaviors that affect risk of falls.  - [place] fall precautions as indicated by assessment.  - Educate pt/family on patient safety including physical limitations  - Instruct pt to call for assistance with activity based on assessment  - Modify environment to reduce risk of injury  - Provide assistive devices as appropriate  - Consider OT/PT consult to assist with strengthening/mobility  - Encourage toileting schedule  Outcome: Progressing

## 2025-03-09 NOTE — PROGRESS NOTES
[facility]  part of [facility]    Progress Note      Subjective:     Patient lying comfortably.  Remains weak but feels better.  Appetite better.    Review of Systems:   Constitutional: +ve weakness   Eyes: negative for irritation, redness and visual disturbance  Ears, nose, mouth, throat, and face: negative for hearing loss and sore throat  Respiratory: negative for cough, hemoptysis and wheezing  Cardiovascular: negative for chest pain, exertional dyspnea, lower extremity edema and palpitations  Gastrointestinal: negative for abdominal pain, diarrhea and nausea  Genitourinary:negative for dysuria, frequency and hematuria  Hematologic/lymphatic: negative for bleeding and easy bruising  Neurological: negative for gait problems, memory problems and seizures  Behavioral/Psych: negative for anxiety and depression    Objective:   Temp:  [98.3 °F (36.8 °C)-99.7 °F (37.6 °C)] 99.7 °F (37.6 °C)  Pulse:  [63-72] 72  Resp:  [14-18] 18  BP: (102-159)/(65-83) 102/66  SpO2:  [95 %-99 %] 96 %  SpO2: 96 %     Intake/Output Summary (Last 24 hours) at 3/9/2025 1327  Last data filed at 3/9/2025 1028  Gross per 24 hour   Intake 560 ml   Output 0 ml   Net 560 ml     Wt Readings from Last 3 Encounters:   03/09/25 177 lb 3.2 oz (80.4 kg)   11/08/24 178 lb (80.7 kg)   09/20/24 176 lb (79.8 kg)       General appearance: alert, appears stated age and cooperative  Head: Normocephalic, atraumatic  Eyes: conjunctivae/corneas clear  Throat: lips, mucosa, and tongue normal; teeth and gums normal  Neck:  no JVD, supple, symmetrical  Abdominal: soft, non-tender; non distended, bowel sounds normal   Extremities: extremities normal, no edema  Skin: No rashes or lesions  Neurologic: Grossly normal  Psychiatric: calm    Medications:  Current Facility-Administered Medications   Medication Dose Route Frequency    aspirin DR tab 81 mg  81 mg Oral Daily    atorvastatin (Lipitor) tab 80 mg  80 mg Oral Nightly    albuterol (Ventolin  HFA) 108 (90 Base) MCG/ACT inhaler 4 puff  4 puff Inhalation Q4H PRN    allopurinol (Zyloprim) tab 150 mg  150 mg Oral Daily    buPROPion ER (Wellbutrin XL) 24 hr tab 300 mg  300 mg Oral Daily    carvedilol (Coreg) tab 37.5 mg  37.5 mg Oral BID with meals    diazePAM (Valium) tab 5 mg  5 mg Oral BID    levothyroxine (Synthroid) tab 125 mcg  125 mcg Oral Daily @ 0700    pantoprazole (Protonix) DR tab 40 mg  40 mg Oral QAM AC    sertraline (Zoloft) tab 200 mg  200 mg Oral Daily    heparin (Porcine) 5000 UNIT/ML injection 5,000 Units  5,000 Units Subcutaneous Q8H JERRY    acetaminophen (Tylenol Extra Strength) tab 500 mg  500 mg Oral Q6H PRN    glucose (Dex4) 15 GM/59ML oral liquid 15 g  15 g Oral Q15 Min PRN    Or    glucose (Glutose) 40% oral gel 15 g  15 g Oral Q15 Min PRN    Or    glucose-vitamin C (Dex-4) chewable tab 4 tablet  4 tablet Oral Q15 Min PRN    Or    dextrose 50% injection 50 mL  50 mL Intravenous Q15 Min PRN    Or    glucose (Dex4) 15 GM/59ML oral liquid 30 g  30 g Oral Q15 Min PRN    Or    glucose (Glutose) 40% oral gel 30 g  30 g Oral Q15 Min PRN    Or    glucose-vitamin C (Dex-4) chewable tab 8 tablet  8 tablet Oral Q15 Min PRN    insulin degludec (Tresiba) 100 units/mL flextouch 12 Units  12 Units Subcutaneous Nightly    insulin aspart (NovoLOG) 100 Units/mL FlexPen 1-5 Units  1-5 Units Subcutaneous TID CC    dextrose 1.5%-calcium 2.5 mEq/L peritoneal solution  5,000 mL Intraperitoneal Once in dialysis    dextrose 1.5%-calcium 2.5 mEq/L peritoneal solution  2,000 mL Intraperitoneal Once in dialysis          Results:     Recent Labs   Lab 03/07/25  1248 03/08/25  0745 03/09/25  0704   RBC 3.84 3.45* 3.20*   HGB 12.7 11.4* 10.6*   HCT 37.9 33.3* 31.5*   MCV 98.7 96.5 98.4   NEPRELIM 2.41 1.43* 1.61   WBC 4.8 4.3 4.1   .0 172.0 157.0     Recent Labs   Lab 03/04/25  1742 03/07/25  1248 03/08/25  0745 03/09/25  0704   GLU 94 155* 154* 106*   BUN 33* 32* 31* 32*   CREATSERUM 4.60* 5.13* 4.88* 5.23*    CA 8.5* 8.1* 7.4* 6.9*   ALB 3.7 3.8 3.3 3.0*   * 139 137 139   K 4.1 3.8 2.9* 4.0   CL 97* 99 99 107   CO2 27.0 28.0 25.0 23.0   ALKPHO 153* 122  --   --    AST 12 22  --   --    ALT 8* 13  --   --    BILT 0.3 0.3  --   --    TP 6.1 6.2  --   --      PTT   Date Value Ref Range Status   09/21/2023 63.6 (H) 23.3 - 35.6 seconds Final     Comment:     Elevations of the aPTT in patients not receiving  anticoagulant therapy (Heparin, etc.), may be  seen in Factor deficiency, vitamin K deficiency,  factor inhibitors, liver disease, etc.  Clinical correlation is recommended.     09/19/2017 33.6 25.0 - 34.0 seconds Final   07/16/2014 26.0 25.0 - 34.0 SECONDS Final     Comment:     The aPTT Heparin Theraputic Range is approximately 65 - 104  seconds. The theraputic range has been validated against  0.3 - 0.7 heparin anti-Xa units/mL.       INR   Date Value Ref Range Status   09/19/2017 0.99 0.89 - 1.11 Final   07/16/2014 1.10 (H) 0.93 - 1.07 Final     Comment:     INR Therapeutic Interval Group A (2.00-3.00)  Venous Thrombosis, Pulmonary   Systemic Thrombosis,  Tissue Heart Valve, Acute Myocardial Infarction,  Valvular Heart Disease or Atrial Fibrillation  INR Therapeutic Interval Group B (2.50-3.50)  Recurrent Systemic Embolism, or  Mechanical Prosthetic Valve       No results for input(s): \"BNP\" in the last 168 hours.  Recent Labs   Lab 03/08/25  0745 03/09/25  0704   MG 1.5*  --    PHOS 4.2 3.3        Recent Labs   Lab 03/07/25  1248 03/08/25  0745 03/09/25  0704   PHOS  --  4.2 3.3   ALB 3.8 3.3 3.0*       XR CHEST AP PORTABLE  (CPT=71045)    Result Date: 3/7/2025  CONCLUSION:   Minimal bibasilar opacities, which are favored to reflect atelectasis/scarring.    Dictated by (CST): Todd Calles MD on 3/07/2025 at 2:45 PM     Finalized by (CST): Todd Calles MD on 3/07/2025 at 2:46 PM               Assessment and Plan:     Patient is a 69-year-old female with past medical history of ESRD on peritoneal dialysis,  diabetes, significant orthostatic hypotension, congestive heart failure, depression who presented for gradually increasing weakness and found to have COVID    1.ESRD: On PD  S/p CCPD on 3/7 with UF more than 3 L  PD was held last night given significant orthostasis-will resume PD from today with last cycles  Hypokalemia: Potassium replaced and improved  Hypomagnesemia: Replace replaced and within normal limits  Phos within normal limits    2.orthostatic hypotension:   Normal saline IV fluid for 1 L-remain orthostasis but improved  and patient less symptomatic  Will resume PD with last cycle today  Recheck orthostasis tomorrow  Start amiodarone gentle dose    3.anemia: Decline in hemoglobin noted-likely secondary to IV fluid  Recheck in a.m.    4.hypocalcemia: Corrected calcium 7.7  Will check ionized calcium in a.m.  Start on vitamin D-check vitamin D level    Discussed with nursing and will discuss with Dr. Juliette Woody MD

## 2025-03-09 NOTE — PROGRESS NOTES
[facility]  part of [facility]    Progress Note      Subjective:     Patient lying comfortably.  Denies any chest pain.  Stated appetite is better but still feels weak.  PD with UF close to 3 L      Review of Systems:   Constitutional: +ve weakness   Eyes: negative for irritation, redness and visual disturbance  Ears, nose, mouth, throat, and face: negative for hearing loss and sore throat  Respiratory: negative for cough, hemoptysis and wheezing  Cardiovascular: negative for chest pain, exertional dyspnea, lower extremity edema and palpitations  Gastrointestinal: negative for abdominal pain, diarrhea and nausea  Genitourinary:negative for dysuria, frequency and hematuria  Hematologic/lymphatic: negative for bleeding and easy bruising  Neurological: negative for gait problems, memory problems and seizures  Behavioral/Psych: negative for anxiety and depression    Objective:   Temp:  [97.9 °F (36.6 °C)-98.5 °F (36.9 °C)] 98.1 °F (36.7 °C)  Pulse:  [62-70] 67  Resp:  [14-16] 16  BP: ()/(53-93) 132/76  SpO2:  [95 %-97 %] 95 %  SpO2: 95 %     Intake/Output Summary (Last 24 hours) at 3/8/2025 1816  Last data filed at 3/8/2025 1806  Gross per 24 hour   Intake 1472.5 ml   Output 3441 ml   Net -1968.5 ml     Wt Readings from Last 3 Encounters:   03/08/25 176 lb 1.6 oz (79.9 kg)   11/08/24 178 lb (80.7 kg)   09/20/24 176 lb (79.8 kg)       General appearance: alert, appears stated age and cooperative  Head: Normocephalic, atraumatic  Eyes: conjunctivae/corneas clear  Throat: lips, mucosa, and tongue normal; teeth and gums normal  Neck:  no JVD, supple, symmetrical  Abdominal: soft, non-tender; non distended, bowel sounds normal   Extremities: extremities normal, no edema  Skin: No rashes or lesions  Neurologic: Grossly normal  Psychiatric: calm    Medications:  Current Facility-Administered Medications   Medication Dose Route Frequency    sodium chloride 0.9% infusion   Intravenous Continuous     aspirin DR tab 81 mg  81 mg Oral Daily    atorvastatin (Lipitor) tab 80 mg  80 mg Oral Nightly    albuterol (Ventolin HFA) 108 (90 Base) MCG/ACT inhaler 4 puff  4 puff Inhalation Q4H PRN    allopurinol (Zyloprim) tab 150 mg  150 mg Oral Daily    buPROPion ER (Wellbutrin XL) 24 hr tab 300 mg  300 mg Oral Daily    carvedilol (Coreg) tab 37.5 mg  37.5 mg Oral BID with meals    diazePAM (Valium) tab 5 mg  5 mg Oral BID    levothyroxine (Synthroid) tab 125 mcg  125 mcg Oral Daily @ 0700    pantoprazole (Protonix) DR tab 40 mg  40 mg Oral QAM AC    sertraline (Zoloft) tab 200 mg  200 mg Oral Daily    heparin (Porcine) 5000 UNIT/ML injection 5,000 Units  5,000 Units Subcutaneous Q8H JERRY    acetaminophen (Tylenol Extra Strength) tab 500 mg  500 mg Oral Q6H PRN    glucose (Dex4) 15 GM/59ML oral liquid 15 g  15 g Oral Q15 Min PRN    Or    glucose (Glutose) 40% oral gel 15 g  15 g Oral Q15 Min PRN    Or    glucose-vitamin C (Dex-4) chewable tab 4 tablet  4 tablet Oral Q15 Min PRN    Or    dextrose 50% injection 50 mL  50 mL Intravenous Q15 Min PRN    Or    glucose (Dex4) 15 GM/59ML oral liquid 30 g  30 g Oral Q15 Min PRN    Or    glucose (Glutose) 40% oral gel 30 g  30 g Oral Q15 Min PRN    Or    glucose-vitamin C (Dex-4) chewable tab 8 tablet  8 tablet Oral Q15 Min PRN    insulin degludec (Tresiba) 100 units/mL flextouch 12 Units  12 Units Subcutaneous Nightly    insulin aspart (NovoLOG) 100 Units/mL FlexPen 1-5 Units  1-5 Units Subcutaneous TID CC    dextrose 1.5%-calcium 2.5 mEq/L peritoneal solution  5,000 mL Intraperitoneal Once in dialysis    dextrose 1.5%-calcium 2.5 mEq/L peritoneal solution  2,000 mL Intraperitoneal Once in dialysis          Results:     Recent Labs   Lab 03/04/25  1742 03/07/25  1248 03/08/25  0745   RBC 3.38* 3.84 3.45*   HGB 10.8* 12.7 11.4*   HCT 32.3* 37.9 33.3*   MCV 95.6 98.7 96.5   NEPRELIM 3.98 2.41 1.43*   WBC 6.6 4.8 4.3   .0 193.0 172.0     Recent Labs   Lab 03/04/25  1742  03/07/25  1248 03/08/25  0745   GLU 94 155* 154*   BUN 33* 32* 31*   CREATSERUM 4.60* 5.13* 4.88*   CA 8.5* 8.1* 7.4*   ALB 3.7 3.8 3.3   * 139 137   K 4.1 3.8 2.9*   CL 97* 99 99   CO2 27.0 28.0 25.0   ALKPHO 153* 122  --    AST 12 22  --    ALT 8* 13  --    BILT 0.3 0.3  --    TP 6.1 6.2  --      PTT   Date Value Ref Range Status   09/21/2023 63.6 (H) 23.3 - 35.6 seconds Final     Comment:     Elevations of the aPTT in patients not receiving  anticoagulant therapy (Heparin, etc.), may be  seen in Factor deficiency, vitamin K deficiency,  factor inhibitors, liver disease, etc.  Clinical correlation is recommended.     09/19/2017 33.6 25.0 - 34.0 seconds Final   07/16/2014 26.0 25.0 - 34.0 SECONDS Final     Comment:     The aPTT Heparin Theraputic Range is approximately 65 - 104  seconds. The theraputic range has been validated against  0.3 - 0.7 heparin anti-Xa units/mL.       INR   Date Value Ref Range Status   09/19/2017 0.99 0.89 - 1.11 Final   07/16/2014 1.10 (H) 0.93 - 1.07 Final     Comment:     INR Therapeutic Interval Group A (2.00-3.00)  Venous Thrombosis, Pulmonary   Systemic Thrombosis,  Tissue Heart Valve, Acute Myocardial Infarction,  Valvular Heart Disease or Atrial Fibrillation  INR Therapeutic Interval Group B (2.50-3.50)  Recurrent Systemic Embolism, or  Mechanical Prosthetic Valve       No results for input(s): \"BNP\" in the last 168 hours.  Recent Labs   Lab 03/08/25  0745   MG 1.5*   PHOS 4.2        Recent Labs   Lab 03/04/25  1742 03/07/25  1248 03/08/25  0745   PHOS  --   --  4.2   ALB 3.7 3.8 3.3       XR CHEST AP PORTABLE  (CPT=71045)    Result Date: 3/7/2025  CONCLUSION:   Minimal bibasilar opacities, which are favored to reflect atelectasis/scarring.    Dictated by (CST): Todd Calles MD on 3/07/2025 at 2:45 PM     Finalized by (CST): Todd Calles MD on 3/07/2025 at 2:46 PM         EKG    Result Date: 3/7/2025  Normal sinus rhythm Low voltage QRS, consider pulmonary disease,  pericardial effusion, or normal variant Cannot rule out Anterior infarct (cited on or before 10-OCT-2015) Abnormal ECG When compared with ECG of 04-MAR-2025 17:44, Questionable change in initial forces of Anterior leads Nonspecific T wave abnormality now evident in Anterior leads Confirmed by QUINTEN CARTY ([id number]) on 3/7/2025 2:59:23 PM     Assessment and Plan:     Patient is a 69-year-old female with past medical history of ESRD on peritoneal dialysis, diabetes, significant orthostatic hypotension, congestive heart failure, depression who presented for gradually increasing weakness and found to have COVID    1.ESRD: On PD  S/p CCPD last night with UF more than 3 L  Given significant orthostatic hypotension will defer PD tonight  Will give 1 L of normal saline IV fluid at 100 mL/h  Hypokalemia: Replace potassium  Hypomagnesemia: Replace  Phos within normal limits    2.orthostatic hypotension:   Normal saline IV fluid for 1 L  Will hold PD today  Recheck orthostasis tomorrow    3.anemia: Hemoglobin acceptable    Discussed with nursing      Christopher Woody MD

## 2025-03-10 ENCOUNTER — MOBILE ENCOUNTER (OUTPATIENT)
Dept: INTERNAL MEDICINE UNIT | Facility: HOSPITAL | Age: 70
End: 2025-03-10

## 2025-03-10 VITALS
WEIGHT: 175.31 LBS | TEMPERATURE: 99 F | DIASTOLIC BLOOD PRESSURE: 80 MMHG | RESPIRATION RATE: 18 BRPM | OXYGEN SATURATION: 98 % | SYSTOLIC BLOOD PRESSURE: 168 MMHG | BODY MASS INDEX: 31 KG/M2 | HEART RATE: 62 BPM

## 2025-03-10 LAB
ALBUMIN SERPL-MCNC: 3.1 G/DL (ref 3.2–4.8)
ANION GAP SERPL CALC-SCNC: 10 MMOL/L (ref 0–18)
BASOPHILS # BLD AUTO: 0.01 X10(3) UL (ref 0–0.2)
BASOPHILS NFR BLD AUTO: 0.2 %
BUN BLD-MCNC: 36 MG/DL (ref 9–23)
BUN/CREAT SERPL: 6.8 (ref 10–20)
CA-I BLD-SCNC: 0.94 MMOL/L (ref 0.95–1.32)
CALCIUM BLD-MCNC: 7.1 MG/DL (ref 8.7–10.4)
CHLORIDE SERPL-SCNC: 106 MMOL/L (ref 98–112)
CO2 SERPL-SCNC: 23 MMOL/L (ref 21–32)
CREAT BLD-MCNC: 5.27 MG/DL
DEPRECATED RDW RBC AUTO: 47 FL (ref 35.1–46.3)
EGFRCR SERPLBLD CKD-EPI 2021: 8 ML/MIN/1.73M2 (ref 60–?)
EOSINOPHIL # BLD AUTO: 0.05 X10(3) UL (ref 0–0.7)
EOSINOPHIL NFR BLD AUTO: 1 %
ERYTHROCYTE [DISTWIDTH] IN BLOOD BY AUTOMATED COUNT: 13.2 % (ref 11–15)
GLUCOSE BLD-MCNC: 99 MG/DL (ref 70–99)
GLUCOSE BLDC GLUCOMTR-MCNC: 101 MG/DL (ref 70–99)
GLUCOSE BLDC GLUCOMTR-MCNC: 184 MG/DL (ref 70–99)
HCT VFR BLD AUTO: 31.4 %
HGB BLD-MCNC: 10.6 G/DL
IMM GRANULOCYTES # BLD AUTO: 0.01 X10(3) UL (ref 0–1)
IMM GRANULOCYTES NFR BLD: 0.2 %
LYMPHOCYTES # BLD AUTO: 2.41 X10(3) UL (ref 1–4)
LYMPHOCYTES NFR BLD AUTO: 47 %
MCH RBC QN AUTO: 33.1 PG (ref 26–34)
MCHC RBC AUTO-ENTMCNC: 33.8 G/DL (ref 31–37)
MCV RBC AUTO: 98.1 FL
MONOCYTES # BLD AUTO: 0.3 X10(3) UL (ref 0.1–1)
MONOCYTES NFR BLD AUTO: 5.8 %
NEUTROPHILS # BLD AUTO: 2.35 X10 (3) UL (ref 1.5–7.7)
NEUTROPHILS # BLD AUTO: 2.35 X10(3) UL (ref 1.5–7.7)
NEUTROPHILS NFR BLD AUTO: 45.8 %
OSMOLALITY SERPL CALC.SUM OF ELEC: 296 MOSM/KG (ref 275–295)
PHOSPHATE SERPL-MCNC: 3.4 MG/DL (ref 2.4–5.1)
PLATELET # BLD AUTO: 143 10(3)UL (ref 150–450)
POTASSIUM SERPL-SCNC: 3.8 MMOL/L (ref 3.5–5.1)
PUNCTURE CHARGE: NO
RBC # BLD AUTO: 3.2 X10(6)UL
SODIUM SERPL-SCNC: 139 MMOL/L (ref 136–145)
WBC # BLD AUTO: 5.1 X10(3) UL (ref 4–11)

## 2025-03-10 PROCEDURE — 90945 DIALYSIS ONE EVALUATION: CPT | Performed by: INTERNAL MEDICINE

## 2025-03-10 RX ORDER — DEXTROSE MONOHYDRATE, SODIUM CHLORIDE, SODIUM LACTATE, CALCIUM CHLORIDE, MAGNESIUM CHLORIDE 1.5; 538; 448; 18.4; 5.08 G/100ML; MG/100ML; MG/100ML; MG/100ML; MG/100ML
2000 SOLUTION INTRAPERITONEAL SEE ADMIN INSTRUCTIONS
Status: DISCONTINUED | OUTPATIENT
Start: 2025-03-10 | End: 2025-03-10

## 2025-03-10 RX ORDER — CALCIUM CARBONATE 500 MG/1
1000 TABLET, CHEWABLE ORAL ONCE
Status: COMPLETED | OUTPATIENT
Start: 2025-03-10 | End: 2025-03-10

## 2025-03-10 NOTE — DISCHARGE INSTRUCTIONS
Sometimes managing your health at home requires assistance.  The [facility] team has recognized your preference to use  [facility].  A representative from the home health agency will contact you or your family to schedule your first visit.       [facility]  [address] [place] [address]  Phone: [phone]

## 2025-03-10 NOTE — OCCUPATIONAL THERAPY NOTE
OCCUPATIONAL THERAPY EVALUATION - INPATIENT     Room Number: 301/301-A  Evaluation Date: 3/10/2025  Type of Evaluation: Initial  Presenting Problem: COVID 19 +    Physician Order: IP Consult to Occupational Therapy  Reason for Therapy: ADL/IADL Dysfunction and Discharge Planning    OCCUPATIONAL THERAPY ASSESSMENT   Patient is a 69 year old female admitted 3/7/2025 for COVID 19 + and orthostatic hypotension.  Prior to admission, patient's baseline is Modified Independent.  Patient is currently functioning near baseline with ADls and functional mobility.  Patient is requiring stand-by assist and contact guard assist as a result of the following impairments: decreased endurance and impaired stand balance. Occupational Therapy will continue to follow for duration of hospitalization.    Patient will benefit from continued skilled OT Services while hospitalized.    PLAN DURING HOSPITALIZATION     OT Treatment Plan: Balance activities, Endurance training, Equipment eval/education     OCCUPATIONAL THERAPY MEDICAL/SOCIAL HISTORY   Problem List  Principal Problem:    COVID  Active Problems:    ESRD on peritoneal dialysis (HCC)    Weakness generalized    Elevated troponin    Hypomagnesemia    Hypocalcemia    HOME SITUATION  Type of Home: Independent living facility (resident at [facility] x2 years)  Home Layout: One level; Elevator  Lives With: Alone (intermittent stff assist/supervision)  Drives: No  Patient Regularly Uses: Glasses    Stairs in Home: elevator  Use of Assistive Device(s): cane    Prior Level of [place]: Pt reports she is I with BADLs and mod I with functional mobility. Pt has recently been requesting supervision for showers d/t issues with \"passing out\". Pt does not drive. Pt has 2 sons in the area who provide limited support.       SUBJECTIVE  \"I feel sort of trapped\"- referring to lack of transportation    OCCUPATIONAL THERAPY EXAMINATION      OBJECTIVE  Precautions: Bed/chair alarm (Covid isolation)  Fall  Risk: Standard fall risk      PAIN ASSESSMENT  Ratin      ACTIVITY TOLERANCE  Activity limited to bed to chair xfer d/t BP dropping with standing    Behavioral/Emotional/Social: pleasant and cooperative    RANGE OF MOTION   Upper extremity ROM is within functional limits     STRENGTH ASSESSMENT  Upper extremity strength is within functional limits       ACTIVITIES OF DAILY LIVING ASSESSMENT  AM-PAC ‘6-Clicks’ Inpatient Daily Activity Short Form  How much help from another person does the patient currently need…  -   Putting on and taking off regular lower body clothing?: A Little  -   Bathing (including washing, rinsing, drying)?: A Little  -   Toileting, which includes using toilet, bedpan or urinal? : A Little  -   Putting on and taking off regular upper body clothing?: A Little  -   Taking care of personal grooming such as brushing teeth?: None  -   Eating meals?: None    AM-PAC Score:  Score: 20  Approx Degree of Impairment: 38.32%  Standardized Score (AM-PAC Scale): 42.03  CMS Modifier (G-Code): CJ    FUNCTIONAL TRANSFER ASSESSMENT  CGA    BED MOBILITY  Supine to Sit : Supervision    BALANCE ASSESSMENT  Good sit balance, fair stand balance    FUNCTIONAL ADL ASSESSMENT  Provide  set up and SBA for bed side BADLs including LE dressing demo good flexibility    Skilled Therapy Provided: Pt received in bed, pleasant and motivated for OOB activity. Activity limited to bed to chair xfer d/t pt demo a significant drop in BP with standing    Supine /64  Seated /77  Standing BP 93/60 with pt indicating some lightheadedness  Recovery BOP after sitting 146/72    EDUCATION PROVIDED  Patient Education : Role of Occupational Therapy; Plan of Care  Patient's Response to Education: Verbalized Understanding    The patient's Approx Degree of Impairment: 38.32% has been calculated based on documentation in the [facility] '6 clicks' Inpatient Daily Activity Short Form.  Research supports that patients with this level of  impairment may benefit from no further OT services after discharge. .  Final disposition will be made by interdisciplinary medical team.     Patient End of Session: Up in chair, Needs met, Call light within reach, RN aware of session/findings, All patient questions and concerns addressed    OT Goals  Patients self stated goal is: move without passing out     Patient will complete functional transfer with Mod I  Comment:     Patient will complete toileting with Mod I  Comment:     Patient will tolerate standing for 5 minutes in prep for adls with Mod I   Comment:    Patient will complete item retrieval with Mod I  Comment:          Goals  on: 3/17/25  Frequency: 3x/week    Patient Evaluation Complexity Level:   Occupational Profile/Medical History LOW - Brief history including review of medical or therapy records    Specific performance deficits impacting engagement in ADL/IADL LOW  1 - 3 performance deficits    Client Assessment/Performance Deficits LOW - No comorbidities nor modifications of tasks    Clinical Decision Making LOW - Analysis of occupational profile, problem-focused assessments, limited treatment options    Overall Complexity LOW

## 2025-03-10 NOTE — CM/SW NOTE
03/10/25 1400   Discharge disposition   Expected discharge disposition Home-Health   Post Acute Care Provider   ( Home Health Providers)   Discharge transportation Private car     Per RN Dianne - pt has selected  Home Health Providers for services at [place].    [facility] reserved in Aidin and notified of plan for DC today. HH instructions added to pt's AVS.    Pt is cleared from SW/CM stand point. RN and DC RN updated.      PLAN: [facility] w/ [facility]          MAYELIN Blandon, LSW [phone]

## 2025-03-10 NOTE — CM/SW NOTE
03/10/25 1100   CM/SW Referral Data   Referral Source Social Work (self-referral)   Reason for Referral Discharge planning   Informant Patient   Medical Hx   Does patient have an established PCP? Yes  (Ambreen Sandoval)   Patient Info   Patient's Current Mental Status at Time of Assessment Alert;Oriented   Patient's Home Environment Independent Living  ([facility])   Patient lives with Alone   Patient Status Prior to Admission   Independent with ADLs and Mobility No   Pt. requires assistance with Housework;Driving;Ambulating   Services in place prior to admission DME/Supplies at home;Dialysis   Type of DME/Supplies Standard Walker;Shower Chair;Grab Bars   Dialysis Clinic US Renal  (Peritoneal Dialysis)   Discharge Needs   Anticipated D/C needs Home health care;To be determined   Choice of Post-Acute Provider   Informed patient of right to choose their preferred provider Yes   List of appropriate post-acute services provided to patient/family with quality data   (HH ref in Aidin  - needs f/up)     11:15AM  SW self referred pt for DC Planning. At the time of this note, not PT/OT notes available for review.    SW met w/ pt at bedside. Above assessment completed.  Verified pt's home address and confirmed pt lives at [facility] alone.    Pt confirmed using a walker for ambulation at baseline.  Pt does not drive stating her dialysis made her faint when first starting.    Pt confirmed she is established w/ home Peritoneal dialysis via  Renal.  Pt confirmed she manages this herself.    Pt reports hx w/ HH services. Per chart, hx w/ [facility].  Pt confirmed she would like HH again at [place]. Pt prefers to NOT go to [facility].    Tentative HH referrals sent in Aidin. F entered/sent.    01:40PM  Per chart, PT worked w/ pt and Anticipated therapy need: Home with Home Healthcare    Met w/ pt at bedside and provided HH list of quality data. Informed pt that per chart, she has a hx w/ [facility]. Pt does not recall.    Pt  understands that she must notify SW of her choice by calling phone # provided.    RN and DC RN updated.    PLAN: [facility] w/ HH - pending choice      SW/CM to remain available for support and/or discharge planning.       MS JamiaW, LSW [phone]

## 2025-03-10 NOTE — PLAN OF CARE
Problem: Patient Centered Care  Goal: Patient preferences are identified and integrated in the patient's plan of care  Description: Interventions:  - What would you like us to know as we care for you? I live in independent living.  - Provide timely, complete, and accurate information to patient/family  - Incorporate patient and family knowledge, values, beliefs, and cultural backgrounds into the planning and delivery of care  - Encourage patient/family to participate in care and decision-making at the level they choose  - Honor patient and family perspectives and choices  Outcome: Adequate for Discharge     Problem: Patient/Family Goals  Goal: Patient/Family Long Term Goal  Description: Patient's Long Term Goal:  Go home  Problem: CARDIOVASCULAR - ADULT  Goal: Maintains optimal cardiac output and hemodynamic stability  Description: INTERVENTIONS:  - Monitor vital signs, rhythm, and trends  - Monitor for bleeding, hypotension and signs of decreased cardiac output  - Evaluate effectiveness of vasoactive medications to optimize hemodynamic stability  - Monitor arterial and/or venous puncture sites for bleeding and/or hematoma  - Assess quality of pulses, skin color and temperature  - Assess for signs of decreased coronary artery perfusion - ex. Angina  - Evaluate fluid balance, assess for edema, trend weights  Outcome: Adequate for Discharge  Goal: Absence of cardiac arrhythmias or at baseline  Description: INTERVENTIONS:  - Continuous cardiac monitoring, monitor vital signs, obtain 12 lead EKG if indicated  - Evaluate effectiveness of antiarrhythmic and heart rate control medications as ordered  - Initiate emergency measures for life threatening arrhythmias  - Monitor electrolytes and administer replacement therapy as ordered  Outcome: Adequate for Discharge     Problem: RISK FOR INFECTION - ADULT  Goal: Absence of fever/infection during anticipated neutropenic period  Description: INTERVENTIONS  - Monitor WBC  -  Please note the following items as they are important to your next appointment:    Bring updated medication list to next appointment.   Labs to be completed 3-5 days before your future appointment date or one hour prior to appointment time.   You do not need to fast prior to these labs  Arrive to lab appointment with a full bladder as you will be asked to provide a urine specimen.    Thank you! For Your Information   If you are in need of a medication refill please use one of the following options. You can expect your medication to be filled within 2-3 business days.   1. Call your pharmacy for all medication refills and renewals.   2. [facility]- [email or web address]  3. Call your providers office    If your provider ordered any imaging for you today. Our pre-scheduling services will be reaching out to you within 2 business days to schedule this. [facility] can be reached by calling [phone]     If your provider ordered testing today, you will be notified of your test results within 3-5 business days unless specified otherwise. If you have not received your results within 5 business days please call your provider's office.    You may be receiving a survey.  Please take the time to complete this, as your feedback is very important to us!  We strive to make your experience exceptional and your comments help us with that goal.  We look forward to hearing from you!    For all future appointments please arrive 15 minutes prior to your scheduled visit.     Patient Contact Center Business Office: assistance with medical billing & financial inquires [phone]           Administer growth factors as ordered  - Implement neutropenic guidelines  Outcome: Adequate for Discharge     Problem: SAFETY ADULT - FALL  Goal: Free from fall injury  Description: INTERVENTIONS:  - Assess pt frequently for physical needs  - Identify cognitive and physical deficits and behaviors that affect risk of falls.  - [place] fall precautions as indicated by assessment.  - Educate pt/family on patient safety including physical limitations  - Instruct pt to call for assistance with activity based on assessment  - Modify environment to reduce risk of injury  - Provide assistive devices as appropriate  - Consider OT/PT consult to assist with strengthening/mobility  - Encourage toileting schedule  Outcome: Adequate for Discharge     - See additional Care Plan goals for specific interventions  Outcome: Adequate for Discharge

## 2025-03-10 NOTE — DISCHARGE SUMMARY
General Medicine Discharge Summary     Patient ID:  Cassy Patterson  69 year old  7/13/1955    Admit date: 3/7/2025    Discharge date and time: 3/10/25    Attending Physician: Remi Segovia DO     Primary Care Physician: Ambreen Sandoval MD     Discharge Diagnoses:   COVID-19  Generalized weakness, presyncopal episodes  Acute on chronic orthostatic hypotension  ESRD  Type 2 diabetes  Chronic diastolic CHF  CAD      Discharge Condition: stable    Disposition: home     Important Follow up:  Ambreen Sandoval MD  [address] [place] [address]  [address]  [facility] [address]  [phone]  Follow up      Hospital Course:    This is a 69-year-old female with a complicated medical history including ESRD on peritoneal dialysis, diabetes, coronary artery disease, depression, orthostatic hypotension, chronic diastolic CHF, who is here for evaluation of weakness, presyncopal episodes, dizziness, weakness, decreased p.o. intake.       COVID 19  Generalized weakness, presyncopal episodes  Recurrent acute on chronic orthostatic hypotension  -Suspect she is hypovolemic, dehydrated, orthostatic, from recent viral illness, recently positive for COVID, not eating and drinking much  -gave saline infusion with 75cc/hr for 12 hours on admission, then another 1 liter total on 3/8  -Encourage p.o. intake, oral hydration  -Was prescribed Paxlovid outpatient,held here, contraindicated when try to order in epic given ESRD     Acute on Chronic orthostatic hypotension  -Follows with neurology  -would resume her atomextine 25mg daily  -renal following too  -used some midodrine here but will not discharge with this   -ambulate and mobilize as able to see how she is doing symptom wise      ESRD on peritoneal dialysis  -Nephrology consulted     Type 2 diabetes  -resume home regimen as sugars tolerate     Chronic diastolic CHF  CAD  -Normally on Lipitor and aspirin, resume    Consults:   IP CONSULT TO  NEPHROLOGY  IP CONSULT TO SOCIAL WORK      Patient instructions:        Current Discharge Medication List        CONTINUE these medications which have NOT CHANGED    Details   diazePAM 5 MG Oral Tab Take 1 tablet (5 mg total) by mouth 2 (two) times daily.      buPROPion  MG Oral Tablet 24 Hr Take 1 tablet (300 mg total) by mouth daily.      sertraline 100 MG Oral Tab Take 2 tablets (200 mg total) by mouth daily.      atomoxetine 25 MG Oral Cap Take 1 capsule (25 mg total) by mouth daily.      Potassium Chloride ER 20 MEQ Oral Tab CR Take 1 tablet by mouth daily.      amLODIPine 10 MG Oral Tab Take 1 tablet (10 mg total) by mouth at bedtime.      Insulin Glargine, 1 Unit Dial, (TOUJEO SOLOSTAR) 300 UNIT/ML Subcutaneous Solution Pen-injector Inject 22 Units into the skin every evening.      carvedilol 12.5 MG Oral Tab Take 3 tablets (37.5 mg total) by mouth 2 (two) times daily with meals.      allopurinol 300 MG Oral Tab TAKE ONE-HALF (1/2) TABLET DAILY      Levothyroxine Sodium 125 MCG Oral Tab Take 1 tablet (125 mcg total) by mouth before breakfast.      Pantoprazole Sodium 40 MG Oral Tab EC TAKE 1 TABLET EVERY MORNING BEFORE BREAKFAST      hydrALAZINE HCl 100 MG Oral Tab Take 25 mg by mouth 3 (three) times daily. Takes up to TID, usually takes BID      atorvastatin 80 MG Oral Tab Take 1 tablet (80 mg total) by mouth daily.      Insulin Pen Needle (BD PEN NEEDLE ORIGINAL U/F) 29G X 12.7MM Does not apply Misc USE AS DIRECTED DAILY      ERICKSON CONTOUR NEXT TEST In Vitro Strip TEST BLOOD GLUCOSE THREE TIMES DAILY           Code Status: Full Code    Exam on day of discharge:     Vitals:    03/10/25 1236   BP: 146/72   Pulse: 62   Resp:    Temp:        General: no acute distress  Heart: RRR  Lungs: clear bilaterally  Abdomen: nontender  Extremities: no pedal edema     Total time coordinating care for discharge: Greater than 30 minutes    Remi Segovia DO

## 2025-03-10 NOTE — PHYSICAL THERAPY NOTE
PHYSICAL THERAPY EVALUATION - INPATIENT     Room Number: 301/301-A  Evaluation Date: 3/10/2025  Type of Evaluation: Initial   Physician Order: PT Eval and Treat    Presenting Problem: COVID 19 infection, orthostatic hypotension  Co-Morbidities : ESRD on PD  Reason for Therapy: Mobility Dysfunction and Discharge Planning    PHYSICAL THERAPY ASSESSMENT   Patient is a 69 year old female admitted 3/7/2025 for COVID 19 and orthostatic hypotension.  Prior to admission, patient's baseline is independent with RW for mobility in apartment, does not drive and lately needs supervision in shower due to history of passing out.  Patient is currently functioning near baseline with bed mobility, transfers, and gait.  Patient is requiring contact guard assist as a result of the following impairments: medical status and positive orthostatic hypotension .  Physical Therapy will continue to follow for duration of hospitalization.    Patient will benefit from continued skilled PT Services at discharge to promote prior level of function and safety with additional support and return home with home health PT.    PLAN DURING HOSPITALIZATION  Nursing Mobility Recommendation : 1 Assist     PT Treatment Plan: Bed mobility, Body mechanics, Endurance, Energy conservation, Patient education, Family education, Neuromuscular re-educate, Strengthening, Balance training, Transfer training, Gait training, Stoop training  Rehab Potential : Fair  Frequency (Obs): 5x/week     PHYSICAL THERAPY MEDICAL/SOCIAL HISTORY   History related to current admission: ESRD     Problem List  Principal Problem:    COVID  Active Problems:    ESRD on peritoneal dialysis (HCC)    Weakness generalized    Elevated troponin    Hypomagnesemia    Hypocalcemia      HOME SITUATION  Type of Home: Independent living facility (resident at [facility] x2 years)  Home Layout: One level, Elevator  Stairs to Enter : 0        Stairs to Bedroom: 0         Lives With: Alone (intermittent stff  assist/supervision)    Drives: No   Patient Regularly Uses: Glasses     Prior Level of [place]: independent     SUBJECTIVE  \"I feel sort of trapped at home, I don't drive.\"     PHYSICAL THERAPY EXAMINATION   OBJECTIVE  Precautions: Bed/chair alarm (Covid isolation)  Fall Risk: Standard fall risk    WEIGHT BEARING RESTRICTION       PAIN ASSESSMENT     Location: denies       COGNITION  Overall Cognitive Status:  WFL - within functional limits    BALANCE  Static Sitting: Good  Dynamic Sitting: Good  Static Standing: Fair +  Dynamic Standing: Fair    ADDITIONAL TESTS         03/10/25 1220 03/10/25 1228 03/10/25 1231   Patient Background       --    Vital Signs   Pulse 62 74 70   Heart Rate Source Monitor Monitor Monitor   /64 123/77 93/60   BP Location Right arm Right arm Right arm   BP Method Automatic Automatic Automatic   Patient Position Semi-Barrera Sitting Standing      03/10/25 1236   Patient Background   Presenting Problem  --    Vital Signs   Pulse 62   Heart Rate Source Monitor   /72   BP Location Right arm   BP Method Automatic   Patient Position Sitting       ACTIVITY TOLERANCE  Pulse: 62  Heart Rate Source: Monitor     BP: 146/72  BP Location: Right arm  BP Method: Automatic  Patient Position: Sitting    O2 WALK  Oxygen Therapy  SPO2% on Room Air at Rest: 99    AM-PAC '6-Clicks' INPATIENT SHORT FORM - BASIC MOBILITY  How much difficulty does the patient currently have...  Patient Difficulty: Turning over in bed (including adjusting bedclothes, sheets and blankets)?: A Little   Patient Difficulty: Sitting down on and standing up from a chair with arms (e.g., wheelchair, bedside commode, etc.): A Little   Patient Difficulty: Moving from lying on back to sitting on the side of the bed?: A Little   How much help from another person does the patient currently need...   Help from Another: Moving to and from a bed to a chair (including a wheelchair)?: A Little   Help from Another: Need to walk in  hospital room?: A Little   Help from Another: Climbing 3-5 steps with a railing?: Total     AM-PAC Score:  Raw Score: 16   Approx Degree of Impairment: 54.16%   Standardized Score (AM-PAC Scale): 40.78   CMS Modifier (G-Code): CK    FUNCTIONAL ABILITY STATUS  Functional Mobility/Gait Assessment  Gait Assistance: Contact guard assist  Distance (ft): 3'  Assistive Device: Rolling walker  Pattern: Shuffle  Rolling: stand-by assist  Supine to Sit: stand-by assist  Sit to Supine: stand-by assist  Sit to Stand: contact guard assist  Seated balance SBA ~5 min, standing balance ~4 min CGA    Exercise/Education Provided:  Bed mobility  Body mechanics  Functional activity tolerated  Gait training  Transfer training    The patient's Approx Degree of Impairment: 54.16% has been calculated based on documentation in the [facility] '6 clicks' Inpatient Basic Mobility Short Form.  Research supports that patients with this level of impairment may benefit from KAREN, however with medical stabilization anticipated to progress to home with home care.  Final disposition will be made by interdisciplinary medical team.    Patient End of Session: Up in chair, Needs met, Call light within reach, RN aware of session/findings, All patient questions and concerns addressed, Alarm set, Discussed recommendations with /, Hospital anti-slip socks    CURRENT GOALS  Goals to be met by: 4/1  Patient Goal Patient's self-stated goal is: unstated    Goal #1 Patient is able to demonstrate supine - sit EOB @ level: supervision     Goal #1   Current Status    Goal #2 Patient is able to demonstrate transfers Sit to/from Stand at assistance level: supervision with walker - rolling     Goal #2  Current Status    Goal #3 Patient is able to ambulate 50 feet with assist device: walker - rolling at assistance level: supervision   Goal #3   Current Status            Goal #5 Patient to demonstrate independence with home activity/exercise  instructions provided to patient in preparation for discharge.   Goal #5   Current Status    Goal #6    Goal #6  Current Status      Patient Evaluation Complexity Level:  History Moderate - 1 or 2 personal factors and/or co-morbidities   Examination of body systems Moderate - addressing a total of 3 or more elements   Clinical Presentation  Moderate - Evolving   Clinical Decision Making  Moderate Complexity     Therapeutic Activity:  24 minutes

## 2025-03-10 NOTE — PLAN OF CARE
Problem: CARDIOVASCULAR - ADULT  Goal: Maintains optimal cardiac output and hemodynamic stability  Description: INTERVENTIONS:  - Monitor vital signs, rhythm, and trends  - Monitor for bleeding, hypotension and signs of decreased cardiac output  - Evaluate effectiveness of vasoactive medications to optimize hemodynamic stability  - Monitor arterial and/or venous puncture sites for bleeding and/or hematoma  - Assess quality of pulses, skin color and temperature  - Assess for signs of decreased coronary artery perfusion - ex. Angina  - Evaluate fluid balance, assess for edema, trend weights  Outcome: Progressing  Goal: Absence of cardiac arrhythmias or at baseline  Description: INTERVENTIONS:  - Continuous cardiac monitoring, monitor vital signs, obtain 12 lead EKG if indicated  - Evaluate effectiveness of antiarrhythmic and heart rate control medications as ordered  - Initiate emergency measures for life threatening arrhythmias  - Monitor electrolytes and administer replacement therapy as ordered  Outcome: Progressing     Problem: RISK FOR INFECTION - ADULT  Goal: Absence of fever/infection during anticipated neutropenic period  Description: INTERVENTIONS  - Monitor WBC  - Administer growth factors as ordered  - Implement neutropenic guidelines  Outcome: Not Progressing     Problem: SAFETY ADULT - FALL  Goal: Free from fall injury  Description: INTERVENTIONS:  - Assess pt frequently for physical needs  - Identify cognitive and physical deficits and behaviors that affect risk of falls.  - Blunt fall precautions as indicated by assessment.  - Educate pt/family on patient safety including physical limitations  - Instruct pt to call for assistance with activity based on assessment  - Modify environment to reduce risk of injury  - Provide assistive devices as appropriate  - Consider OT/PT consult to assist with strengthening/mobility  - Encourage toileting schedule  Outcome: Progressing    Received awake,oriented.  PD stopped draining-Dr.Sher Woody notified. PD stopped per MD order. SBP in the 170-180's,asymptomatic.Dr. Jenkins notified,no new orders.Febrile early morning-tylenol given.Blood cultures done.No complaints of pain at this time.

## 2025-03-10 NOTE — PROGRESS NOTES
[facility]  part of [facility]    Progress Note      Subjective:     No new issues  No cp or sob  Pd alarm last nigbt    Review of Systems:   Constitutional: +ve weakness   Eyes: negative for irritation, redness and visual disturbance  Ears, nose, mouth, throat, and face: negative for hearing loss and sore throat  Respiratory: negative for cough, hemoptysis and wheezing  Cardiovascular: negative for chest pain, exertional dyspnea, lower extremity edema and palpitations  Gastrointestinal: negative for abdominal pain, diarrhea and nausea  Genitourinary:negative for dysuria, frequency and hematuria  Hematologic/lymphatic: negative for bleeding and easy bruising  Neurological: negative for gait problems, memory problems and seizures  Behavioral/Psych: negative for anxiety and depression    Objective:   Temp:  [98.6 °F (37 °C)-101.3 °F (38.5 °C)] 98.6 °F (37 °C)  Pulse:  [62-74] 62  Resp:  [18-20] 18  BP: ()/(60-92) 146/72  SpO2:  [94 %-98 %] 97 %  SpO2: 97 %     Intake/Output Summary (Last 24 hours) at 3/10/2025 1319  Last data filed at 3/10/2025 1100  Gross per 24 hour   Intake 636 ml   Output 850 ml   Net -214 ml     Wt Readings from Last 3 Encounters:   03/10/25 175 lb 4.8 oz (79.5 kg)   11/08/24 178 lb (80.7 kg)   09/20/24 176 lb (79.8 kg)       General appearance: alert, appears stated age and cooperative  Head: Normocephalic, atraumatic  Eyes: conjunctivae/corneas clear  Throat: lips, mucosa, and tongue normal; teeth and gums normal  Neck:  no JVD, supple, symmetrical  Abdominal: soft, non-tender; non distended, bowel sounds normal   Extremities: extremities normal, no edema  Skin: No rashes or lesions  Neurologic: Grossly normal  Psychiatric: calm    Medications:  Current Facility-Administered Medications   Medication Dose Route Frequency    midodrine (ProAmatine) tab 2.5 mg  2.5 mg Oral TID    cholecalciferol (Vitamin D3) tab 2,000 Units  2,000 Units Oral Daily    aspirin DR tab 81  mg  81 mg Oral Daily    atorvastatin (Lipitor) tab 80 mg  80 mg Oral Nightly    albuterol (Ventolin HFA) 108 (90 Base) MCG/ACT inhaler 4 puff  4 puff Inhalation Q4H PRN    allopurinol (Zyloprim) tab 150 mg  150 mg Oral Daily    buPROPion ER (Wellbutrin XL) 24 hr tab 300 mg  300 mg Oral Daily    carvedilol (Coreg) tab 37.5 mg  37.5 mg Oral BID with meals    diazePAM (Valium) tab 5 mg  5 mg Oral BID    levothyroxine (Synthroid) tab 125 mcg  125 mcg Oral Daily @ 0700    pantoprazole (Protonix) DR tab 40 mg  40 mg Oral QAM AC    sertraline (Zoloft) tab 200 mg  200 mg Oral Daily    heparin (Porcine) 5000 UNIT/ML injection 5,000 Units  5,000 Units Subcutaneous Q8H JERRY    acetaminophen (Tylenol Extra Strength) tab 500 mg  500 mg Oral Q6H PRN    glucose (Dex4) 15 GM/59ML oral liquid 15 g  15 g Oral Q15 Min PRN    Or    glucose (Glutose) 40% oral gel 15 g  15 g Oral Q15 Min PRN    Or    glucose-vitamin C (Dex-4) chewable tab 4 tablet  4 tablet Oral Q15 Min PRN    Or    dextrose 50% injection 50 mL  50 mL Intravenous Q15 Min PRN    Or    glucose (Dex4) 15 GM/59ML oral liquid 30 g  30 g Oral Q15 Min PRN    Or    glucose (Glutose) 40% oral gel 30 g  30 g Oral Q15 Min PRN    Or    glucose-vitamin C (Dex-4) chewable tab 8 tablet  8 tablet Oral Q15 Min PRN    insulin degludec (Tresiba) 100 units/mL flextouch 12 Units  12 Units Subcutaneous Nightly    insulin aspart (NovoLOG) 100 Units/mL FlexPen 1-5 Units  1-5 Units Subcutaneous TID CC    dextrose 1.5%-calcium 2.5 mEq/L peritoneal solution  5,000 mL Intraperitoneal Once in dialysis    dextrose 1.5%-calcium 2.5 mEq/L peritoneal solution  2,000 mL Intraperitoneal Once in dialysis          Results:     Recent Labs   Lab 03/08/25  0745 03/09/25  0704 03/10/25  0852   RBC 3.45* 3.20* 3.20*   HGB 11.4* 10.6* 10.6*   HCT 33.3* 31.5* 31.4*   MCV 96.5 98.4 98.1   NEPRELIM 1.43* 1.61 2.35   WBC 4.3 4.1 5.1   .0 157.0 143.0*     Recent Labs   Lab 03/04/25  1742 03/07/25  1248  03/08/25  0745 03/09/25  0704 03/10/25  0852   GLU 94 155* 154* 106* 99   BUN 33* 32* 31* 32* 36*   CREATSERUM 4.60* 5.13* 4.88* 5.23* 5.27*   CA 8.5* 8.1* 7.4* 6.9* 7.1*   ALB 3.7 3.8 3.3 3.0* 3.1*   * 139 137 139 139   K 4.1 3.8 2.9* 4.0 3.8   CL 97* 99 99 107 106   CO2 27.0 28.0 25.0 23.0 23.0   ALKPHO 153* 122  --   --   --    AST 12 22  --   --   --    ALT 8* 13  --   --   --    BILT 0.3 0.3  --   --   --    TP 6.1 6.2  --   --   --      PTT   Date Value Ref Range Status   09/21/2023 63.6 (H) 23.3 - 35.6 seconds Final     Comment:     Elevations of the aPTT in patients not receiving  anticoagulant therapy (Heparin, etc.), may be  seen in Factor deficiency, vitamin K deficiency,  factor inhibitors, liver disease, etc.  Clinical correlation is recommended.     09/19/2017 33.6 25.0 - 34.0 seconds Final   07/16/2014 26.0 25.0 - 34.0 SECONDS Final     Comment:     The aPTT Heparin Theraputic Range is approximately 65 - 104  seconds. The theraputic range has been validated against  0.3 - 0.7 heparin anti-Xa units/mL.       INR   Date Value Ref Range Status   09/19/2017 0.99 0.89 - 1.11 Final   07/16/2014 1.10 (H) 0.93 - 1.07 Final     Comment:     INR Therapeutic Interval Group A (2.00-3.00)  Venous Thrombosis, Pulmonary   Systemic Thrombosis,  Tissue Heart Valve, Acute Myocardial Infarction,  Valvular Heart Disease or Atrial Fibrillation  INR Therapeutic Interval Group B (2.50-3.50)  Recurrent Systemic Embolism, or  Mechanical Prosthetic Valve       No results for input(s): \"BNP\" in the last 168 hours.  Recent Labs   Lab 03/08/25 0745 03/09/25  0704 03/10/25  0852   MG 1.5*  --   --    PHOS 4.2 3.3 3.4        Recent Labs   Lab 03/08/25  0745 03/09/25  0704 03/10/25  0852   PHOS 4.2 3.3 3.4   ALB 3.3 3.0* 3.1*       No results found.        Assessment and Plan:     Patient is a 69-year-old female with past medical history of ESRD on peritoneal dialysis, diabetes, significant orthostatic hypotension, congestive  heart failure, depression who presented for gradually increasing weakness and found to have COVID    1.ESRD: On PD  S/p CCPD on 3/7 with UF more than 3 L  PD was held last night given significant orthostasis-will resume PD from today with last cycles  Hypokalemia: Potassium replaced and improved  Hypomagnesemia: Replace replaced and within normal limits  Phos within normal limits    2.orthostatic hypotension:   Normal saline IV fluid for 1 L-remain orthostasis but improved  and patient less symptomatic  Start amiodarone gentle dose  Did not complete pd sat and sun , feels better    3.anemia: Decline in hemoglobin noted-likely secondary to IV fluid  Recheck in a.m.    4.hypocalcemia: Corrected calcium 7.7  Will check ionized calcium in a.m.  Start on vitamin D-check vitamin D level    Plan   One exchange today  Calcium carbonate 1000 mg one dose  No need for midodrine on dc  Hold ARB   Fu with pd nurse    DC once pd exchange done today and we know catheter is working.       Discussed with  with Dr. Juliette Shabazz MD

## 2025-04-09 ENCOUNTER — HOSPITAL ENCOUNTER (OUTPATIENT)
Facility: HOSPITAL | Age: 70
Setting detail: OBSERVATION
Discharge: HOME HEALTH | End: 2025-04-11
Attending: EMERGENCY MEDICINE | Admitting: INTERNAL MEDICINE
Payer: MEDICARE

## 2025-04-09 ENCOUNTER — APPOINTMENT (OUTPATIENT)
Dept: CT IMAGING | Facility: HOSPITAL | Age: 70
End: 2025-04-09
Attending: EMERGENCY MEDICINE
Payer: MEDICARE

## 2025-04-09 ENCOUNTER — APPOINTMENT (OUTPATIENT)
Dept: GENERAL RADIOLOGY | Facility: HOSPITAL | Age: 70
End: 2025-04-09
Attending: EMERGENCY MEDICINE
Payer: MEDICARE

## 2025-04-09 DIAGNOSIS — R19.7 DIARRHEA, UNSPECIFIED TYPE: ICD-10-CM

## 2025-04-09 DIAGNOSIS — I95.1 ORTHOSTATIC HYPOTENSION: ICD-10-CM

## 2025-04-09 DIAGNOSIS — R55 SYNCOPE AND COLLAPSE: Primary | ICD-10-CM

## 2025-04-09 DIAGNOSIS — R94.31 PROLONGED Q-T INTERVAL ON ECG: ICD-10-CM

## 2025-04-09 LAB
ALBUMIN SERPL-MCNC: 4.2 G/DL (ref 3.2–4.8)
ALP LIVER SERPL-CCNC: 146 U/L (ref 55–142)
ALT SERPL-CCNC: 9 U/L (ref 10–49)
ANION GAP SERPL CALC-SCNC: 13 MMOL/L (ref 0–18)
AST SERPL-CCNC: 18 U/L (ref ?–34)
BASOPHILS # BLD AUTO: 0.07 X10(3) UL (ref 0–0.2)
BASOPHILS NFR BLD AUTO: 0.6 %
BILIRUB DIRECT SERPL-MCNC: <0.1 MG/DL (ref ?–0.3)
BILIRUB SERPL-MCNC: 0.2 MG/DL (ref 0.2–1.1)
BUN BLD-MCNC: 30 MG/DL (ref 9–23)
BUN/CREAT SERPL: 6.7 (ref 10–20)
CALCIUM BLD-MCNC: 8.6 MG/DL (ref 8.7–10.4)
CHLORIDE SERPL-SCNC: 96 MMOL/L (ref 98–112)
CO2 SERPL-SCNC: 25 MMOL/L (ref 21–32)
CREAT BLD-MCNC: 4.51 MG/DL (ref 0.55–1.02)
DEPRECATED RDW RBC AUTO: 48 FL (ref 35.1–46.3)
EGFRCR SERPLBLD CKD-EPI 2021: 10 ML/MIN/1.73M2 (ref 60–?)
EOSINOPHIL # BLD AUTO: 0.32 X10(3) UL (ref 0–0.7)
EOSINOPHIL NFR BLD AUTO: 3 %
ERYTHROCYTE [DISTWIDTH] IN BLOOD BY AUTOMATED COUNT: 14.2 % (ref 11–15)
GLUCOSE BLD-MCNC: 195 MG/DL (ref 70–99)
GLUCOSE BLDC GLUCOMTR-MCNC: 196 MG/DL (ref 70–99)
HCT VFR BLD AUTO: 34.7 % (ref 35–48)
HGB BLD-MCNC: 11.9 G/DL (ref 12–16)
IMM GRANULOCYTES # BLD AUTO: 0.05 X10(3) UL (ref 0–1)
IMM GRANULOCYTES NFR BLD: 0.5 %
LYMPHOCYTES # BLD AUTO: 3.07 X10(3) UL (ref 1–4)
LYMPHOCYTES NFR BLD AUTO: 28.3 %
MCH RBC QN AUTO: 32 PG (ref 26–34)
MCHC RBC AUTO-ENTMCNC: 34.3 G/DL (ref 31–37)
MCV RBC AUTO: 93.3 FL (ref 80–100)
MONOCYTES # BLD AUTO: 0.57 X10(3) UL (ref 0.1–1)
MONOCYTES NFR BLD AUTO: 5.3 %
NEUTROPHILS # BLD AUTO: 6.76 X10 (3) UL (ref 1.5–7.7)
NEUTROPHILS # BLD AUTO: 6.76 X10(3) UL (ref 1.5–7.7)
NEUTROPHILS NFR BLD AUTO: 62.3 %
OSMOLALITY SERPL CALC.SUM OF ELEC: 290 MOSM/KG (ref 275–295)
PLATELET # BLD AUTO: 306 10(3)UL (ref 150–450)
POTASSIUM SERPL-SCNC: 4.2 MMOL/L (ref 3.5–5.1)
PROT SERPL-MCNC: 6.7 G/DL (ref 5.7–8.2)
RBC # BLD AUTO: 3.72 X10(6)UL (ref 3.8–5.3)
SODIUM SERPL-SCNC: 134 MMOL/L (ref 136–145)
TROPONIN I SERPL HS-MCNC: 20 NG/L (ref ?–34)
WBC # BLD AUTO: 10.8 X10(3) UL (ref 4–11)

## 2025-04-09 PROCEDURE — 70450 CT HEAD/BRAIN W/O DYE: CPT | Performed by: EMERGENCY MEDICINE

## 2025-04-09 PROCEDURE — 74176 CT ABD & PELVIS W/O CONTRAST: CPT | Performed by: EMERGENCY MEDICINE

## 2025-04-09 PROCEDURE — 71045 X-RAY EXAM CHEST 1 VIEW: CPT | Performed by: EMERGENCY MEDICINE

## 2025-04-09 RX ORDER — INSULIN DEGLUDEC 100 U/ML
20 INJECTION, SOLUTION SUBCUTANEOUS DAILY
Status: DISCONTINUED | OUTPATIENT
Start: 2025-04-10 | End: 2025-04-11

## 2025-04-09 RX ORDER — ASPIRIN 81 MG/1
81 TABLET, CHEWABLE ORAL DAILY
COMMUNITY

## 2025-04-09 RX ORDER — ASPIRIN 81 MG/1
81 TABLET, CHEWABLE ORAL DAILY
Status: DISCONTINUED | OUTPATIENT
Start: 2025-04-10 | End: 2025-04-11

## 2025-04-09 RX ORDER — HEPARIN SODIUM 5000 [USP'U]/ML
5000 INJECTION, SOLUTION INTRAVENOUS; SUBCUTANEOUS EVERY 8 HOURS SCHEDULED
Status: DISCONTINUED | OUTPATIENT
Start: 2025-04-10 | End: 2025-04-11

## 2025-04-09 RX ORDER — ATORVASTATIN CALCIUM 80 MG/1
80 TABLET, FILM COATED ORAL DAILY
Status: DISCONTINUED | OUTPATIENT
Start: 2025-04-10 | End: 2025-04-11

## 2025-04-09 RX ORDER — ALLOPURINOL 300 MG/1
150 TABLET ORAL DAILY
Status: DISCONTINUED | OUTPATIENT
Start: 2025-04-10 | End: 2025-04-11

## 2025-04-09 RX ORDER — NICOTINE POLACRILEX 4 MG
30 LOZENGE BUCCAL
Status: DISCONTINUED | OUTPATIENT
Start: 2025-04-09 | End: 2025-04-11

## 2025-04-09 RX ORDER — METOCLOPRAMIDE HYDROCHLORIDE 5 MG/ML
5 INJECTION INTRAMUSCULAR; INTRAVENOUS EVERY 8 HOURS PRN
Status: DISCONTINUED | OUTPATIENT
Start: 2025-04-09 | End: 2025-04-10

## 2025-04-09 RX ORDER — HYDRALAZINE HYDROCHLORIDE 25 MG/1
25 TABLET, FILM COATED ORAL 3 TIMES DAILY
Status: DISCONTINUED | OUTPATIENT
Start: 2025-04-10 | End: 2025-04-11

## 2025-04-09 RX ORDER — ATOMOXETINE 25 MG/1
25 CAPSULE ORAL DAILY
Status: DISCONTINUED | OUTPATIENT
Start: 2025-04-10 | End: 2025-04-11

## 2025-04-09 RX ORDER — NICOTINE POLACRILEX 4 MG
15 LOZENGE BUCCAL
Status: DISCONTINUED | OUTPATIENT
Start: 2025-04-09 | End: 2025-04-11

## 2025-04-09 RX ORDER — PANTOPRAZOLE SODIUM 40 MG/1
40 TABLET, DELAYED RELEASE ORAL
Status: DISCONTINUED | OUTPATIENT
Start: 2025-04-10 | End: 2025-04-11

## 2025-04-09 RX ORDER — DEXTROSE MONOHYDRATE 25 G/50ML
50 INJECTION, SOLUTION INTRAVENOUS
Status: DISCONTINUED | OUTPATIENT
Start: 2025-04-09 | End: 2025-04-11

## 2025-04-09 RX ORDER — ONDANSETRON 2 MG/ML
4 INJECTION INTRAMUSCULAR; INTRAVENOUS EVERY 6 HOURS PRN
Status: DISCONTINUED | OUTPATIENT
Start: 2025-04-09 | End: 2025-04-11

## 2025-04-09 RX ORDER — BUPROPION HYDROCHLORIDE 150 MG/1
300 TABLET ORAL DAILY
Status: DISCONTINUED | OUTPATIENT
Start: 2025-04-10 | End: 2025-04-11

## 2025-04-10 ENCOUNTER — APPOINTMENT (OUTPATIENT)
Dept: CV DIAGNOSTICS | Facility: HOSPITAL | Age: 70
End: 2025-04-10
Attending: INTERNAL MEDICINE
Payer: MEDICARE

## 2025-04-10 ENCOUNTER — APPOINTMENT (OUTPATIENT)
Dept: MRI IMAGING | Facility: HOSPITAL | Age: 70
End: 2025-04-10
Attending: Other
Payer: MEDICARE

## 2025-04-10 PROBLEM — R29.898 LEFT ARM WEAKNESS: Status: ACTIVE | Noted: 2025-04-10

## 2025-04-10 LAB
ANION GAP SERPL CALC-SCNC: 11 MMOL/L (ref 0–18)
BASOPHILS # BLD AUTO: 0.08 X10(3) UL (ref 0–0.2)
BASOPHILS NFR BLD AUTO: 0.7 %
BILIRUB UR QL: NEGATIVE
BUN BLD-MCNC: 34 MG/DL (ref 9–23)
BUN/CREAT SERPL: 7.6 (ref 10–20)
C DIFF TOX B STL QL: NEGATIVE
CALCIUM BLD-MCNC: 8.1 MG/DL (ref 8.7–10.4)
CHLORIDE SERPL-SCNC: 96 MMOL/L (ref 98–112)
CLARITY UR: CLEAR
CO2 SERPL-SCNC: 27 MMOL/L (ref 21–32)
CREAT BLD-MCNC: 4.47 MG/DL (ref 0.55–1.02)
DEPRECATED RDW RBC AUTO: 47.1 FL (ref 35.1–46.3)
EGFRCR SERPLBLD CKD-EPI 2021: 10 ML/MIN/1.73M2 (ref 60–?)
EOSINOPHIL # BLD AUTO: 0.28 X10(3) UL (ref 0–0.7)
EOSINOPHIL NFR BLD AUTO: 2.5 %
ERYTHROCYTE [DISTWIDTH] IN BLOOD BY AUTOMATED COUNT: 14.1 % (ref 11–15)
EST. AVERAGE GLUCOSE BLD GHB EST-MCNC: 163 MG/DL (ref 68–126)
GLUCOSE BLD-MCNC: 90 MG/DL (ref 70–99)
GLUCOSE BLDC GLUCOMTR-MCNC: 113 MG/DL (ref 70–99)
GLUCOSE BLDC GLUCOMTR-MCNC: 135 MG/DL (ref 70–99)
GLUCOSE BLDC GLUCOMTR-MCNC: 160 MG/DL (ref 70–99)
GLUCOSE BLDC GLUCOMTR-MCNC: 184 MG/DL (ref 70–99)
GLUCOSE UR-MCNC: 70 MG/DL
HBA1C MFR BLD: 7.3 % (ref ?–5.7)
HCT VFR BLD AUTO: 32.7 % (ref 35–48)
HGB BLD-MCNC: 11.1 G/DL (ref 12–16)
HGB UR QL STRIP.AUTO: NEGATIVE
IMM GRANULOCYTES # BLD AUTO: 0.07 X10(3) UL (ref 0–1)
IMM GRANULOCYTES NFR BLD: 0.6 %
KETONES UR-MCNC: NEGATIVE MG/DL
LEUKOCYTE ESTERASE UR QL STRIP.AUTO: 75
LYMPHOCYTES # BLD AUTO: 3.13 X10(3) UL (ref 1–4)
LYMPHOCYTES NFR BLD AUTO: 27.8 %
MAGNESIUM SERPL-MCNC: 1.5 MG/DL (ref 1.6–2.6)
MCH RBC QN AUTO: 31.4 PG (ref 26–34)
MCHC RBC AUTO-ENTMCNC: 33.9 G/DL (ref 31–37)
MCV RBC AUTO: 92.4 FL (ref 80–100)
MONOCYTES # BLD AUTO: 0.63 X10(3) UL (ref 0.1–1)
MONOCYTES NFR BLD AUTO: 5.6 %
NEUTROPHILS # BLD AUTO: 7.07 X10 (3) UL (ref 1.5–7.7)
NEUTROPHILS # BLD AUTO: 7.07 X10(3) UL (ref 1.5–7.7)
NEUTROPHILS NFR BLD AUTO: 62.8 %
NITRITE UR QL STRIP.AUTO: NEGATIVE
OSMOLALITY SERPL CALC.SUM OF ELEC: 285 MOSM/KG (ref 275–295)
PH UR: 6 [PH] (ref 5–8)
PLATELET # BLD AUTO: 272 10(3)UL (ref 150–450)
POTASSIUM SERPL-SCNC: 3.6 MMOL/L (ref 3.5–5.1)
PROT UR-MCNC: 30 MG/DL
RBC # BLD AUTO: 3.54 X10(6)UL (ref 3.8–5.3)
SODIUM SERPL-SCNC: 134 MMOL/L (ref 136–145)
SP GR UR STRIP: >1.03 (ref 1–1.03)
UROBILINOGEN UR STRIP-ACNC: NORMAL
WBC # BLD AUTO: 11.3 X10(3) UL (ref 4–11)

## 2025-04-10 PROCEDURE — 99223 1ST HOSP IP/OBS HIGH 75: CPT | Performed by: INTERNAL MEDICINE

## 2025-04-10 PROCEDURE — 93306 TTE W/DOPPLER COMPLETE: CPT | Performed by: INTERNAL MEDICINE

## 2025-04-10 PROCEDURE — 70551 MRI BRAIN STEM W/O DYE: CPT | Performed by: OTHER

## 2025-04-10 PROCEDURE — 99223 1ST HOSP IP/OBS HIGH 75: CPT | Performed by: OTHER

## 2025-04-10 RX ORDER — DEXTROSE MONOHYDRATE, SODIUM CHLORIDE, SODIUM LACTATE, CALCIUM CHLORIDE, MAGNESIUM CHLORIDE 1.5; 538; 448; 18.4; 5.08 G/100ML; MG/100ML; MG/100ML; MG/100ML; MG/100ML
2000 SOLUTION INTRAPERITONEAL
Status: COMPLETED | OUTPATIENT
Start: 2025-04-10 | End: 2025-04-10

## 2025-04-10 RX ORDER — MAGNESIUM SULFATE HEPTAHYDRATE 40 MG/ML
2 INJECTION, SOLUTION INTRAVENOUS ONCE
Status: COMPLETED | OUTPATIENT
Start: 2025-04-10 | End: 2025-04-11

## 2025-04-10 RX ORDER — METOCLOPRAMIDE HYDROCHLORIDE 5 MG/ML
5 INJECTION INTRAMUSCULAR; INTRAVENOUS DAILY PRN
Status: DISCONTINUED | OUTPATIENT
Start: 2025-04-10 | End: 2025-04-11

## 2025-04-10 NOTE — ED PROVIDER NOTES
Patient Seen in: [facility] Emergency Department      History     Chief Complaint   Patient presents with    Fall     Stated Complaint: Fall    Subjective:   HPI    69-year-old female with ESRD on peritoneal dialysis nightly, orthostatic hypotension, diabetes, CAD, diastolic heart failure, who presents for evaluation after syncope.  Today she reports having intermittent bouts of diarrhea and abdominal discomfort.  She was previously having some nausea and vomiting.  After having diarrhea she stood up, felt lightheaded and felt herself falling and hit her head against a wall and then lost consciousness.  She also reports that she had an episode of syncope last week related to her orthostatic hypotension, struck the right side of the forehead but did not seek medical care.  No fever or chills.  No chest pain or dyspnea.  No dysuria or hematuria.  She reports cycling nightly with her peritoneal dialysis however today morning noted that there was no fluid output after her cycle was finished.      Objective:     Past Medical History:    Adrenal adenoma    left adrenoma    Anxiety    Cataract    Chronic kidney disease (CKD), stage III (moderate) (HCC)    CORONARY ARTERY DISEASE    Coronary atherosclerosis    Depression    DIABETES    Dialysis patient    Diarrhea    Disorder of thyroid    Diverticulosis of colon    Esophageal reflux    Fainting episodes    GERD    GOUT    Heart attack (HCC)    High blood pressure    High cholesterol    History of adverse reaction to anesthesia    delusions/hallucinations for a few days after knee replacement    History of blood transfusion    Quadruple Bypass    History of electroconvulsive therapy    HYPERTENSION    Hypothyroid    MENOPAUSE    Osteoarthritis    Other and unspecified hyperlipidemia    Personal history of other medical treatment    Transcranial Magnetic Stimulation (TMS)    Pneumonia, organism unspecified(486)    Polyp of colon    Renal artery stenosis    right  kidney    Renal disorder    Type II or unspecified type diabetes mellitus without mention of complication, not stated as uncontrolled    Unspecified essential hypertension    Unspecified sleep apnea    PSG Merit 2012 AHI 70, auto-pap     Visual impairment              Past Surgical History:   Procedure Laterality Date    Angioplasty (coronary)  3/2015    stents placed in heart    Cabg  2011    CABGx4 vessel    Cath angio  2023    Cath bare metal stent (bms)      Cath bare metal stent (bms)  2023    2 stents    Cath percutaneous  transluminal coronary angioplasty  3/3/2015    Cath percutaneous  transluminal coronary angioplasty Right 2017    right Coronary artery, OSMIN    Colonoscopy      Hc plmt coronary drug eluting stent ea addl  3/5/2015    Histopathology report  13    cecal polyps - 1 tubular adenoma    Impact tooth rem bony w/comp Bilateral 1973    wisdom teeth    Knee replacement surgery Bilateral 3/24/16    right TKA L TKA not at the same time    Left heart cath,percutaneous  2011    CAD    Left heart cath,percutaneous  3/2/2015          Renal angio, cardiac cath  2011    Sleep study, attended  12/10/2012    Tonsillectomy Bilateral 1958                Social History     Socioeconomic History    Marital status:     Number of children: 2    Years of education: 15   Occupational History    Occupation: [employer]     Comment: [employer]   Tobacco Use    Smoking status: Former     Current packs/day: 0.00     Average packs/day: 1 pack/day for 35.0 years (35.0 ttl pk-yrs)     Types: Cigarettes     Start date: 1979     Quit date: 2014     Years since quitting: 10.5    Smokeless tobacco: Never   Vaping Use    Vaping status: Never Used   Substance and Sexual Activity    Alcohol use: Yes     Alcohol/week: 1.0 standard drink of alcohol     Types: 1 Glasses of wine per week     Comment: occasionally    Drug use: Not Currently     Comment: in her 20s    Sexual  activity: Not Currently     Partners: Male   Other Topics Concern     Service No    Blood Transfusions No    Caffeine Concern Yes     Comment: coffee    Occupational Exposure No    Hobby Hazards No    Sleep Concern Yes     Comment: STEFFEN    Stress Concern No    Weight Concern No    Special Diet No    Back Care No    Exercise Yes    Bike Helmet Yes    Seat Belt Yes    Self-Exams Yes   Social History Narrative    Lives alone.    Enjoys reading, pets, knitting, cooking     Social Drivers of Health     Food Insecurity: No Food Insecurity (3/7/2025)    NCSS - Food Insecurity     Worried About Running Out of Food in the Last Year: No     Ran Out of Food in the Last Year: No   Transportation Needs: No Transportation Needs (3/7/2025)    NCSS - Transportation     Lack of Transportation: No   Housing Stability: Not At Risk (3/7/2025)    NCSS - Housing/Utilities     Has Housing: Yes     Worried About Losing Housing: No     Unable to Get Utilities: No                  Physical Exam     ED Triage Vitals [04/09/25 1959]   /83   Pulse 68   Resp 21   Temp 97 °F (36.1 °C)   Temp src Temporal   SpO2 100 %   O2 Device None (Room air)       Current Vitals:   Vital Signs  BP: 121/70  Pulse: 78  Resp: 19  Temp: 97 °F (36.1 °C)  Temp src: Temporal  MAP (mmHg): 86    Oxygen Therapy  SpO2: 98 %  O2 Device: None (Room air)        Physical Exam  Vitals and nursing note reviewed.   Constitutional:       Appearance: She is well-developed.   HENT:      Head: Normocephalic.      Comments: +ecchymosis to R forehead  Eyes:      Extraocular Movements: Extraocular movements intact.   Cardiovascular:      Rate and Rhythm: Normal rate and regular rhythm.      Heart sounds: Normal heart sounds.   Pulmonary:      Effort: Pulmonary effort is normal.      Breath sounds: Normal breath sounds.   Abdominal:      General: There is no distension.      Palpations: Abdomen is soft.      Tenderness: There is no abdominal tenderness.   Musculoskeletal:          General: Normal range of motion.      Cervical back: Normal range of motion.   Skin:     General: Skin is warm.   Neurological:      Mental Status: She is alert.      Comments: No focal deficits       Differential diagnosis includes but is not limited to hypovolemia, orthostatic hypotension, ICH, arrhythmia, electrolyte derangement    ED Course     Labs Reviewed   CBC WITH DIFFERENTIAL WITH PLATELET - Abnormal; Notable for the following components:       Result Value    RBC 3.72 (*)     HGB 11.9 (*)     HCT 34.7 (*)     RDW-SD 48.0 (*)     All other components within normal limits   HEPATIC FUNCTION PANEL (7) - Abnormal; Notable for the following components:    ALT 9 (*)     Alkaline Phosphatase 146 (*)     All other components within normal limits   BASIC METABOLIC PANEL (8) - Abnormal; Notable for the following components:    Glucose 195 (*)     Sodium 134 (*)     Chloride 96 (*)     BUN 30 (*)     Creatinine 4.51 (*)     BUN/CREA Ratio 6.7 (*)     Calcium, Total 8.6 (*)     eGFR-Cr 10 (*)     All other components within normal limits   POCT GLUCOSE - Abnormal; Notable for the following components:    POC Glucose  196 (*)     All other components within normal limits   TROPONIN I HIGH SENSITIVITY - Normal   URINALYSIS WITH CULTURE REFLEX   URINALYSIS, ROUTINE   RAINBOW DRAW LAVENDER   RAINBOW DRAW LIGHT GREEN   RAINBOW DRAW BLUE   C. DIFFICILE(TOXIGENIC)PCR     EKG    Rate, intervals and axes as noted on EKG Report.  Rate: 68  Rhythm: Sinus Rhythm  Reading: No STEMI, QTc is not prolonged to 516 ms compared to prior EKG from 3/7/2025, abnormal EKG            XR CHEST AP PORTABLE  (CPT=71045)  Result Date: 4/9/2025  CONCLUSION: No acute cardiopulmonary abnormality.    Dictated by (CST): Car Enciso MD on 4/09/2025 at 9:20 PM     Finalized by (CST): Car Enciso MD on 4/09/2025 at 9:20 PM          CT ABDOMEN+PELVIS(CPT=74176)  Result Date: 4/9/2025  CONCLUSION:   Cirrhosis.  Moderate volume abdominal and  pelvic ascites.  End-stage renal disease with peritoneal dialysis catheter.  No obstruction.  Diverticulosis without diverticulitis.     Dictated by (CST): Car Enciso MD on 4/09/2025 at 9:12 PM     Finalized by (CST): Car Enciso MD on 4/09/2025 at 9:16 PM          CT BRAIN OR HEAD (CPT=70450)  Result Date: 4/9/2025  CONCLUSION:  Right frontal scalp soft tissue swelling without acute intracranial abnormality.  Mild chronic microvascular ischemic disease.   Dictated by (CST): Car Enciso MD on 4/09/2025 at 9:10 PM     Finalized by (CST): Car Enciso MD on 4/09/2025 at 9:12 PM                     [facility]          Admission disposition: 4/9/2025 10:15 PM           Medical Decision Making  On arrival to the ED patient is normotensive but BP drops to 75/51 with standing up.  Additionally when she stood up she had large volume of diarrhea and was unsteady on her feet.  EKG shows new QTc prolongation as above.  Labs consistent with ESRD.  Urinalysis pending.  C. difficile pending.  Per my independent interpretation of CT brain, no ICH, CT ab pelvis without obstruction.  Patient will be admitted to the hospital for further management.  Notified Drs Wing and Raimundo for consultation.  D/w Dr Faria for admission.    Problems Addressed:  Diarrhea, unspecified type: complicated acute illness or injury with systemic symptoms  Orthostatic hypotension: chronic illness or injury with exacerbation, progression, or side effects of treatment  Prolonged Q-T interval on ECG: complicated acute illness or injury with systemic symptoms that poses a threat to life or bodily functions  Syncope and collapse: complicated acute illness or injury with systemic symptoms that poses a threat to life or bodily functions    Amount and/or Complexity of Data Reviewed  Labs: ordered. Decision-making details documented in ED Course.  Radiology: ordered and independent interpretation performed. Decision-making details documented in ED Course.  ECG/medicine  tests: ordered and independent interpretation performed. Decision-making details documented in ED Course.  Discussion of management or test interpretation with external provider(s): As above    Risk  Decision regarding hospitalization.      I spent a total of 40 minutes of critical care time in obtaining history, performing a physical exam, bedside monitoring of interventions, collecting and interpreting tests and discussion with consultants but not including time spent performing procedures.    Disposition and Plan     Clinical Impression:  1. Syncope and collapse    2. Orthostatic hypotension    3. Prolonged Q-T interval on ECG    4. Diarrhea, unspecified type         Disposition:  Admit  4/9/2025 10:15 pm    Follow-up:  No follow-up provider specified.  We recommend that you schedule follow up care with a primary care provider within the next three months to obtain basic health screening including reassessment of your blood pressure.      Medications Prescribed:  Current Discharge Medication List              Supplementary Documentation:         Hospital Problems       Present on Admission  Date Reviewed: 2/12/2025          ICD-10-CM Noted POA    * (Principal) Syncope and collapse R55 4/9/2025 Unknown

## 2025-04-10 NOTE — ED QUICK NOTES
Spoke w/ lab, requesting new urine sample d/t insufficient amount of urine to run a test. MD made aware.

## 2025-04-10 NOTE — ED QUICK NOTES
Orders for admission, patient is aware of plan and ready to go upstairs. Any questions, please call ED RN Doc at extension [phone].     Patient Covid vaccination status: Fully vaccinated     COVID Test Ordered in ED: None    COVID Suspicion at Admission: N/A    Running Infusions: Medication Infusions[1]     Mental Status/LOC at time of transport: A&Ox4    Other pertinent information:   CIWA score: N/A   NIH score:  N/A             [1]

## 2025-04-10 NOTE — CM/SW NOTE
04/10/25 1100   CM/SW Referral Data   Referral Source Social Work (self-referral)   Reason for Referral Discharge planning;Other  (SDOH)   Informant Patient   Medical Hx   Does patient have an established PCP? Yes   Significant Past Medical/Mental Health Hx ESRD, HTN, DM, CAD   Patient Info   Patient's Current Mental Status at Time of Assessment Alert;Oriented   Patient's Home Environment Supportive Living   Post Acute Care Provider Upon Admission [facility]   Number of Levels in Home 1   Number of Stair in Home 0   Patient lives with Alone   Patient Status Prior to Admission   Independent with ADLs and Mobility No   Pt. requires assistance with Driving;Housework;Meals;Bathing   Services in place prior to admission Home Health Care;DME/Supplies at home   Home Health Provider Info  Home Health   Type of DME/Supplies Wheeled Walker   Discharge Needs   Anticipated D/C needs To be determined     Social work was able to meet with the patient at bedside to discuss discharge planning and SDOH consult.    The patient lives at [facility] alone.   The patient requires assistance with some ADLs at baseline.  The patient owns a walker.    The patient is current with  Home Health. UNIQUE will be uploaded once disciplines are confirmed.     The patient states she feels trapped at home since she gave up driving.  The patient utilizes [employer] for groceries and family transports her to doctors appointments.     Social work provided transportation resources in the patient's AVS.    The patient has no questions or concerns at this time.    SW/CM to remain available for support and/or discharge planning.     Cindi Ayala MSW, LSW  Discharge Planner [id number]

## 2025-04-10 NOTE — PLAN OF CARE
Pt alert and oriented x4. Standby assist with walker. Orthostatic (+). PD catheter in place. Echo completed. Pt updated on plan of care. Plan for MRI. Safety precautions in place. Call light within reach.    Problem: Patient Centered Care  Goal: Patient preferences are identified and integrated in the patient's plan of care  Description: Interventions:- What would you like us to know as we care for you?   - Provide timely, complete, and accurate information to patient/family- Incorporate patient and family knowledge, values, beliefs, and cultural backgrounds into the planning and delivery of care- Encourage patient/family to participate in care and decision-making at the level they choose- Honor patient and family perspectives and choices  Outcome: Progressing     Problem: Diabetes/Glucose Control  Goal: Glucose maintained within prescribed range  Description: INTERVENTIONS:- Monitor Blood Glucose as ordered- Assess for signs and symptoms of hyperglycemia and hypoglycemia- Administer ordered medications to maintain glucose within target range- Assess barriers to adequate nutritional intake and initiate nutrition consult as needed- Instruct patient on self management of diabetes  Outcome: Progressing     Problem: Patient/Family Goals  Goal: Patient/Family Long Term Goal  Description: Patient's Long Term Goal: discharge  Interventions:  - Monitor vitals  - Orthostatics  - Echo  - See additional Care Plan goals for specific interventions  Outcome: Progressing  Goal: Patient/Family Short Term Goal  Description: Patient's Short Term Goal: feel better  Interventions:   - Ambulate as tolerated  - Continue ADLs  - See additional Care Plan goals for specific interventions  Outcome: Progressing     Problem: SAFETY ADULT - FALL  Goal: Free from fall injury  Description: INTERVENTIONS:- Assess pt frequently for physical needs- Identify cognitive and physical deficits and behaviors that affect risk of falls.- [place] fall  precautions as indicated by assessment.- Educate pt/family on patient safety including physical limitations- Instruct pt to call for assistance with activity based on assessment- Modify environment to reduce risk of injury- Provide assistive devices as appropriate- Consider OT/PT consult to assist with strengthening/mobility- Encourage toileting schedule  Outcome: Progressing     Problem: DISCHARGE PLANNING  Goal: Discharge to home or other facility with appropriate resources  Description: INTERVENTIONS:- Identify barriers to discharge w/pt and caregiver- Include patient/family/discharge partner in discharge planning- Arrange for needed discharge resources and transportation as appropriate- Identify discharge learning needs (meds, wound care, etc)- Arrange for interpreters to assist at discharge as needed- Consider post-discharge preferences of patient/family/discharge partner- Complete POLST form as appropriate- Assess patient's ability to be responsible for managing their own health- Refer to Case Management Department for coordinating discharge planning if the patient needs post-hospital services based on physician/LIP order or complex needs related to functional status, cognitive ability or social support system  Outcome: Progressing     Problem: CARDIOVASCULAR - ADULT  Goal: Maintains optimal cardiac output and hemodynamic stability  Description: INTERVENTIONS:- Monitor vital signs, rhythm, and trends- Monitor for bleeding, hypotension and signs of decreased cardiac output- Evaluate effectiveness of vasoactive medications to optimize hemodynamic stability- Monitor arterial and/or venous puncture sites for bleeding and/or hematoma- Assess quality of pulses, skin color and temperature- Assess for signs of decreased coronary artery perfusion - ex. Angina- Evaluate fluid balance, assess for edema, trend weights  Outcome: Progressing  Goal: Absence of cardiac arrhythmias or at baseline  Description: INTERVENTIONS:-  Continuous cardiac monitoring, monitor vital signs, obtain 12 lead EKG if indicated- Evaluate effectiveness of antiarrhythmic and heart rate control medications as ordered- Initiate emergency measures for life threatening arrhythmias- Monitor electrolytes and administer replacement therapy as ordered  Outcome: Progressing

## 2025-04-10 NOTE — PHYSICAL THERAPY NOTE
PHYSICAL THERAPY EVALUATION - INPATIENT     Room Number: 329/329-A  Evaluation Date: 4/10/2025  Type of Evaluation: Initial   Physician Order: PT Eval and Treat    Presenting Problem: syncope and collapse     Reason for Therapy: Mobility Dysfunction and Discharge Planning    PHYSICAL THERAPY ASSESSMENT   Patient is a 69 year old female admitted 4/9/2025 for syncope and collapse.  Prior to admission, patient's baseline is assist from [facility] staff for bathing and dressing, ambulates short distances with rollator.  Patient is currently functioning below baseline with bed mobility, transfers, gait, and standing prolonged periods.  Patient is requiring contact guard assist as a result of the following impairments: decreased functional strength, decreased endurance/aerobic capacity, impaired dynamic standing balance, decreased muscular endurance, and medical status.  Physical Therapy will continue to follow for duration of hospitalization.    Patient will benefit from continued skilled PT Services at discharge to promote prior level of function and safety with additional support and return home with home health PT.    PLAN DURING HOSPITALIZATION  Nursing Mobility Recommendation : 1 Assist  PT Device Recommendation: Rolling walker  PT Treatment Plan: Bed mobility, Body mechanics, Endurance, Energy conservation, Patient education, Gait training, Strengthening, Transfer training, Balance training  Rehab Potential : Good  Frequency (Obs): 5x/week     PHYSICAL THERAPY MEDICAL/SOCIAL HISTORY     Problem List  Principal Problem:    Syncope and collapse  Active Problems:    Orthostatic hypotension    Prolonged Q-T interval on ECG    Diarrhea, unspecified type    Left arm weakness      HOME SITUATION  Type of Home: Assisted living facility  Home Layout: One level  Stairs to Enter : 0                  Lives With: Alone (staff assist PRN)    Drives: No   Patient Regularly Uses: Glasses, Rollator     Prior Level of [place]: assist  for bathing and dressing; ambulates short distances with rollator     SUBJECTIVE  \"My son wants me to use the transport chair to get around in my apartment\"     PHYSICAL THERAPY EXAMINATION   OBJECTIVE  Precautions: Bed/chair alarm  Fall Risk: Standard fall risk    PAIN ASSESSMENT  [place]    COGNITION  Overall Cognitive Status:  WFL - within functional limits    RANGE OF MOTION AND STRENGTH ASSESSMENT  Upper extremity ROM and strength are within functional limits.  Lower extremity ROM is within functional limits.  Lower extremity strength is within functional limits.    BALANCE  Static Sitting: Good  Dynamic Sitting: Fair +  Static Standing: Fair  Dynamic Standing: Fair -    ACTIVITY TOLERANCE  BP: (!) 184/94 (post-activity; RN notified)  BP Location: Right arm  BP Method: Automatic  Patient Position: Lying    AM-PAC '6-Clicks' INPATIENT SHORT FORM - BASIC MOBILITY  How much difficulty does the patient currently have...  Patient Difficulty: Turning over in bed (including adjusting bedclothes, sheets and blankets)?: A Little   Patient Difficulty: Sitting down on and standing up from a chair with arms (e.g., wheelchair, bedside commode, etc.): A Little   Patient Difficulty: Moving from lying on back to sitting on the side of the bed?: A Little   How much help from another person does the patient currently need...   Help from Another: Moving to and from a bed to a chair (including a wheelchair)?: A Little   Help from Another: Need to walk in hospital room?: A Little   Help from Another: Climbing 3-5 steps with a railing?: A Little     AM-PAC Score:  Raw Score: 18   Approx Degree of Impairment: 46.58%   Standardized Score (AM-PAC Scale): 43.63   CMS Modifier (G-Code): CK    FUNCTIONAL ABILITY STATUS  Functional Mobility/Gait Assessment  Gait Assistance: Contact guard assist  Distance (ft): 40  Assistive Device: Rolling walker  Pattern: Shuffle (slow pace, flexed posture)  Supine to Sit: stand-by assist  Sit to  Supine: stand-by assist  Sit to Stand: contact guard assist    Exercise/Education Provided:  Education Provided To: Patient     Patient Education: Role of Physical Therapy, Plan of Care, DME Recommendations, Functional Transfer Techniques, Fall Prevention, Discharge Recommendations, Posture/Positioning, Energy Conservation, Proper Body Mechanics, Gait Training     Patient's Response to Education: Verbalized Understanding, Requires Further Education     Skilled Therapy Provided: Pt received resting in bed and agreeable to activity. No c/o pain. Demos bed mobility with SBA, STS transfers to/from bed with RW and CGA, and in-room ambulation with RW. Cues given for posture, demos overall slow but steady gait with no LOB. Returned to bed at end of session and encouraged ambulation with nursing staff throughout the day and up in chair for meals. BP elevated at end of session; RN notified. Pt was left resting with needs within reach, bed alarm on, handoff to RN complete.     The patient's Approx Degree of Impairment: 46.58% has been calculated based on documentation in the [facility] '6 clicks' Inpatient Basic Mobility Short Form.  Research supports that patients with this level of impairment may benefit from home with  PT.  Final disposition will be made by interdisciplinary medical team.    Patient End of Session: In bed, Needs met, Call light within reach, RN aware of session/findings, All patient questions and concerns addressed, Hospital anti-slip socks, Alarm set    CURRENT GOALS  Goals to be met by: 4/17/25  Patient Goal Patient's self-stated goal is: go home   Goal #1 Patient is able to demonstrate supine - sit EOB @ level: supervision     Goal #1   Current Status    Goal #2 Patient is able to demonstrate transfers Sit to/from Stand at assistance level: supervision with walker - rolling     Goal #2  Current Status    Goal #3 Patient is able to ambulate 75 feet with assist device: walker - rolling at assistance level:  supervision   Goal #3   Current Status    Goal #4    Goal #4   Current Status    Goal #5 Patient to demonstrate independence with home activity/exercise instructions provided to patient in preparation for discharge.   Goal #5   Current Status    Goal #6    Goal #6  Current Status      Patient Evaluation Complexity Level:  History Moderate - 1 or 2 personal factors and/or co-morbidities   Examination of body systems Moderate - addressing a total of 3 or more elements   Clinical Presentation  Moderate - Evolving   Clinical Decision Making  Moderate Complexity     Therapeutic Activity:  20 minutes

## 2025-04-10 NOTE — CONSULTS
[facility]  [address], [address]  [place] [address]  [phone]           NEUROLOGY CONSULTATION NOTE    [facility]  part of [facility]    Report of Consultation  Cassy Patterson Patient Status:  Observation     1955 MRN [id number]    Location [facility] 3W/SW Attending Derian Liz DO    Hosp Day # 0 PCP Ambreen Sandoval MD      Date of Admission:  2025  Date of Consult:  4/10/2025  Reason for Admission/Consultation:  syncope in the setting of diarrhea     Requested by: Derian Liz DO  _________________________________________________________________________________________  Chief Complaint:   Chief Complaint   Patient presents with    Fall     HPI:  Cassy Patterson is a very pleasant  69 year old  woman w a pmhx of  ESR peritoneal dialysis in the setting of diarrhea., prior hx of smoking,  renal artery stenosis,  hx of MI s/p PCI, CABG, HTN,  HLD, left adrenal adenoma,  diabetes, depression, hypothyroidism,  diverticulosis, gerd, covid-19,   and STEFFEN   who follows with this author for neurogenic orthostatic hypotension on atomoxetine and is now seen for fall/syncope.    She feel 1 week ago and hit her head then.   She lost consciousness then. Reports she stood up, felt dizzy, and then woke up on the floor 1 or 2 seconds later.\"  She did not have diarrhea at that time.    Diarrhea has been ongoing intermittently for 1 year.     reportedly the patient fell after she stood up from being on the toilet.      She did hit her head.  She had a head CT that demonstrated right frontal scalp soft tissue swelling without acute intracranial abnormality.  She does not have her atomoxetine here.    She has home PT. Has only been seen twice.     Reports doing worse since she had covid 1 mo to 6 weeks ago.     Negative motor symptoms:  Weakness: Yes globally  since she had covid-19       Positive motor symptoms:  CrampsYes in her abdomen    Twitching”No”     Restless legs”No”  \"Tightness\": no  Fasciculations”No”    Positive sensory large fiber symptoms:  Buzzing”No”  Tingling/paresthesiasYes her toes sometimes  Pins and needles; NO MORE Numbness    Positive sensory small fiber symptoms:  Burning or lancinating pain”No”    Autonomic involvement:  Survey of Autonomic Symptoms    Q1a. Have you had any of the following health symptoms during the past 6 months?  (1 Yes; 0  No) Q1b. If you answered yes in Q1a, how much would you say the symptom bothers you? (1  Not at all; 2 _ A little; 3 _ Some; 4 _ A moderate amount;5 _ A lot)   Symptom/health problem      1. Do you have lightheadedness? 1   5    2. Do you have a dry mouth or dry eyes? 1 dry mouth after she takes her medications 5   3. Are your feet pale or blue?  0    4. Are your feet colder than the rest of your body?  0    5. Is sweating in your feet decreased compared to the rest of your body? 0    6. Is sweating in your feet decreased or absent (for example, after exercise or during hot weather)? 0    7. Is sweating in your hands increased compared to the rest of your body?  0    8. Do you have nausea, vomiting, or bloating after eating a small meal?  This last week    9. Do you have persistent diarrhea (more than 3 loose bowel movements per day)? No, it is much more intermittent than that. The prior episode was 2 weeks ago. Before that it was 1 mo ago.    10. Do you have persistent constipation (less than 1 bowel movement every other day)? 1 she has a bm every 2 to 3 days 0   11. Do you have leaking of urine? 0    12. Do you have difficulty obtaining an erection (men)?      Number of symptoms reported: 2 (sum of column A, 0-12 for men and 0-11 for women); total symptom impact score: 10 (sum of column B, 0-60 for men and 0-55 for women).          Other symptoms:     Worsening balance, particularly in the darkYes getting worse all together      Hyperalgesia; sensation of walking on  coals”No”    Impairment;  Activities of daily living  History of changes in handwritingYes  problems fasting jewelry or buttons:  no  Difficulty inserting and turning keys”No”  tripping on carpet or curbYes  FallingYes  having difficulty arising from a sitting positionYes       Pertinent positive and negatives per HPI and above.  All others were reviewed and negative.      Review and summation of prior records      ROS:  Pertinent positive and negatives per HPI.  All others were reviewed and negative.    Past Medical History[1]    Past Surgical History[2]    Family History[3]    Social History     Socioeconomic History    Marital status:      Spouse name: Not on file    Number of children: 2    Years of education: 15    Highest education level: Not on file   Occupational History    Occupation: Data Systems     Comment: [employer]   Tobacco Use    Smoking status: Former     Current packs/day: 0.00     Average packs/day: 1 pack/day for 35.0 years (35.0 ttl pk-yrs)     Types: Cigarettes     Start date: 9/23/1979     Quit date: 9/23/2014     Years since quitting: 10.5    Smokeless tobacco: Never   Vaping Use    Vaping status: Never Used   Substance and Sexual Activity    Alcohol use: Yes     Alcohol/week: 1.0 standard drink of alcohol     Types: 1 Glasses of wine per week     Comment: occasionally    Drug use: Not Currently     Comment: in her 20s    Sexual activity: Not Currently     Partners: Male   Other Topics Concern     Service No    Blood Transfusions No    Caffeine Concern Yes     Comment: coffee    Occupational Exposure No    Hobby Hazards No    Sleep Concern Yes     Comment: STEFFEN    Stress Concern No    Weight Concern No    Special Diet No    Back Care No    Exercise Yes    Bike Helmet Yes    Seat Belt Yes    Self-Exams Yes   Social History Narrative    Lives alone.    Enjoys reading, pets, knitting, cooking     Social Drivers of Health     Food Insecurity: No Food Insecurity (4/10/2025)    [facility]     Worried About Running Out of Food in the Last Year: No     Ran Out of Food in the Last Year: No   Transportation Needs: Unmet Transportation Needs (4/10/2025)    NCSS - Transportation     Lack of Transportation: Yes   Stress: Not on file   Housing Stability: Not At Risk (4/10/2025)    NCSS - Housing/Utilities     Has Housing: Yes     Worried About Losing Housing: No     Unable to Get Utilities: No       Objective:    Last vitals and weight :  Vitals:    04/10/25 1111   BP: 133/77   Pulse: 81   Resp:    Temp:      @FLOWCHS(14)@    Exam:  - General: appears stated age and no distress  - CV: Symmetric pulsee   Carotids:   - Pulmonary: No sign of respiratory distress   Neurologic Exam  - Mental Status: Alert and attentive.  Able to provide a history..  Speech is spontaneous, fluent, and prosodic. Comprehension intact. Repetition intact. Phrase length and rate are normal. No paraphasic errors, neologisms, anomia, acalculia, apraxia, anosognosia, or R/L confusion. No neglect.   - Cranial Nerves: No gaze preference. Visual fields:normal Pupils are 4mm briskly constricting to 3mm and equally round and reactive to light  in a well lit room.  EOMI. No nystagmus. No ptosis. V1-V3 intact B/L to light touch.No pathological facial asymmetry. No flattening of the nasolabial fold. .  Hearing grossly intact.  Tongue midline. No atrophy or fasiculations of the tongue noted. Palate and uvula elevate symmetrically.  Shoulder shrug symmetric.  - Fundoscopic exam:normal w/o hemorrhages, exudates, or papilledema.No attenuation. No pallor.  - Motor:  normal tone, normal bulk. No interosseous wasting. No flattening of hypothenar eminences.       Right Left     Motor Strength   Deltoids 5 4+  Triceps 5 5  Biceps 5 5  Wrist Extensors 5 5   5 5       Knee extensors 3 3      Pronator drift: No pronator drift and left arm drifts down before 10 seconds. NO pronation. No finger curling.   Arm Rolling:  + orbiting. Left  index finger was stationary and would collide w/ right index.   Finger Taps: Finger taps are symmetric in rate and amplitude.    Rapid movements: Rapid/fine movements are symmetric. As expected their dominant hand is slightly faster.   Leg Drift: none   Foot Taps: Foot taps are symmetric.      Asterixis: No asterixis noted.   Tremor:         Adductor Spread: No adductor spread noted.    Frontal release signs:Not assessed.    Jaw Jerk:    Anderson's sign:absent   Nonsustained clonus: Absent   Sustained clonus: Absent   - Sensory:   Light touch: intact  Temperature:  gradient loss in LE   Pinprick:   Vibration: intact    - Cerebellum: No truncal ataxia. No titubations. No dysmetria, no dysdiadochokinesis. No overshoot.        Inpatient Medications  Scheduled Medications[4]     PRN Medications[5]      Home Medications  Current Outpatient Medications   Medication Instructions    allopurinol 300 MG Oral Tab TAKE ONE-HALF (1/2) TABLET DAILY    amLODIPine (NORVASC) 10 mg, Nightly    aspirin 81 mg, Daily    atomoxetine (STRATTERA) 25 mg, Oral, Daily    atorvastatin (LIPITOR) 80 mg, Oral, Nightly    ERICKSON CONTOUR NEXT TEST In Vitro Strip TEST BLOOD GLUCOSE THREE TIMES DAILY    buPROPion ER (WELLBUTRIN XL) 300 mg, Oral, Daily    carvedilol (COREG) 37.5 mg, 2 times daily with meals    diazePAM (VALIUM) 5 mg, Oral, 2 times daily    hydrALAZINE (APRESOLINE) 25 mg, Oral, 3 times daily, Take tid, hold afternoon hydralazine if sbp <150 but usually does not take    Insulin Pen Needle (BD PEN NEEDLE ORIGINAL U/F) 29G X 12.7MM Does not apply Misc USE AS DIRECTED DAILY    levothyroxine (SYNTHROID) 125 mcg, Oral, Before breakfast    Pantoprazole Sodium 40 MG Oral Tab EC TAKE 1 TABLET EVERY MORNING BEFORE BREAKFAST    Potassium Chloride ER 20 MEQ Oral Tab CR 1 tablet, Daily    sertraline (ZOLOFT) 200 mg, Oral, Daily    Toujeo SoloStar 22 Units, Subcutaneous, Every evening        Data reviewed  Laboratory Data:  Lab Results   Component  Value Date    WBC 11.3 (H) 04/10/2025    HGB 11.1 (L) 04/10/2025    HCT 32.7 (L) 04/10/2025    .0 04/10/2025    CREATSERUM 4.47 (H) 04/10/2025    BUN 34 (H) 04/10/2025     (L) 04/10/2025    K 3.6 04/10/2025    CL 96 (L) 04/10/2025    CO2 27.0 04/10/2025    GLU 90 04/10/2025    CA 8.1 (L) 04/10/2025    ALB 4.2 04/09/2025    ALKPHO 146 (H) 04/09/2025    TP 6.7 04/09/2025    AST 18 04/09/2025    ALT 9 (L) 04/09/2025    PTT 63.6 (H) 09/21/2023    INR 0.99 09/19/2017    PTP 13.1 09/19/2017    T4F 1.21 07/29/2021    TSH 1.541 03/07/2025    DDIMER 2.53 (H) 09/19/2023    ESRML 9 07/16/2014     (H) 07/17/2014    MG 1.5 (L) 04/10/2025    PHOS 3.4 03/10/2025    TROP <0.045 07/21/2019    CK 61 07/17/2014    CKMB 30.3 (H) 05/31/2011    B12 369 07/16/2014    POCGLU 152 (H) 07/18/2014     Recent Results (from the past 72 hours)   RAINBOW DRAW LAVENDER    Collection Time: 04/09/25  8:00 PM   Result Value Ref Range    Hold Lavender Auto Resulted    RAINBOW DRAW LIGHT GREEN    Collection Time: 04/09/25  8:00 PM   Result Value Ref Range    Hold Lt Green Auto Resulted    RAINBOW DRAW BLUE    Collection Time: 04/09/25  8:00 PM   Result Value Ref Range    Hold Blue Auto Resulted    CBC With Differential With Platelet    Collection Time: 04/09/25  8:00 PM   Result Value Ref Range    WBC 10.8 4.0 - 11.0 x10(3) uL    RBC 3.72 (L) 3.80 - 5.30 x10(6)uL    HGB 11.9 (L) 12.0 - 16.0 g/dL    HCT 34.7 (L) 35.0 - 48.0 %    MCV 93.3 80.0 - 100.0 fL    MCH 32.0 26.0 - 34.0 pg    MCHC 34.3 31.0 - 37.0 g/dL    RDW-SD 48.0 (H) 35.1 - 46.3 fL    RDW 14.2 11.0 - 15.0 %    .0 150.0 - 450.0 10(3)uL    Neutrophil Absolute Prelim 6.76 1.50 - 7.70 x10 (3) uL    Neutrophil Absolute 6.76 1.50 - 7.70 x10(3) uL    Lymphocyte Absolute 3.07 1.00 - 4.00 x10(3) uL    Monocyte Absolute 0.57 0.10 - 1.00 x10(3) uL    Eosinophil Absolute 0.32 0.00 - 0.70 x10(3) uL    Basophil Absolute 0.07 0.00 - 0.20 x10(3) uL    Immature Granulocyte Absolute  0.05 0.00 - 1.00 x10(3) uL    Neutrophil % 62.3 %    Lymphocyte % 28.3 %    Monocyte % 5.3 %    Eosinophil % 3.0 %    Basophil % 0.6 %    Immature Granulocyte % 0.5 %   Hepatic Function Panel (7)    Collection Time: 04/09/25  8:00 PM   Result Value Ref Range    AST 18 <34 U/L    ALT 9 (L) 10 - 49 U/L    Alkaline Phosphatase 146 (H) 55 - 142 U/L    Bilirubin, Total 0.2 0.2 - 1.1 mg/dL    Bilirubin, Direct <0.1 <=0.3 mg/dL    Total Protein 6.7 5.7 - 8.2 g/dL    Albumin 4.2 3.2 - 4.8 g/dL   Troponin I (High Sensitivity)    Collection Time: 04/09/25  8:00 PM   Result Value Ref Range    Troponin I (High Sensitivity) 20 <=34 ng/L   Basic Metabolic Panel (8)    Collection Time: 04/09/25  8:00 PM   Result Value Ref Range    Glucose 195 (H) 70 - 99 mg/dL    Sodium 134 (L) 136 - 145 mmol/L    Potassium 4.2 3.5 - 5.1 mmol/L    Chloride 96 (L) 98 - 112 mmol/L    CO2 25.0 21.0 - 32.0 mmol/L    Anion Gap 13 0 - 18 mmol/L    BUN 30 (H) 9 - 23 mg/dL    Creatinine 4.51 (H) 0.55 - 1.02 mg/dL    BUN/CREA Ratio 6.7 (L) 10.0 - 20.0    Calcium, Total 8.6 (L) 8.7 - 10.4 mg/dL    Calculated Osmolality 290 275 - 295 mOsm/kg    eGFR-Cr 10 (L) >=60 mL/min/1.73m2   POCT Glucose    Collection Time: 04/09/25  8:28 PM   Result Value Ref Range    POC Glucose  196 (H) 70 - 99 mg/dL   Basic Metabolic Panel (8)    Collection Time: 04/10/25  6:47 AM   Result Value Ref Range    Glucose 90 70 - 99 mg/dL    Sodium 134 (L) 136 - 145 mmol/L    Potassium 3.6 3.5 - 5.1 mmol/L    Chloride 96 (L) 98 - 112 mmol/L    CO2 27.0 21.0 - 32.0 mmol/L    Anion Gap 11 0 - 18 mmol/L    BUN 34 (H) 9 - 23 mg/dL    Creatinine 4.47 (H) 0.55 - 1.02 mg/dL    BUN/CREA Ratio 7.6 (L) 10.0 - 20.0    Calcium, Total 8.1 (L) 8.7 - 10.4 mg/dL    Calculated Osmolality 285 275 - 295 mOsm/kg    eGFR-Cr 10 (L) >=60 mL/min/1.73m2   Magnesium    Collection Time: 04/10/25  6:47 AM   Result Value Ref Range    Magnesium 1.5 (L) 1.6 - 2.6 mg/dL   CBC With Differential With Platelet     Collection Time: 04/10/25  6:47 AM   Result Value Ref Range    WBC 11.3 (H) 4.0 - 11.0 x10(3) uL    RBC 3.54 (L) 3.80 - 5.30 x10(6)uL    HGB 11.1 (L) 12.0 - 16.0 g/dL    HCT 32.7 (L) 35.0 - 48.0 %    MCV 92.4 80.0 - 100.0 fL    MCH 31.4 26.0 - 34.0 pg    MCHC 33.9 31.0 - 37.0 g/dL    RDW-SD 47.1 (H) 35.1 - 46.3 fL    RDW 14.1 11.0 - 15.0 %    .0 150.0 - 450.0 10(3)uL    Neutrophil Absolute Prelim 7.07 1.50 - 7.70 x10 (3) uL    Neutrophil Absolute 7.07 1.50 - 7.70 x10(3) uL    Lymphocyte Absolute 3.13 1.00 - 4.00 x10(3) uL    Monocyte Absolute 0.63 0.10 - 1.00 x10(3) uL    Eosinophil Absolute 0.28 0.00 - 0.70 x10(3) uL    Basophil Absolute 0.08 0.00 - 0.20 x10(3) uL    Immature Granulocyte Absolute 0.07 0.00 - 1.00 x10(3) uL    Neutrophil % 62.8 %    Lymphocyte % 27.8 %    Monocyte % 5.6 %    Eosinophil % 2.5 %    Basophil % 0.7 %    Immature Granulocyte % 0.6 %   Hemoglobin A1C    Collection Time: 04/10/25  6:47 AM   Result Value Ref Range    HgbA1C 7.3 (H) <5.7 %    Estimated Average Glucose 163 (H) 68 - 126 mg/dL   POCT Glucose    Collection Time: 04/10/25  7:53 AM   Result Value Ref Range    POC Glucose  113 (H) 70 - 99 mg/dL   Urinalysis with Culture Reflex    Collection Time: 04/10/25  8:27 AM    Specimen: Urine, clean catch   Result Value Ref Range    Urine Color Light-Yellow Yellow    Clarity Urine Clear Clear    Spec Gravity >1.030 (H) 1.005 - 1.030    Glucose Urine 70 (A) Normal mg/dL    Bilirubin Urine Negative Negative    Ketones Urine Negative Negative mg/dL    Blood Urine Negative Negative    pH Urine 6.0 5.0 - 8.0    Protein Urine 30 (A) Negative mg/dL    Urobilinogen Urine Normal Normal    Nitrite Urine Negative Negative    Leukocyte Esterase Urine 75 (A) Negative    WBC Urine 11-20 (A) 0 - 5 /HPF    RBC Urine 0-2 0 - 2 /HPF    Bacteria Urine None Seen None Seen /HPF    Squamous Epi. Cells Few (A) None Seen /HPF    Renal Tubular Epithelial Cells None Seen None Seen /HPF    Transitional Cells  None Seen None Seen /HPF    Yeast Urine None Seen None Seen /HPF        HGBA1C:   Lab Results   Component Value Date    A1C 7.3 (H) 04/10/2025    A1C 7.1 (H) 03/07/2025    A1C 5.9 (H) 02/25/2024     (H) 04/10/2025        Test results/Imaging:   XR CHEST AP PORTABLE  (CPT=71045)  Result Date: 4/9/2025  CONCLUSION: No acute cardiopulmonary abnormality.    Dictated by (CST): Car Enciso MD on 4/09/2025 at 9:20 PM     Finalized by (CST): Car Enciso MD on 4/09/2025 at 9:20 PM          CT ABDOMEN+PELVIS(CPT=74176)  Result Date: 4/9/2025  CONCLUSION:   Cirrhosis.  Moderate volume abdominal and pelvic ascites.  End-stage renal disease with peritoneal dialysis catheter.  No obstruction.  Diverticulosis without diverticulitis.     Dictated by (CST): Car Enciso MD on 4/09/2025 at 9:12 PM     Finalized by (CST): Car Enciso MD on 4/09/2025 at 9:16 PM          CT BRAIN OR HEAD (CPT=70450)  Result Date: 4/9/2025  CONCLUSION:  Right frontal scalp soft tissue swelling without acute intracranial abnormality.  Mild chronic microvascular ischemic disease.   Dictated by (CST): Car Enciso MD on 4/09/2025 at 9:10 PM     Finalized by (CST): Car Enciso MD on 4/09/2025 at 9:12 PM              CT BRAIN OR HEAD (CPT=70450)  Result Date: 4/9/2025  CONCLUSION:  Right frontal scalp soft tissue swelling without acute intracranial abnormality.  Mild chronic microvascular ischemic disease.   Dictated by (CST): Car Enciso MD on 4/09/2025 at 9:10 PM     Finalized by (CST): Car Enciso MD on 4/09/2025 at 9:12 PM          CT BRAIN OR HEAD (CPT=70450)  Result Date: 2/5/2025  CONCLUSION: Small to moderate-sized soft tissue hematoma within the left frontal scalp with no underlying calvarial fracture or acute intracranial process.    Dictated by (CST): Jeff Allison MD on 2/05/2025 at 1:30 PM     Finalized by (CST): Jeff Allison MD on 2/05/2025 at 1:43 PM            Performed an independent visualization of head CT, prior MRI  from 2019  Imaging revealed: Agree with read         Cassy Patterson is a very pleasant  69 year old  woman w a pmhx of  ESR peritoneal dialysis in the setting of diarrhea., prior hx of smoking,  renal artery stenosis,  hx of MI s/p PCI, CABG, HTN,  HLD, left adrenal adenoma,  diabetes, depression, hypothyroidism,  diverticulosis, gerd, covid-19,   and STEFFEN   who follows with this author for neurogenic orthostatic hypotension on atomoxetine and is now seen for fall/syncope.    Unexpectedly she had left index finger orbiting.  Her left index finger/hand would be stationary as a right arm rotated around it.  This occurred intermittently.  She also had a left arm drift but no pronation.  Her left deltoid weaker than the right.  She reports she may have injured her left shoulder.  No other symptoms that localized to the right hemisphere.  However given the left arm drift and index finger orbiting will order MRI of the brain.  Suspicion for acute ischemic stroke is low.  Agree with workup/treatment of her intermittent diarrhea.  Need to restart her atomoxetine.  Loss of consciousness is due to her well-known neurogenic orthostatic hypotension.  She will bring in her home atomoxetine.  Would avoid adding Pirates taking as this can cause diarrhea.      Neurogenic orthostatic hypotension  Differential Diagnosis:  Attributed to  DM neuropathy and ESRD;  Differential diagnosis for neurogenic orthostatic hypotension:  CNS synucleinopathy - doubt  Parkinson disease  Dementia with Lewy bodies  Multiple system atrophy  Pure autonomic failure; if no other associated symptoms  Autoimmune autonomic neuropathy - doubt. Labs never sent.   Small fiber neuropathy  Small fiber neuropathy due to diabetes - working dx.  Small fiber neuropathy due to amyloidosis  Paraneoplastic neuropathy or ganglionopathy   Immune mediated neuropathy or ganglionopathy   Hereditary transthyretin amyloidosis   less robust pressor effect consider atomoxetine  18 mg  or pyridostigmine.    \" a low dose of atomoxetine has a robust pressor effect in patients with MSA. Pyridostigmine, a peripheral acetylcholinesterase inhibitor, provides a pressor effect enhancing nicotinic neurotransmission at the level of the sympathetic [id number] and can also help patients with constipation. The key challenge with all these therapies is that the durability of the medications wanes with time, so working with the patient and caregiver to adjust lifestyle and medications and manage acute events (especially urinary tract infections, which are prone to causing dramatic worsening in orthostatic hypotension symptoms) is important.\"    For his supine hypertension consider the following:  \" Elevating the head of the bed at night can help with this symptom and should be discussed with the patient. Other methods to reduce supine hypertension include carbohydrates (eg, alcoholic drinks) right before bedtime, short-acting antihypertensives such as nifedipine, or even transdermal nitroglycerin ointment.\"          Therapeutics:  Resume home atomoxetine .     2. Left arm weakness  Differential Diagnosis:   ?orthopaedic d/t injury  Diagnostics:  Mri brain to exclude stroke; doubt pattern of weakness mostly inconsistent with stroke.  Consider imaging of her shoulder per primary       Education/Instructions given to: patient   Barriers to Learning:None  Content: Refer to note above. Evaluation/Outcome: Verbalized understanding    This document is not intended to support charting by exception.  Sections left blank in a completed note should be presumed not to have been done.    Disclaimer:   This record was dictated using Dragon software. There may be errors due to voice recognition problems that were not realized and corrected during the completion of the note.            Thank you.  Sohail Galdamez D.O.   Vascular & General Neurology  4/10/2025  11:59 AM         [1]   Past Medical History:   Adrenal adenoma     left adrenoma    Anxiety    Cataract    Chronic kidney disease (CKD), stage III (moderate) (HCC)    CORONARY ARTERY DISEASE    Coronary atherosclerosis    Depression    DIABETES    Dialysis patient    Diarrhea    Disorder of thyroid    Diverticulosis of colon    Esophageal reflux    Fainting episodes    GERD    GOUT    Heart attack (HCC)    High blood pressure    High cholesterol    History of adverse reaction to anesthesia    delusions/hallucinations for a few days after knee replacement    History of blood transfusion    Quadruple Bypass    History of electroconvulsive therapy    HYPERTENSION    Hypothyroid    MENOPAUSE    Osteoarthritis    Other and unspecified hyperlipidemia    Personal history of other medical treatment    Transcranial Magnetic Stimulation (TMS)    Pneumonia, organism unspecified(486)    Polyp of colon    Renal artery stenosis    right kidney    Renal disorder    Type II or unspecified type diabetes mellitus without mention of complication, not stated as uncontrolled    Unspecified essential hypertension    Unspecified sleep apnea    PSG Merit 2012 AHI 68, auto-pap     Visual impairment   [2]   Past Surgical History:  Procedure Laterality Date    Angioplasty (coronary)  3/2015    stents placed in heart    Cabg  6/1/2011    CABGx4 vessel    Cath angio  09/21/2023    Cath bare metal stent (bms)      Cath bare metal stent (bms)  09/2023    2 stents    Cath percutaneous  transluminal coronary angioplasty  3/3/2015    Cath percutaneous  transluminal coronary angioplasty Right 09/19/2017    right Coronary artery, OSMIN    Colonoscopy      Hc plmt coronary drug eluting stent ea addl  3/5/2015    Histopathology report  12/18/13    cecal polyps - 1 tubular adenoma    Impact tooth rem bony w/comp Bilateral 1973    wisdom teeth    Knee replacement surgery Bilateral 3/24/16    right TKA L TKA not at the same time    Left heart cath,percutaneous  5/31/2011    CAD    Left heart cath,percutaneous  3/2/2015           Renal angio, cardiac cath  2011    Sleep study, attended  12/10/2012    Tonsillectomy Bilateral    [3]   Family History  Problem Relation Age of Onset    Cancer Father         prostate, liver    Hypertension Father     Obesity Father     Breast Cancer Father 70        age at dx 70    Heart Surgery Mother         Mitral valve replaced    Heart Disorder Mother         CHF    Hypertension Mother     Arthritis Mother         TKA    Breast Cancer Mother     Stroke Maternal Grandmother     Stroke Maternal Grandfather     Breast Cancer Paternal Grandmother 78        age at dx 78    Cancer Paternal Grandfather         lung    Obesity Brother     Diabetes Brother     Alcohol and Other Disorders Associated Son     Substance Abuse Son     Obesity Sister     Diabetes Sister     Traumatic brain injury Brother    [4]    allopurinol  150 mg Oral Daily    aspirin  81 mg Oral Daily    [Held by provider] atomoxetine  25 mg Oral Daily    atorvastatin  80 mg Oral Daily    buPROPion ER  300 mg Oral Daily    carvedilol  37.5 mg Oral BID with meals    hydrALAZINE  25 mg Oral TID    levothyroxine  125 mcg Oral Daily @ 0700    pantoprazole  40 mg Oral QAM AC    heparin  5,000 Units Subcutaneous Q8H JERRY    insulin degludec  20 Units Subcutaneous Daily    insulin aspart  1-5 Units Subcutaneous TID CC   [5]   metoclopramide    melatonin    ondansetron    glucose **OR** glucose **OR** glucose-vitamin C **OR** dextrose **OR** glucose **OR** glucose **OR** glucose-vitamin C

## 2025-04-10 NOTE — RESPIRATORY THERAPY NOTE
Patient previously diagnosed  with sleep apnea but does not use CPAP at home and refused to use it during hospital stay.

## 2025-04-10 NOTE — CONSULTS
[facility] CARDIOLOGY CONSULT      Cassy Patterson is a 69 year old female who I assessed as follows:  Chief Complaint   Patient presents with    Fall          REASON FOR CONSULTATION:    syncope            ASSESSMENT/PLAN:     IMPRESSIONS:  Syncope. Has chronic orthostatic hypotension, exacerbated by recent emesis/diarrhea  Chronic HFpEF  CAD s/p stents, last 2023  HL, on statin  ESRD on PD  HTN        PLAN:  Atomoxetine for chronic orthostasis  Echo  Tele  Follow BP  Hydralazine dose decreased in recent past.            --------------------------------------------------------------------------------------------------------------------------------    HPI:         History of CAD, CHF, chronic orthostatic hypotension presents with syncope last week and pre-syncope vs syncope yesterday. Both soon after standing. Hit forehead last week, back of head yesterday.     Yesterday, on toilet, got up, then fell and hit back of head. She denies syncope today though ER notes says she passed out.    She has been having intermittent emesis and diarrhea recently.                 Current Medications[1]   Past Medical History[2]  Past Surgical History[3]  Family History[4]   Social History:  Short Social Hx on File[5]       Medications:  Current Hospital Medications[6]        Allergies  Allergies[7]            REVIEW OF SYSTEMS:   GENERAL HEALTH: no fevers  SKIN: denies any unusual skin lesions or rashes   EARS: no recent changes in hearing  EYE: no recent vision changes  THROAT: denies sore throat  RESPIRATORY: denies cough or shortness of breath with exertion  CARDIOVASCULAR: syncope  GI: abdominal pain, diarrhea, vomiting  NEURO: denies headaches and focal neurologic symptoms  PSYCH: no recent depression  ENDOCRINE: no change in sleep pattern  HEME: no easy bruising  All other systems reviewed and negative.          EXAM:         TELE: SR          /76 (BP Location: Right arm)   Pulse 77   Temp 97.4 °F (36.3 °C)  (Oral)   Resp 16   Wt 173 lb 1 oz (78.5 kg)   SpO2 98%   BMI 30.66 kg/m²   Temp (24hrs), Av.3 °F (36.3 °C), Min:97 °F (36.1 °C), Max:97.4 °F (36.3 °C)    Wt Readings from Last 3 Encounters:   04/10/25 173 lb 1 oz (78.5 kg)   03/10/25 175 lb 4.8 oz (79.5 kg)   24 178 lb (80.7 kg)         Intake/Output Summary (Last 24 hours) at 4/10/2025 0835  Last data filed at 4/10/2025 0617  Gross per 24 hour   Intake 500 ml   Output 100 ml   Net 400 ml         GENERAL: well developed, well nourished, in no apparent distress  SKIN: no rashes  HEENT: atraumatic, normocephalic, throat without erythema  NECK: supple, no bruits  LUNGS: clear to auscultation  CARDIO: RRR without murmur or S3   GI: soft, nontender  EXTREMITIES: no cyanosis, clubbing or edema  NEUROLOGY: alert  PSYCH: cooperative          LABORATORY DATA:               ECG:        ECHO:          Labs:  Recent Labs   Lab 04/09/25  2000 04/10/25  0647   * 90   BUN 30* 34*   CREATSERUM 4.51* 4.47*   CA 8.6* 8.1*   * 134*   K 4.2 3.6   CL 96* 96*   CO2 25.0 27.0     No results for input(s): \"TROP\" in the last 168 hours.  Recent Labs   Lab 04/10/25  0647   RBC 3.54*   HGB 11.1*   HCT 32.7*   MCV 92.4   MCH 31.4   MCHC 33.9   RDW 14.1   NEPRELIM 7.07   WBC 11.3*   .0     Recent Labs   Lab 25   TROPHS 20         CATH :  CATH:  IMPRESSION:    1.  Left heart catheterization: LVEDP 9 mmHg, no aortic stenosis.  LV gram was not performed to minimize contrast exposure.    2.  Coronary angiography:  right dominant system  - LM: no significant angiographic disease  - LAD: proximal occlusion  - LCx: patent stents; OM1- 80% ostial stenosis (small caliber vessel, unchanged angiographic appearance from 2017 study).  - Ramus intermedius: 90% ostial stenosis  - RCA: patent stents  3. Bypass graft angiography:  - LIMA to LAD- widely patent  - SVG to ramus- occluded  - SVG to OM- occluded  - SVG to RCA- occluded  4. Percutaneous coronary  intervention:  Successful PCI to the ostial ramus intermedius with a 2.5x12mm [place] OSMIN and the mid vessel with another 2.5x12mm OSMIN.           Imaging:  XR CHEST AP PORTABLE  (CPT=71045)  Result Date: 4/9/2025  CONCLUSION: No acute cardiopulmonary abnormality.    Dictated by (CST): Car Enciso MD on 4/09/2025 at 9:20 PM     Finalized by (CST): Car Enciso MD on 4/09/2025 at 9:20 PM          CT ABDOMEN+PELVIS(CPT=74176)  Result Date: 4/9/2025  CONCLUSION:   Cirrhosis.  Moderate volume abdominal and pelvic ascites.  End-stage renal disease with peritoneal dialysis catheter.  No obstruction.  Diverticulosis without diverticulitis.     Dictated by (CST): Car Enciso MD on 4/09/2025 at 9:12 PM     Finalized by (CST): Car Enciso MD on 4/09/2025 at 9:16 PM          CT BRAIN OR HEAD (CPT=70450)  Result Date: 4/9/2025  CONCLUSION:  Right frontal scalp soft tissue swelling without acute intracranial abnormality.  Mild chronic microvascular ischemic disease.   Dictated by (CST): Car Enciso MD on 4/09/2025 at 9:10 PM     Finalized by (CST): Car Enciso MD on 4/09/2025 at 9:12 PM                 Rajinder Reed MD          [1]   No current outpatient medications on file.   [2]   Past Medical History:   Adrenal adenoma    left adrenoma    Anxiety    Cataract    Chronic kidney disease (CKD), stage III (moderate) (HCC)    CORONARY ARTERY DISEASE    Coronary atherosclerosis    Depression    DIABETES    Dialysis patient    Diarrhea    Disorder of thyroid    Diverticulosis of colon    Esophageal reflux    Fainting episodes    GERD    GOUT    Heart attack (HCC)    High blood pressure    High cholesterol    History of adverse reaction to anesthesia    delusions/hallucinations for a few days after knee replacement    History of blood transfusion    Quadruple Bypass    History of electroconvulsive therapy    HYPERTENSION    Hypothyroid    MENOPAUSE    Osteoarthritis    Other and unspecified hyperlipidemia    Personal  history of other medical treatment    Transcranial Magnetic Stimulation (TMS)    Pneumonia, organism unspecified(486)    Polyp of colon    Renal artery stenosis    right kidney    Renal disorder    Type II or unspecified type diabetes mellitus without mention of complication, not stated as uncontrolled    Unspecified essential hypertension    Unspecified sleep apnea    PSG Merit  AHI 70, auto-pap     Visual impairment   [3]   Past Surgical History:  Procedure Laterality Date    Angioplasty (coronary)  3/2015    stents placed in heart    Cabg  2011    CABGx4 vessel    Cath angio  2023    Cath bare metal stent (bms)      Cath bare metal stent (bms)  2023    2 stents    Cath percutaneous  transluminal coronary angioplasty  3/3/2015    Cath percutaneous  transluminal coronary angioplasty Right 2017    right Coronary artery, OSMIN    Colonoscopy      Hc plmt coronary drug eluting stent ea addl  3/5/2015    Histopathology report  13    cecal polyps - 1 tubular adenoma    Impact tooth rem bony w/comp Bilateral 1973    wisdom teeth    Knee replacement surgery Bilateral 3/24/16    right TKA L TKA not at the same time    Left heart cath,percutaneous  2011    CAD    Left heart cath,percutaneous  3/2/2015          Renal angio, cardiac cath  2011    Sleep study, attended  12/10/2012    Tonsillectomy Bilateral 195   [4]   Family History  Problem Relation Age of Onset    Cancer Father         prostate, liver    Hypertension Father     Obesity Father     Breast Cancer Father 70        age at dx 70    Heart Surgery Mother         Mitral valve replaced    Heart Disorder Mother         CHF    Hypertension Mother     Arthritis Mother         TKA    Breast Cancer Mother     Stroke Maternal Grandmother     Stroke Maternal Grandfather     Breast Cancer Paternal Grandmother 78        age at dx 78    Cancer Paternal Grandfather         lung    Obesity Brother     Diabetes Brother     Alcohol and  Other Disorders Associated Son     Substance Abuse Son     Obesity Sister     Diabetes Sister     Traumatic brain injury Brother    [5]   Social History  Socioeconomic History    Marital status:     Number of children: 2    Years of education: 15   Occupational History    Occupation: [employer]     Comment: [employer]   Tobacco Use    Smoking status: Former     Current packs/day: 0.00     Average packs/day: 1 pack/day for 35.0 years (35.0 ttl pk-yrs)     Types: Cigarettes     Start date: 9/23/1979     Quit date: 9/23/2014     Years since quitting: 10.5    Smokeless tobacco: Never   Vaping Use    Vaping status: Never Used   Substance and Sexual Activity    Alcohol use: Yes     Alcohol/week: 1.0 standard drink of alcohol     Types: 1 Glasses of wine per week     Comment: occasionally    Drug use: Not Currently     Comment: in her 20s    Sexual activity: Not Currently     Partners: Male   Other Topics Concern     Service No    Blood Transfusions No    Caffeine Concern Yes     Comment: coffee    Occupational Exposure No    Hobby Hazards No    Sleep Concern Yes     Comment: STEFFEN    Stress Concern No    Weight Concern No    Special Diet No    Back Care No    Exercise Yes    Bike Helmet Yes    Seat Belt Yes    Self-Exams Yes   Social History Narrative    Lives alone.    Enjoys reading, pets, knitting, cooking     Social Drivers of Health     Food Insecurity: No Food Insecurity (4/10/2025)    NCSS - Food Insecurity     Worried About Running Out of Food in the Last Year: No     Ran Out of Food in the Last Year: No   Transportation Needs: Unmet Transportation Needs (4/10/2025)    NCSS - Transportation     Lack of Transportation: Yes   Housing Stability: Not At Risk (4/10/2025)    NCSS - Housing/Utilities     Has Housing: Yes     Worried About Losing Housing: No     Unable to Get Utilities: No   [6]   Current Facility-Administered Medications   Medication Dose Route Frequency    allopurinol (Zyloprim) tab 150 mg   150 mg Oral Daily    aspirin chewable tab 81 mg  81 mg Oral Daily    [Held by provider] atomoxetine (Strattera) cap 25 mg - PT'S OWN MEDICATION  25 mg Oral Daily    atorvastatin (Lipitor) tab 80 mg  80 mg Oral Daily    buPROPion ER (Wellbutrin XL) 24 hr tab 300 mg  300 mg Oral Daily    carvedilol (Coreg) tab 37.5 mg  37.5 mg Oral BID with meals    hydrALAZINE (Apresoline) tab 25 mg  25 mg Oral TID    levothyroxine (Synthroid) tab 125 mcg  125 mcg Oral Daily @ 0700    pantoprazole (Protonix) DR tab 40 mg  40 mg Oral QAM AC    heparin (Porcine) 5000 UNIT/ML injection 5,000 Units  5,000 Units Subcutaneous Q8H JERRY    melatonin tab 3 mg  3 mg Oral Nightly PRN    ondansetron (Zofran) 4 MG/2ML injection 4 mg  4 mg Intravenous Q6H PRN    metoclopramide (Reglan) 5 mg/mL injection 5 mg  5 mg Intravenous Q8H PRN    glucose (Dex4) 15 GM/59ML oral liquid 15 g  15 g Oral Q15 Min PRN    Or    glucose (Glutose) 40% oral gel 15 g  15 g Oral Q15 Min PRN    Or    glucose-vitamin C (Dex-4) chewable tab 4 tablet  4 tablet Oral Q15 Min PRN    Or    dextrose 50% injection 50 mL  50 mL Intravenous Q15 Min PRN    Or    glucose (Dex4) 15 GM/59ML oral liquid 30 g  30 g Oral Q15 Min PRN    Or    glucose (Glutose) 40% oral gel 30 g  30 g Oral Q15 Min PRN    Or    glucose-vitamin C (Dex-4) chewable tab 8 tablet  8 tablet Oral Q15 Min PRN    insulin degludec (Tresiba) 100 units/mL flextouch 20 Units  20 Units Subcutaneous Daily    insulin aspart (NovoLOG) 100 Units/mL FlexPen 1-5 Units  1-5 Units Subcutaneous TID CC   [7]   Allergies  Allergen Reactions    Clopidogrel RASH    Sulfa Antibiotics HIVES and UNKNOWN    Tramadol ANXIETY, FEVER and OTHER (SEE COMMENTS)     Serotonin syndrome to scheduled tramadol in setting of use of SSRI    Serotonin Reuptake Inhibitors UNKNOWN

## 2025-04-10 NOTE — ED QUICK NOTES
Pt endorsed peritoneal dialysis. Reported that nothing was drained this AM. Pt endorsed diarrhea. MD at bedside during conversation.

## 2025-04-10 NOTE — SPIRITUAL CARE NOTE
Spiritual Care Visit Note    Patient Name: Cassy Patterson Date of Spiritual Care Visit: 04/10/25   : 1955 Primary Dx: Syncope and collapse       Referred By: Referral From: Care Coordination    Spiritual Care Taxonomy:    Intended Effects: Establish rapport and connectedness    Methods: Assist with finding purpose, Encourage story-telling, Offer emotional support    Interventions: Acknowledge current situation, Acknowledge response to difficult experience, Active listening, Ask guided questions, Ask guided questions about cultural and Nondenominational values, Ask guided questions about piper, Ask questions to bring forth feelings, Facilitate decision making, Facilitate advance care planning, Identify supportive relationship(s), Provide hospitality, Silent prayer, Explain  role, Discuss coping mechanism with someone, Discuss concerns    Visit Type/Summary:     - Spiritual Care: Responded to a request for spiritual care and assessed patient for spiritual care needs. Consulted with RN prior to visit. Offered empathic listening and emotional support.  explored hope. Provided information regarding how to contact Spiritual Care and left a Spiritual Care information card. Patient has two sons and three grandchildren.  remains available as needed for follow up.    Spiritual Care support can be requested via an Epic consult. For urgent/immediate needs, please contact the On Call  at: [place]: ext [phone]    Chaplain Resident, Coby Turner, PhD

## 2025-04-10 NOTE — ED INITIAL ASSESSMENT (HPI)
Pt presents to ED via [facility] from [facility] for c/o dizziness w/ fall. -LOC, +daily baby aspirin. Hit head on wall after feeling dizzy. Pt A&Ox4 on arrival.

## 2025-04-10 NOTE — H&P
[facility]   Hospitalist Team  History and Physical  Admit Date:  4/9/2025    Is this a shared or split note between Advanced Practice Provider and Physician? Yes    ASSESSMENT / PLAN:     This is a 69-year-old female with a complicated medical history including ESRD on peritoneal dialysis since 1/2024, diabetes, coronary artery disease, depression, orthostatic hypotension, previous syncopal,  events chronic diastolic CHF     [facility] 3/7-3/10 Admission for COVID and orthostasis    [facility] 3/9- presents with syncopal event in setting of nausea and vomiting diarrhea and decreased p.o. intake, see below for details    Syncope 2/2 Acute on Chronic Orthostatic Hypotension  -Likely worsened due to recent GI issues  -orthostatic checks q shift  -see cardiac med changes  -echo ordered  -PT/OT/SW-started with [facility]     N/V/D  Decreased PO intake  Wt Loss  Colon Polyps  - Reports 10 pound weight loss since starting PD, recent TSH normal.  -Reports history of colonoscopy years ago with polyps, needs repeat scope but has been unable to schedule due to busyness of her other doctor appointments  -CT abdomen and pelvis negative for acute process, have noted cirrhosis, and moderate abdominal pelvic ascites  -gi stool panel and c-diff pending  -continue ppi  -prn zofran      Acute on Chronic orthostatic hypotension with baseline   -Home amlodipine stopped  -Holding parameters for hydralazine  -Started Coreg this a.m. with possible resumption this evening  -Family would be able to bring in her home   atomextine 25mg daily tomorrow  -encouraged po   -? Adjust PD- defer To Renal  -neurology consult- spoke with Dr Galdamez     ESRD on peritoneal dialysis  -Nephrology consulted     DM Type II  -HgbA1C pending  -home regimen: toujeo 22 units nightly  -tresiba 20 units nightly plus sliding scale, adjust as needed  -accuchecks  -ADA diet     Chronic diastolic CHF  CAD S/P Stents  HTN  - Continue aspirin and statin  - See above  regarding blood pressure changes  - Echo pending   -as per cards    Hypothyroidism  - Continue Synthroid    Depression  -Per patient is being well addressed  - Continue home Wellbutrin and Zoloft  - Follows with psychiatrist monthly and therapist twice weekly    GOC  -full code  -resides at [facility]    MA/ACO Reach  -ER Visits 2025:2  -Admissions 2025:2  [facility] 3/7-3/10  [facility] 4/9-  -Discharge Needs: [facility] vs [facility]  -Appointments: [ ] PCP MD CHAVO Hinson  -darell in am  -diet-ada/cardiac    Prophy  -SCD  -heparin    Dispo  -EDoD 4/10-4/11  PCP: Ambreen Sandoval MD       Outpatient records or previous hospital records reviewed.   Further recommendations pending patient's clinical course.  [place] Hospitalist  team to continue to follow patient while in house  Concerns regarding plan of care were discussed with patient. Patient agrees with plan as detailed above. Discussed plan of care with Dr. Liz    Note: This chart was prepared using voice recognition software and may contain unintended word substitution errors.     Alison Kaiser RN, NP  [facility] Hospitalist Team   Available via Perfect Serve or Bubble Chat (check Availability)    4/10/2025      HISTORY:   CC:   Chief Complaint   Patient presents with    Fall        PCP: Ambreen Sandoval MD    History of Present Illness:    Patient reports for the last 1 to 1-1/2 weeks that she has been experiencing nausea.  She reports she had emesis this past Sunday and Monday.  She states she is having loose stools which have been an intermittent problem with last event a few weeks ago, she reports having abdominal cramping prior to the loose stools.  She notes she has not been eating very much.  She reports a 10 pound weight loss since starting PD in January 2025    Yesterday patient ended up having a syncopal event which is not unusual for the patient, she has chronic orthostatic hypotension and takes Strattera, patient states gets lightheaded and dizzy usually  with standing but has supine hypertension and is on blood pressure medications.  She states yesterday she started feeling lightheaded and dizzy and knew she was going to pass out and fell on the floor, she states that she aroused quickly after this event.  She has been reluctant to move and has recently started home physical therapy.    She has been doing home PD.    She is at assisted living in [facility] and reports that she manages her own medications.  She states that they will help shower her and dressed her if they have enough staff to do so.  She states she is reluctant to eat food in the dining room.    She does have a long history of depression and sees a psychiatrist monthly and speaks to a therapist virtually 2 times a week.  She denies any suicidal ideations and states her depression currently is managed with her current medication profile.    Reports frustration in her decline of her medical condition after having her heart attack.  She reports not being very happy to be in assisted living but states that her sons made her go there.  She is no longer driving and rely on family members to take her to appointments      Review of Systems  12 point systems reviewed, please see HPI for pertinent positives, otherwise negative    OBJECTIVE:  /77 (BP Location: Right arm)   Pulse 81   Temp 98.3 °F (36.8 °C) (Oral)   Resp 18   Wt 173 lb 1 oz (78.5 kg)   SpO2 99%   BMI 30.66 kg/m²     GENERAL: no apparent distress, overweight  NEUROLOGIC: A/A; Ox3  RESPIRATORY: normal expansion; non labored, CTA   CARDIOVASCULAR: regular,nl S1 S2  ABDOMEN:  Soft, BS+; non distended, non tender    EXTREMITIES: no LE edema    PMH  Past Medical History[1]     PSH  Past Surgical History[2]     ALL:  Allergies[3]     Home Medications:  Medications Taking[4]    Soc Hx  Social History     Tobacco Use    Smoking status: Former     Current packs/day: 0.00     Average packs/day: 1 pack/day for 35.0 years (35.0 ttl pk-yrs)      Types: Cigarettes     Start date: 9/23/1979     Quit date: 9/23/2014     Years since quitting: 10.5    Smokeless tobacco: Never   Substance Use Topics    Alcohol use: Yes     Alcohol/week: 1.0 standard drink of alcohol     Types: 1 Glasses of wine per week     Comment: occasionally        Fam Hx  Family History[5]      DIAGNOSTIC DATA:   CBC/Chem  Recent Labs   Lab 04/09/25  2000 04/10/25  0647   WBC 10.8 11.3*   HGB 11.9* 11.1*   MCV 93.3 92.4   .0 272.0       Recent Labs   Lab 04/09/25  2000 04/10/25  0647   * 134*   K 4.2 3.6   CL 96* 96*   CO2 25.0 27.0   BUN 30* 34*   CREATSERUM 4.51* 4.47*   * 90   CA 8.6* 8.1*   MG  --  1.5*       Recent Labs   Lab 04/09/25 2000   ALT 9*   AST 18   ALB 4.2       No results for input(s): \"TROP\" in the last 168 hours.    Radiology: XR CHEST AP PORTABLE  (CPT=71045)  Result Date: 4/9/2025  CONCLUSION: No acute cardiopulmonary abnormality.    Dictated by (CST): Car Enciso MD on 4/09/2025 at 9:20 PM     Finalized by (CST): Car Enciso MD on 4/09/2025 at 9:20 PM          CT ABDOMEN+PELVIS(CPT=74176)  Result Date: 4/9/2025  CONCLUSION:   Cirrhosis.  Moderate volume abdominal and pelvic ascites.  End-stage renal disease with peritoneal dialysis catheter.  No obstruction.  Diverticulosis without diverticulitis.     Dictated by (CST): Car Enciso MD on 4/09/2025 at 9:12 PM     Finalized by (CST): Car Enciso MD on 4/09/2025 at 9:16 PM          CT BRAIN OR HEAD (CPT=70450)  Result Date: 4/9/2025  CONCLUSION:  Right frontal scalp soft tissue swelling without acute intracranial abnormality.  Mild chronic microvascular ischemic disease.   Dictated by (CST): Car Enciso MD on 4/09/2025 at 9:10 PM     Finalized by (CST): Car Enciso MD on 4/09/2025 at 9:12 PM              SEE ATTENDING NOTE BELOW        Patient seen and examined independently.  Discussed with APN and agree with note above.    S:  Feels well today, does not feels unsteady when trying to stand up ,  denies fevers or chills     Objective:  /77 (BP Location: Right arm)   Pulse 81   Temp 98.3 °F (36.8 °C) (Oral)   Resp 18   Wt 173 lb 1 oz (78.5 kg)   SpO2 99%   BMI 30.66 kg/m²     Gen: No acute distress, alert and oriented x3  Pulm: Lungs clear, normal respiratory effort  CV: Heart with regular rate and rhythm  Abd: Abdomen soft, nontender, non-distended  MSK:  no clubbing, no cyanosis.    Assessment and Plan:    Syncope related to orthostatic hypotension   Likely autonomic dysfunction with variable Bps  Will ask for neuro consult - further work-up per Neuro   May need to reduce home BP meds  Cards to see as well     Some recent diarrhea maybe causing volume related low blood pressures  Will check GI panel     Admit under obs, expect discharge home tomorrow     Case discussed with NP Awilda agree with progress note as documented                [1]   Past Medical History:   Adrenal adenoma    left adrenoma    Anxiety    Cataract    Chronic kidney disease (CKD), stage III (moderate) (HCC)    CORONARY ARTERY DISEASE    Coronary atherosclerosis    Depression    DIABETES    Dialysis patient    Diarrhea    Disorder of thyroid    Diverticulosis of colon    Esophageal reflux    Fainting episodes    GERD    GOUT    Heart attack (HCC)    High blood pressure    High cholesterol    History of adverse reaction to anesthesia    delusions/hallucinations for a few days after knee replacement    History of blood transfusion    Quadruple Bypass    History of electroconvulsive therapy    HYPERTENSION    Hypothyroid    MENOPAUSE    Osteoarthritis    Other and unspecified hyperlipidemia    Personal history of other medical treatment    Transcranial Magnetic Stimulation (TMS)    Pneumonia, organism unspecified(486)    Polyp of colon    Renal artery stenosis    right kidney    Renal disorder    Type II or unspecified type diabetes mellitus without mention of complication, not stated as uncontrolled    Unspecified essential  hypertension    Unspecified sleep apnea    PSG Merit  AHI 68, auto-pap     Visual impairment   [2]   Past Surgical History:  Procedure Laterality Date    Angioplasty (coronary)  3/2015    stents placed in heart    Cabg  2011    CABGx4 vessel    Cath angio  2023    Cath bare metal stent (bms)      Cath bare metal stent (bms)  2023    2 stents    Cath percutaneous  transluminal coronary angioplasty  3/3/2015    Cath percutaneous  transluminal coronary angioplasty Right 2017    right Coronary artery, OSMIN    Colonoscopy      Hc plmt coronary drug eluting stent ea addl  3/5/2015    Histopathology report  13    cecal polyps - 1 tubular adenoma    Impact tooth rem bony w/comp Bilateral 1973    wisdom teeth    Knee replacement surgery Bilateral 3/24/16    right TKA L TKA not at the same time    Left heart cath,percutaneous  2011    CAD    Left heart cath,percutaneous  3/2/2015          Renal angio, cardiac cath  2011    Sleep study, attended  12/10/2012    Tonsillectomy Bilateral    [3]   Allergies  Allergen Reactions    Clopidogrel RASH    Sulfa Antibiotics HIVES and UNKNOWN    Tramadol ANXIETY, FEVER and OTHER (SEE COMMENTS)     Serotonin syndrome to scheduled tramadol in setting of use of SSRI    Serotonin Reuptake Inhibitors UNKNOWN   [4]   Outpatient Medications Marked as Taking for the 25 encounter (Hospital Encounter)   Medication Sig Dispense Refill    aspirin 81 MG Oral Chew Tab Chew 1 tablet (81 mg total) by mouth daily.      diazePAM 5 MG Oral Tab Take 1 tablet (5 mg total) by mouth 2 (two) times daily. 60 tablet 2    buPROPion  MG Oral Tablet 24 Hr Take 1 tablet (300 mg total) by mouth daily. (Patient taking differently: Take 1 tablet (300 mg total) by mouth nightly.) 90 tablet 0    sertraline 100 MG Oral Tab Take 2 tablets (200 mg total) by mouth daily. (Patient taking differently: Take 2 tablets (200 mg total) by mouth at bedtime.) 180 tablet 0     atomoxetine 25 MG Oral Cap Take 1 capsule (25 mg total) by mouth daily. 90 capsule 1    Potassium Chloride ER 20 MEQ Oral Tab CR Take 1 tablet by mouth in the morning.      amLODIPine 10 MG Oral Tab Take 1 tablet (10 mg total) by mouth at bedtime.      carvedilol 12.5 MG Oral Tab Take 3 tablets (37.5 mg total) by mouth in the morning and 3 tablets (37.5 mg total) in the evening. Take with meals.      allopurinol 300 MG Oral Tab TAKE ONE-HALF (1/2) TABLET DAILY 45 tablet 4    Levothyroxine Sodium 125 MCG Oral Tab Take 1 tablet (125 mcg total) by mouth before breakfast. 30 tablet 11    Pantoprazole Sodium 40 MG Oral Tab EC TAKE 1 TABLET EVERY MORNING BEFORE BREAKFAST 90 tablet 3    Insulin Pen Needle (BD PEN NEEDLE ORIGINAL U/F) 29G X 12.7MM Does not apply Misc USE AS DIRECTED DAILY 100 each 3    hydrALAZINE HCl 100 MG Oral Tab Take 25 mg by mouth in the morning and 25 mg in the evening and 25 mg before bedtime. Take tid, hold afternoon hydralazine if sbp <150 but usually does not take.      ERICKSON CONTOUR NEXT TEST In Vitro Strip TEST BLOOD GLUCOSE THREE TIMES DAILY 300 strip 4   [5]   Family History  Problem Relation Age of Onset    Cancer Father         prostate, liver    Hypertension Father     Obesity Father     Breast Cancer Father 70        age at dx 70    Heart Surgery Mother         Mitral valve replaced    Heart Disorder Mother         CHF    Hypertension Mother     Arthritis Mother         TKA    Breast Cancer Mother     Stroke Maternal Grandmother     Stroke Maternal Grandfather     Breast Cancer Paternal Grandmother 78        age at dx 78    Cancer Paternal Grandfather         lung    Obesity Brother     Diabetes Brother     Alcohol and Other Disorders Associated Son     Substance Abuse Son     Obesity Sister     Diabetes Sister     Traumatic brain injury Brother

## 2025-04-10 NOTE — OCCUPATIONAL THERAPY NOTE
OCCUPATIONAL THERAPY EVALUATION - INPATIENT     Room Number: 329/329-A  Evaluation Date: 4/10/2025  Type of Evaluation: Initial  Presenting Problem: fall/syncope    Physician Order: IP Consult to Occupational Therapy  Reason for Therapy: ADL/IADL Dysfunction and Discharge Planning    OCCUPATIONAL THERAPY ASSESSMENT   Patient is a 69 year old female admitted 4/9/2025.  Prior to admission, patient's baseline is intemrittent assist with ADLs.  Patient is currently functioning below baseline with ADLs.  Patient is requiring supervision and contact guard assist as a result of the following impairments: decreased functional strength, decreased functional reach, decreased endurance, and impaired standing balance. Occupational Therapy will continue to follow for duration of hospitalization.    Patient will benefit from continued skilled OT Services at discharge to promote prior level of function.  Anticipate patient will return home with home health OT.    PLAN DURING HOSPITALIZATION     OT Treatment Plan: Balance activities, Energy conservation/work simplification techniques, ADL training, Functional transfer training, Endurance training, Patient/Family education, Patient/Family training, Equipment eval/education, Compensatory technique education     OCCUPATIONAL THERAPY MEDICAL/SOCIAL HISTORY   Problem List  Principal Problem:    Syncope and collapse  Active Problems:    Orthostatic hypotension    Prolonged Q-T interval on ECG    Diarrhea, unspecified type    Left arm weakness    HOME SITUATION  Type of Home: Assisted living facility  Home Layout: One level  Lives With: Alone (staff assist PRN)  Toilet and Equipment: Comfort height toilet  Shower/Tub and Equipment: Walk-in shower; Shower chair  Drives: No  Patient Regularly Uses: Glasses; Rollator      Prior Level of [place]: mod I toileting/grooming, intermittent assist with bathing and dressing at [facility]; reported has had multiple falls over the last several weeks  with positive orthostatic hypotension; reported receiving HHPT    SUBJECTIVE  \"I have fallen a lot lately\"    OCCUPATIONAL THERAPY EXAMINATION      OBJECTIVE  Precautions: Bed/chair alarm  Fall Risk: Standard fall risk      PAIN ASSESSMENT  Ratin      ACTIVITY TOLERANCE           BP: (!) 184/94             O2 SATURATIONS       COGNITION  Overall Cognitive Status:  WFL - within functional limits    VISION  Current Vision: wears glasses      Communication: WFL    Behavioral/Emotional/Social: WFL    RANGE OF MOTION   Upper extremity ROM is within functional limits     STRENGTH ASSESSMENT  Upper extremity strength is within functional limits    COORDINATION  Gross Motor: WFL   Fine Motor: WFL     ACTIVITIES OF DAILY LIVING ASSESSMENT  AM-PAC ‘6-Clicks’ Inpatient Daily Activity Short Form  How much help from another person does the patient currently need…  -   Putting on and taking off regular lower body clothing?: None  -   Bathing (including washing, rinsing, drying)?: A Little  -   Toileting, which includes using toilet, bedpan or urinal? : A Little  -   Putting on and taking off regular upper body clothing?: None  -   Taking care of personal grooming such as brushing teeth?: A Little  -   Eating meals?: None    AM-PAC Score:  Score: 21  Approx Degree of Impairment: 32.79%  Standardized Score (AM-PAC Scale): 44.27  CMS Modifier (G-Code): CJ    FUNCTIONAL TRANSFER ASSESSMENT  Sit to Stand: Edge of Bed  Edge of Bed: Contact Guard Assist  Toilet Transfer: Contact Guard Assist  Functional mobility activity bathroom distances    BED MOBILITY  Supine to Sit : Supervision  Sit to Supine (OT): Supervision    BALANCE ASSESSMENT  Static Sitting: Modified Independent  Static Standing: Contact Guard Assist (progressing to SBA)    FUNCTIONAL ADL ASSESSMENT  Eating: Independent  Grooming Standing: Contact Guard Assist (SBA-CGA simulated with functional reach)  LB Dressing Seated: Supervision (paula monroe, modified figure  four)  Toileting Seated: Supervision (simulated with functional reach)       Skilled Therapy Provided: RN approved session. Received patient in supine. Denied dizziness throughout session, elevated BP noted after mobility portion of session. Performed ADLs/functional mobility/transfers as stated above.  At the end of session, patient left in supine per request with needs met, alarm on and RN aware of session.    EDUCATION PROVIDED  Patient Education : Role of Occupational Therapy; Plan of Care; Fall Prevention; Posture/Positioning; Other (seated LE/UE exercises to minimize orthostatic BP)  Patient's Response to Education: Verbalized Understanding; Returned Demonstration    The patient's Approx Degree of Impairment: 32.79% has been calculated based on documentation in the [facility] '6 clicks' Inpatient Daily Activity Short Form.  Research supports that patients with this level of impairment may benefit from HHOT.  Final disposition will be made by interdisciplinary medical team.     Patient End of Session: In bed, Needs met, Call light within reach, RN aware of session/findings, All patient questions and concerns addressed, Alarm set    OT Goals  Patients self stated goal is: increased independence, decreased fall risk     Patient will complete functional transfer with mod I  Comment:     Patient will complete toileting with mod I  Comment:     Patient will tolerate standing for 8 minutes in prep for adls with mod I   Comment:    Patient will complete item retrieval with mod I  Comment:          Goals  on: 2025  Frequency: 3-5x/wk    Patient Evaluation Complexity Level:   Occupational Profile/Medical History LOW - Brief history including review of medical or therapy records    Specific performance deficits impacting engagement in ADL/IADL LOW  1 - 3 performance deficits    Client Assessment/Performance Deficits LOW - No comorbidities nor modifications of tasks    Clinical Decision Making LOW - Analysis of  occupational profile, problem-focused assessments, limited treatment options    Overall Complexity LOW     OT Session Time: 23 minutes  Self-Care Home Management: 5 minutes  Therapeutic Activity: 8 minutes  Evaluation    Mikaela Buenrostro OTR/L

## 2025-04-11 VITALS
OXYGEN SATURATION: 98 % | BODY MASS INDEX: 31 KG/M2 | HEART RATE: 102 BPM | WEIGHT: 172.88 LBS | DIASTOLIC BLOOD PRESSURE: 74 MMHG | RESPIRATION RATE: 18 BRPM | SYSTOLIC BLOOD PRESSURE: 127 MMHG | TEMPERATURE: 99 F

## 2025-04-11 PROBLEM — R94.31 PROLONGED Q-T INTERVAL ON ECG: Status: RESOLVED | Noted: 2025-04-09 | Resolved: 2025-04-11

## 2025-04-11 PROBLEM — R29.898 LEFT ARM WEAKNESS: Status: RESOLVED | Noted: 2025-04-10 | Resolved: 2025-04-11

## 2025-04-11 PROBLEM — R19.7 DIARRHEA, UNSPECIFIED TYPE: Status: RESOLVED | Noted: 2025-04-09 | Resolved: 2025-04-11

## 2025-04-11 LAB
ANION GAP SERPL CALC-SCNC: 10 MMOL/L (ref 0–18)
BASOPHILS # BLD AUTO: 0.07 X10(3) UL (ref 0–0.2)
BASOPHILS NFR BLD AUTO: 0.7 %
BUN BLD-MCNC: 37 MG/DL (ref 9–23)
BUN/CREAT SERPL: 8.3 (ref 10–20)
CALCIUM BLD-MCNC: 8 MG/DL (ref 8.7–10.4)
CHLORIDE SERPL-SCNC: 99 MMOL/L (ref 98–112)
CO2 SERPL-SCNC: 25 MMOL/L (ref 21–32)
CREAT BLD-MCNC: 4.46 MG/DL (ref 0.55–1.02)
DEPRECATED RDW RBC AUTO: 49.3 FL (ref 35.1–46.3)
EGFRCR SERPLBLD CKD-EPI 2021: 10 ML/MIN/1.73M2 (ref 60–?)
EOSINOPHIL # BLD AUTO: 0.2 X10(3) UL (ref 0–0.7)
EOSINOPHIL NFR BLD AUTO: 2.1 %
ERYTHROCYTE [DISTWIDTH] IN BLOOD BY AUTOMATED COUNT: 14.1 % (ref 11–15)
GLUCOSE BLD-MCNC: 137 MG/DL (ref 70–99)
GLUCOSE BLDC GLUCOMTR-MCNC: 110 MG/DL (ref 70–99)
GLUCOSE BLDC GLUCOMTR-MCNC: 155 MG/DL (ref 70–99)
HCT VFR BLD AUTO: 30.4 % (ref 35–48)
HGB BLD-MCNC: 10.4 G/DL (ref 12–16)
IMM GRANULOCYTES # BLD AUTO: 0.05 X10(3) UL (ref 0–1)
IMM GRANULOCYTES NFR BLD: 0.5 %
LYMPHOCYTES # BLD AUTO: 2.72 X10(3) UL (ref 1–4)
LYMPHOCYTES NFR BLD AUTO: 28.8 %
MCH RBC QN AUTO: 32.6 PG (ref 26–34)
MCHC RBC AUTO-ENTMCNC: 34.2 G/DL (ref 31–37)
MCV RBC AUTO: 95.3 FL (ref 80–100)
MONOCYTES # BLD AUTO: 0.64 X10(3) UL (ref 0.1–1)
MONOCYTES NFR BLD AUTO: 6.8 %
NEUTROPHILS # BLD AUTO: 5.75 X10 (3) UL (ref 1.5–7.7)
NEUTROPHILS # BLD AUTO: 5.75 X10(3) UL (ref 1.5–7.7)
NEUTROPHILS NFR BLD AUTO: 61.1 %
OSMOLALITY SERPL CALC.SUM OF ELEC: 289 MOSM/KG (ref 275–295)
PLATELET # BLD AUTO: 223 10(3)UL (ref 150–450)
POTASSIUM SERPL-SCNC: 3.3 MMOL/L (ref 3.5–5.1)
RBC # BLD AUTO: 3.19 X10(6)UL (ref 3.8–5.3)
SODIUM SERPL-SCNC: 134 MMOL/L (ref 136–145)
WBC # BLD AUTO: 9.4 X10(3) UL (ref 4–11)

## 2025-04-11 PROCEDURE — 99233 SBSQ HOSP IP/OBS HIGH 50: CPT | Performed by: OTHER

## 2025-04-11 RX ORDER — PSYLLIUM SEED (WITH DEXTROSE)
POWDER (GRAM) ORAL
Status: SHIPPED | COMMUNITY
Start: 2025-04-11

## 2025-04-11 RX ORDER — SERTRALINE HYDROCHLORIDE 100 MG/1
200 TABLET, FILM COATED ORAL NIGHTLY
Status: SHIPPED | COMMUNITY
Start: 2025-04-11

## 2025-04-11 RX ORDER — ATOMOXETINE 40 MG/1
40 CAPSULE ORAL DAILY
Qty: 30 CAPSULE | Refills: 3 | Status: SHIPPED | OUTPATIENT
Start: 2025-04-11 | End: 2025-08-09

## 2025-04-11 RX ORDER — BUPROPION HYDROCHLORIDE 300 MG/1
300 TABLET ORAL NIGHTLY
Status: SHIPPED | COMMUNITY
Start: 2025-04-11

## 2025-04-11 NOTE — PROGRESS NOTES
Patient given discharge instructions, JAYDEN hose reapplied, patient verbalized understanding of discharge instructions and left via wheelchair to home with son and all belongings.

## 2025-04-11 NOTE — CM/SW NOTE
04/11/25 1200   Discharge disposition   Expected discharge disposition Home-Health  ([facility])   Discharge transportation Private car     MDO placed for discharge.    CM notified [facility] via Aidin of patient discharge today.  [facility] will be reaching out to patient to coordinate start of care.  Signed face to face uploaded to referral.    Patient cleared for discharge from CM/SW standpoint.    Milvia Contreras RN, BSN  Nurse   [phone]

## 2025-04-11 NOTE — DISCHARGE SUMMARY
[facility] Hospitalist Discharge Summary   Patient ID:  Cassy Patterson  [id number]  69 year old  7/13/1955    Admit date: 4/9/2025  Discharge date: 4/11/2025    Primary Care Physician: Ambreen Sandoval MD   Attending Physician: Derian Liz DO   Consults:   Consultants         Provider   Role Specialty     Sohail Galdamez DO      Consulting Physician NEUROLOGY     Cory Eubanks MD      Consulting Physician Clinical Cardiac Electrophysiology     Sima Dimas MD      Consulting Physician NEPHROLOGY     Christos Faria DO      Consulting Physician Internal Medicine            Hospital Discharge Diagnoses:   Syncope and collapse  ----See D/C Summary for further Dx    Risk of Readmission Lace+ Score: 70  59-90 High Risk  29-58 Medium Risk  0-28   Low Risk.    TCM Follow-Up Recommendation:  LACE > 58: High Risk of readmission after discharge from the hospital.    Please note that only [facility] DMG and EMG patients enrolled in the Medicare ACO, BCBS ACO and [facility] HMOs will be handled by the [facility] Care Management team.  For all other patients, please follow usual protocol for discharge care transition.    Reason for admission  Patient reports for the last 1 to 1-1/2 weeks that she has been experiencing nausea.  She reports she had emesis this past Sunday and Monday.  She states she is having loose stools which have been an intermittent problem with last event a few weeks ago, she reports having abdominal cramping prior to the loose stools.  She notes she has not been eating very much.  She reports a 10 pound weight loss since starting PD in January 2025     Yesterday patient ended up having a syncopal event which is not unusual for the patient, she has chronic orthostatic hypotension and takes Strattera, patient states gets lightheaded and dizzy usually with standing but has supine hypertension and is on blood pressure medications.  She states yesterday she started feeling lightheaded and dizzy and knew she was going to  pass out and fell on the floor, she states that she aroused quickly after this event.  She has been reluctant to move and has recently started home physical therapy.     She has been doing home PD.     She is at assisted living in [facility] and reports that she manages her own medications.  She states that they will help shower her and dressed her if they have enough staff to do so.  She states she is reluctant to eat food in the dining room.     She does have a long history of depression and sees a psychiatrist monthly and speaks to a therapist virtually 2 times a week.  She denies any suicidal ideations and states her depression currently is managed with her current medication profile.     Reports frustration in her decline of her medical condition after having her heart attack.  She reports not being very happy to be in assisted living but states that her sons made her go there.  She is no longer driving and rely on family members to take her to appointments    Hospital Course:     his is a 69-year-old female with a complicated medical history including ESRD on peritoneal dialysis since 1/2024, diabetes, coronary artery disease, depression, orthostatic hypotension, previous syncopal,  events chronic diastolic CHF      [facility] 3/7-3/10 Admission for COVID and orthostasis     [facility] 3/9-4/11 presents with syncopal event in setting of nausea and vomiting diarrhea and decreased p.o. intake.  Patient does have chronic neurogenic orthostatic hypotension and volume depletion contributed to her syncopal event.  Her blood pressure medications were adjusted -amlodipine was stopped and hydralazine will be held if systolic blood pressure less than 150.  Neurology increased her atomextine to 40 mg daily.  Patient instructed on the importance of prevention of dehydration, JAYDEN hose, slow position changes and physical therapy to help with her conditions.  We did start Metamucil to help with intermittent diarrhea and recommended patient  to follow-up with GI as she is scheduled for outpatient colonoscopy, she was noted to have some cirrhosis on imaging but normal hepatic functions. Patient will call if there is any issues with low or high blood pressures.  Patient has to follow-up with primary care physician on April 15 and cardiology on April 18.  Patient was discharged to [facility] assisted living with  home health care, see below for details    N/V/D-resolved  Intermittent Diarrhea  Wt Loss  Colon Polyps  Cirrhosis  - Reports 10 pound weight loss since starting PD, recent TSH normal.  -Reports history of colonoscopy years ago with polyps, needs repeat scope but has been unable to schedule due to busyness of her other doctor appointments  -CT abdomen and pelvis negative for acute process, have noted cirrhosis, and moderate abdominal pelvic ascites  -continue ppi  -c-diff negative   -metamucil to help with intermittent diarrhea  -recommend patient to follow-up with GI     Acute on Chronic Neurogenic orthostatic hypotension and Supine HTN  -MRI with white matter disease   -Home amlodipine stopped  -continue coreg  -hold hydralazine if sbp less than 150  -home atomextine increased to 40 mg daily   -encouraged po intake and to prevent dehydration  -rec support stocking, slow position changes and PT  -rec new soap to prevent diarrhea  -Monitor home blood pressures and call with abnormally low or high readings  -follow up with Dr Galdamez      ESRD on peritoneal dialysis  -follow up with nephrology      DM Type II  -HgbA1C 7.3  -continue home regimen: toujeo 22 units nightly  -accuchecks  -ADA diet      Chronic diastolic CHF  CAD S/P Stents  HTN  - Continue aspirin and statin  - See above regarding blood pressure changes  - Echo EF 55-60%, cvp 3 mmHg  -follow up with cards      Hypothyroidism  - Continue Synthroid     Depression  -Per patient is being well addressed  - Continue home Wellbutrin and Zoloft  - Follows with psychiatrist monthly and  therapist twice weekly     GOC  -full code  -resides at [facility]     MA/ACO [place]  -ER Visits 2025:2  -Admissions 2025:2  [facility] 3/7-3/10  [facility] 4/9-4/11 OBSERVATION  -Discharge Needs: [facility] vs [facility]  -Appointments: [4/15] PCP Ambreen Sandoval MD      EXAM:   GENERAL: no apparent distress  NEURO: A/A Ox3  RESP: non labored, CTA  CARDIO: Regular, no murmur  ABD: soft, NT, ND, BS+  EXTREMITIES: no edema    Radiology:   XR CHEST AP PORTABLE  (CPT=71045)  Result Date: 4/9/2025  CONCLUSION: No acute cardiopulmonary abnormality.    Dictated by (CST): Car Enciso MD on 4/09/2025 at 9:20 PM     Finalized by (CST): Car Enciso MD on 4/09/2025 at 9:20 PM          CT ABDOMEN+PELVIS(CPT=74176)  Result Date: 4/9/2025  CONCLUSION:   Cirrhosis.  Moderate volume abdominal and pelvic ascites.  End-stage renal disease with peritoneal dialysis catheter.  No obstruction.  Diverticulosis without diverticulitis.     Dictated by (CST): Car Enciso MD on 4/09/2025 at 9:12 PM     Finalized by (CST): Car nEciso MD on 4/09/2025 at 9:16 PM          CT BRAIN OR HEAD (CPT=70450)  Result Date: 4/9/2025  CONCLUSION:  Right frontal scalp soft tissue swelling without acute intracranial abnormality.  Mild chronic microvascular ischemic disease.   Dictated by (CST): Car Enciso MD on 4/09/2025 at 9:10 PM     Finalized by (CST): Car Enciso MD on 4/09/2025 at 9:12 PM            Discharge Instructions     Medication List        START taking these medications      atomoxetine 40 MG Caps  Commonly known as: Strattera  Take 1 capsule (40 mg total) by mouth in the morning.            CONTINUE taking these medications      allopurinol 300 MG Tabs  Commonly known as: Zyloprim  TAKE ONE-HALF (1/2) TABLET DAILY     aspirin 81 MG Chew     atorvastatin 80 MG Tabs  Commonly known as: Lipitor     Ally Contour Next Test Strp  TEST BLOOD GLUCOSE THREE TIMES DAILY     buPROPion  MG Tb24  Commonly known as: Wellbutrin XL     carvedilol 12.5 MG  Tabs  Commonly known as: Coreg     diazePAM 5 MG Tabs  Commonly known as: Valium  Take 1 tablet (5 mg total) by mouth 2 (two) times daily.     hydrALAZINE 100 MG Tabs  Commonly known as: Apresoline     Insulin Pen Needle 29G X 12.7MM Misc  Commonly known as: BD Pen Needle Original U/F  USE AS DIRECTED DAILY     levothyroxine 125 MCG Tabs  Commonly known as: Synthroid  Take 1 tablet (125 mcg total) by mouth before breakfast.     Metamucil 48.57 % Powd  Generic drug: Psyllium     pantoprazole 40 MG Tbec  Commonly known as: Protonix  TAKE 1 TABLET EVERY MORNING BEFORE BREAKFAST     Potassium Chloride ER 20 MEQ Tbcr     sertraline 100 MG Tabs  Commonly known as: Zoloft     Toujeo SoloStar 300 UNIT/ML Sopn  Generic drug: Insulin Glargine (1 Unit Dial)            STOP taking these medications      amLODIPine 10 MG Tabs  Commonly known as: Norvasc               Where to Get Your Medications        These medications were sent to [id number] - [place], [place] - [address] AT [facility] OF [address], [phone], [phone]  [address], [facility] [address]      Phone: [phone]   atomoxetine 40 MG Caps       Code Status: Full Code    Important follow up:   Follow-up Information       Sohail Galdamez DO Follow up in 1 month(s).    Specialty: NEUROLOGY  Contact information:  [address] [facility]  [address]  [facility] [address]  [phone]               Amrbeen Sandoval MD Follow up on 4/15/2025.    Specialty: Internal Medicine  Why: video visit 4/15 at 3:40 pm  Contact information:  [address]  [address]  [facility] [id number] [phone]               Rajinder Reed MD Follow up on 4/18/2025.    Specialties: Cardiovascular Diseases, CARDIOLOGY  Why: 4/18 as scheduled  Contact information:  [address]  [facility] [id number] [phone]                           Disposition: home  Discharged Condition: stable      Additional patient instructions:  Medication  changes  1) Stop amlodipine.  May need to resume this at a later date if blood pressure in supine increases  2) home atomoxetine increased to 40 mg daily to help with orthostatic blood pressure  3) hold hydralazine if systolic blood pressure less than 150 mmHg  4) take Metamucil over-the-counter fiber daily to help with fluctuations of diarrhea      Please use support stockings and slow position changes to help with orthostatic blood pressure.  The importance of exercise and physical activity cannot be underscored as it is important to help with regulation of blood pressure.  Please continue with home physical therapy    Please ensure you are taking enough oral intake as dehydration is a risk factor for worsening of your orthostatic blood pressure.      If any issues with with low or high blood pressures please contact your doctors immediately.    Follow-up appointment for your primary has been set for April 15th at 3:40 PM with a video visit    Follow-up with cardiology 4:18 at 130 as previously scheduled    Please call Dr. Galdamez's office to set up appointment for 1 month follow-up  =========================================================================================================================    I Reconciled current and discharge medications on day of discharge  Patient had opportunity to ask questions and state understand and agree with therapeutic plan as outlined    Total Time Coordinating Care: greater than 30 minutes  Is this a shared or split note between Advanced Practice Provider and Physician? Yes    Note: This chart was prepared using voice recognition software and may contain unintended word substitution errors.     Alison Kaiser RN, NP   [facility] Hospitalist Team   4/11/2025      SEE ATTENDING NOTE BELOW      Patient seen and examined independently.  Discussed with APN and agree with note above.    S:  Feels well, no longer dizzy, no nausea, no vomiting today       Objective:  BP  127/74 (BP Location: Right arm)   Pulse 102   Temp 99.1 °F (37.3 °C) (Oral)   Resp 18   Wt 172 lb 14.4 oz (78.4 kg)   SpO2 98%   BMI 30.63 kg/m²     Gen: No acute distress, alert and oriented x3  Pulm: Lungs clear, normal respiratory effort  CV: Heart with regular rate and rhythm,   MSK:  no clubbing, no cyanosis.  No Lower extremity edema    Assessment and Plan:    Orthostatic hypotension - suspect combination of known autonomic dysfunction worsened by recent GI issue / diarrhea  We will stop amlodipine, placed some holding parameters on hydralazine and increased her home strattera   Advised and scheduled closed PCP follow-up   Handoff to PCP completed       Rest as above with above

## 2025-04-11 NOTE — PROGRESS NOTES
[facility]  [address], [address]  [facility] [address]  [phone]        INPATIENT NEUROLOGY   FOLLOW UP PROGRESS NOTE  [facility]  part of [facility]    Cassy Patterson Patient Status:  Observation     1955 MRN [id number]    Location [facility] 3W/SW Attending Derian Liz DO    Hosp Day # 0 PCP Ambreen Sandoval MD    Date of Admission:  2025  Date of Consult Follow Up:  4/10/2025     Assessment and Plan:   Cassy Patterson is a 69 year old  woman w a pmhx of  ESR peritoneal dialysis in the setting of diarrhea., prior hx of smoking,  renal artery stenosis,  hx of MI s/p PCI, CABG, HTN,  HLD, left adrenal adenoma,  diabetes, depression, hypothyroidism,  diverticulosis, gerd, covid-19,   and STEFFEN   who follows with this author for neurogenic orthostatic hypotension on atomoxetine and is now seen for fall/syncope.    Although she had a left arm drift and left index finger orbiting there is no interval infarct on her repeat mri brain from 4/10/25 compared to her 2019 mri brain.   Her symptoms of orthostasis still bother her.  Will increase her dose of atomoxetine from 25 mg daily to 40 mg daily.  Outpatient follow-up in 1 month.  Recommend workup/evaluation of her left arm/left shoulder weakness and pain.      Neurogenic orthostatic hypotension  Differential Diagnosis:       Plan    Diagnostics:    Therapeutics:  Home dose of atomoxetine 25 mg while in hospital.  Increased outpatient dose of 40 mg.  She is to monitor for supine hypertension.    For his supine hypertension consider the following:  \" Elevating the head of the bed at night can help with this symptom and should be discussed with the patient. Other methods to reduce supine hypertension include carbohydrates (eg, alcoholic drinks) right before bedtime, short-acting antihypertensives such as nifedipine, or even transdermal nitroglycerin ointment.    2.  Shoulder pain  Workup  per primary  Consider x-ray    3.  White matter disease on MRI  Patient is on antiplatelet at home.  Risk factors already being managed.  Not a stroke.  Not a TIA chronic and unchanged in 2019          INTERVAL EVENTS  04/10/25:        SUBJECTIVE:     She would like improved control of her orthostatic hypotension.  Still bothersome. C/o left arm/shoulder pain.   The results of her MRI.  Explained there are no significant changes since the 2019 scan.  Pertinent positive and negatives per HPI.  All others were reviewed and negative.       Objective   OBJECTIVE:   Last vitals and weight :  Blood pressure 123/71, pulse 88, temperature 98 °F (36.7 °C), temperature source Oral, resp. rate 16, weight 172 lb 14.4 oz (78.4 kg), SpO2 98%, not currently breastfeeding.   Vitals:    04/10/25 1900 04/10/25 2047 04/11/25 0002 04/11/25 0532   BP:  126/68 123/71    BP Location:  Right arm Right arm    Pulse: 88      Resp:  18 16    Temp:  98.2 °F (36.8 °C) 98 °F (36.7 °C)    TempSrc:  Oral Oral    SpO2:  99% 98%    Weight:    172 lb 14.4 oz (78.4 kg)      Exam:   Exam:  - General: appears stated age and no distress  - CV: Symmetric pulsee   Carotids:   - Pulmonary: No sign of respiratory distress   Neurologic Exam  - Mental Status: Alert and attentive.  Able to provide a history..  Speech is spontaneous, fluent, and prosodic. Comprehension intact. Repetition intact. Phrase length and rate are normal. No paraphasic errors, neologisms, anomia, acalculia, apraxia, anosognosia, or R/L confusion. No neglect.   - Cranial Nerves: No gaze preference. Visual fields:normal Pupils are 4mm briskly constricting to 3mm and equally round and reactive to light  in a well lit room.  EOMI. No nystagmus. No ptosis. V1-V3 intact B/L to light touch.No pathological facial asymmetry. No flattening of the nasolabial fold. .  Hearing grossly intact.  Tongue midline. No atrophy or fasiculations of the tongue noted. Palate and uvula elevate symmetrically.   Shoulder shrug symmetric.  - Fundoscopic exam:normal w/o hemorrhages, exudates, or papilledema.No attenuation. No pallor.  - Motor:  normal tone, normal bulk. No interosseous wasting. No flattening of hypothenar eminences.       Right Left     Motor Strength   Deltoids 5 4+  Triceps 4+ 4+  Biceps 5 5  Wrist Extensors 5 5   5 5       Knee extensors 3 3      Pronator drift: No pronator drift and left arm drifts down before 10 seconds. NO pronation. No finger curling.      Finger Taps: Finger taps are symmetric in rate and amplitude.    Rapid movements: Rapid/fine movements are symmetric. As expected their dominant hand is slightly faster.   Leg Drift: none   Foot Taps: Foot taps are symmetric.      Asterixis: No asterixis noted.   Tremor:         Adductor Spread: No adductor spread noted.    Frontal release signs:Not assessed.    Jaw Jerk:    Anderson's sign:absent   Nonsustained clonus: Absent   Sustained clonus: Absent   - Sensory:   Light touch: intact  Temperature:  gradient loss in LE   Pinprick:   Vibration: intact    - Cerebellum: No truncal ataxia. No titubations. No dysmetria, no dysdiadochokinesis. No overshoot.     Medications:  Scheduled Medications[1]    PRNS: PRN Medications[2]    Infusions: Medication Infusions[3]         Results:   Laboratory Data:  Lab Results   Component Value Date    WBC 9.4 04/11/2025    HGB 10.4 (L) 04/11/2025    HCT 30.4 (L) 04/11/2025    .0 04/11/2025    CREATSERUM 4.46 (H) 04/11/2025    BUN 37 (H) 04/11/2025     (L) 04/11/2025    K 3.3 (L) 04/11/2025    CL 99 04/11/2025    CO2 25.0 04/11/2025     (H) 04/11/2025    CA 8.0 (L) 04/11/2025    ALB 4.2 04/09/2025    ALKPHO 146 (H) 04/09/2025    TP 6.7 04/09/2025    AST 18 04/09/2025    ALT 9 (L) 04/09/2025    PTT 63.6 (H) 09/21/2023    INR 0.99 09/19/2017    PTP 13.1 09/19/2017    T4F 1.21 07/29/2021    TSH 1.541 03/07/2025    DDIMER 2.53 (H) 09/19/2023    ESRML 9 07/16/2014     (H) 07/17/2014    MG 1.5  (L) 04/10/2025    PHOS 3.4 03/10/2025    TROP <0.045 07/21/2019    CK 61 07/17/2014    CKMB 30.3 (H) 05/31/2011    B12 369 07/16/2014    POCGLU 152 (H) 07/18/2014     Recent Results (from the past 72 hours)   RAINBOW DRAW LAVENDER    Collection Time: 04/09/25  8:00 PM   Result Value Ref Range    Hold Lavender Auto Resulted    RAINBOW DRAW LIGHT GREEN    Collection Time: 04/09/25  8:00 PM   Result Value Ref Range    Hold Lt Green Auto Resulted    RAINBOW DRAW BLUE    Collection Time: 04/09/25  8:00 PM   Result Value Ref Range    Hold Blue Auto Resulted    CBC With Differential With Platelet    Collection Time: 04/09/25  8:00 PM   Result Value Ref Range    WBC 10.8 4.0 - 11.0 x10(3) uL    RBC 3.72 (L) 3.80 - 5.30 x10(6)uL    HGB 11.9 (L) 12.0 - 16.0 g/dL    HCT 34.7 (L) 35.0 - 48.0 %    MCV 93.3 80.0 - 100.0 fL    MCH 32.0 26.0 - 34.0 pg    MCHC 34.3 31.0 - 37.0 g/dL    RDW-SD 48.0 (H) 35.1 - 46.3 fL    RDW 14.2 11.0 - 15.0 %    .0 150.0 - 450.0 10(3)uL    Neutrophil Absolute Prelim 6.76 1.50 - 7.70 x10 (3) uL    Neutrophil Absolute 6.76 1.50 - 7.70 x10(3) uL    Lymphocyte Absolute 3.07 1.00 - 4.00 x10(3) uL    Monocyte Absolute 0.57 0.10 - 1.00 x10(3) uL    Eosinophil Absolute 0.32 0.00 - 0.70 x10(3) uL    Basophil Absolute 0.07 0.00 - 0.20 x10(3) uL    Immature Granulocyte Absolute 0.05 0.00 - 1.00 x10(3) uL    Neutrophil % 62.3 %    Lymphocyte % 28.3 %    Monocyte % 5.3 %    Eosinophil % 3.0 %    Basophil % 0.6 %    Immature Granulocyte % 0.5 %   Hepatic Function Panel (7)    Collection Time: 04/09/25  8:00 PM   Result Value Ref Range    AST 18 <34 U/L    ALT 9 (L) 10 - 49 U/L    Alkaline Phosphatase 146 (H) 55 - 142 U/L    Bilirubin, Total 0.2 0.2 - 1.1 mg/dL    Bilirubin, Direct <0.1 <=0.3 mg/dL    Total Protein 6.7 5.7 - 8.2 g/dL    Albumin 4.2 3.2 - 4.8 g/dL   Troponin I (High Sensitivity)    Collection Time: 04/09/25  8:00 PM   Result Value Ref Range    Troponin I (High Sensitivity) 20 <=34 ng/L   Basic  Metabolic Panel (8)    Collection Time: 04/09/25  8:00 PM   Result Value Ref Range    Glucose 195 (H) 70 - 99 mg/dL    Sodium 134 (L) 136 - 145 mmol/L    Potassium 4.2 3.5 - 5.1 mmol/L    Chloride 96 (L) 98 - 112 mmol/L    CO2 25.0 21.0 - 32.0 mmol/L    Anion Gap 13 0 - 18 mmol/L    BUN 30 (H) 9 - 23 mg/dL    Creatinine 4.51 (H) 0.55 - 1.02 mg/dL    BUN/CREA Ratio 6.7 (L) 10.0 - 20.0    Calcium, Total 8.6 (L) 8.7 - 10.4 mg/dL    Calculated Osmolality 290 275 - 295 mOsm/kg    eGFR-Cr 10 (L) >=60 mL/min/1.73m2   POCT Glucose    Collection Time: 04/09/25  8:28 PM   Result Value Ref Range    POC Glucose  196 (H) 70 - 99 mg/dL   Clostridium difficile(toxigenic)PCR    Collection Time: 04/09/25  9:50 PM    Specimen: Stool   Result Value Ref Range    C. Difficile Toxin B Gene Negative Negative   Basic Metabolic Panel (8)    Collection Time: 04/10/25  6:47 AM   Result Value Ref Range    Glucose 90 70 - 99 mg/dL    Sodium 134 (L) 136 - 145 mmol/L    Potassium 3.6 3.5 - 5.1 mmol/L    Chloride 96 (L) 98 - 112 mmol/L    CO2 27.0 21.0 - 32.0 mmol/L    Anion Gap 11 0 - 18 mmol/L    BUN 34 (H) 9 - 23 mg/dL    Creatinine 4.47 (H) 0.55 - 1.02 mg/dL    BUN/CREA Ratio 7.6 (L) 10.0 - 20.0    Calcium, Total 8.1 (L) 8.7 - 10.4 mg/dL    Calculated Osmolality 285 275 - 295 mOsm/kg    eGFR-Cr 10 (L) >=60 mL/min/1.73m2   Magnesium    Collection Time: 04/10/25  6:47 AM   Result Value Ref Range    Magnesium 1.5 (L) 1.6 - 2.6 mg/dL   CBC With Differential With Platelet    Collection Time: 04/10/25  6:47 AM   Result Value Ref Range    WBC 11.3 (H) 4.0 - 11.0 x10(3) uL    RBC 3.54 (L) 3.80 - 5.30 x10(6)uL    HGB 11.1 (L) 12.0 - 16.0 g/dL    HCT 32.7 (L) 35.0 - 48.0 %    MCV 92.4 80.0 - 100.0 fL    MCH 31.4 26.0 - 34.0 pg    MCHC 33.9 31.0 - 37.0 g/dL    RDW-SD 47.1 (H) 35.1 - 46.3 fL    RDW 14.1 11.0 - 15.0 %    .0 150.0 - 450.0 10(3)uL    Neutrophil Absolute Prelim 7.07 1.50 - 7.70 x10 (3) uL    Neutrophil Absolute 7.07 1.50 - 7.70 x10(3)  uL    Lymphocyte Absolute 3.13 1.00 - 4.00 x10(3) uL    Monocyte Absolute 0.63 0.10 - 1.00 x10(3) uL    Eosinophil Absolute 0.28 0.00 - 0.70 x10(3) uL    Basophil Absolute 0.08 0.00 - 0.20 x10(3) uL    Immature Granulocyte Absolute 0.07 0.00 - 1.00 x10(3) uL    Neutrophil % 62.8 %    Lymphocyte % 27.8 %    Monocyte % 5.6 %    Eosinophil % 2.5 %    Basophil % 0.7 %    Immature Granulocyte % 0.6 %   Hemoglobin A1C    Collection Time: 04/10/25  6:47 AM   Result Value Ref Range    HgbA1C 7.3 (H) <5.7 %    Estimated Average Glucose 163 (H) 68 - 126 mg/dL   POCT Glucose    Collection Time: 04/10/25  7:53 AM   Result Value Ref Range    POC Glucose  113 (H) 70 - 99 mg/dL   Urinalysis with Culture Reflex    Collection Time: 04/10/25  8:27 AM    Specimen: Urine, clean catch   Result Value Ref Range    Urine Color Light-Yellow Yellow    Clarity Urine Clear Clear    Spec Gravity >1.030 (H) 1.005 - 1.030    Glucose Urine 70 (A) Normal mg/dL    Bilirubin Urine Negative Negative    Ketones Urine Negative Negative mg/dL    Blood Urine Negative Negative    pH Urine 6.0 5.0 - 8.0    Protein Urine 30 (A) Negative mg/dL    Urobilinogen Urine Normal Normal    Nitrite Urine Negative Negative    Leukocyte Esterase Urine 75 (A) Negative    WBC Urine 11-20 (A) 0 - 5 /HPF    RBC Urine 0-2 0 - 2 /HPF    Bacteria Urine None Seen None Seen /HPF    Squamous Epi. Cells Few (A) None Seen /HPF    Renal Tubular Epithelial Cells None Seen None Seen /HPF    Transitional Cells None Seen None Seen /HPF    Yeast Urine None Seen None Seen /HPF   POCT Glucose    Collection Time: 04/10/25 12:06 PM   Result Value Ref Range    POC Glucose  160 (H) 70 - 99 mg/dL   POCT Glucose    Collection Time: 04/10/25  5:11 PM   Result Value Ref Range    POC Glucose  135 (H) 70 - 99 mg/dL   POCT Glucose    Collection Time: 04/10/25  8:35 PM   Result Value Ref Range    POC Glucose  184 (H) 70 - 99 mg/dL   CBC With Differential With Platelet    Collection Time: 04/11/25   6:10 AM   Result Value Ref Range    WBC 9.4 4.0 - 11.0 x10(3) uL    RBC 3.19 (L) 3.80 - 5.30 x10(6)uL    HGB 10.4 (L) 12.0 - 16.0 g/dL    HCT 30.4 (L) 35.0 - 48.0 %    MCV 95.3 80.0 - 100.0 fL    MCH 32.6 26.0 - 34.0 pg    MCHC 34.2 31.0 - 37.0 g/dL    RDW-SD 49.3 (H) 35.1 - 46.3 fL    RDW 14.1 11.0 - 15.0 %    .0 150.0 - 450.0 10(3)uL    Neutrophil Absolute Prelim 5.75 1.50 - 7.70 x10 (3) uL    Neutrophil Absolute 5.75 1.50 - 7.70 x10(3) uL    Lymphocyte Absolute 2.72 1.00 - 4.00 x10(3) uL    Monocyte Absolute 0.64 0.10 - 1.00 x10(3) uL    Eosinophil Absolute 0.20 0.00 - 0.70 x10(3) uL    Basophil Absolute 0.07 0.00 - 0.20 x10(3) uL    Immature Granulocyte Absolute 0.05 0.00 - 1.00 x10(3) uL    Neutrophil % 61.1 %    Lymphocyte % 28.8 %    Monocyte % 6.8 %    Eosinophil % 2.1 %    Basophil % 0.7 %    Immature Granulocyte % 0.5 %   Basic Metabolic Panel (8)    Collection Time: 04/11/25  6:10 AM   Result Value Ref Range    Glucose 137 (H) 70 - 99 mg/dL    Sodium 134 (L) 136 - 145 mmol/L    Potassium 3.3 (L) 3.5 - 5.1 mmol/L    Chloride 99 98 - 112 mmol/L    CO2 25.0 21.0 - 32.0 mmol/L    Anion Gap 10 0 - 18 mmol/L    BUN 37 (H) 9 - 23 mg/dL    Creatinine 4.46 (H) 0.55 - 1.02 mg/dL    BUN/CREA Ratio 8.3 (L) 10.0 - 20.0    Calcium, Total 8.0 (L) 8.7 - 10.4 mg/dL    Calculated Osmolality 289 275 - 295 mOsm/kg    eGFR-Cr 10 (L) >=60 mL/min/1.73m2   POCT Glucose    Collection Time: 04/11/25  8:19 AM   Result Value Ref Range    POC Glucose  155 (H) 70 - 99 mg/dL         Test results/Imaging:   CT BRAIN OR HEAD (CPT=70450)  Result Date: 4/9/2025  CONCLUSION:  Right frontal scalp soft tissue swelling without acute intracranial abnormality.  Mild chronic microvascular ischemic disease.   Dictated by (CST): Car Enciso MD on 4/09/2025 at 9:10 PM     Finalized by (CST): Car Enciso MD on 4/09/2025 at 9:12 PM          CT BRAIN OR HEAD (CPT=70450)  Result Date: 2/5/2025  CONCLUSION: Small to moderate-sized soft tissue  hematoma within the left frontal scalp with no underlying calvarial fracture or acute intracranial process.    Dictated by (CST): Jeff Allison MD on 2/05/2025 at 1:30 PM     Finalized by (CST): Jeff Allison MD on 2/05/2025 at 1:43 PM                 Performed an independent visualization of:  MRI brain WO   Imaging revealed: Agree with radiology read.  2025 scan on the right, 2019 mri on the left.            Disclaimer:   This record was dictated using Dragon software. There may be errors due to voice recognition problems that were not realized and corrected during the completion of the note.      This document is not intended to support charting by exception.  Sections left blank in a completed note should be presumed not to have been done.     Total face to face time was 35 minutes, more than 50% of the time was spent in counseling and/or coordination of care related to neurogenic orthostatic syncope.    Thank you.  Sohail Galdamez D.O.   Vascular & General Neurology    4/11/2025            [1]    dextrose 1.5%-calcium 2.5 mEq/L  2,000 mL Intraperitoneal Once in dialysis    dextrose 1.5%-calcium 2.5 mEq/L  2,000 mL Intraperitoneal Once in dialysis    allopurinol  150 mg Oral Daily    aspirin  81 mg Oral Daily    atomoxetine  25 mg Oral Daily    atorvastatin  80 mg Oral Daily    buPROPion ER  300 mg Oral Daily    carvedilol  37.5 mg Oral BID with meals    hydrALAZINE  25 mg Oral TID    levothyroxine  125 mcg Oral Daily @ 0700    pantoprazole  40 mg Oral QAM AC    heparin  5,000 Units Subcutaneous Q8H JERRY    insulin degludec  20 Units Subcutaneous Daily    insulin aspart  1-5 Units Subcutaneous TID CC   [2]   metoclopramide    melatonin    ondansetron    glucose **OR** glucose **OR** glucose-vitamin C **OR** dextrose **OR** glucose **OR** glucose **OR** glucose-vitamin C  [3]

## 2025-04-11 NOTE — PROGRESS NOTES
[facility] CARDIOLOGY Progress Note      Cassy Patterson is a 69 year old female who I assessed as follows:  Chief Complaint   Patient presents with    Fall          REASON FOR CONSULTATION:    syncope            ASSESSMENT/PLAN:     IMPRESSIONS:  Syncope. Has chronic orthostatic hypotension, exacerbated by recent emesis/diarrhea  Chronic HFpEF  CAD s/p stents, last 2023  HL, on statin  ESRD on PD  HTN, elevated BP at rest        PLAN:  Atomoxetine for chronic orthostasis  Echo reviewed  Tele  Follow BP  Hydralazine dose decreased in recent past.  Wants to go home          SUBJECTIVE:    No lightheadedness. No chest pain or dyspnea.         --------------------------------------------------------------------------------------------------------------------------------    HPI:         History of CAD, CHF, chronic orthostatic hypotension presents with syncope last week and pre-syncope vs syncope yesterday. Both soon after standing. Hit forehead last week, back of head yesterday.     Yesterday, on toilet, got up, then fell and hit back of head. She denies syncope today though ER notes says she passed out.    She has been having intermittent emesis and diarrhea recently.                 Current Medications[1]   Past Medical History[2]  Past Surgical History[3]  Family History[4]   Social History:  Short Social Hx on File[5]       Medications:  Current Hospital Medications[6]        Allergies  Allergies[7]            REVIEW OF SYSTEMS:   GENERAL HEALTH: no fevers  SKIN: denies any unusual skin lesions or rashes   EARS: no recent changes in hearing  EYE: no recent vision changes  THROAT: denies sore throat  RESPIRATORY: denies cough or shortness of breath with exertion  CARDIOVASCULAR: syncope  GI: abdominal pain, diarrhea, vomiting  NEURO: denies headaches and focal neurologic symptoms  PSYCH: no recent depression  ENDOCRINE: no change in sleep pattern  HEME: no easy bruising  All other systems reviewed and  negative.          EXAM:         TELE: SR          /71 (BP Location: Right arm)   Pulse 88   Temp 98 °F (36.7 °C) (Oral)   Resp 16   Wt 172 lb 14.4 oz (78.4 kg)   SpO2 98%   BMI 30.63 kg/m²   Temp (24hrs), Av.3 °F (36.8 °C), Min:98 °F (36.7 °C), Max:98.5 °F (36.9 °C)    Wt Readings from Last 3 Encounters:   25 172 lb 14.4 oz (78.4 kg)   03/10/25 175 lb 4.8 oz (79.5 kg)   24 178 lb (80.7 kg)         Intake/Output Summary (Last 24 hours) at 2025 0741  Last data filed at 2025 0532  Gross per 24 hour   Intake 720 ml   Output 250 ml   Net 470 ml         GENERAL: well developed, well nourished, in no apparent distress  SKIN: no rashes  HEENT: atraumatic, normocephalic, throat without erythema  NECK: supple, no bruits  LUNGS: clear to auscultation  CARDIO: RRR without murmur or S3   GI: soft, nontender  EXTREMITIES: no cyanosis, clubbing or edema  NEUROLOGY: alert  PSYCH: cooperative          LABORATORY DATA:               ECG:        ECHO:  1. Left ventricle: The cavity size was normal. Wall thickness was mildly to      moderately increased. Systolic function was normal. The estimated      ejection fraction was 55-60%, by biplane method of disks. No diagnostic      evidence for regional wall motion abnormalities. Left ventricular      diastolic function parameters were normal.   2. Right atrium: The estimated central venous pressure is 3mm Hg.   3. Aortic valve: There was thickening, consistent with sclerosis.   Impressions:  This study is compared with previous dated 2023: No   significant change since prior study.         Labs:  Recent Labs   Lab 04/09/25  2000 04/10/25  0647 25  0610   * 90 137*   BUN 30* 34* 37*   CREATSERUM 4.51* 4.47* 4.46*   CA 8.6* 8.1* 8.0*   * 134* 134*   K 4.2 3.6 3.3*   CL 96* 96* 99   CO2 25.0 27.0 25.0     No results for input(s): \"TROP\" in the last 168 hours.  Recent Labs   Lab 25  0610   RBC 3.19*   HGB 10.4*   HCT 30.4*    MCV 95.3   MCH 32.6   MCHC 34.2   RDW 14.1   NEPRELIM 5.75   WBC 9.4   .0     Recent Labs   Lab 04/09/25 2000   TROPHS 20         CATH 2023:  CATH:  IMPRESSION:    1.  Left heart catheterization: LVEDP 9 mmHg, no aortic stenosis.  LV gram was not performed to minimize contrast exposure.    2.  Coronary angiography:  right dominant system  - LM: no significant angiographic disease  - LAD: proximal occlusion  - LCx: patent stents; OM1- 80% ostial stenosis (small caliber vessel, unchanged angiographic appearance from 2017 study).  - Ramus intermedius: 90% ostial stenosis  - RCA: patent stents  3. Bypass graft angiography:  - LIMA to LAD- widely patent  - SVG to ramus- occluded  - SVG to OM- occluded  - SVG to RCA- occluded  4. Percutaneous coronary intervention:  Successful PCI to the ostial ramus intermedius with a 2.5x12mm [employer] [place] OSMIN and the mid vessel with another 2.5x12mm OSMIN.           Imaging:  XR CHEST AP PORTABLE  (CPT=71045)  Result Date: 4/9/2025  CONCLUSION: No acute cardiopulmonary abnormality.    Dictated by (CST): Car Enciso MD on 4/09/2025 at 9:20 PM     Finalized by (CST): Car Enciso MD on 4/09/2025 at 9:20 PM          CT ABDOMEN+PELVIS(CPT=74176)  Result Date: 4/9/2025  CONCLUSION:   Cirrhosis.  Moderate volume abdominal and pelvic ascites.  End-stage renal disease with peritoneal dialysis catheter.  No obstruction.  Diverticulosis without diverticulitis.     Dictated by (CST): Car Enciso MD on 4/09/2025 at 9:12 PM     Finalized by (CST): Car Enciso MD on 4/09/2025 at 9:16 PM          CT BRAIN OR HEAD (CPT=70450)  Result Date: 4/9/2025  CONCLUSION:  Right frontal scalp soft tissue swelling without acute intracranial abnormality.  Mild chronic microvascular ischemic disease.   Dictated by (CST): aCr Enciso MD on 4/09/2025 at 9:10 PM     Finalized by (CST): Car Enciso MD on 4/09/2025 at 9:12 PM                 Rajinder Reed MD          [1]   No current outpatient  medications on file.   [2]   Past Medical History:   Adrenal adenoma    left adrenoma    Anxiety    Cataract    Chronic kidney disease (CKD), stage III (moderate) (HCC)    CORONARY ARTERY DISEASE    Coronary atherosclerosis    Depression    DIABETES    Dialysis patient    Diarrhea    Disorder of thyroid    Diverticulosis of colon    Esophageal reflux    Fainting episodes    GERD    GOUT    Heart attack (HCC)    High blood pressure    High cholesterol    History of adverse reaction to anesthesia    delusions/hallucinations for a few days after knee replacement    History of blood transfusion    Quadruple Bypass    History of electroconvulsive therapy    HYPERTENSION    Hypothyroid    MENOPAUSE    Osteoarthritis    Other and unspecified hyperlipidemia    Personal history of other medical treatment    Transcranial Magnetic Stimulation (TMS)    Pneumonia, organism unspecified(486)    Polyp of colon    Renal artery stenosis    right kidney    Renal disorder    Type II or unspecified type diabetes mellitus without mention of complication, not stated as uncontrolled    Unspecified essential hypertension    Unspecified sleep apnea    PSG Merit 2012 AHI 70, auto-pap     Visual impairment   [3]   Past Surgical History:  Procedure Laterality Date    Angioplasty (coronary)  3/2015    stents placed in heart    Cabg  6/1/2011    CABGx4 vessel    Cath angio  09/21/2023    Cath bare metal stent (bms)      Cath bare metal stent (bms)  09/2023    2 stents    Cath percutaneous  transluminal coronary angioplasty  3/3/2015    Cath percutaneous  transluminal coronary angioplasty Right 09/19/2017    right Coronary artery, OSMIN    Colonoscopy      Hc plmt coronary drug eluting stent ea addl  3/5/2015    Histopathology report  12/18/13    cecal polyps - 1 tubular adenoma    Impact tooth rem bony w/comp Bilateral 1973    wisdom teeth    Knee replacement surgery Bilateral 3/24/16    right TKA L TKA not at the same time    Left heart  cath,percutaneous  2011    CAD    Left heart cath,percutaneous  3/2/2015          Renal angio, cardiac cath  2011    Sleep study, attended  12/10/2012    Tonsillectomy Bilateral 1958   [4]   Family History  Problem Relation Age of Onset    Cancer Father         prostate, liver    Hypertension Father     Obesity Father     Breast Cancer Father 70        age at dx 70    Heart Surgery Mother         Mitral valve replaced    Heart Disorder Mother         CHF    Hypertension Mother     Arthritis Mother         TKA    Breast Cancer Mother     Stroke Maternal Grandmother     Stroke Maternal Grandfather     Breast Cancer Paternal Grandmother 78        age at dx 78    Cancer Paternal Grandfather         lung    Obesity Brother     Diabetes Brother     Alcohol and Other Disorders Associated Son     Substance Abuse Son     Obesity Sister     Diabetes Sister     Traumatic brain injury Brother    [5]   Social History  Socioeconomic History    Marital status:     Number of children: 2    Years of education: 15   Occupational History    Occupation: [employer]     Comment: [employer]   Tobacco Use    Smoking status: Former     Current packs/day: 0.00     Average packs/day: 1 pack/day for 35.0 years (35.0 ttl pk-yrs)     Types: Cigarettes     Start date: 1979     Quit date: 2014     Years since quitting: 10.5    Smokeless tobacco: Never   Vaping Use    Vaping status: Never Used   Substance and Sexual Activity    Alcohol use: Yes     Alcohol/week: 1.0 standard drink of alcohol     Types: 1 Glasses of wine per week     Comment: occasionally    Drug use: Not Currently     Comment: in her 20s    Sexual activity: Not Currently     Partners: Male   Other Topics Concern     Service No    Blood Transfusions No    Caffeine Concern Yes     Comment: coffee    Occupational Exposure No    Hobby Hazards No    Sleep Concern Yes     Comment: STEFFEN    Stress Concern No    Weight Concern No    Special Diet No     Back Care No    Exercise Yes    Bike Helmet Yes    Seat Belt Yes    Self-Exams Yes   Social History Narrative    Lives alone.    Enjoys reading, pets, knitting, cooking     Social Drivers of Health     Food Insecurity: No Food Insecurity (4/10/2025)    NCSS - Food Insecurity     Worried About Running Out of Food in the Last Year: No     Ran Out of Food in the Last Year: No   Transportation Needs: Unmet Transportation Needs (4/10/2025)    NCSS - Transportation     Lack of Transportation: Yes   Housing Stability: Not At Risk (4/10/2025)    NCSS - Housing/Utilities     Has Housing: Yes     Worried About Losing Housing: No     Unable to Get Utilities: No   [6]   Current Facility-Administered Medications   Medication Dose Route Frequency    metoclopramide (Reglan) 5 mg/mL injection 5 mg  5 mg Intravenous Daily PRN    dextrose 1.5%-calcium 2.5 mEq/L peritoneal solution  2,000 mL Intraperitoneal Once in dialysis    dextrose 1.5%-calcium 2.5 mEq/L peritoneal solution  2,000 mL Intraperitoneal Once in dialysis    allopurinol (Zyloprim) tab 150 mg  150 mg Oral Daily    aspirin chewable tab 81 mg  81 mg Oral Daily    atomoxetine (Strattera) cap 25 mg - PT'S OWN MEDICATION  25 mg Oral Daily    atorvastatin (Lipitor) tab 80 mg  80 mg Oral Daily    buPROPion ER (Wellbutrin XL) 24 hr tab 300 mg  300 mg Oral Daily    carvedilol (Coreg) tab 37.5 mg  37.5 mg Oral BID with meals    hydrALAZINE (Apresoline) tab 25 mg  25 mg Oral TID    levothyroxine (Synthroid) tab 125 mcg  125 mcg Oral Daily @ 0700    pantoprazole (Protonix) DR tab 40 mg  40 mg Oral QAM AC    heparin (Porcine) 5000 UNIT/ML injection 5,000 Units  5,000 Units Subcutaneous Q8H JERRY    melatonin tab 3 mg  3 mg Oral Nightly PRN    ondansetron (Zofran) 4 MG/2ML injection 4 mg  4 mg Intravenous Q6H PRN    glucose (Dex4) 15 GM/59ML oral liquid 15 g  15 g Oral Q15 Min PRN    Or    glucose (Glutose) 40% oral gel 15 g  15 g Oral Q15 Min PRN    Or    glucose-vitamin C (Dex-4)  chewable tab 4 tablet  4 tablet Oral Q15 Min PRN    Or    dextrose 50% injection 50 mL  50 mL Intravenous Q15 Min PRN    Or    glucose (Dex4) 15 GM/59ML oral liquid 30 g  30 g Oral Q15 Min PRN    Or    glucose (Glutose) 40% oral gel 30 g  30 g Oral Q15 Min PRN    Or    glucose-vitamin C (Dex-4) chewable tab 8 tablet  8 tablet Oral Q15 Min PRN    insulin degludec (Tresiba) 100 units/mL flextouch 20 Units  20 Units Subcutaneous Daily    insulin aspart (NovoLOG) 100 Units/mL FlexPen 1-5 Units  1-5 Units Subcutaneous TID CC   [7]   Allergies  Allergen Reactions    Clopidogrel RASH    Sulfa Antibiotics HIVES and UNKNOWN    Tramadol ANXIETY, FEVER and OTHER (SEE COMMENTS)     Serotonin syndrome to scheduled tramadol in setting of use of SSRI    Serotonin Reuptake Inhibitors UNKNOWN

## 2025-04-11 NOTE — PLAN OF CARE
Problem: Patient Centered Care  Goal: Patient preferences are identified and integrated in the patient's plan of care  Description: Interventions:- What would you like us to know as we care for you? Provide timely, complete, and accurate information to patient/family- Incorporate patient and family knowledge, values, beliefs, and cultural backgrounds into the planning and delivery of care- Encourage patient/family to participate in care and decision-making at the level they choose- Honor patient and family perspectives and choices  Outcome: Adequate for Discharge     Problem: Diabetes/Glucose Control  Goal: Glucose maintained within prescribed range  Description: INTERVENTIONS:- Monitor Blood Glucose as ordered- Assess for signs and symptoms of hyperglycemia and hypoglycemia- Administer ordered medications to maintain glucose within target range- Assess barriers to adequate nutritional intake and initiate nutrition consult as needed- Instruct patient on self management of diabetes  Outcome: Adequate for Discharge     Problem: Patient/Family Goals  Goal: Patient/Family Long Term Goal  Description: Patient's Long Term Goal: discharge  Interventions:  - Monitor vitals  - Orthostatics  - Echo  - See additional Care Plan goals for specific interventions  Outcome: Adequate for Discharge  Goal: Patient/Family Short Term Goal  Description: Patient's Short Term Goal: feel better  Interventions:   - Ambulate as tolerated  - Continue ADLs  - See additional Care Plan goals for specific interventions  Outcome: Adequate for Discharge     Problem: SAFETY ADULT - FALL  Goal: Free from fall injury  Description: INTERVENTIONS:- Assess pt frequently for physical needs- Identify cognitive and physical deficits and behaviors that affect risk of falls.- [place] fall precautions as indicated by assessment.- Educate pt/family on patient safety including physical limitations- Instruct pt to call for assistance with activity based on  assessment- Modify environment to reduce risk of injury- Provide assistive devices as appropriate- Consider OT/PT consult to assist with strengthening/mobility- Encourage toileting schedule  Outcome: Adequate for Discharge     Problem: DISCHARGE PLANNING  Goal: Discharge to home or other facility with appropriate resources  Description: INTERVENTIONS:- Identify barriers to discharge w/pt and caregiver- Include patient/family/discharge partner in discharge planning- Arrange for needed discharge resources and transportation as appropriate- Identify discharge learning needs (meds, wound care, etc)- Arrange for interpreters to assist at discharge as needed- Consider post-discharge preferences of patient/family/discharge partner- Complete POLST form as appropriate- Assess patient's ability to be responsible for managing their own health- Refer to Case Management Department for coordinating discharge planning if the patient needs post-hospital services based on physician/LIP order or complex needs related to functional status, cognitive ability or social support system  Outcome: Adequate for Discharge     Problem: CARDIOVASCULAR - ADULT  Goal: Maintains optimal cardiac output and hemodynamic stability  Description: INTERVENTIONS:- Monitor vital signs, rhythm, and trends- Monitor for bleeding, hypotension and signs of decreased cardiac output- Evaluate effectiveness of vasoactive medications to optimize hemodynamic stability- Monitor arterial and/or venous puncture sites for bleeding and/or hematoma- Assess quality of pulses, skin color and temperature- Assess for signs of decreased coronary artery perfusion - ex. Angina- Evaluate fluid balance, assess for edema, trend weights  Outcome: Adequate for Discharge  Goal: Absence of cardiac arrhythmias or at baseline  Description: INTERVENTIONS:- Continuous cardiac monitoring, monitor vital signs, obtain 12 lead EKG if indicated- Evaluate effectiveness of antiarrhythmic and  heart rate control medications as ordered- Initiate emergency measures for life threatening arrhythmias- Monitor electrolytes and administer replacement therapy as ordered  Outcome: Adequate for Discharge

## 2025-04-11 NOTE — DISCHARGE INSTRUCTIONS
Medication changes  1) Stop amlodipine.  May need to resume this at a later date if blood pressure in supine increases  2) home atomoxetine increased to 40 mg daily to help with orthostatic blood pressure  3) hold hydralazine if systolic blood pressure less than 150 mmHg  4) take Metamucil over-the-counter fiber daily to help with fluctuations of diarrhea      Please use support stockings and slow position changes to help with orthostatic blood pressure.  The importance of exercise and physical activity cannot be underscored as it is important to help with regulation of blood pressure.  Please continue with home physical therapy    Please ensure you are taking enough oral intake as dehydration is a risk factor for worsening of your orthostatic blood pressure.      If any issues with with low or high blood pressures please contact your doctors immediately.    Follow-up appointment for your primary has been set for April 15th at 3:40 PM with a video visit    Follow-up with cardiology 4:18 at 130 as previously scheduled    Please call Dr. Galdamez's office to set up appointment for 1 month follow-up

## 2025-04-11 NOTE — PROGRESS NOTES
Patient received on PD, running since overnight. Patient's A&Ox3, VSS, orthostatics negative this morning. Patient to discharge home today. Daughter to arrive and take her home. Patient verbalizes understanding.

## 2025-04-11 NOTE — PLAN OF CARE
Pt is A&OX4; VSS; Currently getting peritoneal dialysis; No significant issues; will continue to monitor  Problem: Patient Centered Care  Goal: Patient preferences are identified and integrated in the patient's plan of care  Description: Interventions:- What would you like us to know as we care for you? I need peritoneal dialysis- Provide timely, complete, and accurate information to patient/family- Incorporate patient and family knowledge, values, beliefs, and cultural backgrounds into the planning and delivery of care- Encourage patient/family to participate in care and decision-making at the level they choose- Honor patient and family perspectives and choices  Outcome: Progressing     Problem: Diabetes/Glucose Control  Goal: Glucose maintained within prescribed range  Description: INTERVENTIONS:- Monitor Blood Glucose as ordered- Assess for signs and symptoms of hyperglycemia and hypoglycemia- Administer ordered medications to maintain glucose within target range- Assess barriers to adequate nutritional intake and initiate nutrition consult as needed- Instruct patient on self management of diabetes  Outcome: Progressing     Problem: Patient/Family Goals  Goal: Patient/Family Long Term Goal  Description: Patient's Long Term Goal: discharge  Interventions:  - Monitor vitals  - Orthostatics  - Echo  - See additional Care Plan goals for specific interventions  Outcome: Progressing  Goal: Patient/Family Short Term Goal  Description: Patient's Short Term Goal: feel better  Interventions:   - Ambulate as tolerated  - Continue ADLs  - See additional Care Plan goals for specific interventions  Outcome: Progressing     Problem: SAFETY ADULT - FALL  Goal: Free from fall injury  Description: INTERVENTIONS:- Assess pt frequently for physical needs- Identify cognitive and physical deficits and behaviors that affect risk of falls.- [place] fall precautions as indicated by assessment.- Educate pt/family on patient safety  including physical limitations- Instruct pt to call for assistance with activity based on assessment- Modify environment to reduce risk of injury- Provide assistive devices as appropriate- Consider OT/PT consult to assist with strengthening/mobility- Encourage toileting schedule  Outcome: Progressing     Problem: DISCHARGE PLANNING  Goal: Discharge to home or other facility with appropriate resources  Description: INTERVENTIONS:- Identify barriers to discharge w/pt and caregiver- Include patient/family/discharge partner in discharge planning- Arrange for needed discharge resources and transportation as appropriate- Identify discharge learning needs (meds, wound care, etc)- Arrange for interpreters to assist at discharge as needed- Consider post-discharge preferences of patient/family/discharge partner- Complete POLST form as appropriate- Assess patient's ability to be responsible for managing their own health- Refer to Case Management Department for coordinating discharge planning if the patient needs post-hospital services based on physician/LIP order or complex needs related to functional status, cognitive ability or social support system  Outcome: Progressing     Problem: CARDIOVASCULAR - ADULT  Goal: Maintains optimal cardiac output and hemodynamic stability  Description: INTERVENTIONS:- Monitor vital signs, rhythm, and trends- Monitor for bleeding, hypotension and signs of decreased cardiac output- Evaluate effectiveness of vasoactive medications to optimize hemodynamic stability- Monitor arterial and/or venous puncture sites for bleeding and/or hematoma- Assess quality of pulses, skin color and temperature- Assess for signs of decreased coronary artery perfusion - ex. Angina- Evaluate fluid balance, assess for edema, trend weights  Outcome: Progressing  Goal: Absence of cardiac arrhythmias or at baseline  Description: INTERVENTIONS:- Continuous cardiac monitoring, monitor vital signs, obtain 12 lead EKG if  indicated- Evaluate effectiveness of antiarrhythmic and heart rate control medications as ordered- Initiate emergency measures for life threatening arrhythmias- Monitor electrolytes and administer replacement therapy as ordered  Outcome: Progressing

## 2025-04-11 NOTE — CONSULTS
[facility]  part of [facility]    Report of Consultation    Casys Patterson Patient Status:  Observation    1955 MRN [id number]   Location [facility] 3W/SW Attending Derian Liz DO   Hosp Day # 0 PCP Ambreen Sandoval MD     Date of Admission:  2025  Date of Consult:  4/10/2025  Reason for Consultation:   Patient is a peritoneal dialysis patient    History of Present Illness:   Patient is a 69 year old female who was admitted to the hospital for Syncope and collapse:  She fell twice passed out hit her head was concerned about a concussion came to the ER and slight visual changes and nausea  MRI of the brain negative had some chronic white matter changes she has been on dialysis for about a year originally on hemodialysis and switched to peritoneal dialysis  History of hypertension diabetes she is a non-smoker history of atherosclerotic heart disease does not cyclo at home alternating 1.5 and 2.5 with a purple bag also currently feels fine  Past Medical History  Past Medical History[1]    Past Surgical History  Past Surgical History[2]    Family History  Family History[3]    Social History  Social History     Socioeconomic History    Marital status:      Spouse name: Not on file    Number of children: 2    Years of education: 15    Highest education level: Not on file   Occupational History    Occupation: [employer]     Comment: [employer]   Tobacco Use    Smoking status: Former     Current packs/day: 0.00     Average packs/day: 1 pack/day for 35.0 years (35.0 ttl pk-yrs)     Types: Cigarettes     Start date: 1979     Quit date: 2014     Years since quitting: 10.5    Smokeless tobacco: Never   Vaping Use    Vaping status: Never Used   Substance and Sexual Activity    Alcohol use: Yes     Alcohol/week: 1.0 standard drink of alcohol     Types: 1 Glasses of wine per week     Comment: occasionally    Drug use: Not Currently     Comment: in her 20s    Sexual  activity: Not Currently     Partners: Male   Other Topics Concern     Service No    Blood Transfusions No    Caffeine Concern Yes     Comment: coffee    Occupational Exposure No    Hobby Hazards No    Sleep Concern Yes     Comment: STEFFEN    Stress Concern No    Weight Concern No    Special Diet No    Back Care No    Exercise Yes    Bike Helmet Yes    Seat Belt Yes    Self-Exams Yes   Social History Narrative    Lives alone.    Enjoys reading, pets, knitting, cooking     Social Drivers of Health     Food Insecurity: No Food Insecurity (4/10/2025)    NCSS - Food Insecurity     Worried About Running Out of Food in the Last Year: No     Ran Out of Food in the Last Year: No   Transportation Needs: Unmet Transportation Needs (4/10/2025)    NCSS - Transportation     Lack of Transportation: Yes   Stress: Not on file   Housing Stability: Not At Risk (4/10/2025)    NCSS - Housing/Utilities     Has Housing: Yes     Worried About Losing Housing: No     Unable to Get Utilities: No          Current Medications:  Current Hospital Medications[4]  Prescriptions Prior to Admission[5]    Allergies  Allergies[6]      Review of Systems:   Constitutional: negative for fatigue, fevers and weight loss  Eyes: negative for irritation, redness and visual disturbance  Ears, nose, mouth, throat, and face: negative for hearing loss and sore throat  Respiratory: negative for cough, hemoptysis and wheezing  Cardiovascular: negative for chest pain, exertional dyspnea, lower extremity edema and palpitations  Gastrointestinal: negative for abdominal pain, diarrhea and nausea  Genitourinary:negative for dysuria, frequency and hematuria  Hematologic/lymphatic: negative for bleeding and easy bruising  Musculoskeletal:negative for back pain, bone pain and muscle weakness  Neurological some slight head discomfort but no visual changes or nausea today  Behavioral/Psych: negative for anxiety and depression  Endocrine: negative for polyuria and weight  loss     Physical Exam:   Blood pressure 126/68, pulse 80, temperature 98.2 °F (36.8 °C), temperature source Oral, resp. rate 18, weight 173 lb 1 oz (78.5 kg), SpO2 99%, not currently breastfeeding.    Intake/Output Summary (Last 24 hours) at 4/10/2025 2119  Last data filed at 4/10/2025 1824  Gross per 24 hour   Intake 1220 ml   Output 100 ml   Net 1120 ml     Wt Readings from Last 3 Encounters:   04/10/25 173 lb 1 oz (78.5 kg)   03/10/25 175 lb 4.8 oz (79.5 kg)   11/08/24 178 lb (80.7 kg)       General appearance: alert, appears stated age and cooperative  Head: Normocephalic, atraumatic  Eyes: conjunctivae/corneas clear  Throat: lips, mucosa, and tongue normal; teeth and gums normal  Neck:  no JVD, supple, symmetrical  Pulmonary:  clear to auscultation bilaterally  Cardiovascular: S1, S2 normal, no rub or gallop, regular rate and rhythm  Abdominal: soft, non-tender; non distended, bowel sounds normal PD catheter looks good  Extremities: extremities normal, no edema  Pulses: pedal pulses palpable  Skin: No rashes or lesions  Lymph nodes: Cervical, supraclavicular normal.  Neurologic: Grossly normal  Psychiatric: calm    Results:     Laboratory Data:  Lab Results   Component Value Date    WBC 11.3 (H) 04/10/2025    HGB 11.1 (L) 04/10/2025    HCT 32.7 (L) 04/10/2025    .0 04/10/2025    CREATSERUM 4.47 (H) 04/10/2025    BUN 34 (H) 04/10/2025     (L) 04/10/2025    K 3.6 04/10/2025    CL 96 (L) 04/10/2025    CO2 27.0 04/10/2025    GLU 90 04/10/2025    CA 8.1 (L) 04/10/2025    ALB 4.2 04/09/2025    ALKPHO 146 (H) 04/09/2025    BILT 0.2 04/09/2025    TP 6.7 04/09/2025    AST 18 04/09/2025    ALT 9 (L) 04/09/2025    PTT 63.6 (H) 09/21/2023    INR 0.99 09/19/2017    PT 13.9 07/16/2014    T4F 1.21 07/29/2021    TSH 1.541 03/07/2025    DDIMER 2.53 (H) 09/19/2023    ESRML 9 07/16/2014     (H) 07/17/2014    MG 1.5 (L) 04/10/2025    PHOS 3.4 03/10/2025    TROP <0.045 07/21/2019    CK 61 07/17/2014    CKMB  30.3 (H) 05/31/2011    B12 369 07/16/2014    POCGLU 152 (H) 07/18/2014       Imaging:  [unfilled]   XR CHEST AP PORTABLE  (CPT=71045)  Result Date: 4/9/2025  CONCLUSION: No acute cardiopulmonary abnormality.    Dictated by (CST): Car Enciso MD on 4/09/2025 at 9:20 PM     Finalized by (CST): Car Enciso MD on 4/09/2025 at 9:20 PM          CT ABDOMEN+PELVIS(CPT=74176)  Result Date: 4/9/2025  CONCLUSION:   Cirrhosis.  Moderate volume abdominal and pelvic ascites.  End-stage renal disease with peritoneal dialysis catheter.  No obstruction.  Diverticulosis without diverticulitis.     Dictated by (CST): Car Enciso MD on 4/09/2025 at 9:12 PM     Finalized by (CST): Car Enciso MD on 4/09/2025 at 9:16 PM          CT BRAIN OR HEAD (CPT=70450)  Result Date: 4/9/2025  CONCLUSION:  Right frontal scalp soft tissue swelling without acute intracranial abnormality.  Mild chronic microvascular ischemic disease.   Dictated by (CST): Car Enciso MD on 4/09/2025 at 9:10 PM     Finalized by (CST): Car Enciso MD on 4/09/2025 at 9:12 PM                 Impression:   Problem List[7]     Recommendations:  #1 head trauma per Dr. Liz patient looks fine  #2 ESRD do peritoneal dialysis tonight 1.5% over 9 hours 5 exchanges  #3 hypertension stable  #4 low mag replaced  Thank you for allowing me to participate in the care of your patient.    Bari Noe MD  4/10/2025         [1]   Past Medical History:   Adrenal adenoma    left adrenoma    Anxiety    Cataract    Chronic kidney disease (CKD), stage III (moderate) (HCC)    CORONARY ARTERY DISEASE    Coronary atherosclerosis    Depression    DIABETES    Dialysis patient    Diarrhea    Disorder of thyroid    Diverticulosis of colon    Esophageal reflux    Fainting episodes    GERD    GOUT    Heart attack (HCC)    High blood pressure    High cholesterol    History of adverse reaction to anesthesia    delusions/hallucinations for a few days after knee replacement    History of blood  transfusion    Quadruple Bypass    History of electroconvulsive therapy    HYPERTENSION    Hypothyroid    MENOPAUSE    Osteoarthritis    Other and unspecified hyperlipidemia    Personal history of other medical treatment    Transcranial Magnetic Stimulation (TMS)    Pneumonia, organism unspecified(486)    Polyp of colon    Renal artery stenosis    right kidney    Renal disorder    Type II or unspecified type diabetes mellitus without mention of complication, not stated as uncontrolled    Unspecified essential hypertension    Unspecified sleep apnea    PSG Merit  AHI 70, auto-pap     Visual impairment   [2]   Past Surgical History:  Procedure Laterality Date    Angioplasty (coronary)  3/2015    stents placed in heart    Cabg  2011    CABGx4 vessel    Cath angio  2023    Cath bare metal stent (bms)      Cath bare metal stent (bms)  2023    2 stents    Cath percutaneous  transluminal coronary angioplasty  3/3/2015    Cath percutaneous  transluminal coronary angioplasty Right 2017    right Coronary artery, OSMIN    Colonoscopy      Hc plmt coronary drug eluting stent ea addl  3/5/2015    Histopathology report  13    cecal polyps - 1 tubular adenoma    Impact tooth rem bony w/comp Bilateral 1973    wisdom teeth    Knee replacement surgery Bilateral 3/24/16    right TKA L TKA not at the same time    Left heart cath,percutaneous  2011    CAD    Left heart cath,percutaneous  3/2/2015          Renal angio, cardiac cath  2011    Sleep study, attended  12/10/2012    Tonsillectomy Bilateral 1958   [3]   Family History  Problem Relation Age of Onset    Cancer Father         prostate, liver    Hypertension Father     Obesity Father     Breast Cancer Father 70        age at dx 70    Heart Surgery Mother         Mitral valve replaced    Heart Disorder Mother         CHF    Hypertension Mother     Arthritis Mother         TKA    Breast Cancer Mother     Stroke Maternal Grandmother     Stroke  Maternal Grandfather     Breast Cancer Paternal Grandmother 78        age at dx 78    Cancer Paternal Grandfather         lung    Obesity Brother     Diabetes Brother     Alcohol and Other Disorders Associated Son     Substance Abuse Son     Obesity Sister     Diabetes Sister     Traumatic brain injury Brother    [4]   Current Facility-Administered Medications   Medication Dose Route Frequency    metoclopramide (Reglan) 5 mg/mL injection 5 mg  5 mg Intravenous Daily PRN    dextrose 1.5%-calcium 2.5 mEq/L peritoneal solution  2,000 mL Intraperitoneal Once in dialysis    dextrose 1.5%-calcium 2.5 mEq/L peritoneal solution  2,000 mL Intraperitoneal Once in dialysis    allopurinol (Zyloprim) tab 150 mg  150 mg Oral Daily    aspirin chewable tab 81 mg  81 mg Oral Daily    atomoxetine (Strattera) cap 25 mg - PT'S OWN MEDICATION  25 mg Oral Daily    atorvastatin (Lipitor) tab 80 mg  80 mg Oral Daily    buPROPion ER (Wellbutrin XL) 24 hr tab 300 mg  300 mg Oral Daily    carvedilol (Coreg) tab 37.5 mg  37.5 mg Oral BID with meals    hydrALAZINE (Apresoline) tab 25 mg  25 mg Oral TID    levothyroxine (Synthroid) tab 125 mcg  125 mcg Oral Daily @ 0700    pantoprazole (Protonix) DR tab 40 mg  40 mg Oral QAM AC    heparin (Porcine) 5000 UNIT/ML injection 5,000 Units  5,000 Units Subcutaneous Q8H JERRY    melatonin tab 3 mg  3 mg Oral Nightly PRN    ondansetron (Zofran) 4 MG/2ML injection 4 mg  4 mg Intravenous Q6H PRN    glucose (Dex4) 15 GM/59ML oral liquid 15 g  15 g Oral Q15 Min PRN    Or    glucose (Glutose) 40% oral gel 15 g  15 g Oral Q15 Min PRN    Or    glucose-vitamin C (Dex-4) chewable tab 4 tablet  4 tablet Oral Q15 Min PRN    Or    dextrose 50% injection 50 mL  50 mL Intravenous Q15 Min PRN    Or    glucose (Dex4) 15 GM/59ML oral liquid 30 g  30 g Oral Q15 Min PRN    Or    glucose (Glutose) 40% oral gel 30 g  30 g Oral Q15 Min PRN    Or    glucose-vitamin C (Dex-4) chewable tab 8 tablet  8 tablet Oral Q15 Min PRN     insulin degludec (Tresiba) 100 units/mL flextouch 20 Units  20 Units Subcutaneous Daily    insulin aspart (NovoLOG) 100 Units/mL FlexPen 1-5 Units  1-5 Units Subcutaneous TID CC   [5]   Medications Prior to Admission   Medication Sig    aspirin 81 MG Oral Chew Tab Chew 1 tablet (81 mg total) by mouth daily.    diazePAM 5 MG Oral Tab Take 1 tablet (5 mg total) by mouth 2 (two) times daily.    buPROPion  MG Oral Tablet 24 Hr Take 1 tablet (300 mg total) by mouth daily. (Patient taking differently: Take 1 tablet (300 mg total) by mouth nightly.)    sertraline 100 MG Oral Tab Take 2 tablets (200 mg total) by mouth daily. (Patient taking differently: Take 2 tablets (200 mg total) by mouth at bedtime.)    atomoxetine 25 MG Oral Cap Take 1 capsule (25 mg total) by mouth daily.    Potassium Chloride ER 20 MEQ Oral Tab CR Take 1 tablet by mouth in the morning.    amLODIPine 10 MG Oral Tab Take 1 tablet (10 mg total) by mouth at bedtime.    carvedilol 12.5 MG Oral Tab Take 3 tablets (37.5 mg total) by mouth in the morning and 3 tablets (37.5 mg total) in the evening. Take with meals.    allopurinol 300 MG Oral Tab TAKE ONE-HALF (1/2) TABLET DAILY    Levothyroxine Sodium 125 MCG Oral Tab Take 1 tablet (125 mcg total) by mouth before breakfast.    Pantoprazole Sodium 40 MG Oral Tab EC TAKE 1 TABLET EVERY MORNING BEFORE BREAKFAST    Insulin Pen Needle (BD PEN NEEDLE ORIGINAL U/F) 29G X 12.7MM Does not apply Misc USE AS DIRECTED DAILY    hydrALAZINE HCl 100 MG Oral Tab Take 25 mg by mouth in the morning and 25 mg in the evening and 25 mg before bedtime. Take tid, hold afternoon hydralazine if sbp <150 but usually does not take.    [employer]le[employer] CONTOUR NEXT TEST In Vitro Strip TEST BLOOD GLUCOSE THREE TIMES DAILY    Insulin Glargine, 1 Unit Dial, (TOUJEO SOLOSTAR) 300 UNIT/ML Subcutaneous Solution Pen-injector Inject 22 Units into the skin every evening.    atorvastatin 80 MG Oral Tab Take 1 tablet (80 mg total) by mouth nightly.    [6]   Allergies  Allergen Reactions    Clopidogrel RASH    Sulfa Antibiotics HIVES and UNKNOWN    Tramadol ANXIETY, FEVER and OTHER (SEE COMMENTS)     Serotonin syndrome to scheduled tramadol in setting of use of SSRI    Serotonin Reuptake Inhibitors UNKNOWN   [7]   Patient Active Problem List  Diagnosis    Coronary atherosclerosis    Pure hypercholesterolemia    Gout    Adrenal adenoma    Metabolic syndrome    BMI 33.0-33.9,adult    Hypothyroid    Gouty arthropathy, unspecified    Obstructive sleep apnea (adult) (pediatric)    Esophageal reflux    Central obesity    Syncope    Major depression, recurrent    Obsessive compulsive disorder    FRANKY (generalized anxiety disorder)    Major depressive disorder, recurrent episode, moderate ([facility])    Localized osteoarthritis of knees, bilateral    Essential hypertension, benign    CKD (chronic kidney disease) stage 4, GFR 15-29 ml/min ([facility])    Total right knee replacement Global 6/22/2016    Primary localized osteoarthrosis, lower leg, left    Diabetes mellitus due to underlying condition, controlled, with stage 4 chronic kidney disease, with long-term current use of insulin ([facility])    Status post total left knee replacement    Hypertensive urgency    Persistent depressive disorder with anxious distress, currently severe    Renal artery atherosclerosis    S/P primary angioplasty with coronary stent    Proteinuria    Syncope, unspecified syncope type    Closed head injury, initial encounter    Laceration of scalp, initial encounter    Chronic pulmonary edema ([facility])    Renal insufficiency    Primary hypertension    Hypoxia    AILIN (acute kidney injury)    Acute respiratory failure with hypoxia ([facility])    NSTEMI (non-ST elevated myocardial infarction) ([facility])    ESRD on hemodialysis ([facility])    Community acquired pneumonia, unspecified laterality    ESRD on peritoneal dialysis ([facility])    Orthostatic hypotension    Smokers' cough ([facility])    Respiratory failure ([facility])    Hypokalemia    Iron  deficiency anemia, unspecified    Protein-calorie malnutrition (HCC)    Recurrent major depressive disorder in partial remission    Secondary hyperparathyroidism of renal origin (HCC)    Type 2 diabetes mellitus with diabetic nephropathy ([facility])    Weakness    Gout due to renal impairment, unspecified site    Hypertensive heart and chronic kidney disease with heart failure and stage 1 through stage 4 chronic kidney disease, or unspecified chronic kidney disease (HCC)    Hypertensive heart failure with end stage renal disease on dialysis (HCC)    Abnormal levels of other serum enzymes    Abnormality of albumin    Allergy, unspecified, initial encounter    Anaphylactic shock, unspecified, initial encounter    Anemia in other chronic diseases classified elsewhere    Bilateral bunions    Cardiac arrest, cause unspecified ([facility])    Chronic diastolic CHF (congestive heart failure) ([facility])    Coagulation defect, unspecified ([facility])    Cystocele, midline    Dependence on renal dialysis    Dysequilibrium    ESRD (end stage renal disease) on dialysis ([facility])    Adrenal adenoma, left    Acute kidney failure, unspecified    Anxiety disorder, unspecified    Generalized anxiety disorder    Obesity, unspecified    Chronic kidney disease, stage 4 (severe) ([facility])    Atherosclerosis of aorta    Atherosclerotic heart disease of native coronary artery without angina pectoris    CAD in native artery    Unspecified atherosclerosis    Type 2 diabetes mellitus with end-stage renal disease ([facility])    Shortness of breath    Gastro-esophageal reflux disease without esophagitis    Idiopathic gout, unspecified site    Major depressive disorder, recurrent, moderate ([facility])    Hypothyroidism, unspecified    Other disorders of electrolyte and fluid balance, not elsewhere classified    Thyrotoxicosis, unspecified without thyrotoxic crisis or storm    Non-ST elevation (NSTEMI) myocardial infarction ([facility])    Depression, unspecified    Hypertensive crisis,  unspecified    Other secondary hypertension    Pure hypercholesterolemia, unspecified    COVID    Weakness generalized    Elevated troponin    Hypomagnesemia    Hypocalcemia    Syncope and collapse    Prolonged Q-T interval on ECG    Diarrhea, unspecified type    Left arm weakness

## 2025-04-30 ENCOUNTER — TELEPHONE (OUTPATIENT)
Dept: NEPHROLOGY | Facility: CLINIC | Age: 70
End: 2025-04-30

## 2025-06-02 ENCOUNTER — APPOINTMENT (OUTPATIENT)
Dept: GENERAL RADIOLOGY | Facility: HOSPITAL | Age: 70
End: 2025-06-02
Attending: EMERGENCY MEDICINE
Payer: MEDICARE

## 2025-06-02 ENCOUNTER — HOSPITAL ENCOUNTER (INPATIENT)
Facility: HOSPITAL | Age: 70
LOS: 1 days | Discharge: HOME HEALTH | End: 2025-06-05
Attending: EMERGENCY MEDICINE | Admitting: HOSPITALIST
Payer: MEDICARE

## 2025-06-02 DIAGNOSIS — J81.0 ACUTE PULMONARY EDEMA (HCC): Primary | ICD-10-CM

## 2025-06-02 PROCEDURE — 71045 X-RAY EXAM CHEST 1 VIEW: CPT | Performed by: EMERGENCY MEDICINE

## 2025-06-03 ENCOUNTER — APPOINTMENT (OUTPATIENT)
Dept: CT IMAGING | Facility: HOSPITAL | Age: 70
End: 2025-06-03
Attending: EMERGENCY MEDICINE
Payer: MEDICARE

## 2025-06-03 PROBLEM — D63.1 ANEMIA DUE TO CHRONIC KIDNEY DISEASE, ON CHRONIC DIALYSIS (HCC): Status: ACTIVE | Noted: 2025-06-03

## 2025-06-03 PROBLEM — Z99.2 ANEMIA DUE TO CHRONIC KIDNEY DISEASE, ON CHRONIC DIALYSIS (HCC): Status: ACTIVE | Noted: 2025-06-03

## 2025-06-03 PROBLEM — J81.0 ACUTE PULMONARY EDEMA (HCC): Status: ACTIVE | Noted: 2025-06-03

## 2025-06-03 PROBLEM — I50.33 ACUTE ON CHRONIC HEART FAILURE WITH PRESERVED EJECTION FRACTION (HCC): Status: ACTIVE | Noted: 2025-06-03

## 2025-06-03 PROBLEM — N18.6 ANEMIA DUE TO CHRONIC KIDNEY DISEASE, ON CHRONIC DIALYSIS (HCC): Status: ACTIVE | Noted: 2025-06-03

## 2025-06-03 LAB
ADENOVIRUS PCR:: NOT DETECTED
ANION GAP SERPL CALC-SCNC: 9 MMOL/L (ref 0–18)
ATRIAL RATE: 107 BPM
B PARAPERT DNA SPEC QL NAA+PROBE: NOT DETECTED
B PERT DNA SPEC QL NAA+PROBE: NOT DETECTED
BASOPHILS # BLD AUTO: 0.07 X10(3) UL (ref 0–0.2)
BASOPHILS NFR BLD AUTO: 0.5 %
BUN BLD-MCNC: 39 MG/DL (ref 9–23)
BUN/CREAT SERPL: 9.6 (ref 10–20)
C PNEUM DNA SPEC QL NAA+PROBE: NOT DETECTED
CALCIUM BLD-MCNC: 8.5 MG/DL (ref 8.7–10.4)
CHLORIDE SERPL-SCNC: 104 MMOL/L (ref 98–112)
CO2 SERPL-SCNC: 27 MMOL/L (ref 21–32)
CORONAVIRUS 229E PCR:: NOT DETECTED
CORONAVIRUS HKU1 PCR:: NOT DETECTED
CORONAVIRUS NL63 PCR:: NOT DETECTED
CORONAVIRUS OC43 PCR:: NOT DETECTED
CREAT BLD-MCNC: 4.05 MG/DL (ref 0.55–1.02)
D DIMER PPP FEU-MCNC: 1.43 UG/ML FEU (ref ?–0.71)
DEPRECATED RDW RBC AUTO: 53.5 FL (ref 35.1–46.3)
EGFRCR SERPLBLD CKD-EPI 2021: 11 ML/MIN/1.73M2 (ref 60–?)
EOSINOPHIL # BLD AUTO: 0.22 X10(3) UL (ref 0–0.7)
EOSINOPHIL NFR BLD AUTO: 1.7 %
ERYTHROCYTE [DISTWIDTH] IN BLOOD BY AUTOMATED COUNT: 14.9 % (ref 11–15)
FLUAV + FLUBV RNA SPEC NAA+PROBE: NEGATIVE
FLUAV + FLUBV RNA SPEC NAA+PROBE: NEGATIVE
FLUAV RNA SPEC QL NAA+PROBE: NOT DETECTED
FLUBV RNA SPEC QL NAA+PROBE: NOT DETECTED
GLUCOSE BLD-MCNC: 169 MG/DL (ref 70–99)
GLUCOSE BLDC GLUCOMTR-MCNC: 109 MG/DL (ref 70–99)
GLUCOSE BLDC GLUCOMTR-MCNC: 144 MG/DL (ref 70–99)
GLUCOSE BLDC GLUCOMTR-MCNC: 176 MG/DL (ref 70–99)
GLUCOSE BLDC GLUCOMTR-MCNC: 281 MG/DL (ref 70–99)
GLUCOSE BLDC GLUCOMTR-MCNC: 98 MG/DL (ref 70–99)
HCT VFR BLD AUTO: 36.4 % (ref 35–48)
HGB BLD-MCNC: 11.9 G/DL (ref 12–16)
IMM GRANULOCYTES # BLD AUTO: 0.05 X10(3) UL (ref 0–1)
IMM GRANULOCYTES NFR BLD: 0.4 %
LYMPHOCYTES # BLD AUTO: 2.22 X10(3) UL (ref 1–4)
LYMPHOCYTES NFR BLD AUTO: 17.4 %
MCH RBC QN AUTO: 32.5 PG (ref 26–34)
MCHC RBC AUTO-ENTMCNC: 32.7 G/DL (ref 31–37)
MCV RBC AUTO: 99.5 FL (ref 80–100)
METAPNEUMOVIRUS PCR:: NOT DETECTED
MONOCYTES # BLD AUTO: 0.51 X10(3) UL (ref 0.1–1)
MONOCYTES NFR BLD AUTO: 4 %
MYCOPLASMA PNEUMONIA PCR:: NOT DETECTED
NEUTROPHILS # BLD AUTO: 9.66 X10 (3) UL (ref 1.5–7.7)
NEUTROPHILS # BLD AUTO: 9.66 X10(3) UL (ref 1.5–7.7)
NEUTROPHILS NFR BLD AUTO: 76 %
OSMOLALITY SERPL CALC.SUM OF ELEC: 303 MOSM/KG (ref 275–295)
P AXIS: 76 DEGREES
P-R INTERVAL: 130 MS
PARAINFLUENZA 1 PCR:: NOT DETECTED
PARAINFLUENZA 2 PCR:: NOT DETECTED
PARAINFLUENZA 3 PCR:: NOT DETECTED
PARAINFLUENZA 4 PCR:: NOT DETECTED
PLATELET # BLD AUTO: 284 10(3)UL (ref 150–450)
POTASSIUM SERPL-SCNC: 4.3 MMOL/L (ref 3.5–5.1)
PROCALCITONIN SERPL-MCNC: 0.14 NG/ML (ref ?–0.05)
Q-T INTERVAL: 356 MS
QRS DURATION: 80 MS
QTC CALCULATION (BEZET): 475 MS
R AXIS: 60 DEGREES
RBC # BLD AUTO: 3.66 X10(6)UL (ref 3.8–5.3)
RHINOVIRUS/ENTERO PCR:: DETECTED
RSV RNA SPEC NAA+PROBE: NEGATIVE
RSV RNA SPEC QL NAA+PROBE: NOT DETECTED
SARS-COV-2 RNA NPH QL NAA+NON-PROBE: NOT DETECTED
SARS-COV-2 RNA RESP QL NAA+PROBE: NOT DETECTED
SODIUM SERPL-SCNC: 140 MMOL/L (ref 136–145)
T AXIS: 74 DEGREES
TROPONIN I SERPL HS-MCNC: 32 NG/L (ref ?–34)
VENTRICULAR RATE: 107 BPM
WBC # BLD AUTO: 12.7 X10(3) UL (ref 4–11)

## 2025-06-03 PROCEDURE — 3E1M39Z IRRIGATION OF PERITONEAL CAVITY USING DIALYSATE, PERCUTANEOUS APPROACH: ICD-10-PCS | Performed by: INTERNAL MEDICINE

## 2025-06-03 PROCEDURE — 71260 CT THORAX DX C+: CPT | Performed by: EMERGENCY MEDICINE

## 2025-06-03 PROCEDURE — 90945 DIALYSIS ONE EVALUATION: CPT | Performed by: INTERNAL MEDICINE

## 2025-06-03 PROCEDURE — 99223 1ST HOSP IP/OBS HIGH 75: CPT | Performed by: INTERNAL MEDICINE

## 2025-06-03 RX ORDER — ALLOPURINOL 300 MG/1
150 TABLET ORAL DAILY
Status: DISCONTINUED | OUTPATIENT
Start: 2025-06-03 | End: 2025-06-05

## 2025-06-03 RX ORDER — DEXTROSE MONOHYDRATE 25 G/50ML
50 INJECTION, SOLUTION INTRAVENOUS
Status: DISCONTINUED | OUTPATIENT
Start: 2025-06-03 | End: 2025-06-05

## 2025-06-03 RX ORDER — BUPROPION HYDROCHLORIDE 150 MG/1
300 TABLET ORAL NIGHTLY
Status: DISCONTINUED | OUTPATIENT
Start: 2025-06-03 | End: 2025-06-05

## 2025-06-03 RX ORDER — NICOTINE POLACRILEX 4 MG
30 LOZENGE BUCCAL
Status: DISCONTINUED | OUTPATIENT
Start: 2025-06-03 | End: 2025-06-05

## 2025-06-03 RX ORDER — HYDRALAZINE HYDROCHLORIDE 20 MG/ML
10 INJECTION INTRAMUSCULAR; INTRAVENOUS EVERY 6 HOURS PRN
Status: DISCONTINUED | OUTPATIENT
Start: 2025-06-03 | End: 2025-06-05

## 2025-06-03 RX ORDER — HEPARIN SODIUM 5000 [USP'U]/ML
5000 INJECTION, SOLUTION INTRAVENOUS; SUBCUTANEOUS EVERY 8 HOURS SCHEDULED
Status: DISCONTINUED | OUTPATIENT
Start: 2025-06-03 | End: 2025-06-05

## 2025-06-03 RX ORDER — ASPIRIN 81 MG/1
81 TABLET, CHEWABLE ORAL DAILY
Status: DISCONTINUED | OUTPATIENT
Start: 2025-06-03 | End: 2025-06-05

## 2025-06-03 RX ORDER — BUMETANIDE 0.25 MG/ML
2 INJECTION, SOLUTION INTRAMUSCULAR; INTRAVENOUS ONCE
Status: COMPLETED | OUTPATIENT
Start: 2025-06-03 | End: 2025-06-03

## 2025-06-03 RX ORDER — DIAZEPAM 5 MG/1
5 TABLET ORAL 2 TIMES DAILY
Status: DISCONTINUED | OUTPATIENT
Start: 2025-06-03 | End: 2025-06-05

## 2025-06-03 RX ORDER — LEVOTHYROXINE SODIUM 125 UG/1
125 TABLET ORAL
Status: DISCONTINUED | OUTPATIENT
Start: 2025-06-03 | End: 2025-06-05

## 2025-06-03 RX ORDER — HYDRALAZINE HYDROCHLORIDE 25 MG/1
25 TABLET, FILM COATED ORAL 3 TIMES DAILY
Status: DISCONTINUED | OUTPATIENT
Start: 2025-06-03 | End: 2025-06-05

## 2025-06-03 RX ORDER — INSULIN DEGLUDEC 100 U/ML
20 INJECTION, SOLUTION SUBCUTANEOUS NIGHTLY
Status: DISCONTINUED | OUTPATIENT
Start: 2025-06-03 | End: 2025-06-05

## 2025-06-03 RX ORDER — ATOMOXETINE 40 MG/1
40 CAPSULE ORAL DAILY
Status: DISCONTINUED | OUTPATIENT
Start: 2025-06-03 | End: 2025-06-05

## 2025-06-03 RX ORDER — ATORVASTATIN CALCIUM 80 MG/1
80 TABLET, FILM COATED ORAL NIGHTLY
Status: DISCONTINUED | OUTPATIENT
Start: 2025-06-03 | End: 2025-06-05

## 2025-06-03 RX ORDER — PANTOPRAZOLE SODIUM 40 MG/1
40 TABLET, DELAYED RELEASE ORAL
Status: DISCONTINUED | OUTPATIENT
Start: 2025-06-03 | End: 2025-06-05

## 2025-06-03 RX ORDER — NICOTINE POLACRILEX 4 MG
15 LOZENGE BUCCAL
Status: DISCONTINUED | OUTPATIENT
Start: 2025-06-03 | End: 2025-06-05

## 2025-06-03 RX ORDER — ACETAMINOPHEN 500 MG
500 TABLET ORAL EVERY 4 HOURS PRN
Status: DISCONTINUED | OUTPATIENT
Start: 2025-06-03 | End: 2025-06-05

## 2025-06-03 RX ORDER — ONDANSETRON 2 MG/ML
4 INJECTION INTRAMUSCULAR; INTRAVENOUS EVERY 6 HOURS PRN
Status: DISCONTINUED | OUTPATIENT
Start: 2025-06-03 | End: 2025-06-05

## 2025-06-03 RX ORDER — METOCLOPRAMIDE HYDROCHLORIDE 5 MG/ML
5 INJECTION INTRAMUSCULAR; INTRAVENOUS EVERY 8 HOURS PRN
Status: DISCONTINUED | OUTPATIENT
Start: 2025-06-03 | End: 2025-06-05

## 2025-06-03 RX ORDER — SERTRALINE HYDROCHLORIDE 100 MG/1
200 TABLET, FILM COATED ORAL NIGHTLY
Status: DISCONTINUED | OUTPATIENT
Start: 2025-06-03 | End: 2025-06-05

## 2025-06-03 RX ORDER — DEXTROSE MONOHYDRATE, SODIUM CHLORIDE, SODIUM LACTATE, CALCIUM CHLORIDE, MAGNESIUM CHLORIDE 2.5; 538; 448; 18.4; 5.08 G/100ML; MG/100ML; MG/100ML; MG/100ML; MG/100ML
5000 SOLUTION INTRAPERITONEAL
Status: DISCONTINUED | OUTPATIENT
Start: 2025-06-03 | End: 2025-06-05

## 2025-06-03 RX ORDER — FUROSEMIDE 10 MG/ML
40 INJECTION INTRAMUSCULAR; INTRAVENOUS ONCE
Status: COMPLETED | OUTPATIENT
Start: 2025-06-03 | End: 2025-06-03

## 2025-06-03 NOTE — ED QUICK NOTES
Rounding Completed  Pt reports no needs at this time.   Bed is locked and in lowest position. Call light within reach.

## 2025-06-03 NOTE — DISCHARGE INSTRUCTIONS
Home Health:   [facility]  [address] [place] [address]  Phone: [phone]  Fax: [phone]    Will need follow up CT Chest- Dr Sandoval to order   1) Abnl CT chest  --CT chest without PE, noted chf, multifocal groundglass density opacifications and groundglass nodules throughout lungs  - Will need follow-up imaging as outpatient

## 2025-06-03 NOTE — RESPIRATORY THERAPY NOTE
Pt has Dx of STEFFEN and does not use CPAP at home. Pt has opted not to use CPAP during this admission.

## 2025-06-03 NOTE — OCCUPATIONAL THERAPY NOTE
OCCUPATIONAL THERAPY EVALUATION - INPATIENT     Room Number: 317/317-A  Evaluation Date: 6/3/2025  Type of Evaluation: Initial  Presenting Problem: Acute pulmonary edema    Physician Order: IP Consult to Occupational Therapy  Reason for Therapy: ADL/IADL Dysfunction and Discharge Planning    OCCUPATIONAL THERAPY ASSESSMENT   RN cleared pt for participation in OT session, which was completed in collaboration with PT. Upon arrival, pt was supine in bed and agreeable to activity. Pt was left supine in bed and alarm was activated. Call light and all needs left in reach. Handoff given to RN.    Patient is a 69 year old female admitted 6/2/2025 for SOB, wheezing, left sided chest pain.  Prior to admission, pt was living in supported living, SBA for bathing and I for all other ADLs; reports using RW for mobility.  Patient is currently requiring up to max A for ADLs overall along with SBA for supine to sit, SBA for sitting at EOB, sit to supine SBA. Eval limited by pt vitals (/117 in sitting). Pt has the following impairments: decreased endurance, decreased muscular endurance, medical status, and increased O2 needs from baseline.    ACTIVITY TOLERANCE  BP: (!) 213/117 (205/113 laying)  BP Location: Right arm  BP Method: Automatic  Patient Position: Sitting  Oxygen Therapy  At rest oxygen flow (liters per minute): 2.5    Education provided  Educated pt about role of OT and hospital therapy process as well as proper safety techniques including proper hand placement, body mechanics, safety techniques. Evaluation limited by pt vitals (/117) Pt/family verbalized/demonstrated good carryover.    Patient will benefit from continued skilled OT Services at discharge to promote prior level of function.  Anticipate patient will return home with home health OT    PLAN  OT Treatment Plan: Balance activities;Energy conservation/work simplification techniques;Continued evaluation;Compensatory technique education;Fine motor  coordination activities;Neuromuscluar reeducation;Equipment eval/education;Patient/Family training;Patient/Family education;Cognitive reorientation;Endurance training;UE strengthening/ROM;Functional transfer training;Visual perceptual training;IADL training;ADL training      OCCUPATIONAL THERAPY MEDICAL/SOCIAL HISTORY     Problem List  Principal Problem:    Acute pulmonary edema (HCC)  Active Problems:    Acute on chronic heart failure with preserved ejection fraction (HCC)    Anemia due to chronic kidney disease, on chronic dialysis (HCC)    Past Medical History  Past Medical History[1]    Past Surgical History  Past Surgical History[2]    HOME SITUATION  Type of Home: Assisted living facility  Home Layout: Elevator  Lives With: Staff 24 hours, Alone  Shower/Tub and Equipment: Shower chair  Patient Regularly Uses: Rolling walker, Glasses    SUBJECTIVE  \"I feel fine\"    OCCUPATIONAL THERAPY EXAMINATION      OBJECTIVE  Precautions: None  Fall Risk: Standard fall risk    COGNITION  Alert and oriented X4    RANGE OF MOTION   Upper extremity ROM is within functional limits     STRENGTH ASSESSMENT  Upper extremity strength is within functional limits     COORDINATION  Gross Motor: WFL   Fine Motor: WFL     ACTIVITIES OF DAILY LIVING ASSESSMENT  AM-PAC ‘6-Clicks’ Inpatient Daily Activity Short Form  How much help from another person does the patient currently need…  -   Putting on and taking off regular lower body clothing?: A Lot  -   Bathing (including washing, rinsing, drying)?: A Little  -   Toileting, which includes using toilet, bedpan or urinal? : A Little  -   Putting on and taking off regular upper body clothing?: A Little  -   Taking care of personal grooming such as brushing teeth?: A Little  -   Eating meals?: None    AM-PAC Score:  Score: 18  Approx Degree of Impairment: 46.65%  Standardized Score (AM-PAC Scale): 38.66  CMS Modifier (G-Code): CK    FUNCTIONAL TRANSFER ASSESSMENT  Supine to Sit : Stand-by  Assist  Sit to supine: SBA    FUNCTIONAL ADL ASSESSMENT  Feeding: I    Grooming: set up A seated     Bathing: min A for BLE including feet    UB dressing: min A for retrieval and adjusting    LB dressing: mod A for retrieval, socks, and shoes    Toileting: SBA for clothing management     The patient's Approx Degree of Impairment: 46.65% has been calculated based on documentation in the [facility] '6 clicks' Inpatient Daily Activity Short Form.  Research supports that patients with this level of impairment may benefit from home health. Final disposition will be made by interdisciplinary medical team.    OT Goals  Patients self stated goal is: nonne stated     Patient will complete functional transfer with SBA  Comment:     Patient will complete LB dressing task with min A  Comment:     Patient will tolerate standing for 2 minutes in prep for adls with SBA  Comment:          Goals  on: 25  Frequency: 3-5x/week    Patient Evaluation Complexity Level:   Occupational Profile/Medical History MODERATE - Expanded review of history including review of medical or therapy record   Specific performance deficits impacting engagement in ADL/IADL MODERATE  3 - 5 performance deficits   Client Assessment/Performance Deficits MODERATE - Comorbidities and min to mod modifications of tasks    Clinical Decision Making MODERATE - Analysis of occupational profile, detailed assessments, several treatment options    Overall Complexity MODERATE     OT Session Time: 10 minutes  Evaluation: 10 minutes    LETICIA Fisher  [facility]  Inpatient Rehabilitation  Occupational Therapy  [phone]         [1]   Past Medical History:   Adrenal adenoma    left adrenoma    Anxiety    Cataract    Chronic kidney disease (CKD), stage III (moderate) (HCC)    CORONARY ARTERY DISEASE    Coronary atherosclerosis    Depression    DIABETES    Dialysis patient    Diarrhea    Disorder of thyroid    Diverticulosis of  colon    Esophageal reflux    Fainting episodes    GERD    GOUT    Heart attack (HCC)    High blood pressure    High cholesterol    History of adverse reaction to anesthesia    delusions/hallucinations for a few days after knee replacement    History of blood transfusion    Quadruple Bypass    History of electroconvulsive therapy    HYPERTENSION    Hypothyroid    MENOPAUSE    Osteoarthritis    Other and unspecified hyperlipidemia    Personal history of other medical treatment    Transcranial Magnetic Stimulation (TMS)    Pneumonia, organism unspecified(486)    Polyp of colon    Renal artery stenosis    right kidney    Renal disorder    Type II or unspecified type diabetes mellitus without mention of complication, not stated as uncontrolled    Unspecified essential hypertension    Unspecified sleep apnea    PSG Merit  AHI 70, auto-pap     Visual impairment   [2]   Past Surgical History:  Procedure Laterality Date    Angioplasty (coronary)  3/2015    stents placed in heart    Cabg  2011    CABGx4 vessel    Cath angio  2023    Cath bare metal stent (bms)      Cath bare metal stent (bms)  2023    2 stents    Cath percutaneous  transluminal coronary angioplasty  3/3/2015    Cath percutaneous  transluminal coronary angioplasty Right 2017    right Coronary artery, OSMIN    Colonoscopy      Hc plmt coronary drug eluting stent ea addl  3/5/2015    Histopathology report  13    cecal polyps - 1 tubular adenoma    Impact tooth rem bony w/comp Bilateral 1973    wisdom teeth    Knee replacement surgery Bilateral 3/24/16    right TKA L TKA not at the same time    Left heart cath,percutaneous  2011    CAD    Left heart cath,percutaneous  3/2/2015          Renal angio, cardiac cath  2011    Sleep study, attended  12/10/2012    Tonsillectomy Bilateral

## 2025-06-03 NOTE — CONSULTS
Cardiology Consultation  [facility]    Cassy Patterson Patient Status:  Observation    1955 MRN [id number]   Location [facility] 3W/SW Attending Leilani Wilson MD   Hosp Day # 0 PCP Ambreen Sandoval MD     Reason for Consultation:  HFpEF    History of Present Illness:  Cassy Patterson is a a(n) 69 year old female with pmh HFpEF (EF 55-60% 2025), CAD with prior CABG and PCI, HTN, HL, DM, ESRD on PD presenting with dyspnea and wheezing.  Patient reports one day h/o wheezing and inability to take deep breath.  Notes occasional chest discomfort, none now.  Denies palpitations, orthopnea, PND or LE edema.  Denies fevers/chills.  Lives in group home.  Prior smoker, father with h/o heart disease.    On admission patient afeb, , /94.  proBNP >35k, trop negative.  CT chest negative for PE but with findings consistent with CHF and GGO.  Cardiology consulted for further eval and management.    History:  Past Medical History[1]  Past Surgical History[2]  Family History[3]   reports that she quit smoking about 10 years ago. Her smoking use included cigarettes. She started smoking about 45 years ago. She has a 35 pack-year smoking history. She has never used smokeless tobacco. She reports current alcohol use of about 1.0 standard drink of alcohol per week. She reports that she does not currently use drugs.    Allergies:  Allergies[4]    Medications:  Medications Ordered Prior to Encounter[5]    Review of Systems:  Constitutional: denies fevers, chills, night sweats  HEENT: denies headache, vision changes, trouble or pain with swallowing  Cardiac: denies chest pain, palpitations, edema  Pulm: +wheeze, dyspnea  GI: denies n/v, abd pain, diarrhea or constipation  : denies hematuria, dysuria, incontinence  MSK: denies muscle or joint pains  Neuro: denies numbness, weakness, paresthesias  Psych: denies anxiety, depression  Integument: denies skin rashes or lesions  Heme: denies easy bruising  or bleeding  Endo: denies heat/cold intolerance, skin or nail changes      Physical Exam:  Blood pressure (!) 180/96, pulse 85, temperature 99 °F (37.2 °C), temperature source Oral, resp. rate 19, height 5' 4\" (1.626 m), weight 182 lb (82.6 kg), SpO2 92%, not currently breastfeeding.  Wt Readings from Last 3 Encounters:   06/03/25 182 lb (82.6 kg)   04/11/25 172 lb 14.4 oz (78.4 kg)   03/10/25 175 lb 4.8 oz (79.5 kg)       General: awake, alert, oriented x 3, no acute distress  HEENT: at/nc, perrl, eomi  Neck: No JVD, carotids 2+ no bruits.  Cardiac: Regular rate and rhythm, S1, S2 normal, no murmur, rub or gallop.  Lungs: Clear without wheezes, rales, rhonchi or dullness.  Normal excursions and effort.  Abdomen: Soft, non-tender, non-distended, normal bowel sounds   Extremities: Without clubbing, cyanosis or edema.  Peripheral pulses are 2+.  Neurologic: Alert and oriented, normal affect.  Psych: normal mood and affect  Skin: Warm and dry.       Laboratories and Data:  Diagnostics:  EKG: ST,     TTE 4/2025:  1. Left ventricle: The cavity size was normal. Wall thickness was mildly to      moderately increased. Systolic function was normal. The estimated      ejection fraction was 55-60%, by biplane method of disks. No diagnostic      evidence for regional wall motion abnormalities. Left ventricular      diastolic function parameters were normal.   2. Right atrium: The estimated central venous pressure is 3mm Hg.   3. Aortic valve: There was thickening, consistent with sclerosis.     Labs:   CBC:    Lab Results   Component Value Date    WBC 12.7 (H) 06/02/2025    WBC 9.4 04/11/2025    WBC 11.3 (H) 04/10/2025     Lab Results   Component Value Date    HEMOGLOBIN 14.6 06/07/2014    HEMOGLOBIN 12.5 07/25/2011    HGB 11.9 (L) 06/02/2025    HGB 10.4 (L) 04/11/2025    HGB 11.1 (L) 04/10/2025      Lab Results   Component Value Date    .0 06/02/2025    .0 04/11/2025    .0 04/10/2025     BMP:     Lab  Results   Component Value Date    GLUCOSE 183 (H) 08/27/2015    GLUCOSE 156 (H) 06/07/2014    GLUCOSE 232 (H) 01/12/2013     Lab Results   Component Value Date    K 4.3 06/02/2025    K 3.3 (L) 04/11/2025    K 3.6 04/10/2025     Lab Results   Component Value Date    BUN 39 (H) 06/02/2025    BUN 37 (H) 04/11/2025    BUN 34 (H) 04/10/2025     Lab Results   Component Value Date    CREATSERUM 4.05 (H) 06/02/2025    CREATSERUM 4.46 (H) 04/11/2025    CREATSERUM 4.47 (H) 04/10/2025     Cholesterol:     Lab Results   Component Value Date    CHOLEST 203 (H) 03/07/2025    CHOLEST 184 09/18/2023    CHOLEST 221.00 (H) 07/29/2021     Lab Results   Component Value Date    HDL 28 (L) 03/07/2025    HDL 57 09/18/2023    HDL 36 (L) 07/29/2021     Lab Results   Component Value Date    TRIG 377 (H) 03/07/2025    TRIG 131 09/18/2023    TRIG 387.00 (H) 07/29/2021    TRIGLY 529 (H) 06/07/2014    TRIGLY 465 (H) 01/12/2013    TRIGLY 608 (HH) 09/24/2012     Lab Results   Component Value Date     (H) 03/07/2025     (H) 09/18/2023     07/29/2021     Lab Results   Component Value Date    AST 18 04/09/2025    AST 22 03/07/2025    AST 12 03/04/2025     Lab Results   Component Value Date    ALT 9 (L) 04/09/2025    ALT 13 03/07/2025    ALT 8 (L) 03/04/2025       Assessment/Plan:  69 year old female presenting with:    1) Acute hypoxemic respiratory failure, r/o URI  2) HFpEF, acute on chronic (EF 55-60% 4/2025)  3) CAD with prior CABG and PCI  4) HTN with orthostatic hypotension  5) HL  6) DM  7) ESRD on PD    - CT chest on admission with GGO and findings consistent with CHF; trop negative, ECG with ST without ischemia or arrhythmia  - pro BNP >35k  - COVID/flu panel negative, expanded RVP pending  - Cont asa, statin, bb, hydralazine  - PD as per nephrology  - Wean O2 as tolerated  - Monitor on tele  - Will follow    Earl Moore MD  Interventional cardiologist, [facility]  6/3/2025  12:23 PM         [1]   Past Medical  History:   Adrenal adenoma    left adrenoma    Anxiety    Cataract    Chronic kidney disease (CKD), stage III (moderate) (HCC)    CORONARY ARTERY DISEASE    Coronary atherosclerosis    Depression    DIABETES    Dialysis patient    Diarrhea    Disorder of thyroid    Diverticulosis of colon    Esophageal reflux    Fainting episodes    GERD    GOUT    Heart attack (HCC)    High blood pressure    High cholesterol    History of adverse reaction to anesthesia    delusions/hallucinations for a few days after knee replacement    History of blood transfusion    Quadruple Bypass    History of electroconvulsive therapy    HYPERTENSION    Hypothyroid    MENOPAUSE    Osteoarthritis    Other and unspecified hyperlipidemia    Personal history of other medical treatment    Transcranial Magnetic Stimulation (TMS)    Pneumonia, organism unspecified(486)    Polyp of colon    Renal artery stenosis    right kidney    Renal disorder    Type II or unspecified type diabetes mellitus without mention of complication, not stated as uncontrolled    Unspecified essential hypertension    Unspecified sleep apnea    PSG Merit 2012 AHI 70, auto-pap     Visual impairment   [2]   Past Surgical History:  Procedure Laterality Date    Angioplasty (coronary)  3/2015    stents placed in heart    Cabg  6/1/2011    CABGx4 vessel    Cath angio  09/21/2023    Cath bare metal stent (bms)      Cath bare metal stent (bms)  09/2023    2 stents    Cath percutaneous  transluminal coronary angioplasty  3/3/2015    Cath percutaneous  transluminal coronary angioplasty Right 09/19/2017    right Coronary artery, OSMIN    Colonoscopy      Hc plmt coronary drug eluting stent ea addl  3/5/2015    Histopathology report  12/18/13    cecal polyps - 1 tubular adenoma    Impact tooth rem bony w/comp Bilateral 1973    wisdom teeth    Knee replacement surgery Bilateral 3/24/16    right TKA L TKA not at the same time    Left heart cath,percutaneous  5/31/2011    CAD    Left heart  cath,percutaneous  3/2/2015          Renal angio, cardiac cath  2011    Sleep study, attended  12/10/2012    Tonsillectomy Bilateral 1958   [3]   Family History  Problem Relation Age of Onset    Cancer Father         prostate, liver    Hypertension Father     Obesity Father     Breast Cancer Father 70        age at dx 70    Heart Surgery Mother         Mitral valve replaced    Heart Disorder Mother         CHF    Hypertension Mother     Arthritis Mother         TKA    Breast Cancer Mother     Stroke Maternal Grandmother     Stroke Maternal Grandfather     Breast Cancer Paternal Grandmother 78        age at dx 78    Cancer Paternal Grandfather         lung    Obesity Brother     Diabetes Brother     Alcohol and Other Disorders Associated Son     Substance Abuse Son     Obesity Sister     Diabetes Sister     Traumatic brain injury Brother    [4]   Allergies  Allergen Reactions    Clopidogrel RASH    Sulfa Antibiotics HIVES and UNKNOWN    Tramadol ANXIETY, FEVER and OTHER (SEE COMMENTS)     Serotonin syndrome to scheduled tramadol in setting of use of SSRI    Serotonin Reuptake Inhibitors UNKNOWN   [5]   Current Facility-Administered Medications on File Prior to Encounter   Medication Dose Route Frequency Provider Last Rate Last Admin    [COMPLETED] Perflutren Lipid Microsphere (DEFINITY) 6.52 MG/ML injection 1.5 mL  1.5 mL Intravenous ONCE PRN Long, Derian, DO   1.5 mL at 04/10/25 1544    [COMPLETED] dextrose 1.5%-calcium 2.5 mEq/L peritoneal solution  2,000 mL Intraperitoneal Once in dialysis Bari Noe MD   2,000 mL at 04/10/25 2130    [COMPLETED] dextrose 1.5%-calcium 2.5 mEq/L peritoneal solution  2,000 mL Intraperitoneal Once in dialysis Bari Noe MD   2,000 mL at 04/10/25 2130    [COMPLETED] magnesium sulfate in sterile water for injection 2 g/50mL IVPB premix 2 g  2 g Intravenous Once Bari Noe MD   Stopped at 25 0520    [COMPLETED] sodium chloride 0.9 % IV bolus 500 mL   500 mL Intravenous Once Afshan Glaser MD   Stopped at 25 2308    [COMPLETED] calcium carbonate (Tums) chewable tab 1,000 mg  1,000 mg Oral Once Veronica Shabazz MD   1,000 mg at 03/10/25 1443    [COMPLETED] dextrose 1.5%-calcium 2.5 mEq/L peritoneal solution  5,000 mL Intraperitoneal Once in dialysis Christopher White MD   5,000 mL at 25    [] sodium chloride 0.9% infusion   Intravenous Continuous Christopher White MD   Stopped at 25    [COMPLETED] potassium chloride (Klor-Con M20) tab 40 mEq  40 mEq Oral Once Christopher White MD   40 mEq at 25 0954    [COMPLETED] potassium chloride (Klor-Con M20) tab 20 mEq  20 mEq Oral Once Christopher White MD   20 mEq at 25 1432    [COMPLETED] magnesium oxide (Mag-Ox) tab 400 mg  400 mg Oral Once Christopher White MD   400 mg at 25 1853     Current Outpatient Medications on File Prior to Encounter   Medication Sig Dispense Refill    atomoxetine 40 MG Oral Cap Take 1 capsule (40 mg total) by mouth in the morning. 30 capsule 3    sertraline 100 MG Oral Tab Take 2 tablets (200 mg total) by mouth at bedtime.      buPROPion  MG Oral Tablet 24 Hr Take 1 tablet (300 mg total) by mouth nightly.      Psyllium (METAMUCIL) 48.57 % Oral Powder Take as daily as directed on bottle      aspirin 81 MG Oral Chew Tab Chew 1 tablet (81 mg total) by mouth daily.      diazePAM 5 MG Oral Tab Take 1 tablet (5 mg total) by mouth 2 (two) times daily. 60 tablet 2    Potassium Chloride ER 20 MEQ Oral Tab CR Take 1 tablet by mouth in the morning.      Insulin Glargine, 1 Unit Dial, (TOUJEO SOLOSTAR) 300 UNIT/ML Subcutaneous Solution Pen-injector Inject 22 Units into the skin every evening.      atorvastatin 80 MG Oral Tab Take 1 tablet (80 mg total) by mouth nightly.      carvedilol 12.5 MG Oral Tab Take 3 tablets (37.5 mg total) by mouth in the morning and 3 tablets (37.5 mg total) in the evening. Take with meals.      allopurinol 300 MG Oral Tab  TAKE ONE-HALF (1/2) TABLET DAILY 45 tablet 4    Levothyroxine Sodium 125 MCG Oral Tab Take 1 tablet (125 mcg total) by mouth before breakfast. 30 tablet 11    Pantoprazole Sodium 40 MG Oral Tab EC TAKE 1 TABLET EVERY MORNING BEFORE BREAKFAST 90 tablet 3    hydrALAZINE HCl 100 MG Oral Tab Take 25 mg by mouth in the morning and 25 mg in the evening and 25 mg before bedtime. Take tid, hold afternoon hydralazine if sbp <150 but usually does not take.      Insulin Pen Needle (BD PEN NEEDLE ORIGINAL U/F) 29G X 12.7MM Does not apply Misc USE AS DIRECTED DAILY 100 each 3    ERICKSON CONTOUR NEXT TEST In Vitro Strip TEST BLOOD GLUCOSE THREE TIMES DAILY 300 strip 4

## 2025-06-03 NOTE — ED QUICK NOTES
Pt back from CT scan.   Pt provided with warm blankets, pt reports no other needs at this time.   Pt endorses she does not feel short of breath anymore. Call light in reach, bed locked and in lowest position.

## 2025-06-03 NOTE — ED QUICK NOTES
Pt provided with warm blankets per request, pt reports no other needs at this time.  Call light in reach, bed locked and in lowest position.

## 2025-06-03 NOTE — H&P
[facility]   Hospitalist Team  History and Physical  Admit Date:  6/2/2025    Is this a shared or split note between Advanced Practice Provider and Physician? Yes    ASSESSMENT / PLAN:   This is a 69-year-old female with a complicated medical history including ESRD on peritoneal dialysis since 1/2024, diabetes, coronary artery disease, depression, orthostatic hypotension, previous syncopal,  events chronic diastolic CHF, COVID 3/2025 who presents with shortness of breath, wheezing and chest pain     Acute Hypoxemic Respiratory Failure   -low grade temp. WBC 12.7. PCT ordered   -pro bnp >35k  -CT chest without PE, noted chf, multifocal groundglass density opacifications and groundglass nodules throughout lungs  -negative for covid, influenza and RSV, full RVP pending  -see management of chf below   -follow up outpt CT chest as outpt for resolution   Wean o2    Acute on Chronic Diastolic CHF  -4/2025 echo with EF 55-60%  -pro bnp >35k  -CT chest without PE, noted chf, multifocal groundglass density opacifications and groundglass nodules throughout lungs  -s/p lasix in ER and dose of bumex this am    -Difficult to manage her blood pressure in light of her neurogenic orthostatic hypotension and supine hypertension-see below  -PD per Renal     Chest Pain  CAD S/P Stents  -4/2025 echo with EF 55-60%  -CT with multivessel cad  -troponin negative   -EKG with ST, no ischemic changes   -cards consult     Cirrhosis-noted on imagine   Ascites  -CT with moderate ascites       Chronic Neurogenic orthostatic hypotension and Supine HTN  -noted blood pressure ~140 up to 212  -continue coreg and hydralazine 25 mg tid, hold if sbp less than 150  -home atomextine increased to 40 mg daily   -encouraged po intake and to prevent dehydration  -rec support stocking, slow position changes and PT  -Monitor home blood pressures and call with abnormally low or high readings  -follow up with Dr Rudolph MCCORMICK Type II  -HgbA1C  7.3  -continue home regimen: toujeo 22 units nightly  -tresiba 20 units nightly plus ssi prn  -accuchecks  -ADA diet     Cirrhosis-noted on imagine   Ascites  -CT with moderate ascites     Hypothyroidism  - Continue Synthroid     Depression  -Per patient mick is feeling better and attributes this to her improved health with lack of passing out and getting physical therapy on a consistent basis  - Continue home Wellbutrin and Zoloft  - Follows with psychiatrist and therapist    Intermittent Diarrhea  -continue metamucil      GO  -full code  -resides at [facility]     MA/ACO Reach  -ER Visits 2025:3  -Admissions 2025:3  -Discharge Needs: HHC [facility]   -Appointments: [ ] PCP Ambreen Sandoval MD     FEN  -lytes in am  -diet-cardiac     Prophy  -SCD  -heparin    Dispo  -EDoD ~6/4  PCP: Ambreen Sandoval MD       Outpatient records or previous hospital records reviewed.   Further recommendations pending patient's clinical course.  [facility] hospitalist  team to continue to follow patient while in house  Concerns regarding plan of care were discussed with patient. Patient agrees with plan as detailed above. Discussed plan of care with Dr. Wilson     Note: This chart was prepared using voice recognition software and may contain unintended word substitution errors.     Alison Kaiser RN, NP  [facility] Hospitalist Team   Available via Perfect Serve or Bubble Chat (check Availability)    6/3/2025      HISTORY:     CC:   Chief Complaint   Patient presents with    Shortness Of Breath        PCP: Ambreen Sandoval MD    History of Present Illness:     Patient states that she developed shortness of breath and wheezing around 730 last night that was intermittent.  She thought that her breathing might have been worse with exertion.  She also did feel some chest pain within the center of her chest.  She states that she had to cut her peritoneal dialysis short due to her symptoms.  She states that they recently changed her PD fluid  about 1 week ago based on her blood pressure readings.  She continues to have issues with supine hypertension but no reported episodes of syncope or dizziness with changes in her blood pressure medications.  She continues to have a physical therapist work with her twice a week for mobility.  She states overall that cognitively she is feeling much improved from a mental health perspective with decreased passing out episodes and dizziness.    On arrival to the emergency room she was found to have sinus tachycardia with noted CHF on CT scan.  She remains markedly hypertensive.  She currently on O2 3 L/min.  Patient was able to walk around the unit on oxygen but indicated that her breathing was labored which was noticeable causing her to not be able to fully engage in a conversation.  After resting her breathing improved and she was able to continue the remainder of her walk.  She denies lightheadedness dizziness nausea vomiting fever or chills.      Review of Systems  12 point systems reviewed, please see HPI for pertinent positives, otherwise negative    OBJECTIVE:  BP (!) 180/96 (BP Location: Left arm)   Pulse 85   Temp 99 °F (37.2 °C) (Oral)   Resp 19   Ht 5' 4\" (1.626 m)   Wt 182 lb (82.6 kg)   SpO2 92%   BMI 31.24 kg/m²     GENERAL: no apparent distress, overweight  NEUROLOGIC: A/A; Ox3  RESPIRATORY: normal expansion; no wheezing   CARDIOVASCULAR: regular   ABDOMEN:  Soft, BS+; non distended, non tender    EXTREMITIES: no LE edema    PMH  Past Medical History[1]     PSH  Past Surgical History[2]     ALL:  Allergies[3]     Home Medications:  Medications Taking[4]    Soc Hx  Social History     Tobacco Use    Smoking status: Former     Current packs/day: 0.00     Average packs/day: 1 pack/day for 35.0 years (35.0 ttl pk-yrs)     Types: Cigarettes     Start date: 9/23/1979     Quit date: 9/23/2014     Years since quitting: 10.7    Smokeless tobacco: Never   Substance Use Topics    Alcohol use: Yes      Alcohol/week: 1.0 standard drink of alcohol     Types: 1 Glasses of wine per week     Comment: occasionally        Fam Hx  Family History[5]      DIAGNOSTIC DATA:   CBC/Chem  Recent Labs   Lab 06/02/25  2323   WBC 12.7*   HGB 11.9*   MCV 99.5   .0       Recent Labs   Lab 06/02/25  2348      K 4.3      CO2 27.0   BUN 39*   CREATSERUM 4.05*   *   CA 8.5*       Radiology: CT CHEST PE AORTA (IV ONLY) (CPT=71260)  Result Date: 6/3/2025  CONCLUSION:  1. No evidence of acute pulmonary embolism. 2. Findings of congestive failure/fluid overload with trace bilateral pleural effusions and diffuse interstitial edema throughout both lungs.  These are new since recent prior April, 2025 abdominal CT.  There is also moderate volume upper abdominal ascites. 3. Multifocal ground-glass density opacities and scattered subcentimeter ground-glass density nodules throughout both lungs, which are also new since prior.  These are favored to represent pulmonary edema or less likely relate to coexistent atypical/viral infection.  Follow-up to resolution is advised. 4. Cardiomegaly with multi-vessel coronary artery and mitral annular calcification in addition to coronary artery bypass. 5. Dilatation of the main pulmonary artery trunk may relate to underlying pulmonary hypertension. 6. Small retrocardiac hiatal hernia. 7. Cholelithiasis. 8. Lesser incidental findings as above.   A preliminary report was issued by the [facility] teleradiology service. There are no major discrepancies.  elm-remote  Dictated by (CST): Saleem Garces MD on 6/03/2025 at 9:22 AM     Finalized by (CST): Saleem Garces MD on 6/03/2025 at 9:32 AM          XR CHEST AP PORTABLE  (CPT=71045)  Result Date: 6/3/2025  CONCLUSION:   Mild diffuse interstitial opacities throughout both lungs, which are new since prior exam from April, 2025 and most concerning for interstitial/pulmonary edema.  Less likely that these relate to atypical/viral  infection.  No large pleural effusion.  Sternotomy with coronary artery bypass.   A preliminary report was issued by the [facility] teleradiology service. There are no major discrepancies.  elm-remote  Dictated by (CST): Saleem Garces MD on 6/03/2025 at 8:19 AM     Finalized by (CST): Saleem Garces MD on 6/03/2025 at 8:21 AM              SEE ATTENDING NOTE BELOW      Patient seen and examined independently.  Discussed with APN and agree with note above.    HPI:   This is a 69-year-old female with a complicated medical history including ESRD on peritoneal dialysis since 1/2024, diabetes, coronary artery disease, depression, orthostatic hypotension, previous syncopal,  events chronic diastolic CHF, COVID 3/2025 who presents with shortness of breath, wheezing and chest pain.  Symptoms appear improved now.  CT of chest negative for PE.  Still with good urine output, patient is on peritoneal dialysis and seen by nephrology.  Later found to be rhinovirus positive which likely contributes to acute symptoms.  Supportive measures.    Patient denies any current chest pain.  She did have earlier.  No nausea vomiting diarrhea.  No fevers or       Objective:  BP (!) 198/97 (BP Location: Right arm)   Pulse 97   Temp (!) 101.3 °F (38.5 °C) (Oral)   Resp 17   Ht 5' 4\" (1.626 m)   Wt 182 lb (82.6 kg)   SpO2 98%   BMI 31.24 kg/m²      Gen: No acute distress, alert and oriented x3  Pulm: Lungs clear, normal respiratory effort, No wheezing or crackles  CV: Heart with regular rate and rhythm, No murmurs, rubs, gallops  Abd: Abdomen soft, PD catheter  MSK:  no clubbing, no cyanosis.  No Lower extremity edema  Skin: no rashes or lesions, well perfused  Psych: mood stable, cooperative  Neuro: no focal deficits    Assessment and Plan  This is a 69-year-old female with a complicated medical history including ESRD on peritoneal dialysis since 1/2024, diabetes, coronary artery disease, depression, orthostatic hypotension,  previous syncopal,  events chronic diastolic CHF, COVID 3/2025 who presents with shortness of breath, wheezing and chest pain     Acute Hypoxemic Respiratory Failure   -low grade temp. WBC 12.7. PCT ordered   -pro bnp >35k  -CT chest without PE, noted chf, multifocal groundglass density opacifications and groundglass nodules throughout lungs  -negative for covid, influenza and RSV, full RVP pending-> later found to be rhinovirus positive  -see management of chf below   -follow up outpt CT chest as outpt for resolution   Wean o2     Acute on Chronic Diastolic CHF  -4/2025 echo with EF 55-60%  -pro bnp >35k  -CT chest without PE, noted chf, multifocal groundglass density opacifications and groundglass nodules throughout lungs  -s/p lasix in ER and dose of bumex this am    -Difficult to manage her blood pressure in light of her neurogenic orthostatic hypotension and supine hypertension-see below  -PD per Renal     Rest as above with above    Leilani Wilson MD          [1]   Past Medical History:   Adrenal adenoma    left adrenoma    Anxiety    Cataract    Chronic kidney disease (CKD), stage III (moderate) (HCC)    CORONARY ARTERY DISEASE    Coronary atherosclerosis    Depression    DIABETES    Dialysis patient    Diarrhea    Disorder of thyroid    Diverticulosis of colon    Esophageal reflux    Fainting episodes    GERD    GOUT    Heart attack (HCC)    High blood pressure    High cholesterol    History of adverse reaction to anesthesia    delusions/hallucinations for a few days after knee replacement    History of blood transfusion    Quadruple Bypass    History of electroconvulsive therapy    HYPERTENSION    Hypothyroid    MENOPAUSE    Osteoarthritis    Other and unspecified hyperlipidemia    Personal history of other medical treatment    Transcranial Magnetic Stimulation (TMS)    Pneumonia, organism unspecified(486)    Polyp of colon    Renal artery stenosis    right kidney    Renal disorder    Type II or  unspecified type diabetes mellitus without mention of complication, not stated as uncontrolled    Unspecified essential hypertension    Unspecified sleep apnea    PSG Merit  AHI 68, auto-pap     Visual impairment   [2]   Past Surgical History:  Procedure Laterality Date    Angioplasty (coronary)  3/2015    stents placed in heart    Cabg  2011    CABGx4 vessel    Cath angio  2023    Cath bare metal stent (bms)      Cath bare metal stent (bms)  2023    2 stents    Cath percutaneous  transluminal coronary angioplasty  3/3/2015    Cath percutaneous  transluminal coronary angioplasty Right 2017    right Coronary artery, OSMIN    Colonoscopy      Hc plmt coronary drug eluting stent ea addl  3/5/2015    Histopathology report  13    cecal polyps - 1 tubular adenoma    Impact tooth rem bony w/comp Bilateral 1973    wisdom teeth    Knee replacement surgery Bilateral 3/24/16    right TKA L TKA not at the same time    Left heart cath,percutaneous  2011    CAD    Left heart cath,percutaneous  3/2/2015          Renal angio, cardiac cath  2011    Sleep study, attended  12/10/2012    Tonsillectomy Bilateral    [3]   Allergies  Allergen Reactions    Clopidogrel RASH    Sulfa Antibiotics HIVES and UNKNOWN    Tramadol ANXIETY, FEVER and OTHER (SEE COMMENTS)     Serotonin syndrome to scheduled tramadol in setting of use of SSRI    Serotonin Reuptake Inhibitors UNKNOWN   [4]   Outpatient Medications Marked as Taking for the 25 encounter (Hospital Encounter)   Medication Sig Dispense Refill    atomoxetine 40 MG Oral Cap Take 1 capsule (40 mg total) by mouth in the morning. 30 capsule 3    sertraline 100 MG Oral Tab Take 2 tablets (200 mg total) by mouth at bedtime.      buPROPion  MG Oral Tablet 24 Hr Take 1 tablet (300 mg total) by mouth nightly.      Psyllium (METAMUCIL) 48.57 % Oral Powder Take as daily as directed on bottle      aspirin 81 MG Oral Chew Tab Chew 1 tablet (81 mg  total) by mouth daily.      diazePAM 5 MG Oral Tab Take 1 tablet (5 mg total) by mouth 2 (two) times daily. 60 tablet 2    Potassium Chloride ER 20 MEQ Oral Tab CR Take 1 tablet by mouth in the morning.      Insulin Glargine, 1 Unit Dial, (TOUJEO SOLOSTAR) 300 UNIT/ML Subcutaneous Solution Pen-injector Inject 22 Units into the skin every evening.      atorvastatin 80 MG Oral Tab Take 1 tablet (80 mg total) by mouth nightly.      carvedilol 12.5 MG Oral Tab Take 3 tablets (37.5 mg total) by mouth in the morning and 3 tablets (37.5 mg total) in the evening. Take with meals.      allopurinol 300 MG Oral Tab TAKE ONE-HALF (1/2) TABLET DAILY 45 tablet 4    Levothyroxine Sodium 125 MCG Oral Tab Take 1 tablet (125 mcg total) by mouth before breakfast. 30 tablet 11    Pantoprazole Sodium 40 MG Oral Tab EC TAKE 1 TABLET EVERY MORNING BEFORE BREAKFAST 90 tablet 3    hydrALAZINE HCl 100 MG Oral Tab Take 25 mg by mouth in the morning and 25 mg in the evening and 25 mg before bedtime. Take tid, hold afternoon hydralazine if sbp <150 but usually does not take.     [5]   Family History  Problem Relation Age of Onset    Cancer Father         prostate, liver    Hypertension Father     Obesity Father     Breast Cancer Father 70        age at dx 70    Heart Surgery Mother         Mitral valve replaced    Heart Disorder Mother         CHF    Hypertension Mother     Arthritis Mother         TKA    Breast Cancer Mother     Stroke Maternal Grandmother     Stroke Maternal Grandfather     Breast Cancer Paternal Grandmother 78        age at dx 78    Cancer Paternal Grandfather         lung    Obesity Brother     Diabetes Brother     Alcohol and Other Disorders Associated Son     Substance Abuse Son     Obesity Sister     Diabetes Sister     Traumatic brain injury Brother

## 2025-06-03 NOTE — ED PROVIDER NOTES
Patient Seen in: [facility] Emergency Department        History  Chief Complaint   Patient presents with    Shortness Of Breath     Stated Complaint: SOB    Subjective:   HPI            Patient is a 69-year-old female who arrives by EMS from nursing home with shortness of breath that started while preparing to do dialysis around 7:30 PM tonight.  She states she completed only part of the dialysis.  She does have intermittent chest pains.  She states that she felt like she was wheezing.  Denies any recent fevers, cough or leg swelling.      Objective:     Past Medical History:    Adrenal adenoma    left adrenoma    Anxiety    Cataract    Chronic kidney disease (CKD), stage III (moderate) (HCC)    CORONARY ARTERY DISEASE    Coronary atherosclerosis    Depression    DIABETES    Dialysis patient    Diarrhea    Disorder of thyroid    Diverticulosis of colon    Esophageal reflux    Fainting episodes    GERD    GOUT    Heart attack (HCC)    High blood pressure    High cholesterol    History of adverse reaction to anesthesia    delusions/hallucinations for a few days after knee replacement    History of blood transfusion    Quadruple Bypass    History of electroconvulsive therapy    HYPERTENSION    Hypothyroid    MENOPAUSE    Osteoarthritis    Other and unspecified hyperlipidemia    Personal history of other medical treatment    Transcranial Magnetic Stimulation (TMS)    Pneumonia, organism unspecified(486)    Polyp of colon    Renal artery stenosis    right kidney    Renal disorder    Type II or unspecified type diabetes mellitus without mention of complication, not stated as uncontrolled    Unspecified essential hypertension    Unspecified sleep apnea    PSG Merit 2012 AHI 70, auto-pap     Visual impairment              Past Surgical History:   Procedure Laterality Date    Angioplasty (coronary)  3/2015    stents placed in heart    Cabg  6/1/2011    CABGx4 vessel    Cath angio  09/21/2023    Cath bare metal stent  (bms)      Cath bare metal stent (bms)  2023    2 stents    Cath percutaneous  transluminal coronary angioplasty  3/3/2015    Cath percutaneous  transluminal coronary angioplasty Right 2017    right Coronary artery, OSMIN    Colonoscopy      Hc plmt coronary drug eluting stent ea addl  3/5/2015    Histopathology report  13    cecal polyps - 1 tubular adenoma    Impact tooth rem bony w/comp Bilateral 1973    wisdom teeth    Knee replacement surgery Bilateral 3/24/16    right TKA L TKA not at the same time    Left heart cath,percutaneous  2011    CAD    Left heart cath,percutaneous  3/2/2015          Renal angio, cardiac cath  2011    Sleep study, attended  12/10/2012    Tonsillectomy Bilateral                 Social History     Socioeconomic History    Marital status:     Number of children: 2    Years of education: 15   Occupational History    Occupation: [employer]     Comment: [employer]   Tobacco Use    Smoking status: Former     Current packs/day: 0.00     Average packs/day: 1 pack/day for 35.0 years (35.0 ttl pk-yrs)     Types: Cigarettes     Start date: 1979     Quit date: 2014     Years since quitting: 10.7    Smokeless tobacco: Never   Vaping Use    Vaping status: Never Used   Substance and Sexual Activity    Alcohol use: Yes     Alcohol/week: 1.0 standard drink of alcohol     Types: 1 Glasses of wine per week     Comment: occasionally    Drug use: Not Currently     Comment: in her 20s    Sexual activity: Not Currently     Partners: Male   Other Topics Concern     Service No    Blood Transfusions No    Caffeine Concern Yes     Comment: coffee    Occupational Exposure No    Hobby Hazards No    Sleep Concern Yes     Comment: STEFFEN    Stress Concern No    Weight Concern No    Special Diet No    Back Care No    Exercise Yes    Bike Helmet Yes    Seat Belt Yes    Self-Exams Yes   Social History Narrative    Lives alone.    Enjoys reading, pets, knitting,  cooking     Social Drivers of Health     Food Insecurity: No Food Insecurity (4/10/2025)    NCSS - Food Insecurity     Worried About Running Out of Food in the Last Year: No     Ran Out of Food in the Last Year: No   Transportation Needs: Unmet Transportation Needs (4/10/2025)    NCSS - Transportation     Lack of Transportation: Yes   Housing Stability: Not At Risk (4/10/2025)    NCSS - Housing/Utilities     Has Housing: Yes     Worried About Losing Housing: No     Unable to Get Utilities: No                                Physical Exam    ED Triage Vitals   BP 06/02/25 2319 (!) 177/94   Pulse 06/02/25 2319 107   Resp 06/02/25 2319 24   Temp 06/02/25 2319 99.2 °F (37.3 °C)   Temp src 06/03/25 0153 Temporal   SpO2 06/02/25 2319 92 %   O2 Device 06/02/25 2319 None (Room air)       Current Vitals:   Vital Signs  BP: (!) 181/86  Pulse: 90  Resp: 22  Temp: 97.6 °F (36.4 °C)  Temp src: Temporal  MAP (mmHg): (!) 114    Oxygen Therapy  SpO2: 96 %  O2 Device: Nasal cannula  O2 Flow Rate (L/min): 2 L/min            Physical Exam  GENERAL: moderate distress, awake and alert, speaking in short sentences  HEENT: EOMI, PERRL  Neck: supple, no jvd  CV: RRR, no murmurs  Resp: +rhonchi b/l  Extremities: FROM of all extremities  Neuro: CN intact, 5/5 motor strength in all extremities, no focal deficits  SKIN: warm, dry, no rashes          ED Course  Labs Reviewed   BASIC METABOLIC PANEL (8) - Abnormal; Notable for the following components:       Result Value    Glucose 169 (*)     BUN 39 (*)     Creatinine 4.05 (*)     BUN/CREA Ratio 9.6 (*)     Calcium, Total 8.5 (*)     Calculated Osmolality 303 (*)     eGFR-Cr 11 (*)     All other components within normal limits   CBC WITH DIFFERENTIAL WITH PLATELET - Abnormal; Notable for the following components:    WBC 12.7 (*)     RBC 3.66 (*)     HGB 11.9 (*)     RDW-SD 53.5 (*)     Neutrophil Absolute Prelim 9.66 (*)     Neutrophil Absolute 9.66 (*)     All other components within normal  limits   D-DIMER - Abnormal; Notable for the following components:    D-Dimer 1.43 (*)     All other components within normal limits   PRO BETA NATRIURETIC PEPTIDE - Abnormal; Notable for the following components:    Pro-Beta Natriuretic Peptide >35,000 (*)     All other components within normal limits   TROPONIN I HIGH SENSITIVITY - Normal   SARS-COV-2/FLU A AND B/RSV BY PCR (GENEXPERT) - Normal    Narrative:     This test is intended for the qualitative detection and differentiation of SARS-CoV-2, influenza A, influenza B, and respiratory syncytial virus (RSV) viral RNA in nasopharyngeal or nares swabs from individuals suspected of respiratory viral infection consistent with COVID-19 by their healthcare provider. Signs and symptoms of respiratory viral infection due to SARS-CoV-2, influenza, and RSV can be similar.    Test performed using the Xpert Xpress SARS-CoV-2/FLU/RSV (real time RT-PCR)  assay on the [employer] instrument, [employer], [place] [address].   This test is being used under the Food and Drug Administration's Emergency Use Authorization.    The authorized Fact Sheet for Healthcare Providers for this assay is available upon request from the laboratory.   RAINBOW DRAW LAVENDER   RAINBOW DRAW LIGHT GREEN   RAINBOW DRAW BLUE   RAINBOW DRAW GOLD     EKG    Rate, intervals and axes as noted on EKG Report.  Rate: 107  Rhythm: Sinus Rhythm  Reading: no TRINH, normal EKG         MDM     Medical Decision Making  Sob x 1 day-ddx includes but not limited to: pneumonia, angina, arrhythmia, chf    Pt remains stable on room air.   Labs remarkable for elevated BNP  Cxr reviewed  Pt given dose of lasix and recommend admission for further management of respiratory status    Amount and/or Complexity of Data Reviewed  External Data Reviewed: labs, radiology and notes.     Details: Labs, cxr, echo, cardiology notes from 4/2025 reviewed  Labs: ordered.  Radiology: ordered.     Details:   X-RAY CHEST 1 VIEW 2333 HRS.      Comparison 4/9/2025      IMPRESSION:  -Small right lower lung zone pulmonary opacity medially may represent atelectasis or consolidation.      CT CHEST PULMONARY ANGIOGRAM WITH IV CONTRAST        IMPRESSION:  -Mild pulmonary edema, small bilateral pleural effusions, and cardiomegaly are suspicious for CHF/volume overload.    -No focal consolidation.  -No thoracic aortic aneurysm.  -Moderate amount of upper abdominal ascites partially visualized.      Discussion of management or test interpretation with external provider(s): D/w dr Hammond hospitalist  Dr orozco nephrology notified    Risk  Decision regarding hospitalization.        Disposition and Plan     Clinical Impression:  1. Acute pulmonary edema (HCC)         Disposition:  Admit  6/3/2025  2:31 am    Follow-up:  No follow-up provider specified.  We recommend that you schedule follow up care with a primary care provider within the next three months to obtain basic health screening including reassessment of your blood pressure.      Medications Prescribed:  Current Discharge Medication List                Supplementary Documentation:         Hospital Problems       Present on Admission  Date Reviewed: 2/12/2025          ICD-10-CM Noted POA    * (Principal) Acute pulmonary edema (HCC) J81.0 6/3/2025 Unknown

## 2025-06-03 NOTE — PHYSICAL THERAPY NOTE
PHYSICAL THERAPY EVALUATION - INPATIENT     Room Number: 317/317-A  Evaluation Date: 6/3/2025  Type of Evaluation: Initial   Physician Order: PT Eval and Treat    Presenting Problem: acute pulmonary edema     Reason for Therapy: Mobility Dysfunction and Discharge Planning    PHYSICAL THERAPY ASSESSMENT   Patient is a 69 year old female admitted 6/2/2025 for acute pulmonary edema.  Prior to admission, patient's baseline is independent with all ADLs except has assist for bathing, ambulates with rollator.  Patient is currently functioning below baseline with bed mobility, transfers, gait, standing prolonged periods, and performing household tasks.  Patient is requiring minimal assist as a result of the following impairments: decreased functional strength, decreased muscular endurance, and medical status.  Physical Therapy will continue to follow for duration of hospitalization.    Patient will benefit from continued skilled PT Services at discharge to promote prior level of function and safety with additional support and return home with home health PT.    PLAN DURING HOSPITALIZATION  Nursing Mobility Recommendation : 1 Assist  PT Device Recommendation: Rolling walker  PT Treatment Plan: Bed mobility, Body mechanics, Endurance, Energy conservation, Patient education, Gait training, Strengthening, Transfer training, Balance training  Rehab Potential : Good  Frequency (Obs): 5x/week     PHYSICAL THERAPY MEDICAL/SOCIAL HISTORY     Problem List  Principal Problem:    Acute pulmonary edema (HCC)  Active Problems:    Acute on chronic heart failure with preserved ejection fraction (HCC)    Anemia due to chronic kidney disease, on chronic dialysis (HCC)      HOME SITUATION  Type of Home: Assisted living facility  Home Layout: Elevator  Lives With: Staff 24 hours, Alone    Drives: No   Patient Regularly Uses: Rolling walker, Glasses     Prior Level of [place]: assist for bathing, otherwise independent      SUBJECTIVE  Agreeable to activity.     PHYSICAL THERAPY EXAMINATION   OBJECTIVE  Precautions: None  Fall Risk: Standard fall risk    WEIGHT BEARING RESTRICTION  none    PAIN ASSESSMENT  Ratin    COGNITION  Overall Cognitive Status:  WFL - within functional limits    RANGE OF MOTION AND STRENGTH ASSESSMENT  Upper extremity ROM and strength are within functional limits.  Lower extremity ROM is within functional limits.  Lower extremity strength is within functional limits.    BALANCE  Static Sitting: Good  Dynamic Sitting: Fair +  Static Standing: Not tested  Dynamic Standing: Not tested    ACTIVITY TOLERANCE  BP: (!) 213/117 (205/113 prior to session per RN reading; RN ok'd to sit at EOB to re-assess BP)  BP Location: Left arm  BP Method: Automatic  Patient Position: Sitting    AM-PAC '6-Clicks' INPATIENT SHORT FORM - BASIC MOBILITY  How much difficulty does the patient currently have...  Patient Difficulty: Turning over in bed (including adjusting bedclothes, sheets and blankets)?: A Little   Patient Difficulty: Sitting down on and standing up from a chair with arms (e.g., wheelchair, bedside commode, etc.): A Little   Patient Difficulty: Moving from lying on back to sitting on the side of the bed?: A Little   How much help from another person does the patient currently need...   Help from Another: Moving to and from a bed to a chair (including a wheelchair)?: A Little   Help from Another: Need to walk in hospital room?: A Little   Help from Another: Climbing 3-5 steps with a railing?: A Little     AM-PAC Score:  Raw Score: 18   Approx Degree of Impairment: 46.58%   Standardized Score (AM-PAC Scale): 43.63   CMS Modifier (G-Code): CK    FUNCTIONAL ABILITY STATUS  Functional Mobility/Gait Assessment  Gait Assistance: Not tested  Supine to Sit: minimal assist  Sit to Supine: contact guard assist    Exercise/Education Provided:  Education Provided To: Patient     Patient Education: Role of Physical Therapy,  Plan of Care, DME Recommendations, Functional Transfer Techniques, Fall Prevention, Posture/Positioning, Proper Body Mechanics     Patient's Response to Education: Verbalized Understanding, Requires Further Education     Skilled Therapy Provided: Pt received resting in bed and agreeable to activity. No c/o pain. Maintained on 2 L O2. Minutes prior to session, BP was 205/113 per RN's reading. RN ok'd to sit patient at EOB to re-assess BP. Required min A and increased time to complete supine>sit. Denied dizziness with position change. XG=374/117 sitting at EOB; RN notified immediately and pt immediately returned to supine. Pt remained asymptomatic. Pt was left resting in bed with alarm on, needs within reach, handoff to RN complete.     The patient's Approx Degree of Impairment: 46.58% has been calculated based on documentation in the [facility] '6 clicks' Inpatient Basic Mobility Short Form.  Research supports that patients with this level of impairment may benefit from home with HH PT.  Final disposition will be made by interdisciplinary medical team.    Patient End of Session: In bed, Needs met, Call light within reach, RN aware of session/findings, All patient questions and concerns addressed, Hospital anti-slip socks, Alarm set    CURRENT GOALS  Goals to be met by: 6/10/25  Patient Goal Patient's self-stated goal is: go home   Goal #1 Patient is able to demonstrate supine - sit EOB @ level: supervision     Goal #1   Current Status    Goal #2 Patient is able to demonstrate transfers Sit to/from Stand at assistance level: supervision with walker - rolling     Goal #2  Current Status    Goal #3 Patient is able to ambulate 150 feet with assist device: walker - rolling at assistance level: supervision   Goal #3   Current Status    Goal #4    Goal #4   Current Status    Goal #5 Patient to demonstrate independence with home activity/exercise instructions provided to patient in preparation for discharge.   Goal #5   Current  Status    Goal #6    Goal #6  Current Status      Patient Evaluation Complexity Level:  History Moderate - 1 or 2 personal factors and/or co-morbidities   Examination of body systems Moderate - addressing a total of 3 or more elements   Clinical Presentation  Moderate - Evolving   Clinical Decision Making  Moderate Complexity     Therapeutic Activity:  10 minutes

## 2025-06-03 NOTE — ED QUICK NOTES
Orders for admission, patient is aware of plan and ready to go upstairs. Any questions, please call ED RN Elizabeth at extension [phone].     Patient Covid vaccination status: Fully vaccinated     COVID Test Ordered in ED: SARS-CoV-2/Flu A and B/RSV by PCR (GeneXpert)    COVID Suspicion at Admission: N/A    Running Infusions: Medication Infusions[1] None    Mental Status/LOC at time of transport: x4    Other pertinent information: on 2L O2 nasal cannula- no O2 at baseline.   CIWA score: N/A   NIH score:  N/A             [1]

## 2025-06-03 NOTE — CONSULTS
[facility]  part of [facility]    Report of Consultation    Date of Admission:  6/2/2025  Date of Consult:  6/3/2025   Reason for Consultation:     ESRD on peritoneal dialysis    History of Present Illness:     Patient is a 69 year old female with past medical history of ESRD on peritoneal dialysis, HFpEF with EF 55-60%, CAD s/p PCI, hypothyroidism, orthostatic hypotension, anemia who presented with shortness of breath and wheezing    In ED patient had CT chest which showed no PE but positive CHF with multifocal groundglass density.  Patient received IV diuretic.  WBC noted to be 12.7 with mildly elevated procalcitonin level    Currently PD regimen is  10 hrs with 2500 ml fill volume with last fill extraneal - UF mostly 500-800 ml. Patient alternated btw 1.5% for 2 days followed by 1.5 % and 2.5% for 2 days and so on .       Past Medical History  Past Medical History:    Adrenal adenoma    left adrenoma    Anxiety    Cataract    Chronic kidney disease (CKD), stage III (moderate) (HCC)    CORONARY ARTERY DISEASE    Coronary atherosclerosis    Depression    DIABETES    Dialysis patient    Diarrhea    Disorder of thyroid    Diverticulosis of colon    Esophageal reflux    Fainting episodes    GERD    GOUT    Heart attack (HCC)    High blood pressure    High cholesterol    History of adverse reaction to anesthesia    delusions/hallucinations for a few days after knee replacement    History of blood transfusion    Quadruple Bypass    History of electroconvulsive therapy    HYPERTENSION    Hypothyroid    MENOPAUSE    Osteoarthritis    Other and unspecified hyperlipidemia    Personal history of other medical treatment    Transcranial Magnetic Stimulation (TMS)    Pneumonia, organism unspecified(486)    Polyp of colon    Renal artery stenosis    right kidney    Renal disorder    Type II or unspecified type diabetes mellitus without mention of complication, not stated as uncontrolled    Unspecified  essential hypertension    Unspecified sleep apnea    PSG Merit  AHI 70, auto-pap     Visual impairment       Past Surgical History  Past Surgical History:   Procedure Laterality Date    Angioplasty (coronary)  3/2015    stents placed in heart    Cabg  2011    CABGx4 vessel    Cath angio  2023    Cath bare metal stent (bms)      Cath bare metal stent (bms)  2023    2 stents    Cath percutaneous  transluminal coronary angioplasty  3/3/2015    Cath percutaneous  transluminal coronary angioplasty Right 2017    right Coronary artery, OSMIN    Colonoscopy      Hc plmt coronary drug eluting stent ea addl  3/5/2015    Histopathology report  13    cecal polyps - 1 tubular adenoma    Impact tooth rem bony w/comp Bilateral 1973    wisdom teeth    Knee replacement surgery Bilateral 3/24/16    right TKA L TKA not at the same time    Left heart cath,percutaneous  2011    CAD    Left heart cath,percutaneous  3/2/2015          Renal angio, cardiac cath  2011    Sleep study, attended  12/10/2012    Tonsillectomy Bilateral 1958       Family History  Family History   Problem Relation Age of Onset    Cancer Father         prostate, liver    Hypertension Father     Obesity Father     Breast Cancer Father 70        age at dx 70    Heart Surgery Mother         Mitral valve replaced    Heart Disorder Mother         CHF    Hypertension Mother     Arthritis Mother         TKA    Breast Cancer Mother     Stroke Maternal Grandmother     Stroke Maternal Grandfather     Breast Cancer Paternal Grandmother 78        age at dx 78    Cancer Paternal Grandfather         lung    Obesity Brother     Diabetes Brother     Alcohol and Other Disorders Associated Son     Substance Abuse Son     Obesity Sister     Diabetes Sister     Traumatic brain injury Brother        Social History  Pediatric History   Patient Parents    Not on file     Other Topics Concern     Service No    Blood Transfusions No     Caffeine Concern Yes     Comment: coffee    Occupational Exposure No    Hobby Hazards No    Sleep Concern Yes     Comment: STEFFEN    Stress Concern No    Weight Concern No    Special Diet No    Back Care No    Exercise Yes    Bike Helmet Yes    Seat Belt Yes    Self-Exams Yes   Social History Narrative    Lives alone.    Enjoys reading, pets, knitting, cooking           Current Medications:  Current Facility-Administered Medications   Medication Dose Route Frequency    aspirin chewable tab 81 mg  81 mg Oral Daily    atorvastatin (Lipitor) tab 80 mg  80 mg Oral Nightly    atomoxetine (Strattera) cap 40 mg(patient own medication)  40 mg Oral Daily    buPROPion ER (Wellbutrin XL) 24 hr tab 300 mg  300 mg Oral Nightly    carvedilol (Coreg) tab 37.5 mg  37.5 mg Oral BID with meals    diazePAM (Valium) tab 5 mg  5 mg Oral BID    hydrALAZINE (Apresoline) tab 25 mg  25 mg Oral TID    levothyroxine (Synthroid) tab 125 mcg  125 mcg Oral Before breakfast    pantoprazole (Protonix) DR tab 40 mg  40 mg Oral QAM AC    acetaminophen (Tylenol Extra Strength) tab 500 mg  500 mg Oral Q4H PRN    melatonin tab 3 mg  3 mg Oral Nightly PRN    ondansetron (Zofran) 4 MG/2ML injection 4 mg  4 mg Intravenous Q6H PRN    metoclopramide (Reglan) 5 mg/mL injection 5 mg  5 mg Intravenous Q8H PRN    heparin (Porcine) 5000 UNIT/ML injection 5,000 Units  5,000 Units Subcutaneous Q8H JERRY    dextrose 2.5%, calcium 2.5 meq/L peritoneal solution  5,000 mL Intraperitoneal Once in dialysis    allopurinol (Zyloprim) tab 150 mg  150 mg Oral Daily    psyllium (Metamucil) oral packet 1 packet  1 packet Oral Daily    sertraline (Zoloft) tab 200 mg  200 mg Oral Nightly    glucose (Dex4) 15 GM/59ML oral liquid 15 g  15 g Oral Q15 Min PRN    Or    glucose (Glutose) 40% oral gel 15 g  15 g Oral Q15 Min PRN    Or    glucose-vitamin C (Dex-4) chewable tab 4 tablet  4 tablet Oral Q15 Min PRN    Or    dextrose 50% injection 50 mL  50 mL Intravenous Q15 Min PRN    Or     glucose (Dex4) 15 GM/59ML oral liquid 30 g  30 g Oral Q15 Min PRN    Or    glucose (Glutose) 40% oral gel 30 g  30 g Oral Q15 Min PRN    Or    glucose-vitamin C (Dex-4) chewable tab 8 tablet  8 tablet Oral Q15 Min PRN    insulin aspart (NovoLOG) 100 Units/mL FlexPen 1-5 Units  1-5 Units Subcutaneous TID CC    insulin degludec (Tresiba) 100 units/mL flextouch 20 Units  20 Units Subcutaneous Nightly       Allergies  Allergies   Allergen Reactions    Clopidogrel RASH    Sulfa Antibiotics HIVES and UNKNOWN    Tramadol ANXIETY, FEVER and OTHER (SEE COMMENTS)     Serotonin syndrome to scheduled tramadol in setting of use of SSRI    Serotonin Reuptake Inhibitors UNKNOWN       Review of Systems:   Constitutional: negative for fatigue, fevers and weight loss  Eyes: negative for irritation, redness and visual disturbance  Ears, nose, mouth, throat, and face: negative for hearing loss and sore throat  Respiratory: negative for cough, hemoptysis and wheezing  Cardiovascular: negative for chest pain, exertional dyspnea, lower extremity edema and palpitations  Gastrointestinal: negative for abdominal pain, diarrhea and nausea  Genitourinary:negative for dysuria, frequency and hematuria  Hematologic/lymphatic: negative for bleeding and easy bruising  Musculoskeletal:negative for back pain, bone pain and muscle weakness  Neurological: negative for gait problems, memory problems and seizures  Behavioral/Psych: negative for anxiety and depression  Endocrine: negative for polyuria and weight loss     Physical Exam:   Height: 5' 4\" (162.6 cm) (06/02 2319)  Weight: 182 lb (82.6 kg) (06/03 0343)  BSA (Calculated - sq m): 1.88 sq meters (06/02 2319)  Pulse: 85 (06/03 0912)  BP: 180/96 (06/03 0912)  Temp: 99 °F (37.2 °C) (06/03 0912)  Do Not Use - Resp Rate: --  SpO2: 92 % (06/03 0914)  Temp:  [97.6 °F (36.4 °C)-99.2 °F (37.3 °C)] 99 °F (37.2 °C)  Pulse:  [] 85  Resp:  [15-24] 19  BP: (147-212)/() 180/96  SpO2:  [85 %-98 %] 92  %  SpO2: 92 %     Intake/Output Summary (Last 24 hours) at 6/3/2025 1334  Last data filed at 6/3/2025 0900  Gross per 24 hour   Intake 250 ml   Output --   Net 250 ml     Wt Readings from Last 5 Encounters:   06/03/25 182 lb (82.6 kg)   04/11/25 172 lb 14.4 oz (78.4 kg)   03/10/25 175 lb 4.8 oz (79.5 kg)   11/08/24 178 lb (80.7 kg)   09/20/24 176 lb (79.8 kg)       General appearance: alert, appears stated age and cooperative  Head: Normocephalic, atraumatic  Eyes: conjunctivae/corneas clear  Throat: lips, mucosa, and tongue normal; teeth and gums normal  Neck:  no JVD, supple, symmetrical  Pulmonary:  clear to auscultation bilaterally  Cardiovascular: S1, S2 normal, no rub, regular rate and rhythm  Abdominal: soft, non-tender; non distended, bowel sounds normal   Extremities: extremities normal, no edema  Pulses: pedal pulses palpable  Skin: No rashes or lesions  Lymph nodes: Cervical, supraclavicular normal  Neurologic: Grossly normal  Psychiatric: calm    Results:     Laboratory Data:  Recent Labs   Lab 06/02/25  2323   RBC 3.66*   HGB 11.9*   HCT 36.4   MCV 99.5   NEPRELIM 9.66*   WBC 12.7*   .0     Recent Labs   Lab 06/02/25  2348   *   BUN 39*   CREATSERUM 4.05*   CA 8.5*      K 4.3      CO2 27.0     PTT   Date Value Ref Range Status   09/21/2023 63.6 (H) 23.3 - 35.6 seconds Final     Comment:     Elevations of the aPTT in patients not receiving  anticoagulant therapy (Heparin, etc.), may be  seen in Factor deficiency, vitamin K deficiency,  factor inhibitors, liver disease, etc.  Clinical correlation is recommended.     09/19/2017 33.6 25.0 - 34.0 seconds Final   07/16/2014 26.0 25.0 - 34.0 SECONDS Final     Comment:     The aPTT Heparin Theraputic Range is approximately 65 - 104  seconds. The theraputic range has been validated against  0.3 - 0.7 heparin anti-Xa units/mL.       INR   Date Value Ref Range Status   09/19/2017 0.99 0.89 - 1.11 Final   07/16/2014 1.10 (H) 0.93 - 1.07  Final     Comment:     INR Therapeutic Interval Group A (2.00-3.00)  Venous Thrombosis, Pulmonary   Systemic Thrombosis,  Tissue Heart Valve, Acute Myocardial Infarction,  Valvular Heart Disease or Atrial Fibrillation  INR Therapeutic Interval Group B (2.50-3.50)  Recurrent Systemic Embolism, or  Mechanical Prosthetic Valve       No results for input(s): \"BNP\" in the last 168 hours.       Imaging:  CT CHEST PE AORTA (IV ONLY) (CPT=71260)  Result Date: 6/3/2025  CONCLUSION:  1. No evidence of acute pulmonary embolism. 2. Findings of congestive failure/fluid overload with trace bilateral pleural effusions and diffuse interstitial edema throughout both lungs.  These are new since recent prior April, 2025 abdominal CT.  There is also moderate volume upper abdominal ascites. 3. Multifocal ground-glass density opacities and scattered subcentimeter ground-glass density nodules throughout both lungs, which are also new since prior.  These are favored to represent pulmonary edema or less likely relate to coexistent atypical/viral infection.  Follow-up to resolution is advised. 4. Cardiomegaly with multi-vessel coronary artery and mitral annular calcification in addition to coronary artery bypass. 5. Dilatation of the main pulmonary artery trunk may relate to underlying pulmonary hypertension. 6. Small retrocardiac hiatal hernia. 7. Cholelithiasis. 8. Lesser incidental findings as above.   A preliminary report was issued by the [facility] teleradiology service. There are no major discrepancies.  elm-remote  Dictated by (CST): Saleem Garces MD on 6/03/2025 at 9:22 AM     Finalized by (CST): Saleem Garces MD on 6/03/2025 at 9:32 AM          XR CHEST AP PORTABLE  (CPT=71045)  Result Date: 6/3/2025  CONCLUSION:   Mild diffuse interstitial opacities throughout both lungs, which are new since prior exam from April, 2025 and most concerning for interstitial/pulmonary edema.  Less likely that these relate to atypical/viral  infection.  No large pleural effusion.  Sternotomy with coronary artery bypass.   A preliminary report was issued by the [facility] teleradiology service. There are no major discrepancies.  elm-remote  Dictated by (CST): Saleem Garces MD on 6/03/2025 at 8:19 AM     Finalized by (CST): Saleem Garces MD on 6/03/2025 at 8:21 AM               Impression:     Patient is a 69 year old female with past medical history of ESRD on peritoneal dialysis, HFpEF with EF 55-60%, CAD s/p PCI, hypothyroidism, orthostatic hypotension, anemia who presented with shortness of breath and wheezing     1.ESRD: On PD  Patient on CCPD for 10 hours with 2500 fill volume.  Alternate 1.5% 2 days followed by 1.5/2.5% 2 days and so on  Last fill extraneal  PD today for 10 hours with 2.5% given pulmonary edema    2.orthostatic hypotension: Severe supine hypertension  Patient walked in the hallway-check orthostatics in a.m.  Currently and comfortably with no symptoms     3.anemia: Secondary chronic kidney disease  No need for DAYA-hemoglobin at goal     4.hypocalcemia: Check serum albumin for corrected calcium  Check serum phosphorus    5.pulmonary edema: Hypoxia-on 3 L nasal cannula  IV diuretics and I's and O's management  PD today  Respiratory panel pending.  History of COVID in March 2020    6.CAD s/p PCI/ HFpEF with EF 55-60%,:  Hypoxia and pulmonary edema.  Not on any diuretics at home   Bumex 2 mg IV 1 dose  Will assess for discharge with diuretics    Discussed with nursing and primary team.  Discussed with outpatient dialysis nurse    Thank you for allowing me to participate in the care of your patient.    Christopher Woody MD

## 2025-06-03 NOTE — PLAN OF CARE
Patient alert and oriented x 4 on 2 LNC. Patient denies pain. Received peritoneal dialysis this afternoon. IV bumex given. Call light within reach and safety precautions in place.     Problem: Patient Centered Care  Goal: Patient preferences are identified and integrated in the patient's plan of care  Description: Interventions:  - What would you like us to know as we care for you? I am from [facility].   - Provide timely, complete, and accurate information to patient/family  - Incorporate patient and family knowledge, values, beliefs, and cultural backgrounds into the planning and delivery of care  - Encourage patient/family to participate in care and decision-making at the level they choose  - Honor patient and family perspectives and choices  Outcome: Progressing     Problem: Diabetes/Glucose Control  Goal: Glucose maintained within prescribed range  Description: INTERVENTIONS:  - Monitor Blood Glucose as ordered  - Assess for signs and symptoms of hyperglycemia and hypoglycemia  - Administer ordered medications to maintain glucose within target range  - Assess barriers to adequate nutritional intake and initiate nutrition consult as needed  - Instruct patient on self management of diabetes  Outcome: Progressing     Problem: Patient/Family Goals  Goal: Patient/Family Long Term Goal  Description: Patient's Long Term Goal:     Interventions:  -   - See additional Care Plan goals for specific interventions  Outcome: Progressing  Goal: Patient/Family Short Term Goal  Description: Patient's Short Term Goal:     Interventions:   -   - See additional Care Plan goals for specific interventions  Outcome: Progressing     Problem: CARDIOVASCULAR - ADULT  Goal: Maintains optimal cardiac output and hemodynamic stability  Description: INTERVENTIONS:  - Monitor vital signs, rhythm, and trends  - Monitor for bleeding, hypotension and signs of decreased cardiac output  - Evaluate effectiveness of vasoactive medications to optimize  hemodynamic stability  - Monitor arterial and/or venous puncture sites for bleeding and/or hematoma  - Assess quality of pulses, skin color and temperature  - Assess for signs of decreased coronary artery perfusion - ex. Angina  - Evaluate fluid balance, assess for edema, trend weights  Outcome: Progressing  Goal: Absence of cardiac arrhythmias or at baseline  Description: INTERVENTIONS:  - Continuous cardiac monitoring, monitor vital signs, obtain 12 lead EKG if indicated  - Evaluate effectiveness of antiarrhythmic and heart rate control medications as ordered  - Initiate emergency measures for life threatening arrhythmias  - Monitor electrolytes and administer replacement therapy as ordered  Outcome: Progressing     Problem: RESPIRATORY - ADULT  Goal: Achieves optimal ventilation and oxygenation  Description: INTERVENTIONS:  - Assess for changes in respiratory status  - Assess for changes in mentation and behavior  - Position to facilitate oxygenation and minimize respiratory effort  - Oxygen supplementation based on oxygen saturation or ABGs  - Provide Smoking Cessation handout, if applicable  - Encourage broncho-pulmonary hygiene including cough, deep breathe, Incentive Spirometry  - Assess the need for suctioning and perform as needed  - Assess and instruct to report SOB or any respiratory difficulty  - Respiratory Therapy support as indicated  - Manage/alleviate anxiety  - Monitor for signs/symptoms of CO2 retention  Outcome: Progressing     Problem: METABOLIC/FLUID AND ELECTROLYTES - ADULT  Goal: Glucose maintained within prescribed range  Description: INTERVENTIONS:  - Monitor Blood Glucose as ordered  - Assess for signs and symptoms of hyperglycemia and hypoglycemia  - Administer ordered medications to maintain glucose within target range  - Assess barriers to adequate nutritional intake and initiate nutrition consult as needed  - Instruct patient on self management of diabetes  Outcome: Progressing

## 2025-06-03 NOTE — CM/SW NOTE
06/03/25 1100   CM/SW Referral Data   Referral Source Physician   Reason for Referral PHQ4/PHQ9   Informant Patient   Medical Hx   Does patient have an established PCP? Yes  (Ambreen Sandoval)   Patient Info   Patient's Current Mental Status at Time of Assessment Alert;Oriented   Patient's Home Environment Independent Living  ([facility])   Patient lives with Alone   Patient Status Prior to Admission   Independent with ADLs and Mobility No   Pt. requires assistance with Housework;Ambulating   Services in place prior to admission Home Health Care;DME/Supplies at home;Dialysis   Home Health Provider Info  Home Health   Dialysis Clinic [facility]  (established PD)   Discharge Needs   Anticipated D/C needs Home health care;Medical equipment  (monitor O2 needs)     Received MDO for PHQ4.  SW met w/ pt at bedside. Above assessment completed.  Pt verified home address and confirmed living at [facility] for approx 1.5 yrs.    Pt reports using a walker for ambulation.  Pt also confirmed she is current w/ Peritoneal Dialysis that is managed via  [facility].    SW discussed MH hx w/ pt. She confirmed dx w/ Depression and Anxiety.   Pt confirmed seeing her therapist 2x/week at this time. Pt also takes medication for both diagnoses. Pt also confirmed these medications are managed through her JUAN CARLOS Thayer w/ [facility].    Pt's last therapy appt was last week Thursday. Pt has missed a couple sessions w/ JUAN CARLOS Thayer but needs to reschedule.    Pt confirmed she has been feeling \"better\" as of the past week. Pt stating she is not necessarily \"happy\" but in a better state w/ more energy. Pt stating difference of getting out of bed earlier than in the past.    Pt declines any further needs right now in regards  to MH.    Pt is currently on O2 w/ no home oxygen. SW to monitor for needs closer to DC.    Pt confirmed being current w/ HH PT.  SW confirmed via chart - pt is current w/ [facility]. Verified via phone w/ Nabil pt is  current w/ HH PT.  Clinical sent to [facility] via [facility]. UNIQUE order entered.    Pt confirmed being current w/ HH PT.  SW confirmed via chart - pt is current w/ [facility]. Verified via phone w/ Nabil, pt is current w/ HH PT.  Clinical sent to [facility] via [facility]. UNIQUE order entered.    PLAN: [facility] w/ [facility] - pending O2 needs & med clear      SW/CM to remain available for support and/or discharge planning.       Jamia, MSW, LSW [id number]

## 2025-06-03 NOTE — PLAN OF CARE
Problem: Diabetes/Glucose Control  Goal: Glucose maintained within prescribed range  Description: INTERVENTIONS:  - Monitor Blood Glucose as ordered  - Assess for signs and symptoms of hyperglycemia and hypoglycemia  - Administer ordered medications to maintain glucose within target range  - Assess barriers to adequate nutritional intake and initiate nutrition consult as needed  - Instruct patient on self management of diabetes  Outcome: Progressing     Problem: CARDIOVASCULAR - ADULT  Goal: Absence of cardiac arrhythmias or at baseline  Description: INTERVENTIONS:  - Continuous cardiac monitoring, monitor vital signs, obtain 12 lead EKG if indicated  - Evaluate effectiveness of antiarrhythmic and heart rate control medications as ordered  - Initiate emergency measures for life threatening arrhythmias  - Monitor electrolytes and administer replacement therapy as ordered  Outcome: Progressing     Problem: RESPIRATORY - ADULT  Goal: Achieves optimal ventilation and oxygenation  Description: INTERVENTIONS:  - Assess for changes in respiratory status  - Assess for changes in mentation and behavior  - Position to facilitate oxygenation and minimize respiratory effort  - Oxygen supplementation based on oxygen saturation or ABGs  - Provide Smoking Cessation handout, if applicable  - Encourage broncho-pulmonary hygiene including cough, deep breathe, Incentive Spirometry  - Assess the need for suctioning and perform as needed  - Assess and instruct to report SOB or any respiratory difficulty  - Respiratory Therapy support as indicated  - Manage/alleviate anxiety  - Monitor for signs/symptoms of CO2 retention  Outcome: Progressing     Problem: METABOLIC/FLUID AND ELECTROLYTES - ADULT  Goal: Glucose maintained within prescribed range  Description: INTERVENTIONS:  - Monitor Blood Glucose as ordered  - Assess for signs and symptoms of hyperglycemia and hypoglycemia  - Administer ordered medications to maintain glucose within  target range  - Assess barriers to adequate nutritional intake and initiate nutrition consult as needed  - Instruct patient on self management of diabetes  Outcome: Progressing

## 2025-06-03 NOTE — ED INITIAL ASSESSMENT (HPI)
By EMS with c/o SOB, wheezing, intermittent L sided aching CP starting earlier today. Pt states she's having difficulty taking a full breath.   Given 324mg ASA en route. Denies back pain, N/V/D, HA, fever, congestion. Pt A&O4. No wheezing noted at time of triage.

## 2025-06-04 LAB
ALBUMIN SERPL-MCNC: 3.1 G/DL (ref 3.2–4.8)
ALBUMIN/GLOB SERPL: 1.6 {RATIO} (ref 1–2)
ALP LIVER SERPL-CCNC: 101 U/L (ref 55–142)
ALT SERPL-CCNC: <7 U/L (ref 10–49)
ANION GAP SERPL CALC-SCNC: 9 MMOL/L (ref 0–18)
AST SERPL-CCNC: <8 U/L (ref ?–34)
BASOPHILS # BLD AUTO: 0.05 X10(3) UL (ref 0–0.2)
BASOPHILS NFR BLD AUTO: 0.5 %
BILIRUB SERPL-MCNC: 0.3 MG/DL (ref 0.2–1.1)
BUN BLD-MCNC: 34 MG/DL (ref 9–23)
BUN/CREAT SERPL: 8.7 (ref 10–20)
CALCIUM BLD-MCNC: 8.1 MG/DL (ref 8.7–10.4)
CHLORIDE SERPL-SCNC: 102 MMOL/L (ref 98–112)
CO2 SERPL-SCNC: 28 MMOL/L (ref 21–32)
CREAT BLD-MCNC: 3.89 MG/DL (ref 0.55–1.02)
DEPRECATED RDW RBC AUTO: 53.2 FL (ref 35.1–46.3)
EGFRCR SERPLBLD CKD-EPI 2021: 12 ML/MIN/1.73M2 (ref 60–?)
EOSINOPHIL # BLD AUTO: 0.24 X10(3) UL (ref 0–0.7)
EOSINOPHIL NFR BLD AUTO: 2.6 %
ERYTHROCYTE [DISTWIDTH] IN BLOOD BY AUTOMATED COUNT: 14.7 % (ref 11–15)
GLOBULIN PLAS-MCNC: 1.9 G/DL (ref 2–3.5)
GLUCOSE BLD-MCNC: 60 MG/DL (ref 70–99)
GLUCOSE BLDC GLUCOMTR-MCNC: 101 MG/DL (ref 70–99)
GLUCOSE BLDC GLUCOMTR-MCNC: 109 MG/DL (ref 70–99)
GLUCOSE BLDC GLUCOMTR-MCNC: 166 MG/DL (ref 70–99)
GLUCOSE BLDC GLUCOMTR-MCNC: 214 MG/DL (ref 70–99)
GLUCOSE BLDC GLUCOMTR-MCNC: 219 MG/DL (ref 70–99)
GLUCOSE BLDC GLUCOMTR-MCNC: 79 MG/DL (ref 70–99)
HCT VFR BLD AUTO: 28.8 % (ref 35–48)
HGB BLD-MCNC: 9.6 G/DL (ref 12–16)
IMM GRANULOCYTES # BLD AUTO: 0.04 X10(3) UL (ref 0–1)
IMM GRANULOCYTES NFR BLD: 0.4 %
LYMPHOCYTES # BLD AUTO: 2.19 X10(3) UL (ref 1–4)
LYMPHOCYTES NFR BLD AUTO: 23.6 %
MAGNESIUM SERPL-MCNC: 1.5 MG/DL (ref 1.6–2.6)
MCH RBC QN AUTO: 33.3 PG (ref 26–34)
MCHC RBC AUTO-ENTMCNC: 33.3 G/DL (ref 31–37)
MCV RBC AUTO: 100 FL (ref 80–100)
MONOCYTES # BLD AUTO: 0.49 X10(3) UL (ref 0.1–1)
MONOCYTES NFR BLD AUTO: 5.3 %
NEUTROPHILS # BLD AUTO: 6.26 X10 (3) UL (ref 1.5–7.7)
NEUTROPHILS # BLD AUTO: 6.26 X10(3) UL (ref 1.5–7.7)
NEUTROPHILS NFR BLD AUTO: 67.6 %
OSMOLALITY SERPL CALC.SUM OF ELEC: 293 MOSM/KG (ref 275–295)
PHOSPHATE SERPL-MCNC: 4.5 MG/DL (ref 2.4–5.1)
PLATELET # BLD AUTO: 228 10(3)UL (ref 150–450)
POTASSIUM SERPL-SCNC: 3.6 MMOL/L (ref 3.5–5.1)
PROT SERPL-MCNC: 5 G/DL (ref 5.7–8.2)
RBC # BLD AUTO: 2.88 X10(6)UL (ref 3.8–5.3)
SODIUM SERPL-SCNC: 139 MMOL/L (ref 136–145)
WBC # BLD AUTO: 9.3 X10(3) UL (ref 4–11)

## 2025-06-04 PROCEDURE — 90945 DIALYSIS ONE EVALUATION: CPT | Performed by: INTERNAL MEDICINE

## 2025-06-04 RX ORDER — MAGNESIUM OXIDE 400 MG/1
400 TABLET ORAL ONCE
Status: COMPLETED | OUTPATIENT
Start: 2025-06-04 | End: 2025-06-04

## 2025-06-04 RX ORDER — BUMETANIDE 0.25 MG/ML
2 INJECTION, SOLUTION INTRAMUSCULAR; INTRAVENOUS ONCE
Status: COMPLETED | OUTPATIENT
Start: 2025-06-04 | End: 2025-06-04

## 2025-06-04 RX ORDER — IPRATROPIUM BROMIDE AND ALBUTEROL SULFATE 2.5; .5 MG/3ML; MG/3ML
3 SOLUTION RESPIRATORY (INHALATION) EVERY 6 HOURS PRN
Status: DISCONTINUED | OUTPATIENT
Start: 2025-06-04 | End: 2025-06-05

## 2025-06-04 RX ORDER — POTASSIUM CHLORIDE 1500 MG/1
20 TABLET, EXTENDED RELEASE ORAL ONCE
Status: COMPLETED | OUTPATIENT
Start: 2025-06-04 | End: 2025-06-04

## 2025-06-04 RX ORDER — DEXTROSE MONOHYDRATE, SODIUM CHLORIDE, SODIUM LACTATE, CALCIUM CHLORIDE, MAGNESIUM CHLORIDE 2.5; 538; 448; 18.4; 5.08 G/100ML; MG/100ML; MG/100ML; MG/100ML; MG/100ML
5000 SOLUTION INTRAPERITONEAL
Status: COMPLETED | OUTPATIENT
Start: 2025-06-04 | End: 2025-06-04

## 2025-06-04 NOTE — PHYSICAL THERAPY NOTE
Chart reviewed for PT treatment, CRISTINA Quinones approves participation. Patient presents in bed and reports she is tired. She has been up in chair for meals and did walk in the [place] this AM and she feels better than admit but still quite SOB. Encouraged her to walk with me now but she feels after dinner will be better. Discussed use of Purewick and encouraged her to NOT use this in the daytime but call staff to increase overall mobility and she is agreeable. She declines therapy right now, agreeable to checking back tomorrow.

## 2025-06-04 NOTE — PROGRESS NOTES
[facility] AND CARE   Progress Note  -  Cassy Patterson Patient Status:  Observation    1955 MRN [id number]   Location [facility] 3W/SW Attending Leilani Wilson MD   Hosp Day # 0 PCP Ambreen Sandoval MD     PCP: Ambreen Sandoval MD      SEE ATTENDING NOTE AT BOTTOM OF PAGE    Is this a shared or split note between Advanced Practice Provider and Physician? Yes    Assessment and Plan:    This is a 69-year-old female with a complicated medical history including ESRD on peritoneal dialysis since 2024, diabetes, coronary artery disease, depression, orthostatic hypotension, previous syncopal,  events chronic diastolic CHF, COVID 3/2025 who presents with shortness of breath, wheezing and chest pain found to have acute hypoxic hemic respiratory failure due to rhinovirus and acute on chronic diastolic heart failure with uncontrolled blood pressure.     Acute Hypoxemic Respiratory Failure 2/ Rhinovirus and CHF  -tmax 101.3. WBC 12.7-->9.3. PCT 0.14  -pro bnp >35k  -CT chest without PE, noted chf, multifocal groundglass density opacifications and groundglass nodules throughout lungs  -RVP + rhinovirus, support care   -tx for CHF tx below   -follow up outpt CT chest as outpt for resolution   -on RA     Acute on Chronic Diastolic CHF  -2025 echo with EF 55-60%  -pro bnp >35k  -CT chest without PE, noted chf, multifocal groundglass density opacifications and groundglass nodules throughout lungs  -s/p lasix in ER and dose of bumex this am    -Difficult to manage her blood pressure in light of her neurogenic orthostatic hypotension and supine hypertension-see below  -PD per Renal      Chest Pain-resolved  CAD S/P Stents  -2025 echo with EF 55-60%  -CT with multivessel cad  -troponin negative   -EKG with ST, no ischemic changes      Cirrhosis-noted on imagine   Ascites  -CT with moderate ascites       Chronic Neurogenic orthostatic hypotension and Supine HTN  -noted blood pressure ~140 up to 212  -continue  coreg and hydralazine 25 mg tid, hold if sbp less than 150  -home atomextine increased to 40 mg daily   -encouraged po intake and to prevent dehydration  -rec support stocking, slow position changes and PT  -Monitor home blood pressures and call with abnormally low or high readings  -follow up with Dr Rudolph MCCORMICK Type II  -HgbA1C 7.3  -continue home regimen: toujeo 22 units nightly  -tresiba 20 units nightly plus ssi prn  -accuchecks  -ADA diet      Cirrhosis-noted on imagine   Ascites  -CT with moderate ascites      Hypothyroidism  - Continue Synthroid     Depression  -Per patient mick is feeling better and attributes this to her improved health with lack of passing out and getting physical therapy on a consistent basis  - Continue home Wellbutrin and Zoloft  - Follows with psychiatrist and therapist     Intermittent Diarrhea  -continue metamucil      GOC  -full code  -resides at [facility]     MA/ACO Reach  -ER Visits 2025:3  -Admissions 2025:3  -Discharge Needs: HHC [facility]   -Appointments: [ ] PCP Ambreen Sandoval MD     FEN  -lytes in am  -diet-cardiac      Prophy  -SCD  -heparin     Dispo  -EDoD ~6/5 if fever free   PCP: Ambreen Sandoval MD        Outpatient records or previous hospital records reviewed.   Further recommendations pending patient's clinical course.  [facility] hospitalist  team to continue to follow patient while in house  Concerns regarding plan of care were discussed with patient. Patient agrees with plan as detailed above. Discussed plan of care with Dr. Wilson      Note: This chart was prepared using voice recognition software and may contain unintended word substitution errors.      Alison Kaiser RN, NP  [facility] Hospitalist Team   Available via Perfect Serve or Bubble Chat (check Availability)  6/4/2025    SUBJECTIVE:   Noted fever of 101.3 yesterday.  Patient states she is feeling markedly better.  She was on peritoneal dialysis for about 10 hours yesterday and will restart today  at 4 PM.  Her blood pressure is improved.  She states that the nebulizers are helping with her breathing as she was found to have rhinovirus.      OBJECTIVE:   Blood pressure 150/62, pulse 88, temperature 98.6 °F (37 °C), temperature source Oral, resp. rate 20, height 5' 4\" (1.626 m), weight 181 lb 14.1 oz (82.5 kg), SpO2 96%, not currently breastfeeding.    GENERAL: no apparent distress  NEURO: A/A Ox3  RESP: non labored, fine rales   CARDIO: Regular, no murmur  ABD: soft, NT, ND, BS+  EXTREMITIES: no edema    DIAGNOSTIC DATA:   Labs:       Lab Results   Component Value Date    WBC 9.3 06/04/2025    HGB 9.6 06/04/2025    HCT 28.8 06/04/2025    .0 06/04/2025    CREATSERUM 3.89 06/04/2025    BUN 34 06/04/2025     06/04/2025    K 3.6 06/04/2025     06/04/2025    CO2 28.0 06/04/2025    GLU 60 06/04/2025    CA 8.1 06/04/2025    ALB 3.1 06/04/2025    ALKPHO 101 06/04/2025    BILT 0.3 06/04/2025    TP 5.0 06/04/2025    AST <8 06/04/2025    ALT <7 06/04/2025    MG 1.5 06/04/2025    PHOS 4.5 06/04/2025           MEDICATIONS      Scheduled Medications[1]  Medication Infusions[2]  PRN Medications[3]    Alison Kaiser, PASCUAL      IMAGING     CT CHEST PE AORTA (IV ONLY) (CPT=71260)  Result Date: 6/3/2025  CONCLUSION:  1. No evidence of acute pulmonary embolism. 2. Findings of congestive failure/fluid overload with trace bilateral pleural effusions and diffuse interstitial edema throughout both lungs.  These are new since recent prior April, 2025 abdominal CT.  There is also moderate volume upper abdominal ascites. 3. Multifocal ground-glass density opacities and scattered subcentimeter ground-glass density nodules throughout both lungs, which are also new since prior.  These are favored to represent pulmonary edema or less likely relate to coexistent atypical/viral infection.  Follow-up to resolution is advised. 4. Cardiomegaly with multi-vessel coronary artery and mitral annular calcification in addition to  coronary artery bypass. 5. Dilatation of the main pulmonary artery trunk may relate to underlying pulmonary hypertension. 6. Small retrocardiac hiatal hernia. 7. Cholelithiasis. 8. Lesser incidental findings as above.   A preliminary report was issued by the [facility] teleradiology service. There are no major discrepancies.  elm-remote  Dictated by (CST): Saleem Garces MD on 6/03/2025 at 9:22 AM     Finalized by (CST): Saleem Garces MD on 6/03/2025 at 9:32 AM          XR CHEST AP PORTABLE  (CPT=71045)  Result Date: 6/3/2025  CONCLUSION:   Mild diffuse interstitial opacities throughout both lungs, which are new since prior exam from April, 2025 and most concerning for interstitial/pulmonary edema.  Less likely that these relate to atypical/viral infection.  No large pleural effusion.  Sternotomy with coronary artery bypass.   A preliminary report was issued by the [facility] teleradiology service. There are no major discrepancies.  elm-remote  Dictated by (CST): Saleem Garces MD on 6/03/2025 at 8:19 AM     Finalized by (CST): Saleme Garces MD on 6/03/2025 at 8:21 AM              SEE ATTENDING NOTE BELOW:     Patient seen and examined independently.  Discussed with APN and agree with note above.    S:  Doing much better today.  Did have a fever overnight, hoping to go home soon.  On room air    Objective:  BP (!) 161/70 (BP Location: Right arm)   Pulse 78   Temp 98.9 °F (37.2 °C) (Oral)   Resp 20   Ht 5' 4\" (1.626 m)   Wt 181 lb 14.1 oz (82.5 kg)   SpO2 95%   BMI 31.22 kg/m²     Gen: No acute distress, alert and oriented x3  Neck Supple, no JVD  Pulm: Lungs clear, normal respiratory effort, No wheezing or crackles  CV: Heart with regular rate and rhythm, No murmurs, rubs, gallops  Abd: Abdomen soft, nontender, nondistended, no organomegaly, bowel sounds present  MSK:  no clubbing, no cyanosis.  No Lower extremity edema  Skin: no rashes or lesions, well perfused  Psych: mood stable,  cooperative  Neuro: no focal deficits    Assessment and Plan  This is a 69-year-old female with a complicated medical history including ESRD on peritoneal dialysis since 1/2024, diabetes, coronary artery disease, depression, orthostatic hypotension, previous syncopal,  events chronic diastolic CHF, COVID 3/2025 who presents with shortness of breath, wheezing and chest pain found to have acute hypoxic hemic respiratory failure due to rhinovirus and acute on chronic diastolic heart failure with uncontrolled blood pressure.     Acute Hypoxemic Respiratory Failure 2/2 Rhinovirus and CHF  -tmax 101.3. WBC 12.7-->9.3. PCT 0.14  -pro bnp >35k  -CT chest without PE, noted chf, multifocal groundglass density opacifications and groundglass nodules throughout lungs  -RVP + rhinovirus, support care   -tx for CHF tx below   -follow up outpt CT chest as outpt for resolution   -on RA    Rest as above with above    Leilani Wilson MD            [1]    dextrose 2.5%, calcium 2.5 meq/L  5,000 mL Intraperitoneal Once in dialysis    aspirin  81 mg Oral Daily    atorvastatin  80 mg Oral Nightly    atomoxetine  40 mg Oral Daily    buPROPion ER  300 mg Oral Nightly    carvedilol  37.5 mg Oral BID with meals    diazePAM  5 mg Oral BID    hydrALAZINE  25 mg Oral TID    levothyroxine  125 mcg Oral Before breakfast    pantoprazole  40 mg Oral QAM AC    heparin  5,000 Units Subcutaneous Q8H JERRY    dextrose 2.5%, calcium 2.5 meq/L  5,000 mL Intraperitoneal Once in dialysis    allopurinol  150 mg Oral Daily    psyllium  1 packet Oral Daily    sertraline  200 mg Oral Nightly    insulin aspart  1-5 Units Subcutaneous TID CC    insulin degludec  20 Units Subcutaneous Nightly   [2] [3]   acetaminophen    melatonin    ondansetron    metoclopramide    glucose **OR** glucose **OR** glucose-vitamin C **OR** dextrose **OR** glucose **OR** glucose **OR** glucose-vitamin C    hydrALAzine

## 2025-06-04 NOTE — PLAN OF CARE
Problem: Patient Centered Care  Goal: Patient preferences are identified and integrated in the patient's plan of care  Description: Interventions:  - What would you like us to know as we care for you?   - Provide timely, complete, and accurate information to patient/family  - Incorporate patient and family knowledge, values, beliefs, and cultural backgrounds into the planning and delivery of care  - Encourage patient/family to participate in care and decision-making at the level they choose  - Honor patient and family perspectives and choices  Outcome: Progressing     Problem: Diabetes/Glucose Control  Goal: Glucose maintained within prescribed range  Description: INTERVENTIONS:  - Monitor Blood Glucose as ordered  - Assess for signs and symptoms of hyperglycemia and hypoglycemia  - Administer ordered medications to maintain glucose within target range  - Assess barriers to adequate nutritional intake and initiate nutrition consult as needed  - Instruct patient on self management of diabetes  Outcome: Progressing     Problem: Patient/Family Goals  Goal: Patient/Family Long Term Goal  Description: Patient's Long Term Goal:     Interventions:  -   - See additional Care Plan goals for specific interventions  Outcome: Progressing  Goal: Patient/Family Short Term Goal  Description: Patient's Short Term Goal:     Interventions:   -  - See additional Care Plan goals for specific interventions  Outcome: Progressing     Problem: CARDIOVASCULAR - ADULT  Goal: Maintains optimal cardiac output and hemodynamic stability  Description: INTERVENTIONS:  - Monitor vital signs, rhythm, and trends  - Monitor for bleeding, hypotension and signs of decreased cardiac output  - Evaluate effectiveness of vasoactive medications to optimize hemodynamic stability  - Monitor arterial and/or venous puncture sites for bleeding and/or hematoma  - Assess quality of pulses, skin color and temperature  - Assess for signs of decreased coronary  artery perfusion - ex. Angina  - Evaluate fluid balance, assess for edema, trend weights  Outcome: Progressing  Goal: Absence of cardiac arrhythmias or at baseline  Description: INTERVENTIONS:  - Continuous cardiac monitoring, monitor vital signs, obtain 12 lead EKG if indicated  - Evaluate effectiveness of antiarrhythmic and heart rate control medications as ordered  - Initiate emergency measures for life threatening arrhythmias  - Monitor electrolytes and administer replacement therapy as ordered  Outcome: Progressing     Problem: RESPIRATORY - ADULT  Goal: Achieves optimal ventilation and oxygenation  Description: INTERVENTIONS:  - Assess for changes in respiratory status  - Assess for changes in mentation and behavior  - Position to facilitate oxygenation and minimize respiratory effort  - Oxygen supplementation based on oxygen saturation or ABGs  - Provide Smoking Cessation handout, if applicable  - Encourage broncho-pulmonary hygiene including cough, deep breathe, Incentive Spirometry  - Assess the need for suctioning and perform as needed  - Assess and instruct to report SOB or any respiratory difficulty  - Respiratory Therapy support as indicated  - Manage/alleviate anxiety  - Monitor for signs/symptoms of CO2 retention  Outcome: Progressing     Problem: METABOLIC/FLUID AND ELECTROLYTES - ADULT  Goal: Glucose maintained within prescribed range  Description: INTERVENTIONS:  - Monitor Blood Glucose as ordered  - Assess for signs and symptoms of hyperglycemia and hypoglycemia  - Administer ordered medications to maintain glucose within target range  - Assess barriers to adequate nutritional intake and initiate nutrition consult as needed  - Instruct patient on self management of diabetes  Outcome: Progressing

## 2025-06-04 NOTE — PROGRESS NOTES
[facility]  part of [facility]    Progress Note      Subjective:     Currently off of oxygen.  State breathing has improved since yesterday.  Denies any chest pain.  Urinating well.  Bladder scan at bedside unremarkable for urinary retention      Review of Systems:   Constitutional: negative for fatigue, fevers and weight loss  Eyes: negative for irritation, redness and visual disturbance  Ears, nose, mouth, throat, and face: negative for hearing loss and sore throat  Respiratory: negative for cough, hemoptysis and wheezing  Cardiovascular: negative for chest pain, exertional dyspnea, lower extremity edema  Gastrointestinal: negative for abdominal pain, diarrhea and nausea  Genitourinary:negative for dysuria, frequency and hematuria  Hematologic/lymphatic: negative for bleeding and easy bruising  Musculoskeletal:negative for back pain, bone pain and muscle weakness  Neurological: negative for gait problems, memory problems and seizures  Behavioral/Psych: negative for anxiety and depression  Endocrine: negative for polyuria and weight loss     Objective:   Temp:  [98.6 °F (37 °C)-101.3 °F (38.5 °C)] 98.6 °F (37 °C)  Pulse:  [76-97] 88  Resp:  [17-22] 20  BP: (126-213)/() 150/62  SpO2:  [96 %-98 %] 96 %  SpO2: 96 %     Intake/Output Summary (Last 24 hours) at 6/4/2025 1115  Last data filed at 6/4/2025 0848  Gross per 24 hour   Intake 1160 ml   Output 1609 ml   Net -449 ml     Wt Readings from Last 3 Encounters:   06/04/25 181 lb 14.1 oz (82.5 kg)   04/11/25 172 lb 14.4 oz (78.4 kg)   03/10/25 175 lb 4.8 oz (79.5 kg)       General appearance: alert, appears stated age and cooperative  Head: Normocephalic, atraumatic  Eyes: conjunctivae/corneas clear  Throat: lips, mucosa, and tongue normal; teeth and gums normal  Neck:  no JVD, supple, symmetrical  Extremities: extremities normal, no edema  Skin: No rashes or lesions  Neurologic: Grossly normal  Psychiatric: calm    Medications:  Current  Facility-Administered Medications   Medication Dose Route Frequency    aspirin chewable tab 81 mg  81 mg Oral Daily    atorvastatin (Lipitor) tab 80 mg  80 mg Oral Nightly    atomoxetine (Strattera) cap 40 mg(patient own medication)  40 mg Oral Daily    buPROPion ER (Wellbutrin XL) 24 hr tab 300 mg  300 mg Oral Nightly    carvedilol (Coreg) tab 37.5 mg  37.5 mg Oral BID with meals    diazePAM (Valium) tab 5 mg  5 mg Oral BID    hydrALAZINE (Apresoline) tab 25 mg  25 mg Oral TID    levothyroxine (Synthroid) tab 125 mcg  125 mcg Oral Before breakfast    pantoprazole (Protonix) DR tab 40 mg  40 mg Oral QAM AC    acetaminophen (Tylenol Extra Strength) tab 500 mg  500 mg Oral Q4H PRN    melatonin tab 3 mg  3 mg Oral Nightly PRN    ondansetron (Zofran) 4 MG/2ML injection 4 mg  4 mg Intravenous Q6H PRN    metoclopramide (Reglan) 5 mg/mL injection 5 mg  5 mg Intravenous Q8H PRN    heparin (Porcine) 5000 UNIT/ML injection 5,000 Units  5,000 Units Subcutaneous Q8H JERRY    dextrose 2.5%, calcium 2.5 meq/L peritoneal solution  5,000 mL Intraperitoneal Once in dialysis    allopurinol (Zyloprim) tab 150 mg  150 mg Oral Daily    psyllium (Metamucil) oral packet 1 packet  1 packet Oral Daily    sertraline (Zoloft) tab 200 mg  200 mg Oral Nightly    glucose (Dex4) 15 GM/59ML oral liquid 15 g  15 g Oral Q15 Min PRN    Or    glucose (Glutose) 40% oral gel 15 g  15 g Oral Q15 Min PRN    Or    glucose-vitamin C (Dex-4) chewable tab 4 tablet  4 tablet Oral Q15 Min PRN    Or    dextrose 50% injection 50 mL  50 mL Intravenous Q15 Min PRN    Or    glucose (Dex4) 15 GM/59ML oral liquid 30 g  30 g Oral Q15 Min PRN    Or    glucose (Glutose) 40% oral gel 30 g  30 g Oral Q15 Min PRN    Or    glucose-vitamin C (Dex-4) chewable tab 8 tablet  8 tablet Oral Q15 Min PRN    insulin aspart (NovoLOG) 100 Units/mL FlexPen 1-5 Units  1-5 Units Subcutaneous TID CC    insulin degludec (Tresiba) 100 units/mL flextouch 20 Units  20 Units Subcutaneous Nightly     hydrALAzine (Apresoline) 20 mg/mL injection 10 mg  10 mg Intravenous Q6H PRN          Results:     Recent Labs   Lab 06/02/25  2323 06/04/25  0632   RBC 3.66* 2.88*   HGB 11.9* 9.6*   HCT 36.4 28.8*   MCV 99.5 100.0   NEPRELIM 9.66* 6.26   WBC 12.7* 9.3   .0 228.0     Recent Labs   Lab 06/02/25  2348 06/04/25  0632   * 60*   BUN 39* 34*   CREATSERUM 4.05* 3.89*   CA 8.5* 8.1*   ALB  --  3.1*    139   K 4.3 3.6    102   CO2 27.0 28.0   ALKPHO  --  101   AST  --  <8   ALT  --  <7*   BILT  --  0.3   TP  --  5.0*     PTT   Date Value Ref Range Status   09/21/2023 63.6 (H) 23.3 - 35.6 seconds Final     Comment:     Elevations of the aPTT in patients not receiving  anticoagulant therapy (Heparin, etc.), may be  seen in Factor deficiency, vitamin K deficiency,  factor inhibitors, liver disease, etc.  Clinical correlation is recommended.     09/19/2017 33.6 25.0 - 34.0 seconds Final   07/16/2014 26.0 25.0 - 34.0 SECONDS Final     Comment:     The aPTT Heparin Theraputic Range is approximately 65 - 104  seconds. The theraputic range has been validated against  0.3 - 0.7 heparin anti-Xa units/mL.       INR   Date Value Ref Range Status   09/19/2017 0.99 0.89 - 1.11 Final   07/16/2014 1.10 (H) 0.93 - 1.07 Final     Comment:     INR Therapeutic Interval Group A (2.00-3.00)  Venous Thrombosis, Pulmonary   Systemic Thrombosis,  Tissue Heart Valve, Acute Myocardial Infarction,  Valvular Heart Disease or Atrial Fibrillation  INR Therapeutic Interval Group B (2.50-3.50)  Recurrent Systemic Embolism, or  Mechanical Prosthetic Valve       No results for input(s): \"BNP\" in the last 168 hours.  Recent Labs   Lab 06/04/25  0632   MG 1.5*        Recent Labs   Lab 06/04/25  0632   ALB 3.1*       CT CHEST PE AORTA (IV ONLY) (CPT=71260)  Result Date: 6/3/2025  CONCLUSION:  1. No evidence of acute pulmonary embolism. 2. Findings of congestive failure/fluid overload with trace bilateral pleural effusions and  diffuse interstitial edema throughout both lungs.  These are new since recent prior April, 2025 abdominal CT.  There is also moderate volume upper abdominal ascites. 3. Multifocal ground-glass density opacities and scattered subcentimeter ground-glass density nodules throughout both lungs, which are also new since prior.  These are favored to represent pulmonary edema or less likely relate to coexistent atypical/viral infection.  Follow-up to resolution is advised. 4. Cardiomegaly with multi-vessel coronary artery and mitral annular calcification in addition to coronary artery bypass. 5. Dilatation of the main pulmonary artery trunk may relate to underlying pulmonary hypertension. 6. Small retrocardiac hiatal hernia. 7. Cholelithiasis. 8. Lesser incidental findings as above.   A preliminary report was issued by the [email or web address] teleradiology service. There are no major discrepancies.  elm-remote  Dictated by (CST): Saleem Garces MD on 6/03/2025 at 9:22 AM     Finalized by (CST): Saleem Garces MD on 6/03/2025 at 9:32 AM          XR CHEST AP PORTABLE  (CPT=71045)  Result Date: 6/3/2025  CONCLUSION:   Mild diffuse interstitial opacities throughout both lungs, which are new since prior exam from April, 2025 and most concerning for interstitial/pulmonary edema.  Less likely that these relate to atypical/viral infection.  No large pleural effusion.  Sternotomy with coronary artery bypass.   A preliminary report was issued by the [email or web address] teleradiology service. There are no major discrepancies.  elm-remote  Dictated by (CST): Saleem Garces MD on 6/03/2025 at 8:19 AM     Finalized by (CST): Saleem Garces MD on 6/03/2025 at 8:21 AM          EKG 12 Lead  Result Date: 6/3/2025  Sinus tachycardia Septal infarct (cited on or before 10-OCT-2015) Abnormal ECG When compared with ECG of 09-APR-2025 20:08, Vent. rate has increased BY  39 BPM Criteria for Inferior infarct are no longer Present Questionable change  in initial forces of Anterior-septal leads T wave inversion no longer evident in Anterior leads Confirmed by QUINTEN CARTY ([id number]) on 6/3/2025 7:12:45 AM      Assessment and Plan:     Patient is a 69 year old female with past medical history of ESRD on peritoneal dialysis, HFpEF with EF 55-60%, CAD s/p PCI, hypothyroidism, orthostatic hypotension, anemia who presented with shortness of breath and wheezing     1.ESRD: On PD  Patient on CCPD for 10 hours with 2500 fill volume.  Alternate 1.5% 2 days followed by 1.5/2.5% 2 days and so on  Last fill extraneal  S/p PD last night with 1 L UF.  Will repeat the PD with 2.5% today  Hypomagnesemia-will give 401 dose today.  Recheck it tomorrow  Showed normal potassium - replace     2.Orthostatic hypotension: Severe supine hypertension  Patient walked in the hallway - check orthostatics   Currently and comfortably with no symptoms     3.anemia: Secondary chronic kidney disease  No need for DAYA - hemoglobin at goal     4.hypocalcemia: Albumin low.  Corrected calcium almost within normal limit  Check serum phosphorus     5.pulmonary edema: Hypoxia - off of oxygen  IV diuretics and I's and O's management  PD today  Respiratory panel +ve Rhinovirus.  History of COVID in March 2020     6.CAD s/p PCI/ HFpEF with EF 55-60%,:  Hypoxia and pulmonary edema.  Not on any diuretics at home   Bumex 2 mg IV 1 dose  Will start patient on oral diuretics on dc     Discussed with nursing       Christopher Woody MD

## 2025-06-04 NOTE — PLAN OF CARE
Problem: Patient Centered Care  Goal: Patient preferences are identified and integrated in the patient's plan of care  Description: Interventions:  - What would you like us to know as we care for you? From [facility]   - Provide timely, complete, and accurate information to patient/family  - Incorporate patient and family knowledge, values, beliefs, and cultural backgrounds into the planning and delivery of care  - Encourage patient/family to participate in care and decision-making at the level they choose  - Honor patient and family perspectives and choices  Outcome: Progressing     Problem: Diabetes/Glucose Control  Goal: Glucose maintained within prescribed range  Description: INTERVENTIONS:  - Monitor Blood Glucose as ordered  - Assess for signs and symptoms of hyperglycemia and hypoglycemia  - Administer ordered medications to maintain glucose within target range  - Assess barriers to adequate nutritional intake and initiate nutrition consult as needed  - Instruct patient on self management of diabetes  Outcome: Progressing     Problem: Patient/Family Goals  Goal: Patient/Family Long Term Goal  Description: Patient's Long Term Goal: discharge from hospital     Interventions:  - monitor vital signs   - monitor blood glucose levels  - monitor appropriate labs  - pain management   - administer medications per order  - follow MD orders  - diagnostics per order  - update and inform patient and family on plan of care  - discharge planning  - See additional Care Plan goals for specific interventions  Outcome: Progressing  Goal: Patient/Family Short Term Goal  Description: Patient's Short Term Goal: improve breathing     Interventions:   - monitor vital signs   - monitor blood glucose levels  - monitor appropriate labs  - pain management   - administer medications per order  - follow MD orders  - diagnostics per order  - update and inform patient and family on plan of care  - See additional Care Plan  goals for specific interventions  Outcome: Progressing     Problem: CARDIOVASCULAR - ADULT  Goal: Maintains optimal cardiac output and hemodynamic stability  Description: INTERVENTIONS:  - Monitor vital signs, rhythm, and trends  - Monitor for bleeding, hypotension and signs of decreased cardiac output  - Evaluate effectiveness of vasoactive medications to optimize hemodynamic stability  - Monitor arterial and/or venous puncture sites for bleeding and/or hematoma  - Assess quality of pulses, skin color and temperature  - Assess for signs of decreased coronary artery perfusion - ex. Angina  - Evaluate fluid balance, assess for edema, trend weights  Outcome: Progressing  Goal: Absence of cardiac arrhythmias or at baseline  Description: INTERVENTIONS:  - Continuous cardiac monitoring, monitor vital signs, obtain 12 lead EKG if indicated  - Evaluate effectiveness of antiarrhythmic and heart rate control medications as ordered  - Initiate emergency measures for life threatening arrhythmias  - Monitor electrolytes and administer replacement therapy as ordered  Outcome: Progressing     Problem: RESPIRATORY - ADULT  Goal: Achieves optimal ventilation and oxygenation  Description: INTERVENTIONS:  - Assess for changes in respiratory status  - Assess for changes in mentation and behavior  - Position to facilitate oxygenation and minimize respiratory effort  - Oxygen supplementation based on oxygen saturation or ABGs  - Provide Smoking Cessation handout, if applicable  - Encourage broncho-pulmonary hygiene including cough, deep breathe, Incentive Spirometry  - Assess the need for suctioning and perform as needed  - Assess and instruct to report SOB or any respiratory difficulty  - Respiratory Therapy support as indicated  - Manage/alleviate anxiety  - Monitor for signs/symptoms of CO2 retention  Outcome: Progressing     Problem: METABOLIC/FLUID AND ELECTROLYTES - ADULT  Goal: Glucose maintained within prescribed  range  Description: INTERVENTIONS:  - Monitor Blood Glucose as ordered  - Assess for signs and symptoms of hyperglycemia and hypoglycemia  - Administer ordered medications to maintain glucose within target range  - Assess barriers to adequate nutritional intake and initiate nutrition consult as needed  - Instruct patient on self management of diabetes  Outcome: Progressing     Problem: PAIN - ADULT  Goal: Verbalizes/displays adequate comfort level or patient's stated pain goal  Description: INTERVENTIONS:  - Encourage pt to monitor pain and request assistance  - Assess pain using appropriate pain scale  - Administer analgesics based on type and severity of pain and evaluate response  - Implement non-pharmacological measures as appropriate and evaluate response  - Consider cultural and social influences on pain and pain management  - Manage/alleviate anxiety  - Utilize distraction and/or relaxation techniques  - Monitor for opioid side effects  - Notify MD/LIP if interventions unsuccessful or patient reports new pain  - Anticipate increased pain with activity and pre-medicate as appropriate  Outcome: Progressing     Problem: SAFETY ADULT - FALL  Goal: Free from fall injury  Description: INTERVENTIONS:  - Assess pt frequently for physical needs  - Identify cognitive and physical deficits and behaviors that affect risk of falls.  - [place] fall precautions as indicated by assessment.  - Educate pt/family on patient safety including physical limitations  - Instruct pt to call for assistance with activity based on assessment  - Modify environment to reduce risk of injury  - Provide assistive devices as appropriate  - Consider OT/PT consult to assist with strengthening/mobility  - Encourage toileting schedule  Outcome: Progressing     Problem: DISCHARGE PLANNING  Goal: Discharge to home or other facility with appropriate resources  Description: INTERVENTIONS:  - Identify barriers to discharge w/pt and caregiver  -  Include patient/family/discharge partner in discharge planning  - Arrange for needed discharge resources and transportation as appropriate  - Identify discharge learning needs (meds, wound care, etc)  - Arrange for interpreters to assist at discharge as needed  - Consider post-discharge preferences of patient/family/discharge partner  - Complete POLST form as appropriate  - Assess patient's ability to be responsible for managing their own health  - Refer to Case Management Department for coordinating discharge planning if the patient needs post-hospital services based on physician/LIP order or complex needs related to functional status, cognitive ability or social support system  Outcome: Progressing     Problem: SKIN/TISSUE INTEGRITY - ADULT  Goal: Skin integrity remains intact  Description: INTERVENTIONS  - Assess and document risk factors for pressure ulcer development  - Assess and document skin integrity  - Monitor for areas of redness and/or skin breakdown  - Initiate interventions, skin care algorithm/standards of care as needed  Outcome: Progressing     Problem: HEMATOLOGIC - ADULT  Goal: Free from bleeding injury  Description: (Example usage: patient with low platelets)  INTERVENTIONS:  - Avoid intramuscular injections, enemas and rectal medication administration  - Ensure safe mobilization of patient  - Hold pressure on venipuncture sites to achieve adequate hemostasis  - Assess for signs and symptoms of internal bleeding  - Monitor lab trends  - Patient is to report abnormal signs of bleeding to staff  - Avoid use of toothpicks and dental floss  - Use electric shaver for shaving  - Use soft bristle tooth brush  - Limit straining and forceful nose blowing  Outcome: Progressing     Problem: METABOLIC/FLUID AND ELECTROLYTES - ADULT  Goal: Electrolytes maintained within normal limits  Description: INTERVENTIONS:  - Monitor labs and rhythm and assess patient for signs and symptoms of electrolyte imbalances  -  Administer electrolyte replacement as ordered  - Monitor response to electrolyte replacements, including rhythm and repeat lab results as appropriate  - Fluid restriction as ordered  - Instruct patient on fluid and nutrition restrictions as appropriate  Outcome: Not Progressing     Patient tolerating room air and ambulation. Afebrile. Plan for Peritoneal dialysis tonight, [facility] called. (+) rhinovirus. Given intravenous bumex and oral potassium and magnesium replacement per nephrology's orders. Given intravenous hydralazine for increased blood pressure per cardiology's orders.

## 2025-06-04 NOTE — PROGRESS NOTES
[facility]  part of [facility]    Cardiology Progress Note    Cassy Patterson Patient Status:  Observation    1955 MRN [id number]   Location [facility] 3W/SW Attending Leilani Wislon MD   Hosp Day # 0 PCP Ambreen Sandoval MD       Assessment/Plan:  69 year old female presenting with:     1) Acute hypoxemic respiratory failure, +Rhino/enterovirus  2) HFpEF, acute on chronic (EF 55-60% 2025)  3) CAD with prior CABG and PCI  4) HTN with orthostatic hypotension  5) HL  6) DM  7) ESRD on PD     - CT chest on admission with GGO and findings consistent with CHF; trop negative, ECG with ST without ischemia or arrhythmia  - pro BNP >35k  - COVID/flu panel negative, expanded RVP positive for rhino/enterovirus  - Cont asa, statin, bb, hydralazine  - PD as per nephrology  - Wean O2 as tolerated  - Monitor on tele  - Will follow     Subjective: Patient febrile overnight, T max 101.3.  Today states breathing and wheezing improved, denies chest pain or palpitations.    Problem List[1]    Objective:   Temp: 98.6 °F (37 °C)  Pulse: 88  Resp: 20  BP: 150/62    Intake/Output:     Intake/Output Summary (Last 24 hours) at 2025 0941  Last data filed at 2025 0848  Gross per 24 hour   Intake 1160 ml   Output 1609 ml   Net -449 ml       Last 3 Weights   25 0515 181 lb 14.1 oz (82.5 kg)   25 0343 182 lb (82.6 kg)   25 2319 183 lb (83 kg)   25 0532 172 lb 14.4 oz (78.4 kg)   04/10/25 0617 173 lb 1 oz (78.5 kg)   04/10/25 0007 173 lb (78.5 kg)   25 2345 173 lb (78.5 kg)   03/10/25 0628 175 lb 4.8 oz (79.5 kg)   25 0522 177 lb 3.2 oz (80.4 kg)   25 0457 176 lb 1.6 oz (79.9 kg)   25 1245 175 lb (79.4 kg)       Tele: NSR    Physical Exam:    General: Alert and oriented x 3. No apparent distress. No respiratory or constitutional distress.  HEENT: Normocephalic, anicteric sclera, neck supple, no thyromegaly or adenopathy.  Neck: No JVD, carotids 2+, no  bruits.  Cardiac: Regular rate and rhythm. S1, S2 normal. No murmur, pericardial rub, S3, thrill, heave or extra cardiac sounds.  Lungs: Clear without wheezes, rales, rhonchi or dullness.  Normal excursions and effort.  Abdomen: Soft, non-tender. No organosplenomegally, mass or rebound, BS-present.  Extremities: Without clubbing, cyanosis or edema.  Peripheral pulses are 2+.  Neurologic: Alert and oriented, normal affect. No motor or coordinational deficit.  Skin: Warm and dry.     Laboratory/Data:    Labs:         Recent Labs   Lab 06/02/25  2323 06/04/25  0632   WBC 12.7* 9.3   HGB 11.9* 9.6*   MCV 99.5 100.0   .0 228.0       Recent Labs   Lab 06/02/25  2348 06/04/25  0632    139   K 4.3 3.6    102   CO2 27.0 28.0   BUN 39* 34*   CREATSERUM 4.05* 3.89*   CA 8.5* 8.1*   MG  --  1.5*   * 60*       Recent Labs   Lab 06/04/25  0632   ALT <7*   AST <8   ALB 3.1*       No results for input(s): \"TROP\" in the last 168 hours.    Allergies:   Allergies[2]    Medications:  Current Hospital Medications[3]    Earl Moore MD  6/4/2025  9:41 AM         [1]   Patient Active Problem List  Diagnosis    Coronary atherosclerosis    Pure hypercholesterolemia    Gout    Adrenal adenoma    Metabolic syndrome    BMI 33.0-33.9,adult    Hypothyroid    Gouty arthropathy, unspecified    Obstructive sleep apnea (adult) (pediatric)    Esophageal reflux    Central obesity    Syncope    Major depression, recurrent    Obsessive compulsive disorder    FRANKY (generalized anxiety disorder)    Major depressive disorder, recurrent episode, moderate (HCC)    Localized osteoarthritis of knees, bilateral    Essential hypertension, benign    CKD (chronic kidney disease) stage 4, GFR 15-29 ml/min (HCC)    Total right knee replacement Global 6/22/2016    Primary localized osteoarthrosis, lower leg, left    Diabetes mellitus due to underlying condition, controlled, with stage 4 chronic kidney disease, with long-term current use of  insulin ([facility])    Status post total left knee replacement    Hypertensive urgency    Persistent depressive disorder with anxious distress, currently severe    Renal artery atherosclerosis    S/P primary angioplasty with coronary stent    Proteinuria    Syncope, unspecified syncope type    Closed head injury, initial encounter    Laceration of scalp, initial encounter    Chronic pulmonary edema ([facility])    Renal insufficiency    Primary hypertension    Hypoxia    AILIN (acute kidney injury)    Acute respiratory failure with hypoxia ([facility])    NSTEMI (non-ST elevated myocardial infarction) ([facility])    ESRD on hemodialysis ([facility])    Community acquired pneumonia, unspecified laterality    ESRD on peritoneal dialysis ([facility])    Orthostatic hypotension    Smokers' cough ([facility])    Respiratory failure ([facility])    Hypokalemia    Iron deficiency anemia, unspecified    Protein-calorie malnutrition ([facility])    Recurrent major depressive disorder in partial remission    Secondary hyperparathyroidism of renal origin ([facility])    Type 2 diabetes mellitus with diabetic nephropathy ([facility])    Weakness    Gout due to renal impairment, unspecified site    Hypertensive heart and chronic kidney disease with heart failure and stage 1 through stage 4 chronic kidney disease, or unspecified chronic kidney disease ([facility])    Hypertensive heart failure with end stage renal disease on dialysis ([facility])    Abnormal levels of other serum enzymes    Abnormality of albumin    Allergy, unspecified, initial encounter    Anaphylactic shock, unspecified, initial encounter    Anemia in other chronic diseases classified elsewhere    Bilateral bunions    Cardiac arrest, cause unspecified ([facility])    Chronic diastolic CHF (congestive heart failure) ([facility])    Coagulation defect, unspecified ([facility])    Cystocele, midline    Dependence on renal dialysis    Dysequilibrium    ESRD (end stage renal disease) on dialysis ([facility])    Adrenal adenoma, left    Acute kidney failure, unspecified    Anxiety  disorder, unspecified    Generalized anxiety disorder    Obesity, unspecified    Chronic kidney disease, stage 4 (severe) ([facility])    Atherosclerosis of aorta    Atherosclerotic heart disease of native coronary artery without angina pectoris    CAD in native artery    Unspecified atherosclerosis    Type 2 diabetes mellitus with end-stage renal disease ([facility])    Shortness of breath    Gastro-esophageal reflux disease without esophagitis    Idiopathic gout, unspecified site    Major depressive disorder, recurrent, moderate (HCC)    Hypothyroidism, unspecified    Other disorders of electrolyte and fluid balance, not elsewhere classified    Thyrotoxicosis, unspecified without thyrotoxic crisis or storm    Non-ST elevation (NSTEMI) myocardial infarction ([facility])    Depression, unspecified    Hypertensive crisis, unspecified    Other secondary hypertension    Pure hypercholesterolemia, unspecified    COVID    Weakness generalized    Elevated troponin    Hypomagnesemia    Hypocalcemia    Syncope and collapse    Acute pulmonary edema ([facility])    Acute on chronic heart failure with preserved ejection fraction ([facility])    Anemia due to chronic kidney disease, on chronic dialysis ([facility])   [2]   Allergies  Allergen Reactions    Clopidogrel RASH    Sulfa Antibiotics HIVES and UNKNOWN    Tramadol ANXIETY, FEVER and OTHER (SEE COMMENTS)     Serotonin syndrome to scheduled tramadol in setting of use of SSRI    Serotonin Reuptake Inhibitors UNKNOWN   [3]   Current Facility-Administered Medications   Medication Dose Route Frequency    aspirin chewable tab 81 mg  81 mg Oral Daily    atorvastatin (Lipitor) tab 80 mg  80 mg Oral Nightly    atomoxetine (Strattera) cap 40 mg(patient own medication)  40 mg Oral Daily    buPROPion ER (Wellbutrin XL) 24 hr tab 300 mg  300 mg Oral Nightly    carvedilol (Coreg) tab 37.5 mg  37.5 mg Oral BID with meals    diazePAM (Valium) tab 5 mg  5 mg Oral BID    hydrALAZINE (Apresoline) tab 25 mg  25 mg Oral TID     levothyroxine (Synthroid) tab 125 mcg  125 mcg Oral Before breakfast    pantoprazole (Protonix) DR tab 40 mg  40 mg Oral QAM AC    acetaminophen (Tylenol Extra Strength) tab 500 mg  500 mg Oral Q4H PRN    melatonin tab 3 mg  3 mg Oral Nightly PRN    ondansetron (Zofran) 4 MG/2ML injection 4 mg  4 mg Intravenous Q6H PRN    metoclopramide (Reglan) 5 mg/mL injection 5 mg  5 mg Intravenous Q8H PRN    heparin (Porcine) 5000 UNIT/ML injection 5,000 Units  5,000 Units Subcutaneous Q8H JERRY    dextrose 2.5%, calcium 2.5 meq/L peritoneal solution  5,000 mL Intraperitoneal Once in dialysis    allopurinol (Zyloprim) tab 150 mg  150 mg Oral Daily    psyllium (Metamucil) oral packet 1 packet  1 packet Oral Daily    sertraline (Zoloft) tab 200 mg  200 mg Oral Nightly    glucose (Dex4) 15 GM/59ML oral liquid 15 g  15 g Oral Q15 Min PRN    Or    glucose (Glutose) 40% oral gel 15 g  15 g Oral Q15 Min PRN    Or    glucose-vitamin C (Dex-4) chewable tab 4 tablet  4 tablet Oral Q15 Min PRN    Or    dextrose 50% injection 50 mL  50 mL Intravenous Q15 Min PRN    Or    glucose (Dex4) 15 GM/59ML oral liquid 30 g  30 g Oral Q15 Min PRN    Or    glucose (Glutose) 40% oral gel 30 g  30 g Oral Q15 Min PRN    Or    glucose-vitamin C (Dex-4) chewable tab 8 tablet  8 tablet Oral Q15 Min PRN    insulin aspart (NovoLOG) 100 Units/mL FlexPen 1-5 Units  1-5 Units Subcutaneous TID CC    insulin degludec (Tresiba) 100 units/mL flextouch 20 Units  20 Units Subcutaneous Nightly    hydrALAzine (Apresoline) 20 mg/mL injection 10 mg  10 mg Intravenous Q6H PRN

## 2025-06-05 VITALS
DIASTOLIC BLOOD PRESSURE: 50 MMHG | SYSTOLIC BLOOD PRESSURE: 107 MMHG | BODY MASS INDEX: 31.27 KG/M2 | HEIGHT: 64 IN | HEART RATE: 70 BPM | WEIGHT: 183.19 LBS | TEMPERATURE: 98 F | OXYGEN SATURATION: 95 % | RESPIRATION RATE: 18 BRPM

## 2025-06-05 PROBLEM — J81.0 ACUTE PULMONARY EDEMA (HCC): Status: RESOLVED | Noted: 2025-06-03 | Resolved: 2025-06-05

## 2025-06-05 LAB
ALBUMIN SERPL-MCNC: 3.2 G/DL (ref 3.2–4.8)
ANION GAP SERPL CALC-SCNC: 9 MMOL/L (ref 0–18)
BUN BLD-MCNC: 34 MG/DL (ref 9–23)
BUN/CREAT SERPL: 8.7 (ref 10–20)
CALCIUM BLD-MCNC: 7.9 MG/DL (ref 8.7–10.4)
CHLORIDE SERPL-SCNC: 101 MMOL/L (ref 98–112)
CO2 SERPL-SCNC: 27 MMOL/L (ref 21–32)
CREAT BLD-MCNC: 3.9 MG/DL (ref 0.55–1.02)
EGFRCR SERPLBLD CKD-EPI 2021: 12 ML/MIN/1.73M2 (ref 60–?)
GLUCOSE BLD-MCNC: 119 MG/DL (ref 70–99)
GLUCOSE BLDC GLUCOMTR-MCNC: 126 MG/DL (ref 70–99)
GLUCOSE BLDC GLUCOMTR-MCNC: 175 MG/DL (ref 70–99)
MAGNESIUM SERPL-MCNC: 1.5 MG/DL (ref 1.6–2.6)
OSMOLALITY SERPL CALC.SUM OF ELEC: 293 MOSM/KG (ref 275–295)
PHOSPHATE SERPL-MCNC: 4.1 MG/DL (ref 2.4–5.1)
POTASSIUM SERPL-SCNC: 3.9 MMOL/L (ref 3.5–5.1)
SODIUM SERPL-SCNC: 137 MMOL/L (ref 136–145)

## 2025-06-05 PROCEDURE — 90945 DIALYSIS ONE EVALUATION: CPT | Performed by: INTERNAL MEDICINE

## 2025-06-05 RX ORDER — BUMETANIDE 0.25 MG/ML
2 INJECTION, SOLUTION INTRAMUSCULAR; INTRAVENOUS ONCE
Status: COMPLETED | OUTPATIENT
Start: 2025-06-05 | End: 2025-06-05

## 2025-06-05 NOTE — PHYSICAL THERAPY NOTE
PHYSICAL THERAPY TREATMENT NOTE - INPATIENT     Room Number: 317/317-A       Presenting Problem: acute pulmonary edema    Problem List  Active Problems:    Acute on chronic heart failure with preserved ejection fraction (HCC)    Anemia due to chronic kidney disease, on chronic dialysis (HCC)    PHYSICAL THERAPY ASSESSMENT   Patient demonstrates good  progress this session, goals  remain in progress.      Patient is requiring up to contact guard assist as a result of the following impairments: decreased functional strength, decreased endurance/aerobic capacity, impaired standing balance, decreased muscular endurance, and medical status.     Patient continues to function below baseline with bed mobility, transfers, gait, standing prolonged periods, and performing household tasks.  Next session anticipate patient to progress bed mobility, transfers, gait, standing prolonged periods, and performing household tasks.  Physical Therapy will continue to follow patient for duration of hospitalization.    Patient continues to benefit from continued skilled PT services: at discharge to promote prior level of function and safety with additional support and return home with home health PT.    PLAN DURING HOSPITALIZATION  Nursing Mobility Recommendation : 1 Assist  PT Device Recommendation: Rolling walker  PT Treatment Plan: Bed mobility, Body mechanics, Endurance, Energy conservation, Patient education, Gait training, Strengthening, Transfer training, Balance training  Frequency (Obs): 5x/week     SUBJECTIVE  \"I want to walk today\"     OBJECTIVE  Precautions: None    WEIGHT BEARING RESTRICTION  none    PAIN ASSESSMENT   Ratin    BALANCE  Static Sitting: Good  Dynamic Sitting: Fair +  Static Standing: Fair -  Dynamic Standing: Fair -    ACTIVITY TOLERANCE  BP: 107/50 (90/59 standing; 128/69 after toileting)  BP Location: Right arm  BP Method: Automatic  Patient Position: Lying     O2 WALK  Oxygen Therapy  SPO2% on Room Air at  Rest: 94    AM-PAC '6-Clicks' INPATIENT SHORT FORM - BASIC MOBILITY  How much difficulty does the patient currently have...  Patient Difficulty: Turning over in bed (including adjusting bedclothes, sheets and blankets)?: A Little   Patient Difficulty: Sitting down on and standing up from a chair with arms (e.g., wheelchair, bedside commode, etc.): A Little   Patient Difficulty: Moving from lying on back to sitting on the side of the bed?: A Little   How much help from another person does the patient currently need...   Help from Another: Moving to and from a bed to a chair (including a wheelchair)?: A Little   Help from Another: Need to walk in hospital room?: A Little   Help from Another: Climbing 3-5 steps with a railing?: A Little     AM-PAC Score:  Raw Score: 18   Approx Degree of Impairment: 46.58%   Standardized Score (AM-PAC Scale): 43.63   CMS Modifier (G-Code): CK    FUNCTIONAL ABILITY STATUS  Functional Mobility/Gait Assessment  Gait Assistance: Other (Comment) (SBA)  Distance (ft): 125 x 2  Assistive Device: Rolling walker  Pattern: Shuffle (slow pace, flexed posture, 1 seated rest break)  Supine to Sit: contact guard assist  Sit to Stand: contact guard assist;  SBA on subsequent transfers    Skilled Therapy Provided: Pt received resting in bed and agreeable to activity. Required up to CGA for bed mobility and also benefits from increased time. SBA for STS from bed with RW. BP taken in standing was 90/59. Pt remained asymptomatic. Ambulated to the bathroom with RW; assisted with toileting; took seated rest break in chair. After rest break, ambulated in hallway with RW and SBA. Demos slow but steady gait, flexed posture, required 1 seated rest break. HR 80 at rest break. No LOB. Pt reported minimal fatigue with activity. Pt was left sitting in chair in room, with needs within reach, handoff to RN complete.     The patient's Approx Degree of Impairment: 46.58% has been calculated based on documentation in  the [facility] '6 clicks' Inpatient Daily Activity Short Form.  Research supports that patients with this level of impairment may benefit from home with  PT.  Final disposition will be made by interdisciplinary medical team.    Patient End of Session: Up in chair, Needs met, RN aware of session/findings, Call light within reach, All patient questions and concerns addressed, Hospital anti-slip socks    CURRENT GOALS   Goals to be met by: 6/10/25  Patient Goal Patient's self-stated goal is: go home   Goal #1 Patient is able to demonstrate supine - sit EOB @ level: supervision      Goal #1   Current Status  in progress   Goal #2 Patient is able to demonstrate transfers Sit to/from Stand at assistance level: supervision with walker - rolling      Goal #2  Current Status In progress   Goal #3 Patient is able to ambulate 150 feet with assist device: walker - rolling at assistance level: supervision   Goal #3   Current Status In progress    Goal #4     Goal #4   Current Status     Goal #5 Patient to demonstrate independence with home activity/exercise instructions provided to patient in preparation for discharge.   Goal #5   Current Status In progress   Goal #6     Goal #6  Current Status        Therapeutic Activity: 25 minutes

## 2025-06-05 NOTE — OCCUPATIONAL THERAPY NOTE
OCCUPATIONAL THERAPY TREATMENT NOTE - INPATIENT        Room Number: 317/317-A     Presenting Problem: acute pulmonary edema    Problem List  Active Problems:    Acute on chronic heart failure with preserved ejection fraction (HCC)    Anemia due to chronic kidney disease, on chronic dialysis (HCC)      OCCUPATIONAL THERAPY ASSESSMENT   Patient demonstrates good  progress this session, goals progressing with 2/3 met this session.    Patient is requiring supervision and stand-by assist as a result of the following impairments: decreased muscular endurance and medical status.    Patient continues to function near baseline with toileting, bathing, upper body dressing, lower body dressing, grooming, eating, bed mobility, and transfers.  Next session anticipate patient to progress toileting, bathing, upper body dressing, lower body dressing, grooming, eating, bed mobility, and transfers.  Occupational Therapy will continue to follow patient for duration of hospitalization.    Patient continues to benefit from continued skilled OT services: at discharge to promote prior level of function and safety with additional support and return home with home health OT.     PLAN DURING HOSPITALIZATION  OT Treatment Plan: Balance activities, Functional transfer training, Patient/Family education, Patient/Family training, Equipment eval/education     SUBJECTIVE  \"I feel fine not dizzy\"    OBJECTIVE  Precautions: None    ACTIVITY TOLERANCE  Pulse: 80  Heart Rate Source: Monitor  BP: 107/50 (90/59 in standing, 128/69 after toileting)  BP Location: Right arm  BP Method: Automatic  Patient Position: Lying    O2 SATURATIONS  Oxygen Therapy  SPO2% on Room Air at Rest: 94    ACTIVITIES OF DAILY LIVING ASSESSMENT  AM-PAC ‘6-Clicks’ Inpatient Daily Activity Short Form  How much help from another person does the patient currently need…  -   Putting on and taking off regular lower body clothing?: A Little  -   Bathing (including washing, rinsing,  drying)?: A Little  -   Toileting, which includes using toilet, bedpan or urinal? : A Little  -   Putting on and taking off regular upper body clothing?: A Little  -   Taking care of personal grooming such as brushing teeth?: A Little  -   Eating meals?: None    AM-PAC Score:  Score: 19  Approx Degree of Impairment: 42.8%  Standardized Score (AM-PAC Scale): 40.22  CMS Modifier (G-Code): CK    FUNCTIONAL TRANSFER ASSESSMENT  Sit to Stand: Edge of Bed  Edge of Bed: Stand-by Assist  Toilet Transfer: Stand-by Assist    BED MOBILITY  Supine to Sit : Stand-by Assist  Sit to Supine (OT): Stand-by Assist    BALANCE ASSESSMENT  Static Sitting: Stand-by Assist    FUNCTIONAL ADL ASSESSMENT  Grooming Seated: Supervision  Toileting Seated: Stand-by Assist    Skilled Therapy Provided: Session completed in collaboration with PT. Pt received in bed agreeable to therapy. SBA for supine to sit, /50. SBA for STS to RW level, BP 90/59 with no symptoms. Pt completed toilet t/f toileting and handwashing with SBA for balance. Tolerated standing for about 2 minutes with SBA. Vitals stable after toileting (/69). Pt required up to set up A-sup for hair brushing task to promote independence with grooming. BUE ROM and strength WFL, coordination intact. Pt left in bedside chair with needs met and call light within reach.     The patient's Approx Degree of Impairment: 42.8% has been calculated based on documentation in the [facility] '6 clicks' Inpatient Daily Activity Short Form.  Research supports that patients with this level of impairment may benefit from home health.  Final disposition will be made by interdisciplinary medical team.    OT Goals:  Patients self stated goal is: nonne stated      Patient will complete functional transfer with SBA  Comment: met    Patient will complete LB dressing task with min A  Comment: NM; SBA clothing management while toileting     Patient will tolerate standing for 2 minutes in prep for adls with  SBA  Comment: met            Goals  on: 25  Frequency: 3-5x/week    OT Session Time: 25 minutes  Self-Care Home Management: 25 minutes    LETICIA Fisher OT  [facility]  Inpatient Rehabilitation  Occupational Therapy  [phone]

## 2025-06-05 NOTE — PLAN OF CARE
Problem: Patient Centered Care  Goal: Patient preferences are identified and integrated in the patient's plan of care  Description: Interventions:  - What would you like us to know as we care for you? From [facility]   - Provide timely, complete, and accurate information to patient/family  - Incorporate patient and family knowledge, values, beliefs, and cultural backgrounds into the planning and delivery of care  - Encourage patient/family to participate in care and decision-making at the level they choose  - Honor patient and family perspectives and choices  Outcome: Adequate for Discharge     Problem: Diabetes/Glucose Control  Goal: Glucose maintained within prescribed range  Description: INTERVENTIONS:  - Monitor Blood Glucose as ordered  - Assess for signs and symptoms of hyperglycemia and hypoglycemia  - Administer ordered medications to maintain glucose within target range  - Assess barriers to adequate nutritional intake and initiate nutrition consult as needed  - Instruct patient on self management of diabetes  Outcome: Adequate for Discharge     Problem: Patient/Family Goals  Goal: Patient/Family Long Term Goal  Description: Patient's Long Term Goal: discharge from hospital     Interventions:  - monitor vital signs   - monitor blood glucose levels  - monitor appropriate labs  - pain management   - administer medications per order  - follow MD orders  - diagnostics per order  - update and inform patient and family on plan of care  - discharge planning  - See additional Care Plan goals for specific interventions  Outcome: Adequate for Discharge  Goal: Patient/Family Short Term Goal  Description: Patient's Short Term Goal: improve breathing     Interventions:   - monitor vital signs   - monitor blood glucose levels  - monitor appropriate labs  - pain management   - administer medications per order  - follow MD orders  - diagnostics per order  - update and inform patient and family on plan of  care  - See additional Care Plan goals for specific interventions  Outcome: Adequate for Discharge     Problem: CARDIOVASCULAR - ADULT  Goal: Maintains optimal cardiac output and hemodynamic stability  Description: INTERVENTIONS:  - Monitor vital signs, rhythm, and trends  - Monitor for bleeding, hypotension and signs of decreased cardiac output  - Evaluate effectiveness of vasoactive medications to optimize hemodynamic stability  - Monitor arterial and/or venous puncture sites for bleeding and/or hematoma  - Assess quality of pulses, skin color and temperature  - Assess for signs of decreased coronary artery perfusion - ex. Angina  - Evaluate fluid balance, assess for edema, trend weights  Outcome: Adequate for Discharge  Goal: Absence of cardiac arrhythmias or at baseline  Description: INTERVENTIONS:  - Continuous cardiac monitoring, monitor vital signs, obtain 12 lead EKG if indicated  - Evaluate effectiveness of antiarrhythmic and heart rate control medications as ordered  - Initiate emergency measures for life threatening arrhythmias  - Monitor electrolytes and administer replacement therapy as ordered  Outcome: Adequate for Discharge     Problem: RESPIRATORY - ADULT  Goal: Achieves optimal ventilation and oxygenation  Description: INTERVENTIONS:  - Assess for changes in respiratory status  - Assess for changes in mentation and behavior  - Position to facilitate oxygenation and minimize respiratory effort  - Oxygen supplementation based on oxygen saturation or ABGs  - Provide Smoking Cessation handout, if applicable  - Encourage broncho-pulmonary hygiene including cough, deep breathe, Incentive Spirometry  - Assess the need for suctioning and perform as needed  - Assess and instruct to report SOB or any respiratory difficulty  - Respiratory Therapy support as indicated  - Manage/alleviate anxiety  - Monitor for signs/symptoms of CO2 retention  Outcome: Adequate for Discharge     Problem: METABOLIC/FLUID AND  ELECTROLYTES - ADULT  Goal: Glucose maintained within prescribed range  Description: INTERVENTIONS:  - Monitor Blood Glucose as ordered  - Assess for signs and symptoms of hyperglycemia and hypoglycemia  - Administer ordered medications to maintain glucose within target range  - Assess barriers to adequate nutritional intake and initiate nutrition consult as needed  - Instruct patient on self management of diabetes  Outcome: Adequate for Discharge  Goal: Electrolytes maintained within normal limits  Description: INTERVENTIONS:  - Monitor labs and rhythm and assess patient for signs and symptoms of electrolyte imbalances  - Administer electrolyte replacement as ordered  - Monitor response to electrolyte replacements, including rhythm and repeat lab results as appropriate  - Fluid restriction as ordered  - Instruct patient on fluid and nutrition restrictions as appropriate  Outcome: Adequate for Discharge     Problem: SKIN/TISSUE INTEGRITY - ADULT  Goal: Skin integrity remains intact  Description: INTERVENTIONS  - Assess and document risk factors for pressure ulcer development  - Assess and document skin integrity  - Monitor for areas of redness and/or skin breakdown  - Initiate interventions, skin care algorithm/standards of care as needed  Outcome: Adequate for Discharge     Problem: HEMATOLOGIC - ADULT  Goal: Free from bleeding injury  Description: (Example usage: patient with low platelets)  INTERVENTIONS:  - Avoid intramuscular injections, enemas and rectal medication administration  - Ensure safe mobilization of patient  - Hold pressure on venipuncture sites to achieve adequate hemostasis  - Assess for signs and symptoms of internal bleeding  - Monitor lab trends  - Patient is to report abnormal signs of bleeding to staff  - Avoid use of toothpicks and dental floss  - Use electric shaver for shaving  - Use soft bristle tooth brush  - Limit straining and forceful nose blowing  Outcome: Adequate for Discharge      Problem: PAIN - ADULT  Goal: Verbalizes/displays adequate comfort level or patient's stated pain goal  Description: INTERVENTIONS:  - Encourage pt to monitor pain and request assistance  - Assess pain using appropriate pain scale  - Administer analgesics based on type and severity of pain and evaluate response  - Implement non-pharmacological measures as appropriate and evaluate response  - Consider cultural and social influences on pain and pain management  - Manage/alleviate anxiety  - Utilize distraction and/or relaxation techniques  - Monitor for opioid side effects  - Notify MD/LIP if interventions unsuccessful or patient reports new pain  - Anticipate increased pain with activity and pre-medicate as appropriate  Outcome: Adequate for Discharge     Problem: SAFETY ADULT - FALL  Goal: Free from fall injury  Description: INTERVENTIONS:  - Assess pt frequently for physical needs  - Identify cognitive and physical deficits and behaviors that affect risk of falls.  - [place] fall precautions as indicated by assessment.  - Educate pt/family on patient safety including physical limitations  - Instruct pt to call for assistance with activity based on assessment  - Modify environment to reduce risk of injury  - Provide assistive devices as appropriate  - Consider OT/PT consult to assist with strengthening/mobility  - Encourage toileting schedule  Outcome: Adequate for Discharge     Problem: DISCHARGE PLANNING  Goal: Discharge to home or other facility with appropriate resources  Description: INTERVENTIONS:  - Identify barriers to discharge w/pt and caregiver  - Include patient/family/discharge partner in discharge planning  - Arrange for needed discharge resources and transportation as appropriate  - Identify discharge learning needs (meds, wound care, etc)  - Arrange for interpreters to assist at discharge as needed  - Consider post-discharge preferences of patient/family/discharge partner  - Complete POLST form as  appropriate  - Assess patient's ability to be responsible for managing their own health  - Refer to Case Management Department for coordinating discharge planning if the patient needs post-hospital services based on physician/LIP order or complex needs related to functional status, cognitive ability or social support system  Outcome: Adequate for Discharge

## 2025-06-05 NOTE — PLAN OF CARE
Bp elevated, given prn bp medications, vital signs stable. Fever overnight, given Tyneol, fever resolved. PD given by HD RN.     Problem: Patient Centered Care  Goal: Patient preferences are identified and integrated in the patient's plan of care  Description: Interventions:  - What would you like us to know as we care for you? From [facility]   - Provide timely, complete, and accurate information to patient/family  - Incorporate patient and family knowledge, values, beliefs, and cultural backgrounds into the planning and delivery of care  - Encourage patient/family to participate in care and decision-making at the level they choose  - Honor patient and family perspectives and choices  Outcome: Progressing     Problem: Diabetes/Glucose Control  Goal: Glucose maintained within prescribed range  Description: INTERVENTIONS:  - Monitor Blood Glucose as ordered  - Assess for signs and symptoms of hyperglycemia and hypoglycemia  - Administer ordered medications to maintain glucose within target range  - Assess barriers to adequate nutritional intake and initiate nutrition consult as needed  - Instruct patient on self management of diabetes  Outcome: Progressing     Problem: Patient/Family Goals  Goal: Patient/Family Long Term Goal  Description: Patient's Long Term Goal: discharge from hospital     Interventions:  - monitor vital signs   - monitor blood glucose levels  - monitor appropriate labs  - pain management   - administer medications per order  - follow MD orders  - diagnostics per order  - update and inform patient and family on plan of care  - discharge planning  - See additional Care Plan goals for specific interventions  Outcome: Progressing  Goal: Patient/Family Short Term Goal  Description: Patient's Short Term Goal: improve breathing     Interventions:   - monitor vital signs   - monitor blood glucose levels  - monitor appropriate labs  - pain management   - administer medications per order  -  follow MD orders  - diagnostics per order  - update and inform patient and family on plan of care  - See additional Care Plan goals for specific interventions  Outcome: Progressing     Problem: CARDIOVASCULAR - ADULT  Goal: Maintains optimal cardiac output and hemodynamic stability  Description: INTERVENTIONS:  - Monitor vital signs, rhythm, and trends  - Monitor for bleeding, hypotension and signs of decreased cardiac output  - Evaluate effectiveness of vasoactive medications to optimize hemodynamic stability  - Monitor arterial and/or venous puncture sites for bleeding and/or hematoma  - Assess quality of pulses, skin color and temperature  - Assess for signs of decreased coronary artery perfusion - ex. Angina  - Evaluate fluid balance, assess for edema, trend weights  Outcome: Progressing  Goal: Absence of cardiac arrhythmias or at baseline  Description: INTERVENTIONS:  - Continuous cardiac monitoring, monitor vital signs, obtain 12 lead EKG if indicated  - Evaluate effectiveness of antiarrhythmic and heart rate control medications as ordered  - Initiate emergency measures for life threatening arrhythmias  - Monitor electrolytes and administer replacement therapy as ordered  Outcome: Progressing     Problem: RESPIRATORY - ADULT  Goal: Achieves optimal ventilation and oxygenation  Description: INTERVENTIONS:  - Assess for changes in respiratory status  - Assess for changes in mentation and behavior  - Position to facilitate oxygenation and minimize respiratory effort  - Oxygen supplementation based on oxygen saturation or ABGs  - Provide Smoking Cessation handout, if applicable  - Encourage broncho-pulmonary hygiene including cough, deep breathe, Incentive Spirometry  - Assess the need for suctioning and perform as needed  - Assess and instruct to report SOB or any respiratory difficulty  - Respiratory Therapy support as indicated  - Manage/alleviate anxiety  - Monitor for signs/symptoms of CO2 retention  Outcome:  Progressing     Problem: METABOLIC/FLUID AND ELECTROLYTES - ADULT  Goal: Glucose maintained within prescribed range  Description: INTERVENTIONS:  - Monitor Blood Glucose as ordered  - Assess for signs and symptoms of hyperglycemia and hypoglycemia  - Administer ordered medications to maintain glucose within target range  - Assess barriers to adequate nutritional intake and initiate nutrition consult as needed  - Instruct patient on self management of diabetes  Outcome: Progressing  Goal: Electrolytes maintained within normal limits  Description: INTERVENTIONS:  - Monitor labs and rhythm and assess patient for signs and symptoms of electrolyte imbalances  - Administer electrolyte replacement as ordered  - Monitor response to electrolyte replacements, including rhythm and repeat lab results as appropriate  - Fluid restriction as ordered  - Instruct patient on fluid and nutrition restrictions as appropriate  Outcome: Progressing     Problem: PAIN - ADULT  Goal: Verbalizes/displays adequate comfort level or patient's stated pain goal  Description: INTERVENTIONS:  - Encourage pt to monitor pain and request assistance  - Assess pain using appropriate pain scale  - Administer analgesics based on type and severity of pain and evaluate response  - Implement non-pharmacological measures as appropriate and evaluate response  - Consider cultural and social influences on pain and pain management  - Manage/alleviate anxiety  - Utilize distraction and/or relaxation techniques  - Monitor for opioid side effects  - Notify MD/LIP if interventions unsuccessful or patient reports new pain  - Anticipate increased pain with activity and pre-medicate as appropriate  Outcome: Progressing     Problem: SAFETY ADULT - FALL  Goal: Free from fall injury  Description: INTERVENTIONS:  - Assess pt frequently for physical needs  - Identify cognitive and physical deficits and behaviors that affect risk of falls.  - [place] fall precautions as  indicated by assessment.  - Educate pt/family on patient safety including physical limitations  - Instruct pt to call for assistance with activity based on assessment  - Modify environment to reduce risk of injury  - Provide assistive devices as appropriate  - Consider OT/PT consult to assist with strengthening/mobility  - Encourage toileting schedule  Outcome: Progressing     Problem: DISCHARGE PLANNING  Goal: Discharge to home or other facility with appropriate resources  Description: INTERVENTIONS:  - Identify barriers to discharge w/pt and caregiver  - Include patient/family/discharge partner in discharge planning  - Arrange for needed discharge resources and transportation as appropriate  - Identify discharge learning needs (meds, wound care, etc)  - Arrange for interpreters to assist at discharge as needed  - Consider post-discharge preferences of patient/family/discharge partner  - Complete POLST form as appropriate  - Assess patient's ability to be responsible for managing their own health  - Refer to Case Management Department for coordinating discharge planning if the patient needs post-hospital services based on physician/LIP order or complex needs related to functional status, cognitive ability or social support system  Outcome: Progressing     Problem: SKIN/TISSUE INTEGRITY - ADULT  Goal: Skin integrity remains intact  Description: INTERVENTIONS  - Assess and document risk factors for pressure ulcer development  - Assess and document skin integrity  - Monitor for areas of redness and/or skin breakdown  - Initiate interventions, skin care algorithm/standards of care as needed  Outcome: Progressing     Problem: HEMATOLOGIC - ADULT  Goal: Free from bleeding injury  Description: (Example usage: patient with low platelets)  INTERVENTIONS:  - Avoid intramuscular injections, enemas and rectal medication administration  - Ensure safe mobilization of patient  - Hold pressure on venipuncture sites to achieve  adequate hemostasis  - Assess for signs and symptoms of internal bleeding  - Monitor lab trends  - Patient is to report abnormal signs of bleeding to staff  - Avoid use of toothpicks and dental floss  - Use electric shaver for shaving  - Use soft bristle tooth brush  - Limit straining and forceful nose blowing  Outcome: Progressing

## 2025-06-05 NOTE — PROGRESS NOTES
[facility]  part of [facility]    Cardiology Progress Note    Cassy Patterson Patient Status:  Observation    1955 MRN [id number]   Location [facility] 3W/SW Attending Leilani Wilson MD   Hosp Day # 1 PCP Ambreen Sandoval MD       Assessment/Plan:  69 year old female presenting with:     1) Acute hypoxemic respiratory failure, +Rhino/enterovirus  2) HFpEF, acute on chronic (EF 55-60% 2025)  3) CAD with prior CABG and PCI  4) HTN with orthostatic hypotension  5) HL, on statin  6) DM  7) ESRD on PD     - CT chest on admission with GGO and findings consistent with CHF; trop negative, ECG with ST without ischemia or arrhythmia  - pro BNP >35k  - COVID/flu panel negative, expanded RVP positive for rhino/enterovirus  - Cont asa, statin, bb, hydralazine  - PD as per nephrology  - atomoxetine for neuro orthostatic hypotension  - Wean O2 as tolerated  - tele           Subjective:     Cough, dyspnea worse at night. Not improved with sitting up.   Fevers at night.     Problem List[1]    Objective:   Temp: 98.3 °F (36.8 °C)  Pulse: 81  Resp: 18  BP: 142/71    Intake/Output:     Intake/Output Summary (Last 24 hours) at 2025 0737  Last data filed at 2025 0519  Gross per 24 hour   Intake 1000 ml   Output 1609 ml   Net -609 ml       Last 3 Weights   25 0519 183 lb 3.2 oz (83.1 kg)   25 0515 181 lb 14.1 oz (82.5 kg)   25 0343 182 lb (82.6 kg)   25 2319 183 lb (83 kg)   25 0532 172 lb 14.4 oz (78.4 kg)   04/10/25 0617 173 lb 1 oz (78.5 kg)   04/10/25 0007 173 lb (78.5 kg)   25 2345 173 lb (78.5 kg)   03/10/25 0628 175 lb 4.8 oz (79.5 kg)   25 0522 177 lb 3.2 oz (80.4 kg)   25 0457 176 lb 1.6 oz (79.9 kg)   25 1245 175 lb (79.4 kg)       Tele: SR    Physical Exam:    /71 (BP Location: Right arm)   Pulse 81   Temp 98.3 °F (36.8 °C) (Oral)   Resp 18   Ht 5' 4\" (1.626 m)   Wt 183 lb 3.2 oz (83.1 kg)   SpO2 97%   BMI 31.45  kg/m²   Temp (24hrs), Av.3 °F (37.4 °C), Min:98.3 °F (36.8 °C), Max:100.5 °F (38.1 °C)    Wt Readings from Last 3 Encounters:   25 183 lb 3.2 oz (83.1 kg)   25 172 lb 14.4 oz (78.4 kg)   03/10/25 175 lb 4.8 oz (79.5 kg)             GENERAL: well developed, well nourished, in no apparent distress  SKIN: no rashes  HEENT: atraumatic, normocephalic, throat without erythema  NECK: supple, no bruits  LUNGS: clear to auscultation  CARDIO: RRR without murmur or S3   GI: soft, nontender  EXTREMITIES: trace edema  NEUROLOGY: alert  PSYCH: cooperative     Laboratory/Data:    Labs:         Recent Labs   Lab 25  2323 25  0632   WBC 12.7* 9.3   HGB 11.9* 9.6*   MCV 99.5 100.0   .0 228.0       Recent Labs   Lab 25  2348 25  0632    139   K 4.3 3.6    102   CO2 27.0 28.0   BUN 39* 34*   CREATSERUM 4.05* 3.89*   CA 8.5* 8.1*   MG  --  1.5*   PHOS  --  4.5   * 60*       Recent Labs   Lab 25  0632   ALT <7*   AST <8   ALB 3.1*       No results for input(s): \"TROP\" in the last 168 hours.    Allergies:   Allergies[2]    Medications:  Current Hospital Medications[3]                 [1]   Patient Active Problem List  Diagnosis    Coronary atherosclerosis    Pure hypercholesterolemia    Gout    Adrenal adenoma    Metabolic syndrome    BMI 33.0-33.9,adult    Hypothyroid    Gouty arthropathy, unspecified    Obstructive sleep apnea (adult) (pediatric)    Esophageal reflux    Central obesity    Syncope    Major depression, recurrent    Obsessive compulsive disorder    FRANKY (generalized anxiety disorder)    Major depressive disorder, recurrent episode, moderate (HCC)    Localized osteoarthritis of knees, bilateral    Essential hypertension, benign    CKD (chronic kidney disease) stage 4, GFR 15-29 ml/min ([facility])    Total right knee replacement Global 2016    Primary localized osteoarthrosis, lower leg, left    Diabetes mellitus due to underlying condition, controlled,  with stage 4 chronic kidney disease, with long-term current use of insulin ([facility])    Status post total left knee replacement    Hypertensive urgency    Persistent depressive disorder with anxious distress, currently severe    Renal artery atherosclerosis    S/P primary angioplasty with coronary stent    Proteinuria    Syncope, unspecified syncope type    Closed head injury, initial encounter    Laceration of scalp, initial encounter    Chronic pulmonary edema ([facility])    Renal insufficiency    Primary hypertension    Hypoxia    AILIN (acute kidney injury)    Acute respiratory failure with hypoxia ([facility])    NSTEMI (non-ST elevated myocardial infarction) ([facility])    ESRD on hemodialysis ([facility])    Community acquired pneumonia, unspecified laterality    ESRD on peritoneal dialysis ([facility])    Orthostatic hypotension    Smokers' cough ([facility])    Respiratory failure ([facility])    Hypokalemia    Iron deficiency anemia, unspecified    Protein-calorie malnutrition ([facility])    Recurrent major depressive disorder in partial remission    Secondary hyperparathyroidism of renal origin ([facility])    Type 2 diabetes mellitus with diabetic nephropathy ([facility])    Weakness    Gout due to renal impairment, unspecified site    Hypertensive heart and chronic kidney disease with heart failure and stage 1 through stage 4 chronic kidney disease, or unspecified chronic kidney disease ([facility])    Hypertensive heart failure with end stage renal disease on dialysis ([facility])    Abnormal levels of other serum enzymes    Abnormality of albumin    Allergy, unspecified, initial encounter    Anaphylactic shock, unspecified, initial encounter    Anemia in other chronic diseases classified elsewhere    Bilateral bunions    Cardiac arrest, cause unspecified ([facility])    Chronic diastolic CHF (congestive heart failure) ([facility])    Coagulation defect, unspecified ([facility])    Cystocele, midline    Dependence on renal dialysis    Dysequilibrium    ESRD (end stage renal disease) on dialysis ([facility])     Adrenal adenoma, left    Acute kidney failure, unspecified    Anxiety disorder, unspecified    Generalized anxiety disorder    Obesity, unspecified    Chronic kidney disease, stage 4 (severe) ([facility])    Atherosclerosis of aorta    Atherosclerotic heart disease of native coronary artery without angina pectoris    CAD in native artery    Unspecified atherosclerosis    Type 2 diabetes mellitus with end-stage renal disease (HCC)    Shortness of breath    Gastro-esophageal reflux disease without esophagitis    Idiopathic gout, unspecified site    Major depressive disorder, recurrent, moderate (HCC)    Hypothyroidism, unspecified    Other disorders of electrolyte and fluid balance, not elsewhere classified    Thyrotoxicosis, unspecified without thyrotoxic crisis or storm    Non-ST elevation (NSTEMI) myocardial infarction (HCC)    Depression, unspecified    Hypertensive crisis, unspecified    Other secondary hypertension    Pure hypercholesterolemia, unspecified    COVID    Weakness generalized    Elevated troponin    Hypomagnesemia    Hypocalcemia    Syncope and collapse    Acute pulmonary edema (HCC)    Acute on chronic heart failure with preserved ejection fraction (HCC)    Anemia due to chronic kidney disease, on chronic dialysis ([facility])   [2]   Allergies  Allergen Reactions    Clopidogrel RASH    Sulfa Antibiotics HIVES and UNKNOWN    Tramadol ANXIETY, FEVER and OTHER (SEE COMMENTS)     Serotonin syndrome to scheduled tramadol in setting of use of SSRI    Serotonin Reuptake Inhibitors UNKNOWN   [3]   Current Facility-Administered Medications   Medication Dose Route Frequency    ipratropium-albuterol (Duoneb) 0.5-2.5 (3) MG/3ML inhalation solution 3 mL  3 mL Nebulization Q6H PRN    aspirin chewable tab 81 mg  81 mg Oral Daily    atorvastatin (Lipitor) tab 80 mg  80 mg Oral Nightly    atomoxetine (Strattera) cap 40 mg(patient own medication)  40 mg Oral Daily    buPROPion ER (Wellbutrin XL) 24 hr tab 300 mg  300 mg Oral  Nightly    carvedilol (Coreg) tab 37.5 mg  37.5 mg Oral BID with meals    diazePAM (Valium) tab 5 mg  5 mg Oral BID    hydrALAZINE (Apresoline) tab 25 mg  25 mg Oral TID    levothyroxine (Synthroid) tab 125 mcg  125 mcg Oral Before breakfast    pantoprazole (Protonix) DR tab 40 mg  40 mg Oral QAM AC    acetaminophen (Tylenol Extra Strength) tab 500 mg  500 mg Oral Q4H PRN    melatonin tab 3 mg  3 mg Oral Nightly PRN    ondansetron (Zofran) 4 MG/2ML injection 4 mg  4 mg Intravenous Q6H PRN    metoclopramide (Reglan) 5 mg/mL injection 5 mg  5 mg Intravenous Q8H PRN    heparin (Porcine) 5000 UNIT/ML injection 5,000 Units  5,000 Units Subcutaneous Q8H JERRY    dextrose 2.5%, calcium 2.5 meq/L peritoneal solution  5,000 mL Intraperitoneal Once in dialysis    allopurinol (Zyloprim) tab 150 mg  150 mg Oral Daily    psyllium (Metamucil) oral packet 1 packet  1 packet Oral Daily    sertraline (Zoloft) tab 200 mg  200 mg Oral Nightly    glucose (Dex4) 15 GM/59ML oral liquid 15 g  15 g Oral Q15 Min PRN    Or    glucose (Glutose) 40% oral gel 15 g  15 g Oral Q15 Min PRN    Or    glucose-vitamin C (Dex-4) chewable tab 4 tablet  4 tablet Oral Q15 Min PRN    Or    dextrose 50% injection 50 mL  50 mL Intravenous Q15 Min PRN    Or    glucose (Dex4) 15 GM/59ML oral liquid 30 g  30 g Oral Q15 Min PRN    Or    glucose (Glutose) 40% oral gel 30 g  30 g Oral Q15 Min PRN    Or    glucose-vitamin C (Dex-4) chewable tab 8 tablet  8 tablet Oral Q15 Min PRN    insulin aspart (NovoLOG) 100 Units/mL FlexPen 1-5 Units  1-5 Units Subcutaneous TID CC    insulin degludec (Tresiba) 100 units/mL flextouch 20 Units  20 Units Subcutaneous Nightly    hydrALAzine (Apresoline) 20 mg/mL injection 10 mg  10 mg Intravenous Q6H PRN

## 2025-06-05 NOTE — DISCHARGE SUMMARY
Hospitalist Discharge Summary    Admission Date/Time  6/2/2025 11:13 PM  Discharge Date  06/05/25    PCP  Ambreen Sandoval MD     Discharging Hospitalist:  Alison Kaiser NP with Dr. Wilson    Disposition:  Return to assisted living at [facility]    Follow Up Appointments  Ambreen Sandoval MD  [address]  [address]  [place] [address]  [phone]    Follow up on 6/9/2025 6/9 at 1:20 pm with [employer] [facility]        Primary Hospital Problems/Hospital Course Summary  his is a 69-year-old female with a complicated medical history including ESRD on peritoneal dialysis since 1/2024, diabetes, coronary artery disease, depression, orthostatic hypotension, previous syncopal,  events chronic diastolic CHF, COVID 3/2025 who presents with shortness of breath, wheezing and chest pain found to have acute hypoxic hemic respiratory failure due to rhinovirus and acute on chronic diastolic heart failure with uncontrolled blood pressure.  Patient was treated with extra peritoneal dialysis and 2 doses of IV diuretics, her outpatient PD solution was recently changed about 1 week ago to help with increased fluid removal.  Patient was noted to have an abnormal CT of the chest with multifocal groundglass density opacifications and groundglass nodules throughout the lungs and will need to have follow-up in the outpatient setting.  Patient has PCP appointment made for June 9 at 120 via [employer] [facility]     Rhinovirus   -supportive care    Acute on Chronic Diastolic CHF  -4/2025 echo with EF 55-60%  -pro bnp >35k  -CT chest without PE, noted chf, multifocal groundglass density opacifications and groundglass nodules throughout lungs  -s/p lasix and Bumex in hospital  -Extra PD done, recently had PD diastole changed for more fluid removal    Abnl CT chest  --CT chest without PE, noted chf, multifocal groundglass density opacifications and groundglass nodules throughout lungs  - Will need follow-up imaging as outpatient for resolution     Chest  Pain-resolved  CAD S/P Stents  -4/2025 echo with EF 55-60%  -CT with multivessel cad  -troponin negative and EKG with ST, no ischemic changes   -Continue aspirin and statin     Cirrhosis-noted on imagine   Ascites  -CT with moderate ascites     CHRONIC MEDICAL PROBLEMS      Chronic Neurogenic orthostatic hypotension and Supine HTN  -noted blood pressure ~140 up to 212  -continue coreg and hydralazine 25 mg tid, hold if sbp less than 150  -home atomextine increased to 40 mg daily   -encouraged po intake and to prevent dehydration  -rec support stocking, slow position changes and PT  -Monitor home blood pressures and call with abnormally low or high readings  -follow up with Dr Rudolph MCCORMICK Type II--HgbA1C 7.3, Continue home regimen: toujeo 22 units nightly  Hypothyroidism-- Continue Synthroid  Depression-continue home meds, follow-up with psychiatry and therapy  Intermittent Diarrhea--continue metamucil        Medication Changes  NONE     Important Follow Up Items  Labs:  NO  Imaging: CT chest without PE, noted chf, multifocal groundglass density opacifications and groundglass nodules throughout lungs. Needs outpt follow up  Medications: NO  Incidental findings: NO      Change in Code Status: NO, FULL CODE     Discharge Medications       Medication List        CONTINUE taking these medications      allopurinol 300 MG Tabs  Commonly known as: Zyloprim  TAKE ONE-HALF (1/2) TABLET DAILY  Notes to patient: Take note 1/2 tab only     aspirin 81 MG Chew     atomoxetine 40 MG Caps  Commonly known as: Strattera  Take 1 capsule (40 mg total) by mouth in the morning.     atorvastatin 80 MG Tabs  Commonly known as: Lipitor     Ally Contour Next Test Strp  TEST BLOOD GLUCOSE THREE TIMES DAILY     buPROPion  MG Tb24  Commonly known as: Wellbutrin XL     carvedilol 12.5 MG Tabs  Commonly known as: Coreg     diazePAM 5 MG Tabs  Commonly known as: Valium  Take 1 tablet (5 mg total) by mouth 2 (two) times daily.      hydrALAZINE 100 MG Tabs  Commonly known as: Apresoline     Insulin Pen Needle 29G X 12.7MM Misc  Commonly known as: BD Pen Needle Original U/F  USE AS DIRECTED DAILY     levothyroxine 125 MCG Tabs  Commonly known as: Synthroid  Take 1 tablet (125 mcg total) by mouth before breakfast.     Metamucil 48.57 % Powd  Generic drug: Psyllium     pantoprazole 40 MG Tbec  Commonly known as: Protonix  TAKE 1 TABLET EVERY MORNING BEFORE BREAKFAST     Potassium Chloride ER 20 MEQ Tbcr     sertraline 100 MG Tabs  Commonly known as: Zoloft     Toujeo SoloStar 300 UNIT/ML Sopn  Generic drug: Insulin Glargine (1 Unit Dial)            I reconciled current and discharge medications on the day of discharge.    Imaging/Diagnostic Reports  CT CHEST PE AORTA (IV ONLY) (CPT=71260)  Result Date: 6/3/2025  PROCEDURE: CT CHEST PE AORTA (IV ONLY) (CPT=71260)  COMPARISON: [facility], CT ABDOMEN+PELVIS (CPT=74176), 4/09/2025, 8:40 PM.  INDICATIONS: SOB  TECHNIQUE: Multidetector CT images of the chest were obtained with non-ionic intravenous contrast material. Automated exposure control for dose reduction was used. Adjustment of the mA and/or kV was done based on the patient's size. Iterative reconstruction technique for dose reduction was employed. Multiplanar reformats and maximum intensity projection images were created.   FINDINGS: VASCULATURE: There is adequate opacification of the pulmonary arterial tree. No suspicious filling defects are identified in the main, lobar, segmental, or proximal subsegmental pulmonary artery branches to suggest acute pulmonary embolism. The distal subsegmental branches are less well assessed.  Borderline dilation of the main pulmonary artery trunk may relate to underlying pulmonary hypertension.  Please note that overall evaluation is mildly limited by respiratory motion artifact. CARDIAC: The heart is mild-to-moderately enlarged.  Multi-vessel coronary artery and mitral annular calcification.   Coronary artery bypass also noted.  There is no bowing of the interventricular septum to suggest right ventricular strain. THORACIC AORTA: Unremarkable configuration without aneurysm or dissection.  LUNGS/PLEURA: Trace bilateral pleural effusions are new since prior abdominal CT.  There is also new interlobular septal thickening throughout both lungs.  Multifocal ground-glass density opacities throughout both lungs are new since prior.  There are also small scattered ground-glass density nodules, which include a reference 0.9 cm nodule in the right upper lobe (series 12, image 57).  No pneumothorax.  Subpleural 3 mm micro nodule in the left lung apex (series 12, image 26).  Additional linear 4 mm  subpleural nodule in the left upper lobe series 12, image 59).  Given small size and morphology/location both nodules are likely benign/incidental.  Small scattered calcified granulomas. AIRWAYS: The tracheobronchial tree is without central mass or obstructing lesion. MEDIASTINUM/TATI: No mass or lymphadenopathy.  CHEST WALL: No axillary mass or lymphadenopathy.  LIMITED ABDOMEN: Moderate volume intra-abdominal ascites.  Small retrocardiac hiatal hernia.  Cholelithiasis.  Fairly extensive abdominal atherosclerosis. BONES: Stable chronic/healed right lateral 8th-10th rib fractures.  Diffuse idiopathic skeletal hyperostosis.  Slight levoscoliosis of the thoracic spine with multilevel thoracic spine degeneration.  Sternotomy wires. OTHER: Negative.          CONCLUSION:  1. No evidence of acute pulmonary embolism. 2. Findings of congestive failure/fluid overload with trace bilateral pleural effusions and diffuse interstitial edema throughout both lungs.  These are new since recent prior April, 2025 abdominal CT.  There is also moderate volume upper abdominal ascites. 3. Multifocal ground-glass density opacities and scattered subcentimeter ground-glass density nodules throughout both lungs, which are also new since prior.   These are favored to represent pulmonary edema or less likely relate to coexistent atypical/viral infection.  Follow-up to resolution is advised. 4. Cardiomegaly with multi-vessel coronary artery and mitral annular calcification in addition to coronary artery bypass. 5. Dilatation of the main pulmonary artery trunk may relate to underlying pulmonary hypertension. 6. Small retrocardiac hiatal hernia. 7. Cholelithiasis. 8. Lesser incidental findings as above.   A preliminary report was issued by the [facility] teleradiology service. There are no major discrepancies.  elm-remote  Dictated by (CST): Saleem Garces MD on 6/03/2025 at 9:22 AM     Finalized by (CST): Saleem Garces MD on 6/03/2025 at 9:32 AM          XR CHEST AP PORTABLE  (CPT=71045)  Result Date: 6/3/2025  PROCEDURE: XR CHEST AP PORTABLE  (CPT=71045) TIME: 2334.   COMPARISON: [facility], CT CHEST PE AORTA (IV ONLY) (CPT=71260), 6/03/2025, 1:17 AM.  [facility], XR CHEST AP PORTABLE (CPT=71045), 3/07/2025, 2:08 PM.  [facility], XR CHEST AP PORTABLE (CPT=71045), 4/09/2025, 8:48 PM.  INDICATIONS: Dialysis patient with shortness of breath.  TECHNIQUE:   Single view.   FINDINGS:  CARDIAC/VASC: The cardiomediastinal silhouette is not enlarged.  Stable presumed cardiac stents and loop recorder device.  There is mild tortuosity of the thoracic aorta with peripheral atherosclerotic vascular calcification. The pulmonary vascularity is  within normal limits. MEDIAST/TATI: Surgical clips are present. LUNGS/PLEURA: Mild interstitial opacities throughout both are new since prior exam.  No large pleural effusion or pneumothorax. BONES: Median sternotomy wires are demonstrated. Mild degenerative changes of the thoracic spine and shoulders are apparent. OTHER: Negative.          CONCLUSION:   Mild diffuse interstitial opacities throughout both lungs, which are new since prior exam from April, 2025 and most concerning  for interstitial/pulmonary edema.  Less likely that these relate to atypical/viral infection.  No large pleural effusion.  Sternotomy with coronary artery bypass.   A preliminary report was issued by the [facility] teleradiology service. There are no major discrepancies.  elm-remote  Dictated by (CST): Saleem Garces MD on 6/03/2025 at 8:19 AM     Finalized by (CST): Saleem Garces MD on 6/03/2025 at 8:21 AM            Pertinent Physical Exam At Time of Discharge  Vitals:    06/05/25 0519 06/05/25 0820 06/05/25 1026 06/05/25 1150   BP:  155/70 119/48    BP Location:  Right arm Right arm    Pulse:  70 70 70   Resp:  18 18    Temp:  98.4 °F (36.9 °C)     TempSrc:  Oral     SpO2:  98% 97% 95%   Weight: 183 lb 3.2 oz (83.1 kg)      Height:            General: no acute distress  Heart: RRR  Lungs: CTAB  Abd: Soft, NT, ND  Neuro: no focal deficits    Total time coordinating care > 30 mins.    Patient had opportunity to ask questions and stated understanding and agreed with therapeutic plan as outlined.     Alison Kaiser NP  6/5/2025      Patient seen and examined independently.  Discussed with APN and agree with note above    Patient improved.  Stable for discharge to home    GEN: NAD  RESP: CTAB  CV: RRR    Time of discharge >30 minutes    Leilani Wilson MD

## 2025-06-05 NOTE — PROGRESS NOTES
[facility]  part of [facility]    Progress Note      Subjective:     Tolerated dialysis well yesterday.  Currently off of oxygen.  Tmax 100.5.  Was short of breath last night but denies any shortness of breath today.  Saturating well on room air      Review of Systems:   Constitutional: negative for fatigue, fevers and weight loss  Eyes: negative for irritation, redness and visual disturbance  Ears, nose, mouth, throat, and face: negative for hearing loss and sore throat  Respiratory: negative for cough, hemoptysis and wheezing  Cardiovascular: negative for chest pain, exertional dyspnea, lower extremity edema  Gastrointestinal: negative for abdominal pain, diarrhea and nausea  Genitourinary:negative for dysuria, frequency and hematuria  Hematologic/lymphatic: negative for bleeding and easy bruising  Musculoskeletal:negative for back pain, bone pain and muscle weakness  Neurological: negative for gait problems, memory problems and seizures  Behavioral/Psych: negative for anxiety and depression  Endocrine: negative for polyuria and weight loss     Objective:   Temp:  [98.3 °F (36.8 °C)-100.5 °F (38.1 °C)] 98.4 °F (36.9 °C)  Pulse:  [70-98] 70  Resp:  [18-20] 18  BP: (119-207)/() 119/48  SpO2:  [86 %-98 %] 95 %  SpO2: 95 %     Intake/Output Summary (Last 24 hours) at 6/5/2025 1301  Last data filed at 6/5/2025 1033  Gross per 24 hour   Intake 930 ml   Output 1350 ml   Net -420 ml     Wt Readings from Last 3 Encounters:   06/05/25 183 lb 3.2 oz (83.1 kg)   04/11/25 172 lb 14.4 oz (78.4 kg)   03/10/25 175 lb 4.8 oz (79.5 kg)       General appearance: alert, appears stated age and cooperative  Head: Normocephalic, atraumatic  Eyes: conjunctivae/corneas clear  Throat: lips, mucosa, and tongue normal; teeth and gums normal  Neck:  no JVD, supple, symmetrical  Extremities: extremities normal, no edema  Skin: No rashes or lesions  Neurologic: Grossly normal  Psychiatric:  calm    Medications:  Current Facility-Administered Medications   Medication Dose Route Frequency    ipratropium-albuterol (Duoneb) 0.5-2.5 (3) MG/3ML inhalation solution 3 mL  3 mL Nebulization Q6H PRN    aspirin chewable tab 81 mg  81 mg Oral Daily    atorvastatin (Lipitor) tab 80 mg  80 mg Oral Nightly    atomoxetine (Strattera) cap 40 mg(patient own medication)  40 mg Oral Daily    buPROPion ER (Wellbutrin XL) 24 hr tab 300 mg  300 mg Oral Nightly    carvedilol (Coreg) tab 37.5 mg  37.5 mg Oral BID with meals    diazePAM (Valium) tab 5 mg  5 mg Oral BID    hydrALAZINE (Apresoline) tab 25 mg  25 mg Oral TID    levothyroxine (Synthroid) tab 125 mcg  125 mcg Oral Before breakfast    pantoprazole (Protonix) DR tab 40 mg  40 mg Oral QAM AC    acetaminophen (Tylenol Extra Strength) tab 500 mg  500 mg Oral Q4H PRN    melatonin tab 3 mg  3 mg Oral Nightly PRN    ondansetron (Zofran) 4 MG/2ML injection 4 mg  4 mg Intravenous Q6H PRN    metoclopramide (Reglan) 5 mg/mL injection 5 mg  5 mg Intravenous Q8H PRN    heparin (Porcine) 5000 UNIT/ML injection 5,000 Units  5,000 Units Subcutaneous Q8H JERRY    dextrose 2.5%, calcium 2.5 meq/L peritoneal solution  5,000 mL Intraperitoneal Once in dialysis    allopurinol (Zyloprim) tab 150 mg  150 mg Oral Daily    psyllium (Metamucil) oral packet 1 packet  1 packet Oral Daily    sertraline (Zoloft) tab 200 mg  200 mg Oral Nightly    glucose (Dex4) 15 GM/59ML oral liquid 15 g  15 g Oral Q15 Min PRN    Or    glucose (Glutose) 40% oral gel 15 g  15 g Oral Q15 Min PRN    Or    glucose-vitamin C (Dex-4) chewable tab 4 tablet  4 tablet Oral Q15 Min PRN    Or    dextrose 50% injection 50 mL  50 mL Intravenous Q15 Min PRN    Or    glucose (Dex4) 15 GM/59ML oral liquid 30 g  30 g Oral Q15 Min PRN    Or    glucose (Glutose) 40% oral gel 30 g  30 g Oral Q15 Min PRN    Or    glucose-vitamin C (Dex-4) chewable tab 8 tablet  8 tablet Oral Q15 Min PRN    insulin aspart (NovoLOG) 100 Units/mL FlexPen  1-5 Units  1-5 Units Subcutaneous TID CC    insulin degludec (Tresiba) 100 units/mL flextouch 20 Units  20 Units Subcutaneous Nightly    hydrALAzine (Apresoline) 20 mg/mL injection 10 mg  10 mg Intravenous Q6H PRN          Results:     Recent Labs   Lab 06/02/25  2323 06/04/25  0632   RBC 3.66* 2.88*   HGB 11.9* 9.6*   HCT 36.4 28.8*   MCV 99.5 100.0   NEPRELIM 9.66* 6.26   WBC 12.7* 9.3   .0 228.0     Recent Labs   Lab 06/02/25  2348 06/04/25  0632 06/05/25  1041   * 60* 119*   BUN 39* 34* 34*   CREATSERUM 4.05* 3.89* 3.90*   CA 8.5* 8.1* 7.9*   ALB  --  3.1* 3.2    139 137   K 4.3 3.6 3.9    102 101   CO2 27.0 28.0 27.0   ALKPHO  --  101  --    AST  --  <8  --    ALT  --  <7*  --    BILT  --  0.3  --    TP  --  5.0*  --      PTT   Date Value Ref Range Status   09/21/2023 63.6 (H) 23.3 - 35.6 seconds Final     Comment:     Elevations of the aPTT in patients not receiving  anticoagulant therapy (Heparin, etc.), may be  seen in Factor deficiency, vitamin K deficiency,  factor inhibitors, liver disease, etc.  Clinical correlation is recommended.     09/19/2017 33.6 25.0 - 34.0 seconds Final   07/16/2014 26.0 25.0 - 34.0 SECONDS Final     Comment:     The aPTT Heparin Theraputic Range is approximately 65 - 104  seconds. The theraputic range has been validated against  0.3 - 0.7 heparin anti-Xa units/mL.       INR   Date Value Ref Range Status   09/19/2017 0.99 0.89 - 1.11 Final   07/16/2014 1.10 (H) 0.93 - 1.07 Final     Comment:     INR Therapeutic Interval Group A (2.00-3.00)  Venous Thrombosis, Pulmonary   Systemic Thrombosis,  Tissue Heart Valve, Acute Myocardial Infarction,  Valvular Heart Disease or Atrial Fibrillation  INR Therapeutic Interval Group B (2.50-3.50)  Recurrent Systemic Embolism, or  Mechanical Prosthetic Valve       No results for input(s): \"BNP\" in the last 168 hours.  Recent Labs   Lab 06/04/25  0632 06/05/25  1041   MG 1.5* 1.5*   PHOS 4.5 4.1        Recent Labs   Lab  06/04/25  0632 06/05/25  1041   PHOS 4.5 4.1   ALB 3.1* 3.2       No results found.            Assessment and Plan:     Patient is a 69 year old female with past medical history of ESRD on peritoneal dialysis, HFpEF with EF 55-60%, CAD s/p PCI, hypothyroidism, orthostatic hypotension, anemia who presented with shortness of breath and wheezing     1.ESRD: On PD  Patient on CCPD for 10 hours with 2500 fill volume.  Alternate 1.5% 2 days followed by 1.5/2.5% 2 days and so on  Last fill extraneal  S/p PD last night with 1 L UF.    Hypomagnesemia-repeat IV magnesium today  Serum potassium within normal limits     2.Orthostatic hypotension: Severe supine hypertension  Denies any dizziness     3.anemia: Secondary chronic kidney disease  Will give a dose of Epogen today  Lasix 40 mg p.o. on discharge at home     4.hypocalcemia: Albumin low.  Corrected calcium almost within normal limit  Serum Phos within normal limits     5.pulmonary edema: Hypoxia - off of oxygen  2 mg IV Bumex today.-Off of oxygen  Respiratory panel +ve Rhinovirus.  History of COVID in March 2020     6.CAD s/p PCI/ HFpEF with EF 55-60%,:  Hypoxia and pulmonary edema.  Not on any diuretics at home   Bumex 2 mg IV 1 dose  Will start patient on oral diuretics on dc-Lasix 40 mg p.o.    Discussed with nursing       Christopher Woody MD

## 2025-06-05 NOTE — PROGRESS NOTES
AVS and discharge instructions reviewed with pt.  IV and tele discontinued.  Discharged to home with family.

## 2025-06-05 NOTE — CM/SW NOTE
06/05/25 1300   Discharge disposition   Expected discharge disposition Home-Health  ([facility])   Post Acute Care Provider   ( [facility])   Discharge transportation Private car     Per chart, pt has DC order in place.    CRISTINA Dean confirmed pt is on RA w/ stable O2 sats.   Per RN, pt reached out to family for a ride. Pending response. If no response w/in the hour, pt will likely need a ride home. RN to update .     Home Health notified via [employer] messenger of plan for DC today.    Pt is cleared from SW/ stand point.    PLAN: [facility] w/  HHC, family likely to transport home        MAYELIN Blandon, LSW [id number]

## 2025-06-12 NOTE — PAYOR COMM NOTE
Payor: [employer]  Subscriber #:  [id number]  Authorization Number: [id number]    Admit date: 6/4/25  Admit time:   4:56 PM  Discharge Date: 6/5/2025  2:50 PM           Hospitalist Discharge Summary    Admission Date/Time  6/2/2025 11:13 PM  Discharge Date  06/05/25  Disposition:  Return to assisted living at [facility] Problems/Hospital Course Summary  his is a 69-year-old female with a complicated medical history including ESRD on peritoneal dialysis since 1/2024, diabetes, coronary artery disease, depression, orthostatic hypotension, previous syncopal,  events chronic diastolic CHF, COVID 3/2025 who presents with shortness of breath, wheezing and chest pain found to have acute hypoxic hemic respiratory failure due to rhinovirus and acute on chronic diastolic heart failure with uncontrolled blood pressure.  Patient was treated with extra peritoneal dialysis and 2 doses of IV diuretics, her outpatient PD solution was recently changed about 1 week ago to help with increased fluid removal.  Patient was noted to have an abnormal CT of the chest with multifocal groundglass density opacifications and groundglass nodules throughout the lungs and will need to have follow-up in the outpatient setting.  Patient has PCP appointment made for June 9 at 120 via [employer] car     Rhinovirus   -supportive care    Acute on Chronic Diastolic CHF  -4/2025 echo with EF 55-60%  -pro bnp >35k  -CT chest without PE, noted chf, multifocal groundglass density opacifications and groundglass nodules throughout lungs  -s/p lasix and Bumex in hospital  -Extra PD done, recently had PD diastole changed for more fluid removal    Abnl CT chest  --CT chest without PE, noted chf, multifocal groundglass density opacifications and groundglass nodules throughout lungs  - Will need follow-up imaging as outpatient for resolution     Chest Pain-resolved  CAD S/P Stents  -4/2025 echo with EF 55-60%  -CT with multivessel  cad  -troponin negative and EKG with ST, no ischemic changes   -Continue aspirin and statin     Cirrhosis-noted on imagine   Ascites  -CT with moderate ascites     CHRONIC MEDICAL PROBLEMS      Chronic Neurogenic orthostatic hypotension and Supine HTN  -noted blood pressure ~140 up to 212  -continue coreg and hydralazine 25 mg tid, hold if sbp less than 150  -home atomextine increased to 40 mg daily   -encouraged po intake and to prevent dehydration  -rec support stocking, slow position changes and PT  -Monitor home blood pressures and call with abnormally low or high readings  -follow up with Dr Galdamez     DM Type II--HgbA1C 7.3, Continue home regimen: toujeo 22 units nightly  Hypothyroidism-- Continue Synthroid  Depression-continue home meds, follow-up with psychiatry and therapy  Intermittent Diarrhea--continue metamucil        Medication Changes  NONE     Important Follow Up Items  Labs:  NO  Imaging: CT chest without PE, noted chf, multifocal groundglass density opacifications and groundglass nodules throughout lungs. Needs outpt follow up  Medications: NO  Incidental findings: NO    CT CHEST PE AORTA (IV ONLY) (CPT=71260)  Result Date: 6/3/2025  PROCEDURE: CT CHEST PE AORTA (IV ONLY) (CPT=71260)  COMPARISON: [facility], CT ABDOMEN+PELVIS (CPT=74176), 4/09/2025, 8:40 PM.  INDICATIONS: SOB  TECHNIQUE: Multidetector CT images of the chest were obtained with non-ionic intravenous contrast material. Automated exposure control for dose reduction was used. Adjustment of the mA and/or kV was done based on the patient's size. Iterative reconstruction technique for dose reduction was employed. Multiplanar reformats and maximum intensity projection images were created.   FINDINGS: VASCULATURE: There is adequate opacification of the pulmonary arterial tree. No suspicious filling defects are identified in the main, lobar, segmental, or proximal subsegmental pulmonary artery branches to suggest acute  pulmonary embolism. The distal subsegmental branches are less well assessed.  Borderline dilation of the main pulmonary artery trunk may relate to underlying pulmonary hypertension.  Please note that overall evaluation is mildly limited by respiratory motion artifact. CARDIAC: The heart is mild-to-moderately enlarged.  Multi-vessel coronary artery and mitral annular calcification.  Coronary artery bypass also noted.  There is no bowing of the interventricular septum to suggest right ventricular strain. THORACIC AORTA: Unremarkable configuration without aneurysm or dissection.  LUNGS/PLEURA: Trace bilateral pleural effusions are new since prior abdominal CT.  There is also new interlobular septal thickening throughout both lungs.  Multifocal ground-glass density opacities throughout both lungs are new since prior.  There are also small scattered ground-glass density nodules, which include a reference 0.9 cm nodule in the right upper lobe (series 12, image 57).  No pneumothorax.  Subpleural 3 mm micro nodule in the left lung apex (series 12, image 26).  Additional linear 4 mm  subpleural nodule in the left upper lobe series 12, image 59).  Given small size and morphology/location both nodules are likely benign/incidental.  Small scattered calcified granulomas. AIRWAYS: The tracheobronchial tree is without central mass or obstructing lesion. MEDIASTINUM/TAIT: No mass or lymphadenopathy.  CHEST WALL: No axillary mass or lymphadenopathy.  LIMITED ABDOMEN: Moderate volume intra-abdominal ascites.  Small retrocardiac hiatal hernia.  Cholelithiasis.  Fairly extensive abdominal atherosclerosis. BONES: Stable chronic/healed right lateral 8th-10th rib fractures.  Diffuse idiopathic skeletal hyperostosis.  Slight levoscoliosis of the thoracic spine with multilevel thoracic spine degeneration.  Sternotomy wires. OTHER: Negative.          CONCLUSION:  1. No evidence of acute pulmonary embolism. 2. Findings of congestive  failure/fluid overload with trace bilateral pleural effusions and diffuse interstitial edema throughout both lungs.  These are new since recent prior April, 2025 abdominal CT.  There is also moderate volume upper abdominal ascites. 3. Multifocal ground-glass density opacities and scattered subcentimeter ground-glass density nodules throughout both lungs, which are also new since prior.  These are favored to represent pulmonary edema or less likely relate to coexistent atypical/viral infection.  Follow-up to resolution is advised. 4. Cardiomegaly with multi-vessel coronary artery and mitral annular calcification in addition to coronary artery bypass. 5. Dilatation of the main pulmonary artery trunk may relate to underlying pulmonary hypertension. 6. Small retrocardiac hiatal hernia. 7. Cholelithiasis. 8. Lesser incidental findings as above.   A preliminary report was issued by the [facility] teleradiology service. There are no major discrepancies.  [facility]-remote  Dictated by (CST): Saleem Garces MD on 6/03/2025 at 9:22 AM     Finalized by (CST): Saleem Garces MD on 6/03/2025 at 9:32 AM          XR CHEST AP PORTABLE  (CPT=71045)  Result Date: 6/3/2025  PROCEDURE: XR CHEST AP PORTABLE  (CPT=71045) TIME: 2334.   COMPARISON: [facility], CT CHEST PE AORTA (IV ONLY) (CPT=71260), 6/03/2025, 1:17 AM.  [facility], XR CHEST AP PORTABLE (CPT=71045), 3/07/2025, 2:08 PM.  [facility], XR CHEST AP PORTABLE (CPT=71045), 4/09/2025, 8:48 PM.  INDICATIONS: Dialysis patient with shortness of breath.  TECHNIQUE:   Single view.   FINDINGS:  CARDIAC/VASC: The cardiomediastinal silhouette is not enlarged.  Stable presumed cardiac stents and loop recorder device.  There is mild tortuosity of the thoracic aorta with peripheral atherosclerotic vascular calcification. The pulmonary vascularity is  within normal limits. MEDIAST/TATI: Surgical clips are present. LUNGS/PLEURA: Mild  interstitial opacities throughout both are new since prior exam.  No large pleural effusion or pneumothorax. BONES: Median sternotomy wires are demonstrated. Mild degenerative changes of the thoracic spine and shoulders are apparent. OTHER: Negative.          CONCLUSION:   Mild diffuse interstitial opacities throughout both lungs, which are new since prior exam from April, 2025 and most concerning for interstitial/pulmonary edema.  Less likely that these relate to atypical/viral infection.  No large pleural effusion.  Sternotomy with coronary artery bypass.   A preliminary report was issued by the [facility] teleradiology service. There are no major discrepancies.  [facility]-remote  Dictated by (CST): Saleem Garces MD on 6/03/2025 at 8:19 AM     Finalized by (CST): Saleem Garces MD on 6/03/2025 at 8:21 AM            Pertinent Physical Exam At Time of Discharge  Vitals:    06/05/25 0519 06/05/25 0820 06/05/25 1026 06/05/25 1150   BP:  155/70 119/48    BP Location:  Right arm Right arm    Pulse:  70 70 70   Resp:  18 18    Temp:  98.4 °F (36.9 °C)     TempSrc:  Oral     SpO2:  98% 97% 95%   Weight: 183 lb 3.2 oz (83.1 kg)      Height:          General: no acute distress  Heart: RRR  Lungs: CTAB  Abd: Soft, NT, ND  Neuro: no focal deficits

## 2025-06-12 NOTE — PAYOR COMM NOTE
--------------  ADMISSION REVIEW     Payor: [employer]  Subscriber #:  [id number]  Authorization Number: [id number]    Admit date: 6/4/25  Admit time:  4:56 PM     Admit Orders (From admission, onward)       Start        06/04/25 1656  Admit to inpatient                06/03/25 0342  Place in observation                                 REVIEW DOCUMENTATION:     ED Provider Notes      HPI  Patient is a 69-year-old female who arrives by EMS from nursing home with shortness of breath that started while preparing to do dialysis around 7:30 PM tonight.  She states she completed only part of the dialysis.  She does have intermittent chest pains.  She states that she felt like she was wheezing.  Denies any recent fevers, cough or leg swelling.    ED Triage Vitals   BP 06/02/25 2319 (!) 177/94   Pulse 06/02/25 2319 107   Resp 06/02/25 2319 24   Temp 06/02/25 2319 99.2 °F (37.3 °C)   Temp src 06/03/25 0153 Temporal   SpO2 06/02/25 2319 92 %   O2 Device 06/02/25 2319 None (Room air)     Current Vitals:   Vital Signs  BP: (!) 181/86  Pulse: 90  Resp: 22  Temp: 97.6 °F (36.4 °C)  Temp src: Temporal  MAP (mmHg): (!) 114    Oxygen Therapy  SpO2: 96 %  O2 Device: Nasal cannula  O2 Flow Rate (L/min): 2 L/min    Physical Exam  GENERAL: moderate distress, awake and alert, speaking in short sentences  HEENT: EOMI, PERRL  Neck: supple, no jvd  CV: RRR, no murmurs  Resp: +rhonchi b/l  Extremities: FROM of all extremities  Neuro: CN intact, 5/5 motor strength in all extremities, no focal deficits  SKIN: warm, dry, no rashes    Labs Reviewed   BASIC METABOLIC PANEL (8) - Abnormal; Notable for the following components:       Result Value    Glucose 169 (*)     BUN 39 (*)     Creatinine 4.05 (*)     BUN/CREA Ratio 9.6 (*)     Calcium, Total 8.5 (*)     Calculated Osmolality 303 (*)     eGFR-Cr 11 (*)     All other components within normal limits   CBC WITH DIFFERENTIAL WITH PLATELET - Abnormal; Notable for the following  components:    WBC 12.7 (*)     RBC 3.66 (*)     HGB 11.9 (*)     RDW-SD 53.5 (*)     Neutrophil Absolute Prelim 9.66 (*)     Neutrophil Absolute 9.66 (*)     All other components within normal limits   D-DIMER - Abnormal; Notable for the following components:    D-Dimer 1.43 (*)     All other components within normal limits   PRO BETA NATRIURETIC PEPTIDE - Abnormal; Notable for the following components:    Pro-Beta Natriuretic Peptide >35,000 (*)    EKG    Rate, intervals and axes as noted on EKG Report.  Rate: 107  Rhythm: Sinus Rhythm  Reading: no TRINH, normal EKG    CT CHEST PULMONARY ANGIOGRAM WITH IV CONTRAST        IMPRESSION:  -Mild pulmonary edema, small bilateral pleural effusions, and cardiomegaly are suspicious for CHF/volume overload.    -No focal consolidation.  -No thoracic aortic aneurysm.  -Moderate amount of upper abdominal ascites partially visualized.      Discussion of management or test interpretation with external provider(s): D/w dr Hammond hospitalist  Dr orozco nephrology notified      Disposition and Plan     Clinical Impression:  1. Acute pulmonary edema (HCC)       Disposition:  Admit  6/3/2025  2:31 am     History and Physical  Admit Date:  6/2/2025     Is this a shared or split note between Advanced Practice Provider and Physician? Yes     ASSESSMENT / PLAN:   This is a 69-year-old female with a complicated medical history including ESRD on peritoneal dialysis since 1/2024, diabetes, coronary artery disease, depression, orthostatic hypotension, previous syncopal,  events chronic diastolic CHF, COVID 3/2025 who presents with shortness of breath, wheezing and chest pain     Acute Hypoxemic Respiratory Failure   -low grade temp. WBC 12.7. PCT ordered   -pro bnp >35k  -CT chest without PE, noted chf, multifocal groundglass density opacifications and groundglass nodules throughout lungs  -negative for covid, influenza and RSV, full RVP pending  -see management of chf below   -follow up outpt  CT chest as outpt for resolution   Wean o2     Acute on Chronic Diastolic CHF  -4/2025 echo with EF 55-60%  -pro bnp >35k  -CT chest without PE, noted chf, multifocal groundglass density opacifications and groundglass nodules throughout lungs  -s/p lasix in ER and dose of bumex this am    -Difficult to manage her blood pressure in light of her neurogenic orthostatic hypotension and supine hypertension-see below  -PD per Renal      Chest Pain  CAD S/P Stents  -4/2025 echo with EF 55-60%  -CT with multivessel cad  -troponin negative   -EKG with ST, no ischemic changes   -cards consult      Cirrhosis-noted on imagine   Ascites  -CT with moderate ascites       Chronic Neurogenic orthostatic hypotension and Supine HTN  -noted blood pressure ~140 up to 212  -continue coreg and hydralazine 25 mg tid, hold if sbp less than 150  -home atomextine increased to 40 mg daily   -encouraged po intake and to prevent dehydration  -rec support stocking, slow position changes and PT  -Monitor home blood pressures and call with abnormally low or high readings  -follow up with Dr Rudolph MCCORMICK Type II  -HgbA1C 7.3  -continue home regimen: toujeo 22 units nightly  -tresiba 20 units nightly plus ssi prn  -accuchecks  -ADA diet      Cirrhosis-noted on imagine   Ascites  -CT with moderate ascites      Hypothyroidism  - Continue Synthroid     Intermittent Diarrhea  -continue metamucil     FEN  -lytes in am  -diet-cardiac      Prophy  -SCD  -heparin       6/4/25        Assessment and Plan:     This is a 69-year-old female with a complicated medical history including ESRD on peritoneal dialysis since 1/2024, diabetes, coronary artery disease, depression, orthostatic hypotension, previous syncopal,  events chronic diastolic CHF, COVID 3/2025 who presents with shortness of breath, wheezing and chest pain found to have acute hypoxic hemic respiratory failure due to rhinovirus and acute on chronic diastolic heart failure with uncontrolled blood  pressure.     Acute Hypoxemic Respiratory Failure 2/2 Rhinovirus and CHF  -tmax 101.3. WBC 12.7-->9.3. PCT 0.14  -pro bnp >35k  -CT chest without PE, noted chf, multifocal groundglass density opacifications and groundglass nodules throughout lungs  -RVP + rhinovirus, support care   -tx for CHF tx below   -follow up outpt CT chest as outpt for resolution   -on RA     Acute on Chronic Diastolic CHF  -4/2025 echo with EF 55-60%  -pro bnp >35k  -CT chest without PE, noted chf, multifocal groundglass density opacifications and groundglass nodules throughout lungs  -s/p lasix in ER and dose of bumex this am    -Difficult to manage her blood pressure in light of her neurogenic orthostatic hypotension and supine hypertension-see below  -PD per Renal      Chest Pain-resolved  CAD S/P Stents  -4/2025 echo with EF 55-60%  -CT with multivessel cad  -troponin negative   -EKG with ST, no ischemic changes      Cirrhosis-noted on imagine   Ascites  -CT with moderate ascites       Chronic Neurogenic orthostatic hypotension and Supine HTN  -noted blood pressure ~140 up to 212  -continue coreg and hydralazine 25 mg tid, hold if sbp less than 150  -home atomextine increased to 40 mg daily   -encouraged po intake and to prevent dehydration  -rec support stocking, slow position changes and PT  -Monitor home blood pressures and call with abnormally low or high readings  -follow up with Dr Rudolph MCCORMICK Type II  -HgbA1C 7.3  -continue home regimen: toujeo 22 units nightly  -tresiba 20 units nightly plus ssi prn  -accuchecks  -ADA diet      Cirrhosis-noted on imagine   Ascites  -CT with moderate ascites      Hypothyroidism  - Continue Synthroid    Intermittent Diarrhea  -continue metamucil       FEN  -lytes in am  -diet-cardiac      Prophy  -SCD  -heparin        SUBJECTIVE:   Noted fever of 101.3 yesterday.  Patient states she is feeling markedly better.  She was on peritoneal dialysis for about 10 hours yesterday and will restart today at  4 PM.  Her blood pressure is improved.  She states that the nebulizers are helping with her breathing as she was found to have rhinovirus.     Blood pressure 150/62, pulse 88, temperature 98.6 °F (37 °C), temperature source Oral, resp. rate 20, height 5' 4\" (1.626 m), weight 181 lb 14.1 oz (82.5 kg), SpO2 96%, not currently breastfeeding.    NEURO: A/A Ox3  RESP: non labored, fine rales   CARDIO: Regular, no murmur  ABD: soft, NT, ND, BS+  EXTREMITIES: no edema     Lab Results   Component Value Date     WBC 9.3 06/04/2025     HGB 9.6 06/04/2025     HCT 28.8 06/04/2025     .0 06/04/2025     CREATSERUM 3.89 06/04/2025     BUN 34 06/04/2025      06/04/2025     K 3.6 06/04/2025      06/04/2025     CO2 28.0 06/04/2025     GLU 60 06/04/2025     CA 8.1 06/04/2025     ALB 3.1 06/04/2025     ALKPHO 101 06/04/2025     BILT 0.3 06/04/2025     TP 5.0 06/04/2025     AST <8 06/04/2025     ALT <7 06/04/2025     MG 1.5 06/04/2025     PHOS 4.5 06/04/2025      [Scheduled Medications]   dextrose 2.5%, calcium 2.5 meq/L  5,000 mL Intraperitoneal Once in dialysis    aspirin  81 mg Oral Daily    atorvastatin  80 mg Oral Nightly    atomoxetine  40 mg Oral Daily    buPROPion ER  300 mg Oral Nightly    carvedilol  37.5 mg Oral BID with meals    diazePAM  5 mg Oral BID    hydrALAZINE  25 mg Oral TID    levothyroxine  125 mcg Oral Before breakfast    pantoprazole  40 mg Oral QAM AC    heparin  5,000 Units Subcutaneous Q8H JERRY    dextrose 2.5%, calcium 2.5 meq/L  5,000 mL Intraperitoneal Once in dialysis    allopurinol  150 mg Oral Daily    psyllium  1 packet Oral Daily    sertraline  200 mg Oral Nightly    insulin aspart  1-5 Units Subcutaneous TID CC    insulin degludec  20 Units Subcutaneous Nightly     IMAGING      CT CHEST PE AORTA (IV ONLY) (CPT=71260)  Result Date: 6/3/2025  CONCLUSION:         1. No evidence of acute pulmonary embolism. 2. Findings of congestive failure/fluid overload with trace bilateral pleural  effusions and diffuse interstitial edema throughout both lungs.  These are new since recent prior April, 2025 abdominal CT.  There is also moderate volume upper abdominal ascites. 3. Multifocal ground-glass density opacities and scattered subcentimeter ground-glass density nodules throughout both lungs, which are also new since prior.  These are favored to represent pulmonary edema or less likely relate to coexistent atypical/viral infection.  Follow-up to resolution is advised. 4. Cardiomegaly with multi-vessel coronary artery and mitral annular calcification in addition to coronary artery bypass. 5. Dilatation of the main pulmonary artery trunk may relate to underlying pulmonary hypertension. 6. Small retrocardiac hiatal hernia. 7. Cholelithiasis. 8. Lesser incidental findings as above.   A preliminary report was issued by the [facility] teleradiology service. There are no major discrepancies.  elm-remote  Dictated by (CST): Saleem Garces MD on 6/03/2025 at 9:22 AM     Finalized by (CST): Saleem Gacres MD on 6/03/2025 at 9:32 AM           XR CHEST AP PORTABLE  (CPT=71045)  Result Date: 6/3/2025  CONCLUSION:          Mild diffuse interstitial opacities throughout both lungs, which are new since prior exam from April, 2025 and most concerning for interstitial/pulmonary edema.  Less likely that these relate to atypical/viral infection.  No large pleural effusion.  Sternotomy with coronary artery bypass.   A preliminary report was issued by the [facility] teleradiology service. There are no major discrepancies.  elm-remote  Dictated by (CST): Saleem Garces MD on 6/03/2025 at 8:19 AM     Finalized by (CST): Saleem Garces MD on 6/03/2025 at 8:21 AM                            Vitals (last day) before discharge       Date/Time Temp Pulse Resp BP SpO2 Weight O2 Device O2 Flow Rate (L/min) Who    06/05/25 1150 -- 70 -- -- 95 % -- None (Room air) -- RP    06/05/25 1116 -- -- -- 107/50 -- -- -- -- DP     06/05/25 1045 -- 80 -- 107/50 -- -- -- -- GF    06/05/25 1026 -- 70 18 119/48 97 % -- None (Room air) -- RP    06/05/25 0820 98.4 °F (36.9 °C) 70 18 155/70 98 % -- Nasal cannula 2 L/min RP    06/05/25 0519 -- -- -- -- -- 183 lb 3.2 oz (83.1 kg) -- -- ES    06/05/25 0500 98.3 °F (36.8 °C) -- -- -- -- -- -- -- CC    06/05/25 0300 -- 81 -- -- -- -- -- -- GT    06/05/25 0140 100.5 °F (38.1 °C) 86 18 142/71 97 % -- Nasal cannula 2 L/min CC    06/04/25 2349 99.8 °F (37.7 °C) 98 18 187/93 95 % -- Nasal cannula 2 L/min CC    06/04/25 2300 -- -- -- 203/98 -- -- -- -- CC    06/04/25 2233 -- 96 18 202/114 97 % -- Nasal cannula 2 L/min CC    06/04/25 2140 -- -- -- -- 92 % -- Nasal cannula 2 L/min MW    06/04/25 2138 -- -- -- 206/93 -- -- -- -- MW    06/04/25 2135 100.5 °F (38.1 °C) 95 18 207/96 86 % -- None (Room air) -- MW    06/04/25 1900 -- 85 -- -- -- -- -- -- GT    06/04/25 1801 -- 86 -- 172/74 -- -- -- -- MWA    06/04/25 1731 -- 85 -- 191/94 -- -- -- -- MWA    06/04/25 1703 98.8 °F (37.1 °C) 78 19 189/93 92 % -- None (Room air) -- MWA    06/04/25 1430 98.9 °F (37.2 °C) 78 20 161/70 95 % -- None (Room air) -- [facility]    06/04/25 0848 -- 88 -- 150/62 -- -- -- -- [facility]    06/04/25 0821 98.6 °F (37 °C) 88 20 158/73 96 % -- None (Room air) -- [facility]    06/04/25 0515 -- -- -- -- -- 181 lb 14.1 oz (82.5 kg) -- -- ES    06/04/25 0515 99.3 °F (37.4 °C) 76 20 139/80 96 % -- Nasal cannula 1 L/min AG    06/04/25 0300 -- 76 -- -- -- -- -- -- GT

## 2025-06-27 RX ORDER — CARVEDILOL 12.5 MG/1
37.5 TABLET ORAL 2 TIMES DAILY WITH MEALS
Qty: 180 TABLET | Refills: 5 | Status: SHIPPED | OUTPATIENT
Start: 2025-06-27

## 2025-06-27 RX ORDER — POTASSIUM CHLORIDE 1500 MG/1
1 TABLET, EXTENDED RELEASE ORAL DAILY
Qty: 90 TABLET | Refills: 1 | Status: SHIPPED | OUTPATIENT
Start: 2025-06-27

## 2025-06-27 NOTE — TELEPHONE ENCOUNTER
Pharmacy is requesting a refill to be sent.    Amlodipine 10 mg       Potassium Chloride ER 20 MEQ Oral Tab CR, Take 1 tablet by mouth in the morning., Disp: , Rfl:       carvedilol 12.5 MG Oral Tab, Take 3 tablets (37.5 mg total) by mouth in the morning and 3 tablets (37.5 mg total) in the evening. Take with meals., Disp: , Rfl:

## 2025-08-10 ENCOUNTER — APPOINTMENT (OUTPATIENT)
Dept: CT IMAGING | Facility: HOSPITAL | Age: 70
End: 2025-08-10
Attending: EMERGENCY MEDICINE

## 2025-08-10 ENCOUNTER — HOSPITAL ENCOUNTER (INPATIENT)
Facility: HOSPITAL | Age: 70
LOS: 4 days | Discharge: HOME HEALTH | End: 2025-08-14
Attending: EMERGENCY MEDICINE | Admitting: INTERNAL MEDICINE

## 2025-08-10 ENCOUNTER — HOSPITAL ENCOUNTER (INPATIENT)
Facility: HOSPITAL | Age: 70
LOS: 4 days | Discharge: HOME | End: 2025-08-14
Attending: EMERGENCY MEDICINE | Admitting: INTERNAL MEDICINE

## 2025-08-10 DIAGNOSIS — K65.9 PERITONITIS (HCC): ICD-10-CM

## 2025-08-10 DIAGNOSIS — R10.9 ABDOMINAL PAIN, ACUTE: Primary | ICD-10-CM

## 2025-08-10 DIAGNOSIS — N18.6 ESRD (END STAGE RENAL DISEASE) (HCC): ICD-10-CM

## 2025-08-10 LAB
ALBUMIN SERPL-MCNC: 4 G/DL (ref 3.2–4.8)
ALBUMIN/GLOB SERPL: 1.6 (ref 1–2)
ALP LIVER SERPL-CCNC: 146 U/L (ref 55–142)
ALT SERPL-CCNC: 9 U/L (ref 10–49)
ANION GAP SERPL CALC-SCNC: 11 MMOL/L (ref 0–18)
AST SERPL-CCNC: 15 U/L (ref ?–34)
BASOPHILS # BLD AUTO: 0.05 X10(3) UL (ref 0–0.2)
BASOPHILS NFR BLD AUTO: 0.3 %
BILIRUB SERPL-MCNC: 1.1 MG/DL (ref 0.2–1.1)
BUN BLD-MCNC: 37 MG/DL (ref 9–23)
BUN/CREAT SERPL: 6.8 (ref 10–20)
CALCIUM BLD-MCNC: 7.9 MG/DL (ref 8.7–10.4)
CHLORIDE SERPL-SCNC: 94 MMOL/L (ref 98–112)
CO2 SERPL-SCNC: 22 MMOL/L (ref 21–32)
CREAT BLD-MCNC: 5.41 MG/DL (ref 0.55–1.02)
DEPRECATED RDW RBC AUTO: 46.6 FL (ref 35.1–46.3)
EGFRCR SERPLBLD CKD-EPI 2021: 8 ML/MIN/1.73M2 (ref 60–?)
EOSINOPHIL # BLD AUTO: 0 X10(3) UL (ref 0–0.7)
EOSINOPHIL NFR BLD AUTO: 0 %
ERYTHROCYTE [DISTWIDTH] IN BLOOD BY AUTOMATED COUNT: 13.6 % (ref 11–15)
GLOBULIN PLAS-MCNC: 2.5 G/DL (ref 2–3.5)
GLUCOSE BLD-MCNC: 183 MG/DL (ref 70–99)
GLUCOSE BLDC GLUCOMTR-MCNC: 142 MG/DL (ref 70–99)
HCT VFR BLD AUTO: 35.6 % (ref 35–48)
HGB BLD-MCNC: 12.3 G/DL (ref 12–16)
IMM GRANULOCYTES # BLD AUTO: 0.05 X10(3) UL (ref 0–1)
IMM GRANULOCYTES NFR BLD: 0.3 %
LACTATE SERPL-SCNC: 1.9 MMOL/L (ref 0.5–2)
LIPASE SERPL-CCNC: 30 U/L (ref 12–53)
LYMPHOCYTES # BLD AUTO: 2.1 X10(3) UL (ref 1–4)
LYMPHOCYTES NFR BLD AUTO: 13.9 %
MCH RBC QN AUTO: 32.7 PG (ref 26–34)
MCHC RBC AUTO-ENTMCNC: 34.6 G/DL (ref 31–37)
MCV RBC AUTO: 94.7 FL (ref 80–100)
MONOCYTES # BLD AUTO: 0.25 X10(3) UL (ref 0.1–1)
MONOCYTES NFR BLD AUTO: 1.7 %
MRSA DNA SPEC QL NAA+PROBE: NEGATIVE
NEUTROPHILS # BLD AUTO: 12.66 X10 (3) UL (ref 1.5–7.7)
NEUTROPHILS # BLD AUTO: 12.66 X10(3) UL (ref 1.5–7.7)
NEUTROPHILS NFR BLD AUTO: 83.8 %
OSMOLALITY SERPL CALC.SUM OF ELEC: 277 MOSM/KG (ref 275–295)
PLATELET # BLD AUTO: 235 10(3)UL (ref 150–450)
POTASSIUM SERPL-SCNC: 5.4 MMOL/L (ref 3.5–5.1)
PROCALCITONIN SERPL-MCNC: 5.75 NG/ML (ref ?–0.05)
PROT SERPL-MCNC: 6.5 G/DL (ref 5.7–8.2)
RBC # BLD AUTO: 3.76 X10(6)UL (ref 3.8–5.3)
SODIUM SERPL-SCNC: 127 MMOL/L (ref 136–145)
WBC # BLD AUTO: 15.1 X10(3) UL (ref 4–11)

## 2025-08-10 PROCEDURE — 3E1M39Z IRRIGATION OF PERITONEAL CAVITY USING DIALYSATE, PERCUTANEOUS APPROACH: ICD-10-PCS | Performed by: HOSPITALIST

## 2025-08-10 PROCEDURE — 74176 CT ABD & PELVIS W/O CONTRAST: CPT | Performed by: EMERGENCY MEDICINE

## 2025-08-10 RX ORDER — HYDROMORPHONE HYDROCHLORIDE 1 MG/ML
0.5 INJECTION, SOLUTION INTRAMUSCULAR; INTRAVENOUS; SUBCUTANEOUS EVERY 2 HOUR PRN
Status: DISCONTINUED | OUTPATIENT
Start: 2025-08-10 | End: 2025-08-14

## 2025-08-10 RX ORDER — SERTRALINE HYDROCHLORIDE 100 MG/1
200 TABLET, FILM COATED ORAL NIGHTLY
Status: DISCONTINUED | OUTPATIENT
Start: 2025-08-10 | End: 2025-08-14

## 2025-08-10 RX ORDER — NICOTINE POLACRILEX 4 MG
30 LOZENGE BUCCAL
Status: DISCONTINUED | OUTPATIENT
Start: 2025-08-10 | End: 2025-08-14

## 2025-08-10 RX ORDER — ACETAMINOPHEN 500 MG
500 TABLET ORAL EVERY 4 HOURS PRN
Status: DISCONTINUED | OUTPATIENT
Start: 2025-08-10 | End: 2025-08-14

## 2025-08-10 RX ORDER — DIAZEPAM 5 MG/1
5 TABLET ORAL 2 TIMES DAILY
Status: DISCONTINUED | OUTPATIENT
Start: 2025-08-10 | End: 2025-08-14

## 2025-08-10 RX ORDER — AMLODIPINE BESYLATE 10 MG/1
10 TABLET ORAL DAILY
Status: DISCONTINUED | OUTPATIENT
Start: 2025-08-11 | End: 2025-08-14

## 2025-08-10 RX ORDER — BISACODYL 10 MG
10 SUPPOSITORY, RECTAL RECTAL
Status: DISCONTINUED | OUTPATIENT
Start: 2025-08-10 | End: 2025-08-14

## 2025-08-10 RX ORDER — ONDANSETRON 2 MG/ML
4 INJECTION INTRAMUSCULAR; INTRAVENOUS EVERY 6 HOURS PRN
Status: DISCONTINUED | OUTPATIENT
Start: 2025-08-10 | End: 2025-08-14

## 2025-08-10 RX ORDER — ATORVASTATIN CALCIUM 80 MG/1
80 TABLET, FILM COATED ORAL NIGHTLY
Status: DISCONTINUED | OUTPATIENT
Start: 2025-08-10 | End: 2025-08-14

## 2025-08-10 RX ORDER — VANCOMYCIN HYDROCHLORIDE
15 EVERY 24 HOURS
Status: DISCONTINUED | OUTPATIENT
Start: 2025-08-11 | End: 2025-08-10 | Stop reason: DRUGHIGH

## 2025-08-10 RX ORDER — BUPROPION HYDROCHLORIDE 150 MG/1
300 TABLET ORAL NIGHTLY
Status: DISCONTINUED | OUTPATIENT
Start: 2025-08-10 | End: 2025-08-14

## 2025-08-10 RX ORDER — INSULIN GLARGINE 100 [IU]/ML
22 INJECTION, SOLUTION SUBCUTANEOUS NIGHTLY
COMMUNITY

## 2025-08-10 RX ORDER — VANCOMYCIN 2 GRAM/500 ML IN 0.9 % SODIUM CHLORIDE INTRAVENOUS
25 ONCE
Status: COMPLETED | OUTPATIENT
Start: 2025-08-10 | End: 2025-08-10

## 2025-08-10 RX ORDER — SENNA AND DOCUSATE SODIUM 50; 8.6 MG/1; MG/1
2 TABLET, FILM COATED ORAL 2 TIMES DAILY
Status: DISCONTINUED | OUTPATIENT
Start: 2025-08-10 | End: 2025-08-12

## 2025-08-10 RX ORDER — ALLOPURINOL 300 MG/1
150 TABLET ORAL DAILY
COMMUNITY

## 2025-08-10 RX ORDER — ATOMOXETINE 40 MG/1
40 CAPSULE ORAL DAILY
COMMUNITY

## 2025-08-10 RX ORDER — HEPARIN SODIUM 5000 [USP'U]/ML
5000 INJECTION, SOLUTION INTRAVENOUS; SUBCUTANEOUS EVERY 8 HOURS SCHEDULED
Status: DISCONTINUED | OUTPATIENT
Start: 2025-08-10 | End: 2025-08-14

## 2025-08-10 RX ORDER — ASPIRIN 81 MG/1
81 TABLET ORAL DAILY
COMMUNITY

## 2025-08-10 RX ORDER — DEXTROSE MONOHYDRATE, SODIUM CHLORIDE, SODIUM LACTATE, CALCIUM CHLORIDE, MAGNESIUM CHLORIDE 1.5; 538; 448; 18.4; 5.08 G/100ML; MG/100ML; MG/100ML; MG/100ML; MG/100ML
5000 SOLUTION INTRAPERITONEAL
Status: DISCONTINUED | OUTPATIENT
Start: 2025-08-11 | End: 2025-08-11

## 2025-08-10 RX ORDER — POLYETHYLENE GLYCOL 3350 17 G/17G
17 POWDER, FOR SOLUTION ORAL 2 TIMES DAILY
Status: DISCONTINUED | OUTPATIENT
Start: 2025-08-11 | End: 2025-08-12

## 2025-08-10 RX ORDER — AMLODIPINE BESYLATE 10 MG/1
10 TABLET ORAL DAILY
COMMUNITY

## 2025-08-10 RX ORDER — DEXTROSE MONOHYDRATE 25 G/50ML
50 INJECTION, SOLUTION INTRAVENOUS
Status: DISCONTINUED | OUTPATIENT
Start: 2025-08-10 | End: 2025-08-14

## 2025-08-10 RX ORDER — METOCLOPRAMIDE HYDROCHLORIDE 5 MG/ML
5 INJECTION INTRAMUSCULAR; INTRAVENOUS EVERY 8 HOURS PRN
Status: DISCONTINUED | OUTPATIENT
Start: 2025-08-10 | End: 2025-08-14

## 2025-08-10 RX ORDER — HYDRALAZINE HYDROCHLORIDE 50 MG/1
25 TABLET, FILM COATED ORAL 3 TIMES DAILY
COMMUNITY

## 2025-08-10 RX ORDER — ALLOPURINOL 300 MG/1
150 TABLET ORAL DAILY
Status: DISCONTINUED | OUTPATIENT
Start: 2025-08-11 | End: 2025-08-14

## 2025-08-10 RX ORDER — ASPIRIN 81 MG/1
81 TABLET ORAL DAILY
Status: DISCONTINUED | OUTPATIENT
Start: 2025-08-11 | End: 2025-08-14

## 2025-08-10 RX ORDER — PANTOPRAZOLE SODIUM 40 MG/1
40 TABLET, DELAYED RELEASE ORAL
Status: DISCONTINUED | OUTPATIENT
Start: 2025-08-11 | End: 2025-08-14

## 2025-08-10 RX ORDER — ATOMOXETINE 40 MG/1
40 CAPSULE ORAL DAILY
Status: DISCONTINUED | OUTPATIENT
Start: 2025-08-11 | End: 2025-08-14

## 2025-08-10 RX ORDER — ONDANSETRON 2 MG/ML
4 INJECTION INTRAMUSCULAR; INTRAVENOUS ONCE
Status: DISCONTINUED | OUTPATIENT
Start: 2025-08-10 | End: 2025-08-14

## 2025-08-10 RX ORDER — PANTOPRAZOLE SODIUM 40 MG/1
40 TABLET, DELAYED RELEASE ORAL
COMMUNITY

## 2025-08-10 RX ORDER — HYDRALAZINE HYDROCHLORIDE 25 MG/1
25 TABLET, FILM COATED ORAL 3 TIMES DAILY
Status: DISCONTINUED | OUTPATIENT
Start: 2025-08-10 | End: 2025-08-14

## 2025-08-10 RX ORDER — LEVOTHYROXINE SODIUM 125 UG/1
125 TABLET ORAL
COMMUNITY

## 2025-08-10 RX ORDER — SODIUM CHLORIDE 9 MG/ML
INJECTION, SOLUTION INTRAVENOUS CONTINUOUS
Status: DISCONTINUED | OUTPATIENT
Start: 2025-08-10 | End: 2025-08-10

## 2025-08-10 RX ORDER — LEVOTHYROXINE SODIUM 125 UG/1
125 TABLET ORAL
Status: DISCONTINUED | OUTPATIENT
Start: 2025-08-11 | End: 2025-08-14

## 2025-08-10 RX ORDER — NICOTINE POLACRILEX 4 MG
15 LOZENGE BUCCAL
Status: DISCONTINUED | OUTPATIENT
Start: 2025-08-10 | End: 2025-08-14

## 2025-08-11 PROBLEM — K65.9 PERITONITIS (HCC): Status: ACTIVE | Noted: 2025-08-11

## 2025-08-11 LAB
ALBUMIN SERPL-MCNC: 3.3 G/DL (ref 3.2–4.8)
ALBUMIN/GLOB SERPL: 1.5 (ref 1–2)
ALP LIVER SERPL-CCNC: 115 U/L (ref 55–142)
ALT SERPL-CCNC: <7 U/L (ref 10–49)
ANION GAP SERPL CALC-SCNC: 11 MMOL/L (ref 0–18)
AST SERPL-CCNC: <8 U/L (ref ?–34)
BASOPHILS NFR PRT: 2 %
BILIRUB SERPL-MCNC: 1.1 MG/DL (ref 0.2–1.1)
BUN BLD-MCNC: 40 MG/DL (ref 9–23)
BUN/CREAT SERPL: 7.5 (ref 10–20)
C DIFF TOX B STL QL: NEGATIVE
CALCIUM BLD-MCNC: 7.5 MG/DL (ref 8.7–10.4)
CHLORIDE SERPL-SCNC: 94 MMOL/L (ref 98–112)
CO2 SERPL-SCNC: 25 MMOL/L (ref 21–32)
CREAT BLD-MCNC: 5.33 MG/DL (ref 0.55–1.02)
DEPRECATED RDW RBC AUTO: 47.8 FL (ref 35.1–46.3)
EGFRCR SERPLBLD CKD-EPI 2021: 8 ML/MIN/1.73M2 (ref 60–?)
EOSINOPHIL NFR PRT: 1 %
ERYTHROCYTE [DISTWIDTH] IN BLOOD BY AUTOMATED COUNT: 13.3 % (ref 11–15)
GLOBULIN PLAS-MCNC: 2.2 G/DL (ref 2–3.5)
GLUCOSE BLD-MCNC: 249 MG/DL (ref 70–99)
GLUCOSE BLDC GLUCOMTR-MCNC: 125 MG/DL (ref 70–99)
GLUCOSE BLDC GLUCOMTR-MCNC: 161 MG/DL (ref 70–99)
GLUCOSE BLDC GLUCOMTR-MCNC: 256 MG/DL (ref 70–99)
GLUCOSE BLDC GLUCOMTR-MCNC: 257 MG/DL (ref 70–99)
HCT VFR BLD AUTO: 32.2 % (ref 35–48)
HGB BLD-MCNC: 10.7 G/DL (ref 12–16)
LYMPHOCYTES NFR PRT: 12 %
MCH RBC QN AUTO: 32.8 PG (ref 26–34)
MCHC RBC AUTO-ENTMCNC: 33.2 G/DL (ref 31–37)
MCV RBC AUTO: 98.8 FL (ref 80–100)
MONOS+MACROS NFR PRT: 10 %
MRSA DNA SPEC QL NAA+PROBE: NEGATIVE
NEUTROPHILS # PRT: 4899.75 /CUMM (ref ?–250)
NEUTROPHILS NFR FLD: 75 %
OSMOLALITY SERPL CALC.SUM OF ELEC: 288 MOSM/KG (ref 275–295)
PLATELET # BLD AUTO: 167 10(3)UL (ref 150–450)
POTASSIUM SERPL-SCNC: 3.8 MMOL/L (ref 3.5–5.1)
PROT SERPL-MCNC: 5.5 G/DL (ref 5.7–8.2)
RBC # BLD AUTO: 3.26 X10(6)UL (ref 3.8–5.3)
RBC # FLD: 246 /CUMM (ref ?–1)
SODIUM SERPL-SCNC: 130 MMOL/L (ref 136–145)
TOTAL CELLS COUNTED FLD: 100
TOTAL CELLS COUNTED PRT: 6533 /CUMM (ref ?–1)
WBC # BLD AUTO: 12.8 X10(3) UL (ref 4–11)
WBC # PRT: 6533 /CUMM

## 2025-08-11 PROCEDURE — 99291 CRITICAL CARE FIRST HOUR: CPT | Performed by: HOSPITALIST

## 2025-08-11 PROCEDURE — 99223 1ST HOSP IP/OBS HIGH 75: CPT | Performed by: INTERNAL MEDICINE

## 2025-08-11 RX ORDER — ALBUMIN HUMAN 50 G/1000ML
25 SOLUTION INTRAVENOUS ONCE
Status: COMPLETED | OUTPATIENT
Start: 2025-08-11 | End: 2025-08-11

## 2025-08-11 RX ORDER — DEXTROSE MONOHYDRATE, SODIUM CHLORIDE, SODIUM LACTATE, CALCIUM CHLORIDE, MAGNESIUM CHLORIDE 1.5; 538; 448; 18.4; 5.08 G/100ML; MG/100ML; MG/100ML; MG/100ML; MG/100ML
10000 SOLUTION INTRAPERITONEAL
Status: COMPLETED | OUTPATIENT
Start: 2025-08-11 | End: 2025-08-11

## 2025-08-12 LAB
ALBUMIN SERPL-MCNC: 3 G/DL (ref 3.2–4.8)
ALBUMIN/GLOB SERPL: 1.4 (ref 1–2)
ALP LIVER SERPL-CCNC: 102 U/L (ref 55–142)
ALT SERPL-CCNC: <7 U/L (ref 10–49)
ANION GAP SERPL CALC-SCNC: 12 MMOL/L (ref 0–18)
AST SERPL-CCNC: <8 U/L (ref ?–34)
BILIRUB SERPL-MCNC: 0.5 MG/DL (ref 0.2–1.1)
BUN BLD-MCNC: 30 MG/DL (ref 9–23)
BUN/CREAT SERPL: 6.9 (ref 10–20)
CALCIUM BLD-MCNC: 7.3 MG/DL (ref 8.7–10.4)
CHLORIDE SERPL-SCNC: 92 MMOL/L (ref 98–112)
CO2 SERPL-SCNC: 26 MMOL/L (ref 21–32)
CREAT BLD-MCNC: 4.32 MG/DL (ref 0.55–1.02)
DEPRECATED RDW RBC AUTO: 44.7 FL (ref 35.1–46.3)
EGFRCR SERPLBLD CKD-EPI 2021: 10 ML/MIN/1.73M2 (ref 60–?)
ERYTHROCYTE [DISTWIDTH] IN BLOOD BY AUTOMATED COUNT: 13.1 % (ref 11–15)
GLOBULIN PLAS-MCNC: 2.2 G/DL (ref 2–3.5)
GLUCOSE BLD-MCNC: 202 MG/DL (ref 70–99)
GLUCOSE BLDC GLUCOMTR-MCNC: 173 MG/DL (ref 70–99)
GLUCOSE BLDC GLUCOMTR-MCNC: 190 MG/DL (ref 70–99)
GLUCOSE BLDC GLUCOMTR-MCNC: 197 MG/DL (ref 70–99)
GLUCOSE BLDC GLUCOMTR-MCNC: 220 MG/DL (ref 70–99)
GLUCOSE BLDC GLUCOMTR-MCNC: 321 MG/DL (ref 70–99)
HCT VFR BLD AUTO: 27.9 % (ref 35–48)
HGB BLD-MCNC: 9.4 G/DL (ref 12–16)
MCH RBC QN AUTO: 31.9 PG (ref 26–34)
MCHC RBC AUTO-ENTMCNC: 33.7 G/DL (ref 31–37)
MCV RBC AUTO: 94.6 FL (ref 80–100)
OSMOLALITY SERPL CALC.SUM OF ELEC: 282 MOSM/KG (ref 275–295)
PLATELET # BLD AUTO: 172 10(3)UL (ref 150–450)
POTASSIUM SERPL-SCNC: 3 MMOL/L (ref 3.5–5.1)
PROT SERPL-MCNC: 5.2 G/DL (ref 5.7–8.2)
RBC # BLD AUTO: 2.95 X10(6)UL (ref 3.8–5.3)
SODIUM SERPL-SCNC: 130 MMOL/L (ref 136–145)
VANCOMYCIN SERPL-MCNC: 18.8 UG/ML (ref ?–40)
WBC # BLD AUTO: 8.1 X10(3) UL (ref 4–11)

## 2025-08-12 PROCEDURE — 99233 SBSQ HOSP IP/OBS HIGH 50: CPT | Performed by: INTERNAL MEDICINE

## 2025-08-12 RX ORDER — DEXTROSE MONOHYDRATE, SODIUM CHLORIDE, SODIUM LACTATE, CALCIUM CHLORIDE, MAGNESIUM CHLORIDE 2.5; 538; 448; 18.4; 5.08 G/100ML; MG/100ML; MG/100ML; MG/100ML; MG/100ML
10000 SOLUTION INTRAPERITONEAL
Status: DISCONTINUED | OUTPATIENT
Start: 2025-08-12 | End: 2025-08-14

## 2025-08-12 RX ORDER — DEXTROSE MONOHYDRATE, SODIUM CHLORIDE, SODIUM LACTATE, CALCIUM CHLORIDE, MAGNESIUM CHLORIDE 2.5; 538; 448; 18.4; 5.08 G/100ML; MG/100ML; MG/100ML; MG/100ML; MG/100ML
10000 SOLUTION INTRAPERITONEAL
Status: DISCONTINUED | OUTPATIENT
Start: 2025-08-12 | End: 2025-08-12

## 2025-08-12 RX ORDER — LOPERAMIDE HYDROCHLORIDE 2 MG/1
2 CAPSULE ORAL 4 TIMES DAILY PRN
Status: DISCONTINUED | OUTPATIENT
Start: 2025-08-12 | End: 2025-08-14

## 2025-08-12 RX ORDER — POTASSIUM CHLORIDE 1500 MG/1
40 TABLET, EXTENDED RELEASE ORAL ONCE
Status: COMPLETED | OUTPATIENT
Start: 2025-08-12 | End: 2025-08-12

## 2025-08-13 LAB
ALBUMIN SERPL-MCNC: 3.2 G/DL (ref 3.2–4.8)
ALBUMIN/GLOB SERPL: 1.5 (ref 1–2)
ALP LIVER SERPL-CCNC: 103 U/L (ref 55–142)
ALT SERPL-CCNC: <7 U/L (ref 10–49)
ANION GAP SERPL CALC-SCNC: 10 MMOL/L (ref 0–18)
AST SERPL-CCNC: <8 U/L (ref ?–34)
BILIRUB SERPL-MCNC: 0.3 MG/DL (ref 0.2–1.1)
BUN BLD-MCNC: 26 MG/DL (ref 9–23)
BUN/CREAT SERPL: 7 (ref 10–20)
CALCIUM BLD-MCNC: 7.4 MG/DL (ref 8.7–10.4)
CHLORIDE SERPL-SCNC: 93 MMOL/L (ref 98–112)
CO2 SERPL-SCNC: 28 MMOL/L (ref 21–32)
CREAT BLD-MCNC: 3.69 MG/DL (ref 0.55–1.02)
DEPRECATED RDW RBC AUTO: 44.9 FL (ref 35.1–46.3)
EGFRCR SERPLBLD CKD-EPI 2021: 12 ML/MIN/1.73M2 (ref 60–?)
ERYTHROCYTE [DISTWIDTH] IN BLOOD BY AUTOMATED COUNT: 13.1 % (ref 11–15)
GLOBULIN PLAS-MCNC: 2.2 G/DL (ref 2–3.5)
GLUCOSE BLD-MCNC: 336 MG/DL (ref 70–99)
GLUCOSE BLDC GLUCOMTR-MCNC: 105 MG/DL (ref 70–99)
GLUCOSE BLDC GLUCOMTR-MCNC: 263 MG/DL (ref 70–99)
GLUCOSE BLDC GLUCOMTR-MCNC: 290 MG/DL (ref 70–99)
GLUCOSE BLDC GLUCOMTR-MCNC: 308 MG/DL (ref 70–99)
HCT VFR BLD AUTO: 28.9 % (ref 35–48)
HGB BLD-MCNC: 9.7 G/DL (ref 12–16)
MCH RBC QN AUTO: 31.6 PG (ref 26–34)
MCHC RBC AUTO-ENTMCNC: 33.6 G/DL (ref 31–37)
MCV RBC AUTO: 94.1 FL (ref 80–100)
OSMOLALITY SERPL CALC.SUM OF ELEC: 290 MOSM/KG (ref 275–295)
PLATELET # BLD AUTO: 182 10(3)UL (ref 150–450)
POTASSIUM SERPL-SCNC: 2.9 MMOL/L (ref 3.5–5.1)
PROT SERPL-MCNC: 5.4 G/DL (ref 5.7–8.2)
RBC # BLD AUTO: 3.07 X10(6)UL (ref 3.8–5.3)
SODIUM SERPL-SCNC: 131 MMOL/L (ref 136–145)
WBC # BLD AUTO: 5.4 X10(3) UL (ref 4–11)

## 2025-08-13 PROCEDURE — 99233 SBSQ HOSP IP/OBS HIGH 50: CPT | Performed by: INTERNAL MEDICINE

## 2025-08-13 RX ORDER — INSULIN DEGLUDEC 100 U/ML
10 INJECTION, SOLUTION SUBCUTANEOUS ONCE
Status: COMPLETED | OUTPATIENT
Start: 2025-08-13 | End: 2025-08-13

## 2025-08-13 RX ORDER — POTASSIUM CHLORIDE 1500 MG/1
40 TABLET, EXTENDED RELEASE ORAL ONCE
Status: COMPLETED | OUTPATIENT
Start: 2025-08-13 | End: 2025-08-13

## 2025-08-13 RX ORDER — INSULIN DEGLUDEC 100 U/ML
10 INJECTION, SOLUTION SUBCUTANEOUS NIGHTLY
Status: DISCONTINUED | OUTPATIENT
Start: 2025-08-13 | End: 2025-08-14

## 2025-08-13 RX ORDER — DEXTROSE MONOHYDRATE, SODIUM CHLORIDE, SODIUM LACTATE, CALCIUM CHLORIDE, MAGNESIUM CHLORIDE 2.5; 538; 448; 18.4; 5.08 G/100ML; MG/100ML; MG/100ML; MG/100ML; MG/100ML
10000 SOLUTION INTRAPERITONEAL
Status: COMPLETED | OUTPATIENT
Start: 2025-08-13 | End: 2025-08-13

## 2025-08-14 VITALS
RESPIRATION RATE: 18 BRPM | SYSTOLIC BLOOD PRESSURE: 119 MMHG | HEIGHT: 63 IN | DIASTOLIC BLOOD PRESSURE: 56 MMHG | WEIGHT: 160.25 LBS | TEMPERATURE: 99 F | BODY MASS INDEX: 28.39 KG/M2 | HEART RATE: 69 BPM | OXYGEN SATURATION: 96 %

## 2025-08-14 LAB
ALBUMIN SERPL-MCNC: 3 G/DL (ref 3.2–4.8)
ALBUMIN/GLOB SERPL: 1.4 (ref 1–2)
ALP LIVER SERPL-CCNC: 94 U/L (ref 55–142)
ALT SERPL-CCNC: <7 U/L (ref 10–49)
ANION GAP SERPL CALC-SCNC: 9 MMOL/L (ref 0–18)
AST SERPL-CCNC: 9 U/L (ref ?–34)
BILIRUB SERPL-MCNC: 0.3 MG/DL (ref 0.2–1.1)
BUN BLD-MCNC: 26 MG/DL (ref 9–23)
BUN/CREAT SERPL: 7.7 (ref 10–20)
CALCIUM BLD-MCNC: 7.8 MG/DL (ref 8.7–10.4)
CHLORIDE SERPL-SCNC: 97 MMOL/L (ref 98–112)
CO2 SERPL-SCNC: 30 MMOL/L (ref 21–32)
CREAT BLD-MCNC: 3.39 MG/DL (ref 0.55–1.02)
DEPRECATED RDW RBC AUTO: 46 FL (ref 35.1–46.3)
EGFRCR SERPLBLD CKD-EPI 2021: 14 ML/MIN/1.73M2 (ref 60–?)
ERYTHROCYTE [DISTWIDTH] IN BLOOD BY AUTOMATED COUNT: 13.1 % (ref 11–15)
GLOBULIN PLAS-MCNC: 2.2 G/DL (ref 2–3.5)
GLUCOSE BLD-MCNC: 275 MG/DL (ref 70–99)
GLUCOSE BLDC GLUCOMTR-MCNC: 228 MG/DL (ref 70–99)
GLUCOSE BLDC GLUCOMTR-MCNC: 264 MG/DL (ref 70–99)
HCT VFR BLD AUTO: 28.2 % (ref 35–48)
HGB BLD-MCNC: 9.4 G/DL (ref 12–16)
MCH RBC QN AUTO: 31.9 PG (ref 26–34)
MCHC RBC AUTO-ENTMCNC: 33.3 G/DL (ref 31–37)
MCV RBC AUTO: 95.6 FL (ref 80–100)
OSMOLALITY SERPL CALC.SUM OF ELEC: 297 MOSM/KG (ref 275–295)
PLATELET # BLD AUTO: 199 10(3)UL (ref 150–450)
POTASSIUM SERPL-SCNC: 3.5 MMOL/L (ref 3.5–5.1)
PROT SERPL-MCNC: 5.2 G/DL (ref 5.7–8.2)
RBC # BLD AUTO: 2.95 X10(6)UL (ref 3.8–5.3)
SODIUM SERPL-SCNC: 136 MMOL/L (ref 136–145)
VANCOMYCIN SERPL-MCNC: 23.5 UG/ML (ref ?–40)
WBC # BLD AUTO: 5.3 X10(3) UL (ref 4–11)

## 2025-08-14 PROCEDURE — 99233 SBSQ HOSP IP/OBS HIGH 50: CPT | Performed by: INTERNAL MEDICINE

## 2025-08-28 ENCOUNTER — ORDER TRANSCRIPTION (OUTPATIENT)
Dept: ADMINISTRATIVE | Facility: HOSPITAL | Age: 70
End: 2025-08-28

## 2025-08-28 DIAGNOSIS — Z12.31 ENCOUNTER FOR SCREENING MAMMOGRAM FOR MALIGNANT NEOPLASM OF BREAST: Primary | ICD-10-CM

## (undated) DEVICE — [HIGH FLOW INSUFFLATOR,  DO NOT USE IF PACKAGE IS DAMAGED,  KEEP DRY,  KEEP AWAY FROM SUNLIGHT,  PROTECT FROM HEAT AND RADIOACTIVE SOURCES.]: Brand: PNEUMOSURE

## (undated) DEVICE — 3M™ IOBAN™ 2 ANTIMICROBIAL INCISE DRAPE 6650EZ: Brand: IOBAN™ 2

## (undated) DEVICE — LAP CHOLE: Brand: MEDLINE INDUSTRIES, INC.

## (undated) DEVICE — SOLUTION IRRIG 1000ML 0.9% NACL USP BTL

## (undated) DEVICE — SOLUTION IV 1000ML 0.9% NACL PRESERVATIVE

## (undated) DEVICE — SUTURE ETHLN SZ 2-0 L18IN NONABSORB BLK

## (undated) DEVICE — TROCARS: Brand: KII® OPTICAL ACCESS SYSTEM

## (undated) DEVICE — PERITONEAL DIALYSIS CATHETER, SWAN NECK CURL CATH, 2 CUFFS RIGHT: Brand: ARGYLE

## (undated) DEVICE — BIOPATCH™ ANTIMICROBIAL DRESSING WITH CHLORHEXIDINE GLUCONATE IS A HYDROPHILLIC POLYURETHANE ABSORPTIVE FOAM WITH CHLORHEXIDINE GLUCONATE (CHG) WHICH INHIBITS BACTERIAL GROWTH UNDER THE DRESSING. THE DRESSING IS INTENDED TO BE USED TO ABSORB EXUDATE, COVER A WOUND CAUSED BY VASCULAR AND NONVASCULAR PERCUTANEOUS MEDICAL DEVICES DURING SURGERY, AS WELL AS REDUCE LOCAL INFECTION AND COLONIZATION OF MICROORGANISMS.: Brand: BIOPATCH

## (undated) DEVICE — STAY.SAFE® CATHETER EXTENSION SET WITH LUER-LOCK, 18 INCH: Brand: STAY.SAFE®

## (undated) DEVICE — UNDYED BRAIDED (POLYGLACTIN 910), SYNTHETIC ABSORBABLE SUTURE: Brand: COATED VICRYL

## (undated) DEVICE — 20 ML SYRINGE LUER-LOCK TIP: Brand: MONOJECT

## (undated) DEVICE — DISPOSABLE SUCTION/IRRIGATOR TUBE SET: Brand: AHTO

## (undated) DEVICE — TROCAR: Brand: KII FIOS FIRST ENTRY

## (undated) DEVICE — INSUFFLATION NEEDLE TO ESTABLISH PNEUMOPERITONEUM.: Brand: INSUFFLATION NEEDLE

## (undated) DEVICE — ADHESIVE SKIN TOP FOR WND CLSR DERMBND ADV

## (undated) DEVICE — TROCAR: Brand: KII® SLEEVE

## (undated) DEVICE — GAMMEX® PI HYBRID SIZE 7.5, STERILE POWDER-FREE SURGICAL GLOVE, POLYISOPRENE AND NEOPRENE BLEND: Brand: GAMMEX

## (undated) NOTE — LETTER
[facility] [address], [address]  Consent for Procedure/Sedation    Date: 09/18/2017     Time: 2030      1.  I authorize the performance upon Cassy Patterson the following:cardiac catheterization, left ventricular cineangiography, period, the physician will determine when the applicable recovery period ends for purposes of reinstating the Do Not Resuscitate (DNR) order.     Signature of Patient: ____________________________________________________    Signature of person authorized

## (undated) NOTE — LETTER
[address], [place]  Authorization for Invasive Procedures                 I hereby authorize Adithya Rouse, my physician and his/her assistants (if applicable), which may include medical students, residents, and/or fellows, to perform the following surgical operation/ procedure and administer such anesthesia as may be determined necessary by my physician: Central line - dialysis catheter insertion on [address]  2. I recognize that during the surgical operation/procedure, unforeseen conditions may necessitate additional or different procedures than those listed above. I, therefore, further authorize and request that the above-named surgeon, assistants, or designees perform such procedures as are, in their judgment, necessary and desirable. 3.   My surgeon/physician has discussed prior to my surgery the potential benefits, risks and side effects of this procedure; the likelihood of achieving goals; and potential problems that might occur during recuperation. They also discussed reasonable alternatives to the procedure, including risks, benefits, and side effects related to the alternatives and risks related to not receiving this procedure. I have had all my questions answered and I acknowledge that no guarantee has been made as to the result that may be obtained. 4.   Should the need arise during my operation/procedure, which includes change of level of care prior to discharge, I also consent to the administration of blood and/or blood products. Further, I understand that despite careful testing and screening of blood or blood products by collecting agencies, I may still be subject to ill effects as a result of receiving a blood transfusion and/or blood products.   The following are some, but not all, of the potential risks that can occur: fever and allergic reactions, hemolytic reactions, transmission of diseases such as Hepatitis, AIDS and Cytomegalovirus (CMV) and fluid overload. In the event that I wish to have an autologous transfusion of my own blood, or a directed donor transfusion, I will discuss this with my physician. Check only if Refusing Blood or Blood Products  I understand refusal of blood or blood products as deemed necessary by my physician may have serious consequences to my condition to include possible death. I hereby assume responsibility for my refusal and release the hospital, its personnel, and my physicians from any responsibility for the consequences of my refusal.         o  Refuse         5. I authorize the use of any specimen, organs, tissues, body parts or foreign objects that may be removed from my body during the operation/procedure for diagnosis, research or teaching purposes and their subsequent disposal by hospital authorities. I also authorize the release of specimen test results and/or written reports to my treating physician on the hospital medical staff or other referring or consulting physicians involved in my care, at the discretion of the Pathologist or my treating physician. 6.   I consent to the photographing or videotaping of the operations or procedures to be performed, including appropriate portions of my body for medical, scientific, or educational purposes, provided my identity is not revealed by the pictures or by descriptive texts accompanying them. If the procedure has been photographed/videotaped, the surgeon will obtain the original picture, image, videotape or CD. The hospital will not be responsible for storage, release or maintenance of the picture, image, tape or CD.    7.   I consent to the presence of a  or observers in the operating room as deemed necessary by my physician or their designees. 8.   I recognize that in the event my procedure results in extended X-Ray/fluoroscopy time, I may develop a skin reaction. 9.  If I have a Do Not Attempt Resuscitation (DNAR) order in place, that status will be suspended while in the operating room, procedural suite, and during the recovery period unless otherwise explicitly stated by me (or a person authorized to consent on my behalf). The surgeon or my attending physician will determine when the applicable recovery period ends for purposes of reinstating the DNAR order. 10. Patients having a sterilization procedure: I understand that if the procedure is successful the results will be permanent and it will therefore be impossible for me to inseminate, conceive, or bear children. I also understand that the procedure is intended to result in sterility, although the result has not been guaranteed. 11. I acknowledge that my physician has explained sedation/analgesia administration to me including the risk and benefits I consent to the administration of sedation/analgesia as may be necessary or desirable in the judgment of my physician. I CERTIFY THAT I HAVE READ AND FULLY UNDERSTAND THE ABOVE CONSENT TO OPERATION and/or OTHER PROCEDURE.        ____________________________________       _________________________________      ______________________________  Signature of Patient         Signature of Responsible Person        Printed Name of Responsible Person        ____________________________________      _________________________________      ______________________________       Signature of Witness          Relationship to Patient                       Date                                       Time    Patient Name: Faith Silva     : 1955                 Printed: 2023      Medical Record #: [id number]                      Page 1 of 2          [place] My signature below affirms that prior to the time of the procedure; I have explained to the patient and/or his/her legal representative, the risks and benefits involved in the proposed treatment and any reasonable alternative to the proposed treatment.  I have also explained the risks and benefits involved in refusal of the proposed treatment and alternatives to the proposed treatment and have answered the patient's questions.  If I have a significant financial interest in a co-management agreement or a significant financial interest in any product or implant, or other significant relationship used in this procedure/surgery, I have disclosed this and had a discussion with my patient.     _______________________________________________________________ _____________________________  Judie Condon of Physician)                                                                                         (Date)                                   (Time)    Patient Name: Brielle Levy     : 1955                 Printed: 2023      Medical Record #: [id number]                      Page 2 of 2

## (undated) NOTE — LETTER
No referring provider defined for this encounter.       12/06/24        Patient: Cassy Patterson   YOB: 1955   Date of Visit: 12/6/2024       Dear  Dr. Olivia Wong,      Thank you for referring Cassy Patterson to my practice.  Please find my assessment and plan below.      Will increase atomoxetine. She is aware it will raise her SBP, particularly when she is supine.  Will add nifedipine PRN. She would be on 2 calcium channel blockers, which can make constipation worse.                  Sincerely,   Sohail Galdamez DO   [address] [id number]    Document electronically generated by:  Sohail Galdamez DO

## (undated) NOTE — ED AVS SNAPSHOT
Vaishali Ricardo   MRN: [id number]    Department:  [facility] Emergency Department   Date of Visit:  7/21/2019           Disclosure     Insurance plans vary and the physician(s) referred by the ER may not be covered by your plan.  Please contact within the next three months to obtain basic health screening including reassessment of your blood pressure.     IF THERE IS ANY CHANGE OR WORSENING OF YOUR CONDITION, CALL YOUR PRIMARY CARE PHYSICIAN AT ONCE OR RETURN IMMEDIATELY TO THE EMERGENCY DEPARTMEN

## (undated) NOTE — ED AVS SNAPSHOT
Rosalia Miller   MRN: [id number]    Department:  [facility] Emergency Department   Date of Visit:  9/2/2018           Disclosure     Insurance plans vary and the physician(s) referred by the ER may not be covered by your plan.  Please contact y CARE PHYSICIAN AT ONCE OR RETURN IMMEDIATELY TO THE EMERGENCY DEPARTMENT. If you have been prescribed any medication(s), please fill your prescription right away and begin taking the medication(s) as directed.   If you believe that any of the medications

## (undated) NOTE — LETTER
[address], [place]  Authorization for Invasive Procedures  Date: 09/20/2023           Time: 1717    I hereby authorize Dr. Elana Sweeney, my physician and his/her assistants (if applicable), which may include medical students, residents, and/or fellows, to perform the following surgical operation/ procedure and administer such anesthesia as may be determined necessary by my physician:  Operation/Procedure name (s)  Cardiac Catheterization, Left Ventricular Cineangiography, Bilateral Selective Coronary Angiography and/or Right Heart Catheterization; possible Percutaneous Transluminal Coronary Angioplasty, Coronary Atherectomy, Coronary Stent, Intracoronary Thrombolytic therapy, Antiplatelet therapy and/or Intravascular Ultrasound on Cassy A Gurnack   2. I recognize that during the surgical operation/procedure, unforeseen conditions may necessitate additional or different procedures than those listed above. I, therefore, further authorize and request that the above-named surgeon, assistants, or designees perform such procedures as are, in their judgment, necessary and desirable. 3.   My surgeon/physician has discussed prior to my surgery the potential benefits, risks and side effects of this procedure; the likelihood of achieving goals; and potential problems that might occur during recuperation. They also discussed reasonable alternatives to the procedure, including risks, benefits, and side effects related to the alternatives and risks related to not receiving this procedure. I have had all my questions answered and I acknowledge that no guarantee has been made as to the result that may be obtained. 4.   Should the need arise during my operation/procedure, which includes change of level of care prior to discharge, I also consent to the administration of blood and/or blood products.   Further, I understand that despite careful testing and screening of blood or blood products by collecting agencies, I may still be subject to ill effects as a result of receiving a blood transfusion and/or blood products. The following are some, but not all, of the potential risks that can occur: fever and allergic reactions, hemolytic reactions, transmission of diseases such as Hepatitis, AIDS and Cytomegalovirus (CMV) and fluid overload. In the event that I wish to have an autologous transfusion of my own blood, or a directed donor transfusion, I will discuss this with my physician. Check only if Refusing Blood or Blood Products  I understand refusal of blood or blood products as deemed necessary by my physician may have serious consequences to my condition to include possible death. I hereby assume responsibility for my refusal and release the hospital, its personnel, and my physicians from any responsibility for the consequences of my refusal.          o  Refuse      5. I authorize the use of any specimen, organs, tissues, body parts or foreign objects that may be removed from my body during the operation/procedure for diagnosis, research or teaching purposes and their subsequent disposal by hospital authorities. I also authorize the release of specimen test results and/or written reports to my treating physician on the hospital medical staff or other referring or consulting physicians involved in my care, at the discretion of the Pathologist or my treating physician. 6.   I consent to the photographing or videotaping of the operations or procedures to be performed, including appropriate portions of my body for medical, scientific, or educational purposes, provided my identity is not revealed by the pictures or by descriptive texts accompanying them. If the procedure has been photographed/videotaped, the surgeon will obtain the original picture, image, videotape or CD.   The hospital will not be responsible for storage, release or maintenance of the picture, image, tape or CD.    7.   I consent to the presence of a  or observers in the operating room as deemed necessary by my physician or their designees. 8.   I recognize that in the event my procedure results in extended X-Ray/fluoroscopy time, I may develop a skin reaction. 9. If I have a Do Not Attempt Resuscitation (DNAR) order in place, that status will be suspended while in the operating room, procedural suite, and during the recovery period unless otherwise explicitly stated by me (or a person authorized to consent on my behalf). The surgeon or my attending physician will determine when the applicable recovery period ends for purposes of reinstating the DNAR order. 10. Patients having a sterilization procedure: I understand that if the procedure is successful the results will be permanent and it will therefore be impossible for me to inseminate, conceive, or bear children. I also understand that the procedure is intended to result in sterility, although the result has not been guaranteed. 11. I acknowledge that my physician has explained sedation/analgesia administration to me including the risk and benefits I consent to the administration of sedation/analgesia as may be necessary or desirable in the judgment of my physician.     I CERTIFY THAT I HAVE READ AND FULLY UNDERSTAND THE ABOVE CONSENT TO OPERATION and/or OTHER PROCEDURE.        ____________________________________       _________________________________      ______________________________  Signature of Patient         Signature of Responsible Person        Printed Name of Responsible Person    ____________________________________      _________________________________      ______________________________       Signature of Witness          Relationship to Patient                       Date                                       Time  Patient Name: Elina Viramontes     : 1955                 Printed: 2023      Medical Record #: [id number] Page 1 of 2           STATEMENT OF PHYSICIAN My signature below affirms that prior to the time of the procedure; I have explained to the patient and/or his/her legal representative, the risks and benefits involved in the proposed treatment and any reasonable alternative to the proposed treatment. I have also explained the risks and benefits involved in refusal of the proposed treatment and alternatives to the proposed treatment and have answered the patient's questions.  If I have a significant financial interest in a co-management agreement or a significant financial interest in any product or implant, or other significant relationship used in this procedure/surgery, I have disclosed this and had a discussion with my patient.     _______________________________________________________________ _____________________________  Rosaura Chamorro of Physician)                                                                                         (Date)                                   (Time)  Patient Name: Keshia Najera     : 1955                 Printed: 2023      Medical Record #: [id number]                      Page 2 of 2

## (undated) NOTE — LETTER
[facility] [address], [place], [place]  Authorization for Surgical Operation and Procedure                                                                                           I hereby authorize *Dr Hines/Dr Peña/Trevor GARCIA*, my physician and his/her assistants (if applicable), which may include medical students, residents, and/or fellows, to perform the following surgical operation/ procedure and administer such anesthesia as may be determined necessary by my physician: Tunneled hemodialysis catheter removal on Cassy Patterson   2.   I recognize that during the surgical operation/procedure, unforeseen conditions may necessitate additional or different procedures than those listed above.  I, therefore, further authorize and request that the above-named surgeon, assistants, or designees perform such procedures as are, in their judgment, necessary and desirable.    3.   My surgeon/physician has discussed prior to my surgery the potential benefits, risks and side effects of this procedure; the likelihood of achieving goals; and potential problems that might occur during recuperation.  They also discussed reasonable alternatives to the procedure, including risks, benefits, and side effects related to the alternatives and risks related to not receiving this procedure.  I have had all my questions answered and I acknowledge that no guarantee has been made as to the result that may be obtained.    4.   Should the need arise during my operation/procedure, which includes change of level of care prior to discharge, I also consent to the administration of blood and/or blood products.  Further, I understand that despite careful testing and screening of blood or blood products by collecting agencies, I may still be subject to ill effects as a result of receiving a blood transfusion and/or blood products.  The following are some, but not all, of the potential risks that can occur: fever and  allergic reactions, hemolytic reactions, transmission of diseases such as Hepatitis, AIDS and Cytomegalovirus (CMV) and fluid overload.  In the event that I wish to have an autologous transfusion of my own blood, or a directed donor transfusion, I will discuss this with my physician.  Check only if Refusing Blood or Blood Products  I understand refusal of blood or blood products as deemed necessary by my physician may have serious consequences to my condition to include possible death. I hereby assume responsibility for my refusal and release the hospital, its personnel, and my physicians from any responsibility for the consequences of my refusal.    o  Refuse   5.   I authorize the use of any specimen, organs, tissues, body parts or foreign objects that may be removed from my body during the operation/procedure for diagnosis, research or teaching purposes and their subsequent disposal by hospital authorities.  I also authorize the release of specimen test results and/or written reports to my treating physician on the hospital medical staff or other referring or consulting physicians involved in my care, at the discretion of the Pathologist or my treating physician.    6.   I consent to the photographing or videotaping of the operations or procedures to be performed, including appropriate portions of my body for medical, scientific, or educational purposes, provided my identity is not revealed by the pictures or by descriptive texts accompanying them.  If the procedure has been photographed/videotaped, the surgeon will obtain the original picture, image, videotape or CD.  The hospital will not be responsible for storage, release or maintenance of the picture, image, tape or CD.    7.   I consent to the presence of a  or observers in the operating room as deemed necessary by my physician or their designees.    8.   I recognize that in the event my procedure results in extended X-Ray/fluoroscopy  time, I may develop a skin reaction.    9. If I have a Do Not Attempt Resuscitation (DNAR) order in place, that status will be suspended while in the operating room, procedural suite, and during the recovery period unless otherwise explicitly stated by me (or a person authorized to consent on my behalf). The surgeon or my attending physician will determine when the applicable recovery period ends for purposes of reinstating the DNAR order.  10. Patients having a sterilization procedure: I understand that if the procedure is successful the results will be permanent and it will therefore be impossible for me to inseminate, conceive, or bear children.  I also understand that the procedure is intended to result in sterility, although the result has not been guaranteed.   11. I acknowledge that my physician has explained sedation/analgesia administration to me including the risk and benefits I consent to the administration of sedation/analgesia as may be necessary or desirable in the judgment of my physician.    I CERTIFY THAT I HAVE READ AND FULLY UNDERSTAND THE ABOVE CONSENT TO OPERATION and/or OTHER PROCEDURE.     _________________________________________ _________________________________     ___________________________________  Signature of Patient     Signature of Responsible Person                   Printed Name of Responsible Person                              _________________________________________ ______________________________        ___________________________________  Signature of Witness         Date  Time         Relationship to Patient    STATEMENT OF PHYSICIAN My signature below affirms that prior to the time of the procedure; I have explained to the patient and/or his/her legal representative, the risks and benefits involved in the proposed treatment and any reasonable alternative to the proposed treatment. I have also explained the risks and benefits involved in refusal of the proposed treatment and  alternatives to the proposed treatment and have answered the patient's questions. If I have a significant financial interest in a co-management agreement or a significant financial interest in any product or implant, or other significant relationship used in this procedure/surgery, I have disclosed this and had a discussion with my patient.     _______________________________________________________________ _____________________________  (Signature of Physician)                                                                                         (Date)                                   (Time)  Patient Name: Cassy WESLEY Patterson    : 1955   Printed: 2024      Medical Record #: [id number]                                              Page 1 of 1